# Patient Record
Sex: FEMALE | Race: BLACK OR AFRICAN AMERICAN | NOT HISPANIC OR LATINO | Employment: OTHER | URBAN - METROPOLITAN AREA
[De-identification: names, ages, dates, MRNs, and addresses within clinical notes are randomized per-mention and may not be internally consistent; named-entity substitution may affect disease eponyms.]

---

## 2017-01-03 RX ORDER — TIZANIDINE 2 MG/1
TABLET ORAL
Qty: 60 TABLET | Refills: 0 | Status: SHIPPED | OUTPATIENT
Start: 2017-01-03 | End: 2017-02-01 | Stop reason: SDUPTHER

## 2017-01-11 ENCOUNTER — TELEPHONE (OUTPATIENT)
Dept: SLEEP MEDICINE | Facility: CLINIC | Age: 52
End: 2017-01-11

## 2017-01-13 ENCOUNTER — HOSPITAL ENCOUNTER (EMERGENCY)
Facility: OTHER | Age: 52
Discharge: HOME | End: 2017-01-13
Attending: EMERGENCY MEDICINE
Payer: MEDICAID

## 2017-01-13 VITALS
BODY MASS INDEX: 38.99 KG/M2 | HEART RATE: 68 BPM | DIASTOLIC BLOOD PRESSURE: 79 MMHG | WEIGHT: 234 LBS | TEMPERATURE: 99 F | SYSTOLIC BLOOD PRESSURE: 146 MMHG | OXYGEN SATURATION: 97 % | HEIGHT: 65 IN | RESPIRATION RATE: 20 BRPM

## 2017-01-13 DIAGNOSIS — J02.9 VIRAL PHARYNGITIS: ICD-10-CM

## 2017-01-13 DIAGNOSIS — J06.9 VIRAL URI: Primary | ICD-10-CM

## 2017-01-13 LAB — DEPRECATED S PYO AG THROAT QL EIA: NEGATIVE

## 2017-01-13 PROCEDURE — 87081 CULTURE SCREEN ONLY: CPT

## 2017-01-13 PROCEDURE — 87880 STREP A ASSAY W/OPTIC: CPT

## 2017-01-13 PROCEDURE — 99283 EMERGENCY DEPT VISIT LOW MDM: CPT

## 2017-01-13 NOTE — ED AVS SNAPSHOT
[facility]  [address]  [facility] [address]               Batool Rivera   2017  8:55 AM   ED    Description:  Female : 1965   Department:  [facility]           Your Care was Coordinated By:     Provider Role From To    Danny Travis MD Attending Provider 17 0913 --    Lindsey Guadarrama PA-C Physician Assistant 17 0859 --      Reason for Visit     Sore Throat           Diagnoses this Visit        Comments    Viral URI    -  Primary     Viral pharyngitis           ED Disposition     None           To Do List           Follow-up Information     Follow up with Karan James MD In 2 days.    Specialty:  Internal Medicine    Contact information:    [address]  [facility] [address]  [phone]        [facility]     [facility] -  Assistance  Registered nurses in the [facility] provide clinical advisement, health education, appointment booking, and other advisory services.  Call for this free service at [phone].             Medications           Message regarding Medications     Verify the changes and/or additions to your medication regime listed below are the same as discussed with your clinician today.  If any of these changes or additions are incorrect, please notify your healthcare provider.        STOP taking these medications     fluticasone (FLONASE) 50 mcg/actuation nasal spray 1 spray by Each Nare route 2 (two) times daily as needed for Rhinitis.    ibuprofen (ADVIL,MOTRIN) 800 MG tablet Take 1 tablet (800 mg total) by mouth every 6 (six) hours as needed for Pain.    ciclopirox (PENLAC) 8 % Soln            Verify that the below list of medications is an accurate representation of the medications you are currently taking.  If none reported, the list may be blank. If incorrect, please contact your healthcare provider. Carry this list with you in  "case of emergency.           Current Medications     amlodipine (NORVASC) 5 MG tablet Take 1 tablet (5 mg total) by mouth once daily.    aspirin 81 MG Chew Take 81 mg by mouth once daily.     atorvastatin (LIPITOR) 80 MG tablet Take 1 tablet (80 mg total) by mouth once daily.    ergocalciferol (ERGOCALCIFEROL) 50,000 unit Cap TAKE ONE CAPSULE BY MOUTH EVERY 7 DAYS    escitalopram oxalate (LEXAPRO) 20 MG tablet Take 1 tablet (20 mg total) by mouth once daily.    ezetimibe (ZETIA) 10 mg tablet Take 1 tablet (10 mg total) by mouth once daily.    gabapentin (NEURONTIN) 300 MG capsule Take 300 mg by mouth 3 (three) times daily.     hydrocodone-acetaminophen 10-325mg (NORCO)  mg Tab Take 1 tablet by mouth 3 (three) times daily.      hydrOXYzine (ATARAX) 25 MG tablet Take 1 tablet (25 mg total) by mouth every 6 (six) hours as needed for Itching.    lisinopril (PRINIVIL,ZESTRIL) 40 MG tablet Take 1 tablet (40 mg total) by mouth once daily.    tizanidine (ZANAFLEX) 2 MG tablet TAKE 1 TO 2 TABLETS BY MOUTH EVERY NIGHT AT BEDTIME AS NEEDED FOR NECK PAIN    triamcinolone acetonide 0.1% (KENALOG) 0.1 % ointment            Clinical Reference Information           Your Vitals Were     BP Pulse Temp Resp Height Weight    142/85 (BP Location: Left arm, Patient Position: Sitting) 61 98.7 °F (37.1 °C) (Oral) 20 5' 5" (1.651 m) 106.1 kg (234 lb)    SpO2 BMI             96% 38.94 kg/m2         Allergies as of 1/13/2017     No Known Allergies      Immunizations Administered on Date of Encounter - 1/13/2017     None      ED Micro, Lab, POCT     Start Ordered       Status Ordering Provider    01/13/17 0929 01/13/17 0929  Rapid strep screen  STAT      Final result     01/13/17 0929 01/13/17 0929  Strep A culture, throat  Once      In process       ED Imaging Orders     None        Discharge Instructions         When You Have a Sore Throat  A sore throat can be painful. There are many reasons why you may have a sore throat. Your " healthcare provider will work with you to find the cause of your sore throat. He or she will also find the best treatment for you.      What Causes a Sore Throat?  Sore throats can be caused or worsened by:  · Cold or flu viruses  · Bacteria  · Irritants such as tobacco smoke  · Acid reflux  A Healthy Throat  The tonsils are on the sides of the throat near the base of the tongue. They collect viruses and bacteria and help fight infection. The throat (pharynx) is the passage for air. Mucus from the nasal cavity also moves down the passage.  An Inflamed Throat  The tonsils and pharynx can become inflamed due to a cold or flu virus. Postnasal drip (excess mucus draining from the nasal cavity) can irritate the throat. It can also make the throat or tonsils more likely to be infected by bacteria. Severe, untreated tonsillitis in children or adults can cause a pocket of pus (abscess) to form near the tonsil.  Your Evaluation  A medical evaluation can help find the cause of your sore throat. It can also help your healthcare provider choose the best treatment for you. The evaluation may include a health history, physical exam, and diagnostic tests.  Health History  Your healthcare provider may ask you the following:  · How long has the sore throat lasted and how have you been treating it?  · Do you have any other symptoms, such as body aches, fever, or cough?  · Does your sore throat recur? If so, how often? How many days of school or work have you missed because of a sore throat?  · Do you have trouble eating or swallowing?  · Have you been told that you snore or have other sleep problems?  · Do you have bad breath?  · Do you cough up bad-tasting mucus?  Physical Exam  During the exam, your healthcare provider checks your ears, nose, and throat for problems. He or she also checks for swelling in the neck, and may listen to your chest.  Possible Tests  Other tests your healthcare provider may perform include:  · A throat  swab to check for bacteria such as streptococcus (the bacteria that causes strep throat)  · A blood test to check for mononucleosis (a viral infection)  · A chest x-ray to rule out pneumonia, especially if you have a cough  Treating a Sore Throat  Treatment depends on many factors. What is the likely cause? Is the problem recent? Does it keep coming back? In many cases, the best thing to do is to treat the symptoms, rest, and let the problem heal itself. Antibiotics may help clear up some infections. For cases of severe or recurring tonsillitis, the tonsils may need to be removed.  Relieving Your Symptoms  · Dont smoke, and avoid secondhand smoke.  · For children, try throat sprays or Popsicles. Adults and older children may try lozenges.  · Drink warm liquids to soothe the throat and help thin mucus. Avoid alcohol, spicy foods, and acidic drinks such as orange juice. These can irritate the throat.  · Gargle with warm saltwater (1 teaspoon of salt to 8 ounces of warm water).  · Use a humidifier to keep air moist and relieve throat dryness.  · Try over-the-counter pain relievers such as acetaminophen or ibuprofen. Use as directed, and dont exceed the recommended dose. Dont give aspirin to children.   Are Antibiotics Needed?  If your sore throat is due to a bacterial infection, antibiotics may speed healing and prevent complications. But most sore throats are caused by cold or flu viruses. And antibiotics dont treat viral illness. In fact, using antibiotics when theyre not needed may produce bacteria that are harder to kill. Your healthcare provider will prescribe antibiotics only if he or she thinks they are likely to help.  If Antibiotics Are Prescribed  Take the medication exactly as directed. Be sure to finish your prescription even if youre feeling better.  And be sure to ask your healthcare provider or pharmacist what side effects are common and what to do about them.  Is Surgery Needed?  In some cases,  tonsils need to be removed. This is often done as outpatient (same-day) surgery. Your healthcare provider may advise removing the tonsils in cases of:  · Several severe bouts of tonsillitis in a year. Severe episodes include those that lead to missed days of school or work, or that need to be treated with antibiotics.  · Tonsillitis that causes breathing problems during sleep.  · Tonsillitis caused by food particles collecting in pouches in the tonsils (cryptic tonsillitis).  Call your healthcare provider if any of the following occur:  · Symptoms worsen, or new symptoms develop.  · Swollen tonsils make breathing difficult.  · The pain is severe enough to keep you from drinking liquids.  · A skin rash, hives, or wheezing develops. Any of these could signal an allergic reaction to antibiotics.  · Symptoms dont improve within a week.  · Symptoms dont improve within 2-3 days of starting antibiotics.   © 6323-8979 [employer]. [address] [place] [address]. All rights reserved. This information is not intended as a substitute for professional medical care. Always follow your healthcare professional's instructions.          Viral Upper Respiratory Illness (Adult)  You have a viral upper respiratory illness (URI), which is another term for the common cold. This illness is contagious during the first few days. It is spread through the air by coughing and sneezing. It may also be spread by direct contact (touching the sick person and then touching your own eyes, nose, or mouth). Frequent handwashing will decrease risk of spread. Most viral illnesses go away within 7 to 10 days with rest and simple home remedies. Sometimes the illness may last for several weeks. Antibiotics will not kill a virus, and they are generally not prescribed for this condition.    Home care  · If symptoms are severe, rest at home for the first 2 to 3 days. When you resume activity, don't let yourself get too  tired.  · Avoid being exposed to cigarette smoke (yours or others).  · You may use acetaminophen or ibuprofen to control pain and fever, unless another medicine was prescribed. (Note: If you have chronic liver or kidney disease, have ever had a stomach ulcer or gastrointestinal bleeding, or are taking blood-thinning medicines, talk with your healthcare provider before using these medicines.) Aspirin should never be given to anyone under 18 years of age who is ill with a viral infection or fever. It may cause severe liver or brain damage.  · Your appetite may be poor, so a light diet is fine. Avoid dehydration by drinking 6 to 8 glasses of fluids per day (water, soft drinks, juices, tea, or soup). Extra fluids will help loosen secretions in the nose and lungs.  · Over-the-counter cold medicines will not shorten the length of time youre sick, but they may be helpful for the following symptoms: cough, sore throat, and nasal and sinus congestion. (Note: Do not use decongestants if you have high blood pressure.)  Follow-up care  Follow up with your healthcare provider, or as advised.  When to seek medical advice  Call your healthcare provider right away if any of these occur:  · Cough with lots of colored sputum (mucus)  · Severe headache; face, neck, or ear pain  · Difficulty swallowing due to throat pain  · Fever of 100.4°F (38°C)  Call 911, or get immediate medical care  Call emergency services right away if any of these occur:  · Chest pain, shortness of breath, wheezing, or difficulty breathing  · Coughing up blood  · Inability to swallow due to throat pain  © 1024-3731 [employer]. [address] [place] [address], [place] [address]. All rights reserved. This information is not intended as a substitute for professional medical care. Always follow your healthcare professional's instructions.          Your Scheduled Appointments     Feb 16, 2017  9:40 AM CST   Established Patient Visit with Karan James  MD [facility] - Internal Medicine ([facility])    [address]  [facility] [id number] [phone]              [facility] Sign-Up     Activating your [facility] account is as easy as 1-2-3!     1) Visit [email or web address], select Sign Up Now, enter this activation code and your date of birth, then select Next.  [email or web address]  Expires: 2/27/2017 10:20 AM      2) Create a username and password to use when you visit [facility] in the future and select a security question in case you lose your password and select Next.    3) Enter your e-mail address and click Sign Up!    Additional Information  If you have questions, please e-mail [email or web address] or call [phone] to talk to our [facility] staff. Remember, [facility] is NOT to be used for urgent needs. For medical emergencies, dial 911.         Smoking Cessation     If you would like to quit smoking:   You may be eligible for free services if you are a [place] resident and started smoking cigarettes before September 1, 1988.  Call the Smoking Cessation Trust ([facility] [place]) toll free at ([phone] or (960) 643-8020.   Call 2-984-QUIT-NOW if you do not meet the above criteria.             [facility] complies with applicable Federal civil rights laws and does not discriminate on the basis of race, color, national origin, age, disability, or sex.        Language Assistance Services     ATTENTION: Language assistance services are available, free of charge. Please call [phone].      ATENCIÓN: Si habla español, tiene a tubbs disposición servicios gratuitos de asistencia lingüística. Llame al [phone].     CHÚ Ý: N?u b?n nói Ti?ng Vi?t, có các d?ch v? h? tr? ngôn ng? mi?n phí dành cho b?n. G?i s? [phone].

## 2017-01-13 NOTE — DISCHARGE INSTRUCTIONS
When You Have a Sore Throat  A sore throat can be painful. There are many reasons why you may have a sore throat. Your healthcare provider will work with you to find the cause of your sore throat. He or she will also find the best treatment for you.      What Causes a Sore Throat?  Sore throats can be caused or worsened by:  · Cold or flu viruses  · Bacteria  · Irritants such as tobacco smoke  · Acid reflux  A Healthy Throat  The tonsils are on the sides of the throat near the base of the tongue. They collect viruses and bacteria and help fight infection. The throat (pharynx) is the passage for air. Mucus from the nasal cavity also moves down the passage.  An Inflamed Throat  The tonsils and pharynx can become inflamed due to a cold or flu virus. Postnasal drip (excess mucus draining from the nasal cavity) can irritate the throat. It can also make the throat or tonsils more likely to be infected by bacteria. Severe, untreated tonsillitis in children or adults can cause a pocket of pus (abscess) to form near the tonsil.  Your Evaluation  A medical evaluation can help find the cause of your sore throat. It can also help your healthcare provider choose the best treatment for you. The evaluation may include a health history, physical exam, and diagnostic tests.  Health History  Your healthcare provider may ask you the following:  · How long has the sore throat lasted and how have you been treating it?  · Do you have any other symptoms, such as body aches, fever, or cough?  · Does your sore throat recur? If so, how often? How many days of school or work have you missed because of a sore throat?  · Do you have trouble eating or swallowing?  · Have you been told that you snore or have other sleep problems?  · Do you have bad breath?  · Do you cough up bad-tasting mucus?  Physical Exam  During the exam, your healthcare provider checks your ears, nose, and throat for problems. He or she also checks for swelling in the  neck, and may listen to your chest.  Possible Tests  Other tests your healthcare provider may perform include:  · A throat swab to check for bacteria such as streptococcus (the bacteria that causes strep throat)  · A blood test to check for mononucleosis (a viral infection)  · A chest x-ray to rule out pneumonia, especially if you have a cough  Treating a Sore Throat  Treatment depends on many factors. What is the likely cause? Is the problem recent? Does it keep coming back? In many cases, the best thing to do is to treat the symptoms, rest, and let the problem heal itself. Antibiotics may help clear up some infections. For cases of severe or recurring tonsillitis, the tonsils may need to be removed.  Relieving Your Symptoms  · Dont smoke, and avoid secondhand smoke.  · For children, try throat sprays or Popsicles. Adults and older children may try lozenges.  · Drink warm liquids to soothe the throat and help thin mucus. Avoid alcohol, spicy foods, and acidic drinks such as orange juice. These can irritate the throat.  · Gargle with warm saltwater (1 teaspoon of salt to 8 ounces of warm water).  · Use a humidifier to keep air moist and relieve throat dryness.  · Try over-the-counter pain relievers such as acetaminophen or ibuprofen. Use as directed, and dont exceed the recommended dose. Dont give aspirin to children.   Are Antibiotics Needed?  If your sore throat is due to a bacterial infection, antibiotics may speed healing and prevent complications. But most sore throats are caused by cold or flu viruses. And antibiotics dont treat viral illness. In fact, using antibiotics when theyre not needed may produce bacteria that are harder to kill. Your healthcare provider will prescribe antibiotics only if he or she thinks they are likely to help.  If Antibiotics Are Prescribed  Take the medication exactly as directed. Be sure to finish your prescription even if youre feeling better.  And be sure to ask your  healthcare provider or pharmacist what side effects are common and what to do about them.  Is Surgery Needed?  In some cases, tonsils need to be removed. This is often done as outpatient (same-day) surgery. Your healthcare provider may advise removing the tonsils in cases of:  · Several severe bouts of tonsillitis in a year. Severe episodes include those that lead to missed days of school or work, or that need to be treated with antibiotics.  · Tonsillitis that causes breathing problems during sleep.  · Tonsillitis caused by food particles collecting in pouches in the tonsils (cryptic tonsillitis).  Call your healthcare provider if any of the following occur:  · Symptoms worsen, or new symptoms develop.  · Swollen tonsils make breathing difficult.  · The pain is severe enough to keep you from drinking liquids.  · A skin rash, hives, or wheezing develops. Any of these could signal an allergic reaction to antibiotics.  · Symptoms dont improve within a week.  · Symptoms dont improve within 2-3 days of starting antibiotics.   © 5001-6346 [employer]. [address] [place] [address]. All rights reserved. This information is not intended as a substitute for professional medical care. Always follow your healthcare professional's instructions.          Viral Upper Respiratory Illness (Adult)  You have a viral upper respiratory illness (URI), which is another term for the common cold. This illness is contagious during the first few days. It is spread through the air by coughing and sneezing. It may also be spread by direct contact (touching the sick person and then touching your own eyes, nose, or mouth). Frequent handwashing will decrease risk of spread. Most viral illnesses go away within 7 to 10 days with rest and simple home remedies. Sometimes the illness may last for several weeks. Antibiotics will not kill a virus, and they are generally not prescribed for this condition.    Home care  · If  symptoms are severe, rest at home for the first 2 to 3 days. When you resume activity, don't let yourself get too tired.  · Avoid being exposed to cigarette smoke (yours or others).  · You may use acetaminophen or ibuprofen to control pain and fever, unless another medicine was prescribed. (Note: If you have chronic liver or kidney disease, have ever had a stomach ulcer or gastrointestinal bleeding, or are taking blood-thinning medicines, talk with your healthcare provider before using these medicines.) Aspirin should never be given to anyone under 18 years of age who is ill with a viral infection or fever. It may cause severe liver or brain damage.  · Your appetite may be poor, so a light diet is fine. Avoid dehydration by drinking 6 to 8 glasses of fluids per day (water, soft drinks, juices, tea, or soup). Extra fluids will help loosen secretions in the nose and lungs.  · Over-the-counter cold medicines will not shorten the length of time youre sick, but they may be helpful for the following symptoms: cough, sore throat, and nasal and sinus congestion. (Note: Do not use decongestants if you have high blood pressure.)  Follow-up care  Follow up with your healthcare provider, or as advised.  When to seek medical advice  Call your healthcare provider right away if any of these occur:  · Cough with lots of colored sputum (mucus)  · Severe headache; face, neck, or ear pain  · Difficulty swallowing due to throat pain  · Fever of 100.4°F (38°C)  Call 911, or get immediate medical care  Call emergency services right away if any of these occur:  · Chest pain, shortness of breath, wheezing, or difficulty breathing  · Coughing up blood  · Inability to swallow due to throat pain  © 8766-5494 [employer]. [address] [place] [address], [place] [address]. All rights reserved. This information is not intended as a substitute for professional medical care. Always follow your healthcare professional's  instructions.

## 2017-01-13 NOTE — ED PROVIDER NOTES
Encounter Date: 2017       History     Chief Complaint   Patient presents with    Sore Throat     Pt c/o sore throat, diffuculty swallowing, horseness & right ear ache. Pt c/o having cold symptoms since , reports symtpms worsening.     Review of patient's allergies indicates:  No Known Allergies  HPI Comments: Patient is a 51-year-old female with history of hypertension and hyperlipidemia who presents to the emergency department with sore throat.  Patient states over the last days she has had cold symptoms.  Patient reports congestion and rhinorrhea.  Patient reports postnasal drip.  Patient reports hoarseness and painful swallowing.  Patient states her right ear feels very full.  Patient reports a throbbing sensation.  Patient denies cough with sputum production.  Patient denies chest pain or shortness of breath.  Patient denies fevers or chills.  Patient denies headache or nuchal rigidity.    The history is provided by the patient.     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     Heart disease, unspecified     Hyperlipidemia     Hypertension      Past Medical History Pertinent Negatives   Diagnosis Date Noted    Abnormal Pap smear 2014     Past Surgical History   Procedure Laterality Date     section      Hysterectomy   or     Oophorectomy       one ovary was removed     Family History   Problem Relation Age of Onset    Hypertension Mother     Heart disease Mother     Asthma Mother     Cataracts Mother     Glaucoma Mother     Brain cancer Father     Stroke Brother     Kidney disease Brother     Glaucoma Sister     Diabetes Sister     Blindness Neg Hx     Macular degeneration Neg Hx     Retinal detachment Neg Hx      Social History   Substance Use Topics    Smoking status: Former Smoker     Packs/day: 2.00     Years: 28.00     Quit  date: 8/24/2011    Smokeless tobacco: Never Used    Alcohol use No     Review of Systems   Constitutional: Negative for activity change, appetite change, chills, fatigue and fever.   HENT: Positive for congestion, ear pain, postnasal drip, rhinorrhea, sore throat and trouble swallowing. Negative for ear discharge and mouth sores.    Eyes: Negative for photophobia and visual disturbance.   Respiratory: Negative for cough and shortness of breath.    Cardiovascular: Negative for chest pain.   Gastrointestinal: Negative for abdominal pain, blood in stool, constipation, diarrhea, nausea and vomiting.   Genitourinary: Negative for dysuria, flank pain and hematuria.   Musculoskeletal: Negative for back pain, neck pain and neck stiffness.   Skin: Negative for rash and wound.   Neurological: Negative for dizziness, syncope, speech difficulty, weakness, light-headedness and headaches.       Physical Exam   Initial Vitals   BP Pulse Resp Temp SpO2   01/13/17 0836 01/13/17 0836 01/13/17 0836 01/13/17 0836 01/13/17 0836   142/85 61 20 98.7 °F (37.1 °C) 96 %     Physical Exam    Nursing note and vitals reviewed.  Constitutional: She appears well-developed and well-nourished. She is not diaphoretic.  Non-toxic appearance. No distress.   HENT:   Head: Normocephalic.   Right Ear: Hearing and external ear normal. A middle ear effusion is present.   Left Ear: Hearing, tympanic membrane, external ear and ear canal normal.   Nose: Mucosal edema present.   Mouth/Throat: Uvula is midline and mucous membranes are normal. Mucous membranes are not pale. No trismus in the jaw. No uvula swelling. Oropharyngeal exudate and posterior oropharyngeal erythema present. No posterior oropharyngeal edema or tonsillar abscesses.   Eyes: Conjunctivae are normal. Pupils are equal, round, and reactive to light.   Neck: Normal range of motion and full passive range of motion without pain. Neck supple. Normal range of motion present. No rigidity.    Cardiovascular: Normal rate and regular rhythm.   Pulmonary/Chest: Breath sounds normal. No respiratory distress. She has no wheezes. She has no rhonchi. She has no rales. She exhibits no tenderness.   Abdominal: Soft. Bowel sounds are normal. She exhibits no distension and no mass. There is no tenderness. There is no rebound and no guarding.   Lymphadenopathy:     She has cervical adenopathy.   Neurological: She is alert and oriented to person, place, and time.   Skin: Skin is warm and dry.   Psychiatric: She has a normal mood and affect.         ED Course   Procedures  Labs Reviewed   THROAT SCREEN, RAPID             Medical Decision Making:   Initial Assessment:   Evaluation of a 51-year-old female who presents to the emergency department with sore throat.  Pt is afebrile, nontoxic appearing, and hemodynamically stable.  On exam, there is tonsillar exudate.  There is oropharyngeal erythema.  Uvula is midline.  No evidence of peritonsillar abscess.  There is nasal mucosal edema.  There is a right middle ear effusion with no evidence of infection.  Rapid strep will be obtained.  Patient be given Decadron.  ED Management:  10:16 AM  Negative strep test.  Patient has viral URI.  Patient is advised to follow-up with PCP or return to the emergency department with any worsening symptoms or concerns.  Other:   I have discussed this case with another health care provider.       <> Summary of the Discussion: Dr. Travis                   ED Course     Clinical Impression:   The primary encounter diagnosis was Viral URI. A diagnosis of Viral pharyngitis was also pertinent to this visit.          Lindsey Guadarrama PA-C  01/13/17 1021

## 2017-01-13 NOTE — ED NOTES
Patient Identifiers for Batool Rivera checked and correct  LOC: The patient is awake, alert and aware of environment with an appropriate affect, the patient is oriented x 3 and speaking appropriate.  APPEARANCE: Patient resting comfortably and in no acute distress. The patient is clean and well groomed. The patient's clothing is properly fastened.  SKIN: The skin is warm and dry. The patient has normal skin turgor and moist mucus membranes.   Musculoskeletal :  Normal range of motion noted. Moves all extremities well,  RESPIRATORY: Airway is open and patent, respirations are spontaneous, patient has a normal effort and rate. Breath sounds are clear & equal, bilaterally.  CARDIAC: Patient has a normal rate and rhythm, no peripheral edema noted, capillary refill < 3 seconds.   PULSES: 2+ radial & pedal pulses, symmetrical in all extremities.  ENT: No tonsillar exudate, mild erythema. Right ear canal redness noted. TM intact & white.    Will continue to monitor

## 2017-01-15 LAB — BACTERIA THROAT CULT: NORMAL

## 2017-01-20 DIAGNOSIS — E55.9 VITAMIN D DEFICIENCY: ICD-10-CM

## 2017-01-20 RX ORDER — ERGOCALCIFEROL 1.25 MG/1
CAPSULE ORAL
Qty: 12 CAPSULE | Refills: 0 | Status: SHIPPED | OUTPATIENT
Start: 2017-01-20 | End: 2017-04-13 | Stop reason: SDUPTHER

## 2017-01-20 RX ORDER — ERGOCALCIFEROL 1.25 MG/1
CAPSULE ORAL
Qty: 12 CAPSULE | Refills: 0 | OUTPATIENT
Start: 2017-01-20

## 2017-02-01 RX ORDER — TIZANIDINE 2 MG/1
TABLET ORAL
Qty: 60 TABLET | Refills: 0 | Status: SHIPPED | OUTPATIENT
Start: 2017-02-01 | End: 2017-02-28 | Stop reason: SDUPTHER

## 2017-02-13 ENCOUNTER — OFFICE VISIT (OUTPATIENT)
Dept: OBSTETRICS AND GYNECOLOGY | Facility: CLINIC | Age: 52
End: 2017-02-13
Payer: MEDICAID

## 2017-02-13 VITALS
WEIGHT: 235.88 LBS | DIASTOLIC BLOOD PRESSURE: 70 MMHG | BODY MASS INDEX: 39.3 KG/M2 | SYSTOLIC BLOOD PRESSURE: 130 MMHG | HEIGHT: 65 IN

## 2017-02-13 DIAGNOSIS — N39.41 URGE URINARY INCONTINENCE: ICD-10-CM

## 2017-02-13 DIAGNOSIS — Z01.419 WELL WOMAN EXAM WITH ROUTINE GYNECOLOGICAL EXAM: Primary | ICD-10-CM

## 2017-02-13 PROCEDURE — 87086 URINE CULTURE/COLONY COUNT: CPT

## 2017-02-13 PROCEDURE — 99214 OFFICE O/P EST MOD 30 MIN: CPT | Mod: PBBFAC | Performed by: OBSTETRICS & GYNECOLOGY

## 2017-02-13 PROCEDURE — 88175 CYTOPATH C/V AUTO FLUID REDO: CPT

## 2017-02-13 PROCEDURE — 99396 PREV VISIT EST AGE 40-64: CPT | Mod: S$PBB,,, | Performed by: OBSTETRICS & GYNECOLOGY

## 2017-02-13 PROCEDURE — 99999 PR PBB SHADOW E&M-EST. PATIENT-LVL IV: CPT | Mod: PBBFAC,,, | Performed by: OBSTETRICS & GYNECOLOGY

## 2017-02-13 NOTE — MR AVS SNAPSHOT
[facility], [place], operated by [facility] [address]  [address]  [address]  [place] [address]  Phone: [phone]  Fax: [phone]                  Batool Rivera   2017 11:15 AM   Office Visit    Description:  Female : 1965   Provider:  Clare Forrest MD   Department:  [facility], [place], operated [facility] - [facility] [address]           Reason for Visit     Well Woman           Diagnoses this Visit        Comments    Well woman exam with routine gynecological exam    -  Primary     Urge urinary incontinence                To Do List           Future Appointments        Provider Department Dept Phone    2017 9:40 AM Karan James MD [facility] - Internal Medicine [phone]    3/13/2017 1:00 PM [id number], [place] [phone]      Goals (5 Years of Data)              9/16/16    3/21/16    3/17/14    HDL > 40   40  36  35    LDL CALC < 130   157.6  245.6  111.4    Reduce fat intake to <75 grams per day             Follow-Up and Disposition     Return in about 1 year (around 2018) for annual or prn.    Follow-up and Disposition History      [facility]     [facility] -  Assistance  Registered nurses in the [facility] provide clinical advisement, health education, appointment booking, and other advisory services.  Call for this free service at [phone].             Medications           Message regarding Medications     Verify the changes and/or additions to your medication regime listed below are the same as discussed with your clinician today.  If any of these changes or additions are incorrect, please notify your healthcare provider.             Verify that the below list of medications is an accurate representation of the medications you are currently taking.  If none reported, the list may be blank. If incorrect, please contact your healthcare provider. Carry this list with you in case of emergency.           Current Medications     amlodipine  "(NORVASC) 5 MG tablet Take 1 tablet (5 mg total) by mouth once daily.    aspirin 81 MG Chew Take 81 mg by mouth once daily.     atorvastatin (LIPITOR) 80 MG tablet Take 1 tablet (80 mg total) by mouth once daily.    ergocalciferol (ERGOCALCIFEROL) 50,000 unit Cap TAKE ONE CAPSULE BY MOUTH EVERY 7 DAYS    escitalopram oxalate (LEXAPRO) 20 MG tablet Take 1 tablet (20 mg total) by mouth once daily.    ezetimibe (ZETIA) 10 mg tablet Take 1 tablet (10 mg total) by mouth once daily.    gabapentin (NEURONTIN) 300 MG capsule Take 300 mg by mouth 3 (three) times daily.     hydrocodone-acetaminophen 10-325mg (NORCO)  mg Tab Take 1 tablet by mouth 3 (three) times daily.      hydrOXYzine (ATARAX) 25 MG tablet Take 1 tablet (25 mg total) by mouth every 6 (six) hours as needed for Itching.    lisinopril (PRINIVIL,ZESTRIL) 40 MG tablet Take 1 tablet (40 mg total) by mouth once daily.    tizanidine (ZANAFLEX) 2 MG tablet TAKE 1 TO 2 TABLETS BY MOUTH EVERY NIGHT AT BEDTIME AS NEEDED FOR NECK PAIN    triamcinolone acetonide 0.1% (KENALOG) 0.1 % ointment            Clinical Reference Information           Your Vitals Were     BP Height Weight BMI       130/70 5' 5" (1.651 m) 107 kg (235 lb 14.3 oz) 39.25 kg/m2       Blood Pressure          Most Recent Value    BP  130/70      Allergies as of 2/13/2017     No Known Allergies      Immunizations Administered on Date of Encounter - 2/13/2017     None      Orders Placed During Today's Visit      Normal Orders This Visit    Liquid-based pap smear, screening     Urine culture     Future Labs/Procedures Expected by Expires    Mammo Digital Screening Bilat with CAD  2/13/2017 4/16/2018      Instructions      Kegel Exercises  Kegel exercises dont need special clothing or equipment. Theyre easy to learn and simple to do. And if you do them right, no one can tell youre doing them, so they can be done almost anywhere. Your healthcare provider, nurse, or physical therapist can answer any " questions you have and help you get started.    A weak pelvic floor   The pelvic floor muscles may weaken due to aging, pregnancy and vaginal childbirth, injury, surgery, chronic cough, or lack of exercise. If the pelvic floor is weak, your bladder and other pelvic organs may sag out of place. The urethra may also open too easily and allow urine to leak out. Kegel exercises can help you strengthen your pelvic floor muscles. Then they can better support the pelvic organs and control urine flow.  How Kegel exercises are done  Try each of the Kegel exercises described below. When youre doing them, try not to move your leg, buttock, or stomach muscles.  · Contract as if you were stopping your urine stream. But do it when youre not urinating.  · Tighten your rectum as if trying not to pass gas. Contract your anus, but dont move your buttocks.  · You may place a finger or 2 in the vagina and squeeze your finger with your vagina to learn which muscles to tighten.  Try to hold each Kegel for a slow count to 5. You probably wont be able to hold them for that long at first. But keep practicing. It will get easier as your pelvic floor gets stronger. Eventually, special weights that you place in your vagina may be recommended to help make your Kegels even more effective. Visit your healthcare provider if you have difficulties doing Kegel exercises.  Helpful hints  Here are some tips to follow:  · Do your Kegels as often as you can. The more you do them, the faster youll feel the results.  · Pick an activity you do often as a reminder. For instance, do your Kegels every time you sit down.  · Tighten your pelvic floor before you sneeze, get up from a chair, cough, laugh, or lift. This protects your pelvic floor from injury and can help prevent urine leakage.   Date Last Reviewed: 8/5/2015  © 0562-6166 [employer]. [address] [place] [address], [place] [address]. All rights reserved. This information is not intended  as a substitute for professional medical care. Always follow your healthcare professional's instructions.    Bladder Irritants  Certain foods and drinks have been associated with worsening symptoms of urinary frequency, urgency, urge incontinence, or bladder pain. If you suffer from any of these conditions, you may wish to try eliminating one or more of these foods from your diet and see if your symptoms improve. If bladder symptoms are related to dietary factors, strict adherence to a diet thateliminates the food should bring marked relief in 10 days. Once you are feeling better, you can begin to add foods back into your diet, one at a time. If symptoms return, you will be able to identify the irritant. As you add foods back to your diet it is very important that you drink significant amounts of water.    -----------------------------------------------------------------------------------------------  List of Common Bladder Irritants*  Alcoholic beverages  Apples and apple juice  Cantaloupe  Carbonated beverages  Chili and spicy foods  Chocolate  Citrus fruit  Coffee (including decaffeinated)  Cranberries and cranberry juice  Grapes  Guava  Milk Products: milk, cheese, cottage cheese, yogurt, ice cream  Peaches  Pineapple  Plums  Strawberries  Sugar especially artificial sweeteners, saccharin, aspartame, corn sweeteners, honey, fructose, sucrose, lactose  Tea  Tomatoes and tomato juice  Vitamin B complex  Vinegar  *Most people are not sensitive to ALL of these products; your goal is to find the foods that make YOUR symptoms worse.  ---------------------------------------------------------------------------------------------------    Low-acid fruit substitutions include apricots, papaya, pears and watermelon. Coffee drinkers can drink Kava or other lowacid instant drinks. Tea drinkers can substitute non-citrus herbal and sun brewed teas. Calcium carbonate co-buffered with calcium ascorbate can be substituted for  Vitamin C. Prelief is a dietary supplement that works as an acid blocker for the bladder.    Where to get more information:        Overcoming Bladder Disorders by Carmen Villalobos and Valerie Carlisle, 1990        You Dont Have to Live with Cystitis! By Mary Beth Cool, 1988  · http://[email or web address]             Language Assistance Services     ATTENTION: Language assistance services are available, free of charge. Please call [phone].      ATENCIÓN: Si habla español, tiene a tubbs disposición servicios gratuitos de asistencia lingüística. Llame al [phone].     CHÚ Ý: N?u b?n nói Ti?ng Vi?t, có các d?ch v? h? tr? ngôn ng? mi?n phí dành cho b?n. G?i s? [phone].         [facility] [address] complies with applicable Federal civil rights laws and does not discriminate on the basis of race, color, national origin, age, disability, or sex.

## 2017-02-13 NOTE — PATIENT INSTRUCTIONS
Kegel Exercises  Kegel exercises dont need special clothing or equipment. Theyre easy to learn and simple to do. And if you do them right, no one can tell youre doing them, so they can be done almost anywhere. Your healthcare provider, nurse, or physical therapist can answer any questions you have and help you get started.    A weak pelvic floor   The pelvic floor muscles may weaken due to aging, pregnancy and vaginal childbirth, injury, surgery, chronic cough, or lack of exercise. If the pelvic floor is weak, your bladder and other pelvic organs may sag out of place. The urethra may also open too easily and allow urine to leak out. Kegel exercises can help you strengthen your pelvic floor muscles. Then they can better support the pelvic organs and control urine flow.  How Kegel exercises are done  Try each of the Kegel exercises described below. When youre doing them, try not to move your leg, buttock, or stomach muscles.  · Contract as if you were stopping your urine stream. But do it when youre not urinating.  · Tighten your rectum as if trying not to pass gas. Contract your anus, but dont move your buttocks.  · You may place a finger or 2 in the vagina and squeeze your finger with your vagina to learn which muscles to tighten.  Try to hold each Kegel for a slow count to 5. You probably wont be able to hold them for that long at first. But keep practicing. It will get easier as your pelvic floor gets stronger. Eventually, special weights that you place in your vagina may be recommended to help make your Kegels even more effective. Visit your healthcare provider if you have difficulties doing Kegel exercises.  Helpful hints  Here are some tips to follow:  · Do your Kegels as often as you can. The more you do them, the faster youll feel the results.  · Pick an activity you do often as a reminder. For instance, do your Kegels every time you sit down.  · Tighten your pelvic floor before you sneeze, get up  from a chair, cough, laugh, or lift. This protects your pelvic floor from injury and can help prevent urine leakage.   Date Last Reviewed: 8/5/2015 © 2000-2016 [employer]. [address] [place] [address], [place] [address]. All rights reserved. This information is not intended as a substitute for professional medical care. Always follow your healthcare professional's instructions.    Bladder Irritants  Certain foods and drinks have been associated with worsening symptoms of urinary frequency, urgency, urge incontinence, or bladder pain. If you suffer from any of these conditions, you may wish to try eliminating one or more of these foods from your diet and see if your symptoms improve. If bladder symptoms are related to dietary factors, strict adherence to a diet thateliminates the food should bring marked relief in 10 days. Once you are feeling better, you can begin to add foods back into your diet, one at a time. If symptoms return, you will be able to identify the irritant. As you add foods back to your diet it is very important that you drink significant amounts of water.    -----------------------------------------------------------------------------------------------  List of Common Bladder Irritants*  Alcoholic beverages  Apples and apple juice  Cantaloupe  Carbonated beverages  Chili and spicy foods  Chocolate  Citrus fruit  Coffee (including decaffeinated)  Cranberries and cranberry juice  Grapes  Guava  Milk Products: milk, cheese, cottage cheese, yogurt, ice cream  Peaches  Pineapple  Plums  Strawberries  Sugar especially artificial sweeteners, saccharin, aspartame, corn sweeteners, honey, fructose, sucrose, lactose  Tea  Tomatoes and tomato juice  Vitamin B complex  Vinegar  *Most people are not sensitive to ALL of these products; your goal is to find the foods that make YOUR symptoms  worse.  ---------------------------------------------------------------------------------------------------    Low-acid fruit substitutions include apricots, papaya, pears and watermelon. Coffee drinkers can drink Kava or other lowacid instant drinks. Tea drinkers can substitute non-citrus herbal and sun brewed teas. Calcium carbonate co-buffered with calcium ascorbate can be substituted for Vitamin C. Prelief is a dietary supplement that works as an acid blocker for the bladder.    Where to get more information:        Overcoming Bladder Disorders by Carmen Villalobos and Valerie Carlisle, 1990        You Dont Have to Live with Cystitis! By Mary Beth Cool, 1988  · http://[email or web address].[email or web address]

## 2017-02-13 NOTE — PROGRESS NOTES
History & Physical  Gynecology      SUBJECTIVE:     Chief Complaint: Well Woman       History of Present Illness:  Annual Exam-Postmenopausal  Patient presents for annual exam. The patient has complaints today of leaking urine.  She has difficulty with leaking when she feels the urge to urinate.  No leaking with cough/sneeze.  This problem has been occurring for several.  Patient denies post-menopausal vaginal bleeding.   The patient is not sexually active. The patient is not taking hormone replacement therapy.    GYN screening history: unsure of last pap date.  Patient had a hysterectomy for fibroids, but does recall having abnormal pap smears in her life; probably >15 years ago.  No pap record in legacy documents or epic. Last colonoscopy was , recommended repeat 10 years.  Last mammogram was , normal.      The patient participates in regular exercise: no.  The patient is taking calcium and vitamin D supplementation.  She does not smoke.     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     Heart disease, unspecified     Hyperlipidemia     Hypertension      Past Surgical History   Procedure Laterality Date     section      Hysterectomy   or     Oophorectomy       one ovary was removed     OB History      Para Term  AB TAB SAB Ectopic Multiple Living    2         2        Family History   Problem Relation Age of Onset    Hypertension Mother     Heart disease Mother     Asthma Mother     Cataracts Mother     Glaucoma Mother     Brain cancer Father     Stroke Brother     Kidney disease Brother     Glaucoma Sister     Diabetes Sister     Blindness Neg Hx     Macular degeneration Neg Hx     Retinal  detachment Neg Hx      Social History   Substance Use Topics    Smoking status: Former Smoker     Packs/day: 2.00     Years: 28.00     Quit date: 8/24/2011    Smokeless tobacco: Never Used    Alcohol use No       Current Outpatient Prescriptions   Medication Sig    amlodipine (NORVASC) 5 MG tablet Take 1 tablet (5 mg total) by mouth once daily.    aspirin 81 MG Chew Take 81 mg by mouth once daily.     atorvastatin (LIPITOR) 80 MG tablet Take 1 tablet (80 mg total) by mouth once daily.    ergocalciferol (ERGOCALCIFEROL) 50,000 unit Cap TAKE ONE CAPSULE BY MOUTH EVERY 7 DAYS    escitalopram oxalate (LEXAPRO) 20 MG tablet Take 1 tablet (20 mg total) by mouth once daily.    ezetimibe (ZETIA) 10 mg tablet Take 1 tablet (10 mg total) by mouth once daily.    gabapentin (NEURONTIN) 300 MG capsule Take 300 mg by mouth 3 (three) times daily.     hydrocodone-acetaminophen 10-325mg (NORCO)  mg Tab Take 1 tablet by mouth 3 (three) times daily.      hydrOXYzine (ATARAX) 25 MG tablet Take 1 tablet (25 mg total) by mouth every 6 (six) hours as needed for Itching.    lisinopril (PRINIVIL,ZESTRIL) 40 MG tablet Take 1 tablet (40 mg total) by mouth once daily.    tizanidine (ZANAFLEX) 2 MG tablet TAKE 1 TO 2 TABLETS BY MOUTH EVERY NIGHT AT BEDTIME AS NEEDED FOR NECK PAIN    triamcinolone acetonide 0.1% (KENALOG) 0.1 % ointment      No current facility-administered medications for this visit.        Review of Systems:  Review of Systems   Constitutional: Negative for appetite change, fever and unexpected weight change.   Respiratory: Negative for shortness of breath.    Cardiovascular: Negative for chest pain.   Gastrointestinal: Negative for nausea and vomiting.   Genitourinary: Positive for urinary incontinence. Negative for dyspareunia, frequency, genital sores, pelvic pain, urgency, vaginal bleeding, vaginal discharge, vaginal pain, postcoital bleeding, postmenopausal bleeding and vaginal odor.        OBJECTIVE:      Physical Exam:  Physical Exam   Constitutional: She is oriented to person, place, and time. She appears well-developed and well-nourished.   Neck: Normal range of motion. Neck supple. No tracheal deviation present. No thyromegaly present.   Cardiovascular: Normal rate, regular rhythm and normal heart sounds.  Exam reveals no gallop and no friction rub.    No murmur heard.  Pulmonary/Chest: Effort normal and breath sounds normal. No respiratory distress. She has no wheezes. She has no rales. Right breast exhibits no inverted nipple, no mass, no nipple discharge, no skin change and no tenderness. Left breast exhibits no inverted nipple, no mass, no nipple discharge, no skin change and no tenderness. Breasts are symmetrical.   Abdominal: Soft. Bowel sounds are normal. She exhibits no distension. There is no tenderness. There is no rebound and no guarding.   Genitourinary: Vagina normal. No labial fusion. There is no rash, tenderness, lesion or injury on the right labia. There is no rash, tenderness, lesion or injury on the left labia. Right adnexum displays no mass, no tenderness and no fullness. Left adnexum displays no mass, no tenderness and no fullness. No erythema, tenderness or bleeding in the vagina. No foreign body in the vagina. No signs of injury around the vagina. No vaginal discharge found.   Neurological: She is alert and oriented to person, place, and time.   Psychiatric: She has a normal mood and affect. Her behavior is normal. Judgment and thought content normal.   Nursing note and vitals reviewed.        ASSESSMENT:       ICD-10-CM ICD-9-CM    1. Well woman exam with routine gynecological exam Z01.419 V72.31 Urine culture      Liquid-based pap smear, screening      Mammo Digital Screening Bilat with CAD   2. Urge urinary incontinence N39.41 788.31 Urine culture          Plan:      Batool was seen today for well woman.    Diagnoses and all orders for this visit:    Well woman exam with routine  gynecological exam  -     Urine culture  -     Liquid-based pap smear, screening  -     Mammo Digital Screening Bilat with CAD; Future    Urge urinary incontinence  -     Urine culture        Orders Placed This Encounter   Procedures    Urine culture    Mammo Digital Screening Bilat with CAD       Well Woman:   - Pap smear: of cuff done today as patient reports a history of an abnormal pap; will discontinue after this point  - Mammogram: ordered  - Colonoscopy: due 2023  - Dexa: due age 63  - Immunizations: none required  - Labs: primary care visit scheduled  - Calcium/Vitamin D counseling provided  - Exercise counseling provided    Urge urinary incontinence:   - counseled on behavior changes; instructions given; discussed options for medications as well; will try behavior first    Return in about 1 year (around 2/13/2018) for annual or prn.    Clare Forrest

## 2017-02-14 LAB — BACTERIA UR CULT: NORMAL

## 2017-02-16 ENCOUNTER — OFFICE VISIT (OUTPATIENT)
Dept: INTERNAL MEDICINE | Facility: CLINIC | Age: 52
End: 2017-02-16
Payer: MEDICAID

## 2017-02-16 VITALS
OXYGEN SATURATION: 99 % | HEIGHT: 65 IN | BODY MASS INDEX: 39.37 KG/M2 | DIASTOLIC BLOOD PRESSURE: 80 MMHG | WEIGHT: 236.31 LBS | HEART RATE: 53 BPM | TEMPERATURE: 98 F | SYSTOLIC BLOOD PRESSURE: 132 MMHG

## 2017-02-16 DIAGNOSIS — E78.5 HYPERLIPIDEMIA, UNSPECIFIED HYPERLIPIDEMIA TYPE: ICD-10-CM

## 2017-02-16 DIAGNOSIS — E78.5 DYSLIPIDEMIA: ICD-10-CM

## 2017-02-16 DIAGNOSIS — R71.8 RBC MICROCYTOSIS: ICD-10-CM

## 2017-02-16 DIAGNOSIS — E55.9 VITAMIN D DEFICIENCY: ICD-10-CM

## 2017-02-16 DIAGNOSIS — I10 ESSENTIAL HYPERTENSION: ICD-10-CM

## 2017-02-16 DIAGNOSIS — E66.01 SEVERE OBESITY (BMI 35.0-35.9 WITH COMORBIDITY): ICD-10-CM

## 2017-02-16 DIAGNOSIS — F39 MOOD DISORDER: ICD-10-CM

## 2017-02-16 DIAGNOSIS — F41.9 ANXIETY: ICD-10-CM

## 2017-02-16 DIAGNOSIS — G47.33 OSA (OBSTRUCTIVE SLEEP APNEA): Primary | ICD-10-CM

## 2017-02-16 PROCEDURE — 99213 OFFICE O/P EST LOW 20 MIN: CPT | Mod: PBBFAC | Performed by: INTERNAL MEDICINE

## 2017-02-16 PROCEDURE — 99214 OFFICE O/P EST MOD 30 MIN: CPT | Mod: S$PBB,,, | Performed by: INTERNAL MEDICINE

## 2017-02-16 PROCEDURE — 99999 PR PBB SHADOW E&M-EST. PATIENT-LVL III: CPT | Mod: PBBFAC,,, | Performed by: INTERNAL MEDICINE

## 2017-02-16 RX ORDER — EZETIMIBE 10 MG/1
10 TABLET ORAL DAILY
Qty: 90 TABLET | Refills: 3 | Status: SHIPPED | OUTPATIENT
Start: 2017-02-16 | End: 2018-02-04 | Stop reason: SDUPTHER

## 2017-02-16 RX ORDER — ESCITALOPRAM OXALATE 20 MG/1
20 TABLET ORAL DAILY
Qty: 90 TABLET | Refills: 3 | Status: SHIPPED | OUTPATIENT
Start: 2017-02-16 | End: 2018-01-23 | Stop reason: SDUPTHER

## 2017-02-16 RX ORDER — ATORVASTATIN CALCIUM 80 MG/1
80 TABLET, FILM COATED ORAL DAILY
Qty: 90 TABLET | Refills: 3 | Status: SHIPPED | OUTPATIENT
Start: 2017-02-16 | End: 2018-02-20 | Stop reason: SDUPTHER

## 2017-02-16 RX ORDER — LISINOPRIL 40 MG/1
40 TABLET ORAL DAILY
Qty: 90 TABLET | Refills: 3 | Status: SHIPPED | OUTPATIENT
Start: 2017-02-16 | End: 2018-02-20 | Stop reason: SDUPTHER

## 2017-02-16 RX ORDER — AMLODIPINE BESYLATE 5 MG/1
5 TABLET ORAL DAILY
Qty: 90 TABLET | Refills: 3 | Status: SHIPPED | OUTPATIENT
Start: 2017-02-16 | End: 2018-01-23 | Stop reason: SDUPTHER

## 2017-02-16 RX ORDER — HYDROXYZINE HYDROCHLORIDE 25 MG/1
25 TABLET, FILM COATED ORAL 3 TIMES DAILY PRN
Qty: 90 TABLET | Refills: 0 | Status: SHIPPED | OUTPATIENT
Start: 2017-02-16 | End: 2017-06-22 | Stop reason: SDUPTHER

## 2017-02-16 NOTE — PROGRESS NOTES
"Subjective:       Patient ID: Batool Rivera is a 51 y.o. female.    Chief Complaint: Follow-up and Medication Refill    HPI  50 y/o woman with h/o chronic back pain, obesity, HTN, HLD, GERD, multiple other issues here for follow up. Says she needs medication refills, but is not sure which medications she is taking or what she needs refills on.     R shoulder pain x 2 weeks - reports no particular strain or injury prior to having pain and stiffness in R shoulder. Does say she fell out of bed about a week ago, landed on R shoulder, but feels this didn't make it worse.   Pain/stiffness improves with taking a hot shower. She does not have a heating pad. Tried voltaren gel, but doesn't feel that this helped. Not exercising / doing ROM for her shoulder but knows she should do this.   Used to have R wrist pain after fracture in 2009, "maybe it traveled up to my shoulder", not having wrist or hand pain.  Also had L elbow pain in the past for which she had a steroid injection, says "it helped me for 3 years" but she doesn't want to have more injections done for anything.  She plans to call Dr Flor to make an appointment for this; does not want referral to Ortho here.     Chronic back and knee pain - follows with Dr Flor for this, he is prescribing norco, gabapentin, voltaren gel, another topical medication. Next visit currently scheduled for 4/2017.     HTN - says she is taking "my pressure pills" - thinks only taking one, but isn't sure. Hasn't taken any medications this morning.     HLD - she does not know if she is taking her medications for this. Last lipid panel improved, though LDL still in 150s.     H/o GERD - not currently taking any medication. No abdominal pain.     MDD, Anxiety - taking lexapro daily, not currently taking atarax but would like to get new rx for this to help with "nerves" and difficulty falling asleep. Feels that her mood is generally ok when taking the lexapro.     Headaches - " "following with Neurology / headache specialist; may be having rebound headaches related to pain med use for her chronic back/knee/joint pain. Was offered injections to help treat headaches, does not want to take this. Has flexeril to take prn.    BELLO - was given APAP machine, says she didn't use this "because I had the flu" and then had to return the machine. Needs new appt with [facility].     Obesity - Would like to see health  again. Weight stable.     Chronic microcytosis - has sickle cell trait (father has sickle cell anemia).     Vitamin D deficiency - taking weekly supplements.     [facility]  Scheduled for mammogram next month  Twin brother  in 2016.     Review of Systems   Constitutional: Negative for activity change and fatigue.   HENT: Negative for congestion and sore throat.    Eyes: Negative for visual disturbance.   Respiratory: Negative for cough and shortness of breath.    Cardiovascular: Negative for chest pain, palpitations and leg swelling.   Gastrointestinal: Negative.  Negative for abdominal pain, blood in stool, constipation and diarrhea.   Endocrine: Negative.    Genitourinary: Negative.  Negative for dysuria.   Musculoskeletal: Positive for arthralgias (knee pain - chronic, no recent change; new R shoulder pain.), back pain (chronic) and gait problem (sometimes with back spasm/sciatica, no recent flare). Negative for joint swelling and neck stiffness.   Skin: Negative for rash.   Allergic/Immunologic: Negative for environmental allergies.   Neurological: Positive for headaches (as noted in HPI). Negative for weakness and numbness.   Psychiatric/Behavioral: Positive for sleep disturbance (sleep apnea). Negative for dysphoric mood (stable on lexapro) and suicidal ideas. The patient is nervous/anxious (better when on lexapro).          Past medical history, surgical history, and family medical history reviewed and updated as appropriate.    Medications " "and allergies reviewed.     Objective:          Vitals:    02/16/17 0929   BP: 132/80   BP Location: Left arm   Patient Position: Sitting   Pulse: (!) 53   Temp: 97.9 °F (36.6 °C)   TempSrc: Oral   SpO2: 99%   Weight: 107.2 kg (236 lb 5.3 oz)   Height: 5' 5" (1.651 m)     Body mass index is 39.33 kg/(m^2).  Physical Exam   Constitutional: She is oriented to person, place, and time. She appears well-developed and well-nourished. No distress.   Pleasant, comfortable and conversant.    HENT:   Head: Normocephalic and atraumatic.   Nose: Mucosal edema present.   Mouth/Throat: Oropharynx is clear and moist.   Eyes: Conjunctivae and EOM are normal. Pupils are equal, round, and reactive to light.   Neck: Normal range of motion. Neck supple. No JVD present. No thyromegaly present.   Cardiovascular: Normal rate, regular rhythm, normal heart sounds and intact distal pulses.    No murmur heard.  Pulmonary/Chest: Effort normal and breath sounds normal. No respiratory distress. She has no wheezes. She has no rales.   Abdominal: Soft. Bowel sounds are normal. She exhibits no distension. There is no tenderness.   Musculoskeletal: Normal range of motion. She exhibits no edema or tenderness.   Exam limited due to pain - ROM of R shoulder very limited in all directions with AROM and PROM due to pain. Tenderness to even light palpation all around shoulder (though this is somewhat distractable; does have consistent tenderness especially in anterior and lateral shoulder with deeper palpation).    Lymphadenopathy:     She has no cervical adenopathy.   Neurological: She is alert and oriented to person, place, and time. She has normal strength. No cranial nerve deficit or sensory deficit. Gait normal.   Skin: Skin is warm and dry. No rash noted. She is not diaphoretic.   Psychiatric: She has a normal mood and affect.   Vitals reviewed.      Lab Results   Component Value Date    WBC 10.53 03/21/2016    HGB 13.6 03/21/2016    HCT 39.1 " 03/21/2016     03/21/2016    CHOL 221 (H) 09/16/2016    TRIG 117 09/16/2016    HDL 40 09/16/2016    ALT 18 09/16/2016    AST 17 09/16/2016     09/16/2016    K 4.3 09/16/2016     09/16/2016    CREATININE 0.8 09/16/2016    BUN 14 09/16/2016    CO2 27 09/16/2016    TSH 1.706 03/21/2016    INR 1.1 02/17/2012    HGBA1C 5.6 02/02/2015       Assessment:       1. BELLO (obstructive sleep apnea)    2. Mood disorder    3. Essential hypertension    4. Hyperlipidemia, unspecified hyperlipidemia type    5. Vitamin D deficiency    6. Anxiety    7. Severe obesity (BMI 35.0-35.9 with comorbidity)    8. RBC microcytosis    9. Dyslipidemia        Plan:   Batool was seen today for follow-up and medication refill.    Diagnoses and all orders for this visit:    BELLO (obstructive sleep apnea) - will assist with scheduling new appt with Sleep Med at checkout today. Not currently on CPAP or APAP but does need this.   -     CBC auto differential; Future  -     Comprehensive metabolic panel; Future    Mood disorder - continue lexapro. Giving refill on atarax for insomnia, prn anxiety especially at night  -     escitalopram oxalate (LEXAPRO) 20 MG tablet; Take 1 tablet (20 mg total) by mouth once daily.  -     hydrOXYzine HCl (ATARAX) 25 MG tablet; Take 1 tablet (25 mg total) by mouth 3 (three) times daily as needed for Anxiety (or difficulty sleeping).    Essential hypertension - unclear what medications she is taking; will send refills and will not attempt to change medication today, but will have her follow up with Adriana ALMEIDA here for full medication review.   -     lisinopril (PRINIVIL,ZESTRIL) 40 MG tablet; Take 1 tablet (40 mg total) by mouth once daily.  -     amlodipine (NORVASC) 5 MG tablet; Take 1 tablet (5 mg total) by mouth once daily.  -     Comprehensive metabolic panel; Future    Hyperlipidemia, unspecified hyperlipidemia type - continue current medications, return for med review as above.   -      atorvastatin (LIPITOR) 80 MG tablet; Take 1 tablet (80 mg total) by mouth once daily.  -     ezetimibe (ZETIA) 10 mg tablet; Take 1 tablet (10 mg total) by mouth once daily.  -     Lipid panel; Future    Vitamin D deficiency - continue supplement  -     Vitamin D; Future    Anxiety - as noted above  -     hydrOXYzine HCl (ATARAX) 25 MG tablet; Take 1 tablet (25 mg total) by mouth 3 (three) times daily as needed for Anxiety (or difficulty sleeping).    Severe obesity (BMI 35.0-35.9 with comorbidity) - obesity with HTN, HLD, BELLO. Encouraged her to re-contact health  to work on diet/exercise goals  -     Comprehensive metabolic panel; Future  -     Lipid panel; Future    RBC microcytosis  -     CBC auto differential; Future    R shoulder pain, chronic back & neck pain - discussed as noted above during visit, then patient states she will follow up with Dr Flor on this. Patient education given re: adhesive capsulitis, shoulder exercises.    Health maintenance reviewed with patient as noted above.  Recommended check with pharmacy re: cost of tdap.  Fasting labs before next visit  Return in about 4 months (around 6/16/2017) for follow up.    Karan James MD  Internal Medicine  [facility]  2/16/2017

## 2017-02-16 NOTE — PATIENT INSTRUCTIONS
Check with your pharmacy about getting a tetanus (TDaP) vaccine done -- you need to have this done once every 10 years.     Frozen Shoulder (Adhesive Capsulitis)    Frozen shoulder is when pain and stiffness make it difficult to move your shoulder normally. This condition is also called adhesive capsulitis.  The shoulder is a ball-and-socket joint. The ball on the upper arm bone fits into a socket on the shoulder blade. The joint is covered by strong connective tissue called the shoulder capsule.  If the shoulder capsule becomes inflamed and swollen, movement is painful. Bands of scar tissue form on the joints surface. This limits your shoulder's range of motion.  In most cases, only one shoulder at a time is affected. Some people may get frozen shoulder in the other shoulder, at a later time.  Frozen shoulder develops slowly in 3 stages. Each stage can last a few months or longer:  1. Painful stage. Pain occurs when raising your arm up or to the side, or when reaching behind your back. Your range of motion decreases. The pain may be worse at night and keep you from sleeping.  2. Frozen stage. Shoulder may be less painful, but it is stiffer. Range of motion is very limited.  3. Thawing stage. Range of motion starts to improve with treatment.  Experts dont know what causes frozen shoulder. It may occur after an injury such as a fracture. Or it may happen if the shoulder is immobile for a long time, such as after surgery. It may also be an autoimmune response. Risk factors include being over age 40, or having diabetes, heart disease, lung disease, or an overactive thyroid.  The diagnosis is made by physical exam and an X-ray of the shoulder joint. Sometimes an MRI is done to look for other causes.  Mild cases may be treated with a home exercise program and anti-inflammatory medicines. More severe cases require physical therapy. In some cases a steroid is shot, or injected, into the shoulder area. In hard to treat  cases, forced movement of the shoulder is done while you are asleep under general anesthesia. This will break up scar tissue and increase your range of motion. In rare cases, surgery may be needed to remove the scar tissue. Over time, most people with frozen shoulder get back nearly all range of motion without pain. Recovery may take a few months up to 1 year. You may still feel some stiffness.  Home care  · If physical therapy was prescribed, keep up with any home exercise program you were given.  · Keep using the affected shoulder and arm as much as possible.  · You may use over-the-counter pain medicine to control pain, unless another pain medicine was prescribed. Anti-inflammatory pain medicines may work better than acetaminophen. If you have chronic liver or kidney disease or ever had a stomach ulcer or GI bleeding, talk with your healthcare provider before using these medicines.  Follow-up care  Follow up with your healthcare provider, or as advised. Or follow up sooner if pain increases or your shoulder motion decreases.  If X-rays or an MRI were done, you will be told of any new findings that may affect your care.  When to seek medical advice  Call your healthcare provider right away if any of these occur:  · Your shoulder is red or swollen  · Your arm feels weak or numb  · Your shoulder movement decreases  · Your shoulder pain increases even after using pain medicines  Date Last Reviewed: 11/24/2015  © 6954-0875 [employer]. [address] [place] [address], [place] [address]. All rights reserved. This information is not intended as a substitute for professional medical care. Always follow your healthcare professional's instructions.

## 2017-02-16 NOTE — MR AVS SNAPSHOT
[facility]  [address]  [facility] [address]  Phone: [phone]  Fax: [phone]                  Batool Rivera   2017 9:40 AM   Office Visit    Description:  Female : 1965   Provider:  Karan James MD   Department:  [facility]           Reason for Visit     Follow-up     Medication Refill           Diagnoses this Visit        Comments    BELLO (obstructive sleep apnea)    -  Primary     Mood disorder         Essential hypertension         Hyperlipidemia, unspecified hyperlipidemia type         Vitamin D deficiency         Anxiety         Severe obesity (BMI 35.0-35.9 with comorbidity)         RBC microcytosis         Dyslipidemia                To Do List           Future Appointments        Provider Department Dept Phone    3/9/2017 9:00 AM Adriana Davis PA-C [facility] [phone]    3/13/2017 1:00 PM [facility] M[facility]MO1 [facility] [phone]    2017 8:30 AM LAB, APPOINTMENT [facility] IN[facility]ED [facility] [phone]    6/15/2017 9:00 AM Karan James MD [facility] [phone]      Goals (5 Years of Data)              9/16/16    3/21/16    3/17/14    HDL > 40   40  36  35    LDL CALC < 130   157.6  245.6  111.4    Reduce fat intake to <75 grams per day             Follow-Up and Disposition     Return in about 4 months (around 2017) for follow up.       These Medications        Disp Refills Start End    escitalopram oxalate (LEXAPRO) 20 MG tablet 90 tablet 3 2017     Take 1 tablet (20 mg total) by mouth once daily. - Oral    Pharmacy: [id number] [address] [facility] Ph #: [phone]       hydrOXYzine HCl (ATARAX) 25 MG tablet 90 tablet 0 2017     Take 1 tablet (25 mg total) by mouth 3 (three) times daily as needed for Anxiety (or difficulty sleeping). - Oral    Pharmacy:  [address] AT [facility] Ph #: [phone]       lisinopril (PRINIVIL,ZESTRIL) 40 MG tablet 90 tablet 3 2/16/2017     Take 1 tablet (40 mg total) by mouth once daily. - Oral    Pharmacy: [address] AT [facility] Ph #: [phone]       amlodipine (NORVASC) 5 MG tablet 90 tablet 3 2/16/2017     Take 1 tablet (5 mg total) by mouth once daily. - Oral    Pharmacy: [address] AT [facility] Ph #: [phone]       atorvastatin (LIPITOR) 80 MG tablet 90 tablet 3 2/16/2017     Take 1 tablet (80 mg total) by mouth once daily. - Oral    Pharmacy: [address] AT [facility] Ph #: [phone]       ezetimibe (ZETIA) 10 mg tablet 90 tablet 3 2/16/2017     Take 1 tablet (10 mg total) by mouth once daily. - Oral    Pharmacy: [address] AT [facility] Ph #: [phone]         [facility]     [facility] - 24/7 Assistance  Registered nurses in the [facility] provide clinical advisement, health education, appointment booking, and other advisory services.  Call for this free service at [phone].             Medications           Message regarding Medications     Verify the changes and/or additions to your medication regime listed below are the same as discussed with your clinician today.  If any of these changes or additions are incorrect, please notify your healthcare provider.        CHANGE how you are taking these medications     Start Taking Instead of    hydrOXYzine HCl (ATARAX) 25 MG tablet hydrOXYzine (ATARAX) 25 MG tablet    Dosage:  Take 1 tablet (25 mg total) by mouth 3 (three) times daily as needed for Anxiety (or difficulty sleeping). Dosage:  Take 1  "tablet (25 mg total) by mouth every 6 (six) hours as needed for Itching.    Reason for Change:  Reorder            Verify that the below list of medications is an accurate representation of the medications you are currently taking.  If none reported, the list may be blank. If incorrect, please contact your healthcare provider. Carry this list with you in case of emergency.           Current Medications     amlodipine (NORVASC) 5 MG tablet Take 1 tablet (5 mg total) by mouth once daily.    aspirin 81 MG Chew Take 81 mg by mouth once daily.     atorvastatin (LIPITOR) 80 MG tablet Take 1 tablet (80 mg total) by mouth once daily.    ergocalciferol (ERGOCALCIFEROL) 50,000 unit Cap TAKE ONE CAPSULE BY MOUTH EVERY 7 DAYS    escitalopram oxalate (LEXAPRO) 20 MG tablet Take 1 tablet (20 mg total) by mouth once daily.    ezetimibe (ZETIA) 10 mg tablet Take 1 tablet (10 mg total) by mouth once daily.    gabapentin (NEURONTIN) 300 MG capsule Take 300 mg by mouth 3 (three) times daily.     hydrocodone-acetaminophen 10-325mg (NORCO)  mg Tab Take 1 tablet by mouth 3 (three) times daily.      hydrOXYzine HCl (ATARAX) 25 MG tablet Take 1 tablet (25 mg total) by mouth 3 (three) times daily as needed for Anxiety (or difficulty sleeping).    lisinopril (PRINIVIL,ZESTRIL) 40 MG tablet Take 1 tablet (40 mg total) by mouth once daily.    tizanidine (ZANAFLEX) 2 MG tablet TAKE 1 TO 2 TABLETS BY MOUTH EVERY NIGHT AT BEDTIME AS NEEDED FOR NECK PAIN    triamcinolone acetonide 0.1% (KENALOG) 0.1 % ointment            Clinical Reference Information           Your Vitals Were     BP Pulse Temp Height Weight SpO2    132/80 (BP Location: Left arm, Patient Position: Sitting) 53 97.9 °F (36.6 °C) (Oral) 5' 5" (1.651 m) 107.2 kg (236 lb 5.3 oz) 99%    BMI                39.33 kg/m2          Blood Pressure          Most Recent Value    BP  132/80      Allergies as of 2/16/2017     No Known Allergies      Immunizations Administered on Date of " Encounter - 2/16/2017     None      Orders Placed During Today's Visit     Future Labs/Procedures Expected by Expires    Lipid panel  5/17/2017 (Approximate) 6/16/2017    CBC auto differential  5/19/2017 2/16/2018    Comprehensive metabolic panel  5/19/2017 2/16/2018    Vitamin D  5/19/2017 (Approximate) 8/16/2017      Instructions      Check with your pharmacy about getting a tetanus (TDaP) vaccine done -- you need to have this done once every 10 years.     Frozen Shoulder (Adhesive Capsulitis)    Frozen shoulder is when pain and stiffness make it difficult to move your shoulder normally. This condition is also called adhesive capsulitis.  The shoulder is a ball-and-socket joint. The ball on the upper arm bone fits into a socket on the shoulder blade. The joint is covered by strong connective tissue called the shoulder capsule.  If the shoulder capsule becomes inflamed and swollen, movement is painful. Bands of scar tissue form on the joints surface. This limits your shoulder's range of motion.  In most cases, only one shoulder at a time is affected. Some people may get frozen shoulder in the other shoulder, at a later time.  Frozen shoulder develops slowly in 3 stages. Each stage can last a few months or longer:  1. Painful stage. Pain occurs when raising your arm up or to the side, or when reaching behind your back. Your range of motion decreases. The pain may be worse at night and keep you from sleeping.  2. Frozen stage. Shoulder may be less painful, but it is stiffer. Range of motion is very limited.  3. Thawing stage. Range of motion starts to improve with treatment.  Experts dont know what causes frozen shoulder. It may occur after an injury such as a fracture. Or it may happen if the shoulder is immobile for a long time, such as after surgery. It may also be an autoimmune response. Risk factors include being over age 40, or having diabetes, heart disease, lung disease, or an overactive thyroid.  The  diagnosis is made by physical exam and an X-ray of the shoulder joint. Sometimes an MRI is done to look for other causes.  Mild cases may be treated with a home exercise program and anti-inflammatory medicines. More severe cases require physical therapy. In some cases a steroid is shot, or injected, into the shoulder area. In hard to treat cases, forced movement of the shoulder is done while you are asleep under general anesthesia. This will break up scar tissue and increase your range of motion. In rare cases, surgery may be needed to remove the scar tissue. Over time, most people with frozen shoulder get back nearly all range of motion without pain. Recovery may take a few months up to 1 year. You may still feel some stiffness.  Home care  · If physical therapy was prescribed, keep up with any home exercise program you were given.  · Keep using the affected shoulder and arm as much as possible.  · You may use over-the-counter pain medicine to control pain, unless another pain medicine was prescribed. Anti-inflammatory pain medicines may work better than acetaminophen. If you have chronic liver or kidney disease or ever had a stomach ulcer or GI bleeding, talk with your healthcare provider before using these medicines.  Follow-up care  Follow up with your healthcare provider, or as advised. Or follow up sooner if pain increases or your shoulder motion decreases.  If X-rays or an MRI were done, you will be told of any new findings that may affect your care.  When to seek medical advice  Call your healthcare provider right away if any of these occur:  · Your shoulder is red or swollen  · Your arm feels weak or numb  · Your shoulder movement decreases  · Your shoulder pain increases even after using pain medicines  Date Last Reviewed: 11/24/2015 © 2000-2016 [employer]. [address] [place] [address], [place] [address]. All rights reserved. This information is not intended as a substitute for professional  medical care. Always follow your healthcare professional's instructions.             Language Assistance Services     ATTENTION: Language assistance services are available, free of charge. Please call [phone].      ATENCIÓN: Si habkris mcguire, tiene a tubbs disposición servicios gratuitos de asistencia lingüística. Llame al [phone].     CHÚ Ý: N?u b?n nói Ti?ng Vi?t, có các d?ch v? h? tr? ngôn ng? mi?n phí dành cho b?n. G?i s? [phone].         [facility] complies with applicable Federal civil rights laws and does not discriminate on the basis of race, color, national origin, age, disability, or sex.

## 2017-02-20 ENCOUNTER — TELEPHONE (OUTPATIENT)
Dept: INTERNAL MEDICINE | Facility: CLINIC | Age: 52
End: 2017-02-20

## 2017-02-28 RX ORDER — TIZANIDINE 2 MG/1
TABLET ORAL
Qty: 60 TABLET | Refills: 0 | Status: SHIPPED | OUTPATIENT
Start: 2017-02-28 | End: 2017-03-28 | Stop reason: SDUPTHER

## 2017-03-06 ENCOUNTER — CLINICAL SUPPORT (OUTPATIENT)
Dept: INTERNAL MEDICINE | Facility: CLINIC | Age: 52
End: 2017-03-06
Payer: MEDICAID

## 2017-03-06 VITALS — BODY MASS INDEX: 39.4 KG/M2 | WEIGHT: 236.75 LBS

## 2017-03-06 NOTE — PROGRESS NOTES
Health  Follow-Up Note   Date: 3/6/17                 Time: 0900  [x] Office  [] Phone  Notes from previous session reviewed.   [x] Previous Session Goals unchanged.   [] Patient/Caregiver Change in Goals.  Goals added or changed by Patient/Caregiver since program participation:  1.     Eat more veggies  2. Stop creamer in coffee  3. Start dancing for exercise through [employer]     Additional/Changed support that patient/caregiver has experienced/sought?  (Indicate readiness, support from family, friends, others, community groups, etc)  1.  Dancing at [employer] group  2. [facility]    Additional/Changed obstacles that could prevent patient/caregiver from reaching their goals?  1.  Moving this month    Feedback provided:  1.  Praised for continued effort    Diagnostic values/Desriptors for follow-up as needed for chronic condition(s)   Weight: 107.4 kg 236 lb 12.4 oz    Interventions:   1. Health  listened reflectively, validated thoughts and feelings, offered support and encouragement.   2. Allowed patient to express themselves in a non-biased atmosphere.  3. Health  assisted pt to problem-solve obstacles such as being in a challenging environment and dealing with these challenges.   4. Motivational Interviewed interventions utilized (OARS).   5. Patient responded favorably to interventions and remained actively engaged in the session.   6. Health  will remain available and connected for patient by phone and/or office visits.   7. Positive reinforcement, emotional support and encouragement provided.   8. Focused Education: sugar, GL, exercise    Plan:  [x] Pt will work on goals as stated above.   [x] Pt will contact Health  for any questions, concerns or needs.  [x] Pt will follow up with Health  in office on 4-3-17 at 0900.      [] Pt will follow up with Health  on phone in:        [x] Health  will remain available.   [] Health  will contact patient by phone in:        []  Health  will consult:        [] Health  will inform Provider via EPIC messaging.     Impression:  1. Behavior is consistent with Preparation     Stage of Change.   2. Participation level:       [x] Receptive      [x] Interactive      [] Guarded and Resistant      [x] Self Motivated      [] Refused/Declined to participate   3. [x] Pt voiced understanding of all information presented.       [] Pt voiced needing further information/education. This will be arranged.       [x] Pt would benefit from further education/information as identified by this health . This will be arranged.     Kendal Gambino RN HC

## 2017-03-09 ENCOUNTER — OFFICE VISIT (OUTPATIENT)
Dept: INTERNAL MEDICINE | Facility: CLINIC | Age: 52
End: 2017-03-09
Payer: MEDICAID

## 2017-03-09 ENCOUNTER — HOSPITAL ENCOUNTER (OUTPATIENT)
Dept: RADIOLOGY | Facility: HOSPITAL | Age: 52
Discharge: HOME | End: 2017-03-09
Attending: INTERNAL MEDICINE
Payer: MEDICAID

## 2017-03-09 VITALS
DIASTOLIC BLOOD PRESSURE: 78 MMHG | BODY MASS INDEX: 39.63 KG/M2 | WEIGHT: 237.88 LBS | HEIGHT: 65 IN | SYSTOLIC BLOOD PRESSURE: 128 MMHG | HEART RATE: 64 BPM

## 2017-03-09 DIAGNOSIS — E78.5 HYPERLIPIDEMIA, UNSPECIFIED HYPERLIPIDEMIA TYPE: ICD-10-CM

## 2017-03-09 DIAGNOSIS — M25.511 ACUTE PAIN OF RIGHT SHOULDER: ICD-10-CM

## 2017-03-09 DIAGNOSIS — M25.511 ACUTE PAIN OF RIGHT SHOULDER: Primary | ICD-10-CM

## 2017-03-09 DIAGNOSIS — I10 HYPERTENSION, ESSENTIAL: ICD-10-CM

## 2017-03-09 PROCEDURE — 73030 X-RAY EXAM OF SHOULDER: CPT | Mod: 26,RT,, | Performed by: RADIOLOGY

## 2017-03-09 PROCEDURE — 73030 X-RAY EXAM OF SHOULDER: CPT | Mod: TC,RT

## 2017-03-09 PROCEDURE — 99999 PR PBB SHADOW E&M-EST. PATIENT-LVL V: CPT | Mod: PBBFAC,,, | Performed by: PHYSICIAN ASSISTANT

## 2017-03-09 PROCEDURE — 99213 OFFICE O/P EST LOW 20 MIN: CPT | Mod: S$PBB,,, | Performed by: PHYSICIAN ASSISTANT

## 2017-03-09 RX ORDER — DICLOFENAC SODIUM 10 MG/G
2 GEL TOPICAL 4 TIMES DAILY
COMMUNITY
End: 2018-10-31

## 2017-03-09 RX ORDER — ASPIRIN 325 MG
325 TABLET, DELAYED RELEASE (ENTERIC COATED) ORAL DAILY
COMMUNITY
End: 2018-10-31 | Stop reason: DRUGHIGH

## 2017-03-09 NOTE — Clinical Note
Reviewed all medication bottles.  She has not been taking Zetia but seems to be taking everything else.    Got her set up with ortho for the shoulder since Dr. Flor doesn't inject shoulders.    Let me know if there's anything else she needs.  Best, Adriana

## 2017-03-09 NOTE — PROGRESS NOTES
Subjective:       Patient ID: Batool Rivera is a 51 y.o. female.        Chief Complaint: Follow-up and Shoulder Pain (right, x 1-2 months)    HPI Comments: Batool Rivera is an established patient of Karan James MD here today for follow up visit.    She brings in her medication bottles for review.      Medication Bottles:  Amlodipine 5 mg qday  Lipitor 80 mg qHS  Lexapro 20 mg qday  Vitamin D 50,000 U q 7 days  Aspirin 325 mg EC  Lisinopril 40 mg qday  Gabapentin 300 mg TID  Voltaren gel 1% prn  Hydroxyzyine 25 mg TID prn (no refills on this)  Tizanidine 2 mg qHS prn (no refills on this)    Reports taking Hydrocodone 10/325 mg TID-does not bring in this bottle    Zesymone is on med card but she reports not taking the medication    She sees Dr. Flor at [facility] for pain management.  She asked him to do a steroid injection of the shoulder but he doesn't do shoulders.  Will get her set up with orthopedics.  Right shoulder pain x 1-2 months, 10/10, decreased range of motion.           Review of Systems   Constitutional: Negative for chills, diaphoresis, fatigue and fever.   HENT: Negative for congestion and sore throat.    Eyes: Negative for visual disturbance.   Respiratory: Negative for cough, chest tightness and shortness of breath.    Cardiovascular: Negative for chest pain, palpitations and leg swelling.   Gastrointestinal: Negative for abdominal pain, blood in stool, constipation, diarrhea, nausea and vomiting.   Genitourinary: Negative for dysuria, frequency, hematuria and urgency.   Musculoskeletal: Positive for arthralgias and back pain.   Skin: Negative for rash.   Neurological: Negative for dizziness, syncope, weakness and headaches.   Psychiatric/Behavioral: Negative for dysphoric mood and sleep disturbance. The patient is not nervous/anxious.        Objective:      Physical Exam   Constitutional: She appears well-developed and well-nourished.   HENT:   Head: Normocephalic.   Right Ear:  External ear normal.   Left Ear: External ear normal.   Mouth/Throat: Oropharynx is clear and moist.   Eyes: Pupils are equal, round, and reactive to light.   Cardiovascular: Normal rate, regular rhythm and normal heart sounds.  Exam reveals no gallop and no friction rub.    No murmur heard.  Pulmonary/Chest: Effort normal and breath sounds normal. No respiratory distress.   Abdominal: Soft. Normal appearance. There is no tenderness.   Musculoskeletal: She exhibits no edema.        Right shoulder: She exhibits decreased range of motion, tenderness (anterior and lateral shoulder) and pain. She exhibits no swelling, no spasm, normal pulse and normal strength.   Neurological: She is alert.   Skin: Skin is warm and dry.   Psychiatric: She has a normal mood and affect.   Nursing note and vitals reviewed.      Assessment:       1. Acute pain of right shoulder    2. Hypertension, essential    3. Hyperlipidemia, unspecified hyperlipidemia type        Plan:       Batool was seen today for follow-up and shoulder pain.    Diagnoses and all orders for this visit:    Acute pain of right shoulder  -     Ambulatory consult to Orthopedics  -     X-ray Shoulder 2 or More Views Right; Future    Hypertension, essential-well controlled, continue amlodipine and lisinopril    Hyperlipidemia, unspecified hyperlipidemia type-taking Lipitor but not Zetia    All medication bottles reviewed and reconciled through [employer] and also listed above.  All medication bottles had several refills except Hydroxyzine and Tizanidine.  She uses Tizanidine at night for headaches and plans to make f/u with neurology to further assess headache control.      Pt has been given instructions populated from [employer] database and has verbalized understanding of the after visit summary and information contained wherein.    Follow up with a primary care provider. May go to ER for acute shortness of breath, lightheadedness, fever, or any other emergent complaints or changes  "in condition.    "This note will be shared with the patient"    Future Appointments  Date Time Provider Department Center   3/9/2017 9:30 AM [facility] [id number] 485 LB LIMIT [facility] XRAY IM [facility]   3/13/2017 8:30 AM Kelsea Oliver PA-C [facility] ORTHO [facility]   3/13/2017 1:00 PM [facility] MAMMO1 [facility] M[facility]   4/3/2017 9:00 AM [facility] IM [facility]   6/8/2017 8:30 AM LAB, APPOINTMENT [facility] LAB IM [facility]   6/22/2017 9:00 AM Karan James MD [facility] IM [facility]               "

## 2017-03-09 NOTE — MR AVS SNAPSHOT
[facility]  [address]  [facility] [address]  Phone: [phone]  Fax: [phone]                  Batool Rivera   3/9/2017 9:00 AM   Office Visit    Description:  Female : 1965   Provider:  Adriana Davis PA-C   Department:  [facility] - Internal Medicine           Reason for Visit     Follow-up     Shoulder Pain           Diagnoses this Visit        Comments    Acute pain of right shoulder    -  Primary     Hypertension, essential         Hyperlipidemia, unspecified hyperlipidemia type                To Do List           Future Appointments        Provider Department Dept Phone    3/13/2017 1:00 PM [id number] [phone]    4/3/2017 9:00 AM [facility] [phone]    2017 8:30 AM LAB, APPOINTMENT [facility] [phone]    2017 9:00 AM Karan James MD [facility] [phone]      Goals (5 Years of Data)              9/16/16    3/21/16    3/17/14    HDL > 40   40  36  35    LDL CALC < 130   157.6  245.6  111.4    Reduce fat intake to <75 grams per day             [facility]     [facility] Care [facility] -  Assistance  Registered nurses in the [facility] provide clinical advisement, health education, appointment booking, and other advisory services.  Call for this free service at [phone].             Medications           Message regarding Medications     Verify the changes and/or additions to your medication regime listed below are the same as discussed with your clinician today.  If any of these changes or additions are incorrect, please notify your healthcare provider.        STOP taking these medications     aspirin 81 MG Chew Take 81 mg by mouth once daily.            Verify that the below list of medications is an accurate representation of the medications you are currently taking.   "If none reported, the list may be blank. If incorrect, please contact your healthcare provider. Carry this list with you in case of emergency.           Current Medications     amlodipine (NORVASC) 5 MG tablet Take 1 tablet (5 mg total) by mouth once daily.    aspirin (ECOTRIN) 325 MG EC tablet Take 325 mg by mouth once daily.    atorvastatin (LIPITOR) 80 MG tablet Take 1 tablet (80 mg total) by mouth once daily.    diclofenac sodium (VOLTAREN) 1 % Gel Apply 2 g topically 4 (four) times daily.    ergocalciferol (ERGOCALCIFEROL) 50,000 unit Cap TAKE ONE CAPSULE BY MOUTH EVERY 7 DAYS    escitalopram oxalate (LEXAPRO) 20 MG tablet Take 1 tablet (20 mg total) by mouth once daily.    gabapentin (NEURONTIN) 300 MG capsule Take 300 mg by mouth 3 (three) times daily.     hydrocodone-acetaminophen 10-325mg (NORCO)  mg Tab Take 1 tablet by mouth 3 (three) times daily.      hydrOXYzine HCl (ATARAX) 25 MG tablet Take 1 tablet (25 mg total) by mouth 3 (three) times daily as needed for Anxiety (or difficulty sleeping).    lisinopril (PRINIVIL,ZESTRIL) 40 MG tablet Take 1 tablet (40 mg total) by mouth once daily.    tizanidine (ZANAFLEX) 2 MG tablet TAKE 1 TO 2 TABLETS BY MOUTH EVERY NIGHT AT BEDTIME AS NEEDED FOR NECK PAIN    ezetimibe (ZETIA) 10 mg tablet Take 1 tablet (10 mg total) by mouth once daily.    triamcinolone acetonide 0.1% (KENALOG) 0.1 % ointment            Clinical Reference Information           Your Vitals Were     BP Pulse Height Weight BMI    128/78 (BP Location: Left arm, Patient Position: Sitting, BP Method: Manual) 64 5' 5" (1.651 m) 107.9 kg (237 lb 14 oz) 39.58 kg/m2      Blood Pressure          Most Recent Value    BP  128/78      Allergies as of 3/9/2017     No Known Allergies      Immunizations Administered on Date of Encounter - 3/9/2017     None      Orders Placed During Today's Visit      Normal Orders This Visit    Ambulatory consult to Orthopedics     Future Labs/Procedures Expected by " Expires    X-ray Shoulder 2 or More Views Right  3/9/2017 3/9/2018      Instructions      Shoulder Pain with Uncertain Cause  Shoulder pain can have many causes. Pain often comes from the structures that surround the shoulder joint. These are the joint capsule, ligaments, tendons, muscles, and bursa. Pain can also come from cartilage in the joint. Cartilage can become worn out or injured. Its important to know whats causing your pain so the healthcare provider can use the correct treatment. But sometimes its difficult to find the exact cause of shoulder pain. You may need to see a specialist (orthopedist). You may also need special tests such as a CT scan or MRI. The provider may need to use special tools to look inside the joint (arthroscopy).  Shoulder pain can be treated with a sling or a device that keeps your shoulder from moving. You can take an anti-inflammatory medicine such as ibuprofen to ease pain. You may need to do special shoulder exercises. Follow up with a specialist if the pain is severe or doesnt go away after a few weeks.  Home care  Follow these tips when caring for yourself at home:  · If a sling was given to you, leave it in place for the time advised by your healthcare provider. If you arent sure how long to wear it, ask for advice. If the sling becomes loose, adjust it so that your forearm is level with the ground. Your shoulder should feel well supported.  · Put an ice pack on the injured area for 20 minutes every 1 to 2 hours the first day. You can make your own ice pack by putting ice cubes in a plastic bag. Wrap the bag in a thin towel. Continue with ice packs 3 to 4 times a day for the next 2 days. Then use the pack as needed to ease pain and swelling.  · You may use acetaminophen or ibuprofen to control pain, unless another pain medicine was prescribed. If you have chronic liver or kidney disease, talk with your healthcare provider before using these medicines. Also talk with your  provider if youve ever had a stomach ulcer or GI bleeding.  · Shoulder pain may seem worse at night, when there is less to distract you from the pain. If you sleep on your side, try to keep weight off your painful shoulder. Propping pillows behind you may stop you from rolling over onto that shoulder during sleep.   · Shoulder and elbow joints can become stiff if left in a sling for too long. You should start range of motion exercises about 7 to 10 days after the injury. Talk with your provider to find out what type of exercises to do and how soon to start.  · You can take the sling off to shower or bathe.  Follow-up care  Follow up with your healthcare provider if you dont start to get better in the next 5 days.  When to seek medical advice  Call your healthcare provider right away if any of these occur:  · Pain or swelling gets worse or continues for more than a few days  · Your hand or fingers become cold, blue, numb, or tingly  · Large amount of bruising on your shoulder or upper arm  · Difficulty moving your hand or fingers  · Weakness in your hand or fingers  · Your shoulder becomes stiff  · It feels like your shoulder is popping out  · You are less able to do your daily activities  Date Last Reviewed: 10/1/2016  © 4395-8217 [employer]. [address] [place] [address], [place] [address]. All rights reserved. This information is not intended as a substitute for professional medical care. Always follow your healthcare professional's instructions.             Language Assistance Services     ATTENTION: Language assistance services are available, free of charge. Please call [phone].      ATENCIÓN: Si habla español, tiene a tubbs disposición servicios gratuitos de asistencia lingüística. Llame al [phone].     CHÚ Ý: N?u b?n nói Ti?ng Vi?t, có các d?ch v? h? tr? ngôn ng? mi?n phí dành cho b?n. G?i s? [phone].         [facility] complies with applicable Federal civil rights  laws and does not discriminate on the basis of race, color, national origin, age, disability, or sex.

## 2017-03-09 NOTE — PATIENT INSTRUCTIONS
Shoulder Pain with Uncertain Cause  Shoulder pain can have many causes. Pain often comes from the structures that surround the shoulder joint. These are the joint capsule, ligaments, tendons, muscles, and bursa. Pain can also come from cartilage in the joint. Cartilage can become worn out or injured. Its important to know whats causing your pain so the healthcare provider can use the correct treatment. But sometimes its difficult to find the exact cause of shoulder pain. You may need to see a specialist (orthopedist). You may also need special tests such as a CT scan or MRI. The provider may need to use special tools to look inside the joint (arthroscopy).  Shoulder pain can be treated with a sling or a device that keeps your shoulder from moving. You can take an anti-inflammatory medicine such as ibuprofen to ease pain. You may need to do special shoulder exercises. Follow up with a specialist if the pain is severe or doesnt go away after a few weeks.  Home care  Follow these tips when caring for yourself at home:  · If a sling was given to you, leave it in place for the time advised by your healthcare provider. If you arent sure how long to wear it, ask for advice. If the sling becomes loose, adjust it so that your forearm is level with the ground. Your shoulder should feel well supported.  · Put an ice pack on the injured area for 20 minutes every 1 to 2 hours the first day. You can make your own ice pack by putting ice cubes in a plastic bag. Wrap the bag in a thin towel. Continue with ice packs 3 to 4 times a day for the next 2 days. Then use the pack as needed to ease pain and swelling.  · You may use acetaminophen or ibuprofen to control pain, unless another pain medicine was prescribed. If you have chronic liver or kidney disease, talk with your healthcare provider before using these medicines. Also talk with your provider if youve ever had a stomach ulcer or GI bleeding.  · Shoulder pain may seem  worse at night, when there is less to distract you from the pain. If you sleep on your side, try to keep weight off your painful shoulder. Propping pillows behind you may stop you from rolling over onto that shoulder during sleep.   · Shoulder and elbow joints can become stiff if left in a sling for too long. You should start range of motion exercises about 7 to 10 days after the injury. Talk with your provider to find out what type of exercises to do and how soon to start.  · You can take the sling off to shower or bathe.  Follow-up care  Follow up with your healthcare provider if you dont start to get better in the next 5 days.  When to seek medical advice  Call your healthcare provider right away if any of these occur:  · Pain or swelling gets worse or continues for more than a few days  · Your hand or fingers become cold, blue, numb, or tingly  · Large amount of bruising on your shoulder or upper arm  · Difficulty moving your hand or fingers  · Weakness in your hand or fingers  · Your shoulder becomes stiff  · It feels like your shoulder is popping out  · You are less able to do your daily activities  Date Last Reviewed: 10/1/2016  © 4516-6330 [employer]. [address] [place] [address], [place] [address]. All rights reserved. This information is not intended as a substitute for professional medical care. Always follow your healthcare professional's instructions.

## 2017-03-13 ENCOUNTER — HOSPITAL ENCOUNTER (OUTPATIENT)
Dept: RADIOLOGY | Facility: OTHER | Age: 52
Discharge: HOME | End: 2017-03-13
Attending: OBSTETRICS & GYNECOLOGY
Payer: MEDICAID

## 2017-03-13 DIAGNOSIS — Z01.419 WELL WOMAN EXAM WITH ROUTINE GYNECOLOGICAL EXAM: ICD-10-CM

## 2017-03-13 DIAGNOSIS — Z12.31 VISIT FOR SCREENING MAMMOGRAM: ICD-10-CM

## 2017-03-13 PROCEDURE — 77067 SCR MAMMO BI INCL CAD: CPT | Mod: 26,,, | Performed by: RADIOLOGY

## 2017-03-13 PROCEDURE — 77063 BREAST TOMOSYNTHESIS BI: CPT | Mod: 26,,, | Performed by: RADIOLOGY

## 2017-03-13 PROCEDURE — 77067 SCR MAMMO BI INCL CAD: CPT | Mod: TC

## 2017-03-20 ENCOUNTER — TELEPHONE (OUTPATIENT)
Dept: INTERNAL MEDICINE | Facility: CLINIC | Age: 52
End: 2017-03-20

## 2017-03-20 ENCOUNTER — HOSPITAL ENCOUNTER (EMERGENCY)
Facility: OTHER | Age: 52
Discharge: HOME | End: 2017-03-20
Attending: EMERGENCY MEDICINE
Payer: MEDICAID

## 2017-03-20 VITALS
HEIGHT: 65 IN | HEART RATE: 54 BPM | OXYGEN SATURATION: 100 % | DIASTOLIC BLOOD PRESSURE: 78 MMHG | SYSTOLIC BLOOD PRESSURE: 158 MMHG | BODY MASS INDEX: 38.99 KG/M2 | TEMPERATURE: 98 F | RESPIRATION RATE: 16 BRPM | WEIGHT: 234 LBS

## 2017-03-20 DIAGNOSIS — M75.31 CALCIFIC TENDONITIS OF RIGHT SHOULDER: ICD-10-CM

## 2017-03-20 DIAGNOSIS — M75.51 BURSITIS, SHOULDER, RIGHT: Primary | ICD-10-CM

## 2017-03-20 DIAGNOSIS — E66.01 SEVERE OBESITY (BMI 35.0-35.9 WITH COMORBIDITY): Primary | ICD-10-CM

## 2017-03-20 PROCEDURE — 96372 THER/PROPH/DIAG INJ SC/IM: CPT

## 2017-03-20 PROCEDURE — 99283 EMERGENCY DEPT VISIT LOW MDM: CPT | Mod: 25

## 2017-03-20 PROCEDURE — 63600175 PHARM REV CODE 636 W HCPCS: Performed by: EMERGENCY MEDICINE

## 2017-03-20 RX ORDER — DEXAMETHASONE SODIUM PHOSPHATE 4 MG/ML
8 INJECTION, SOLUTION INTRA-ARTICULAR; INTRALESIONAL; INTRAMUSCULAR; INTRAVENOUS; SOFT TISSUE
Status: COMPLETED | OUTPATIENT
Start: 2017-03-20 | End: 2017-03-20

## 2017-03-20 RX ORDER — OXYCODONE AND ACETAMINOPHEN 5; 325 MG/1; MG/1
1 TABLET ORAL EVERY 6 HOURS PRN
Qty: 12 TABLET | Refills: 0 | Status: SHIPPED | OUTPATIENT
Start: 2017-03-20 | End: 2017-06-22

## 2017-03-20 RX ORDER — IBUPROFEN 800 MG/1
800 TABLET ORAL EVERY 6 HOURS PRN
Qty: 12 TABLET | Refills: 0 | Status: SHIPPED | OUTPATIENT
Start: 2017-03-20 | End: 2018-05-28

## 2017-03-20 RX ORDER — KETOROLAC TROMETHAMINE 30 MG/ML
30 INJECTION, SOLUTION INTRAMUSCULAR; INTRAVENOUS
Status: COMPLETED | OUTPATIENT
Start: 2017-03-20 | End: 2017-03-20

## 2017-03-20 RX ADMIN — KETOROLAC TROMETHAMINE 30 MG: 30 INJECTION, SOLUTION INTRAMUSCULAR at 09:03

## 2017-03-20 RX ADMIN — DEXAMETHASONE SODIUM PHOSPHATE 8 MG: 4 INJECTION, SOLUTION INTRAMUSCULAR; INTRAVENOUS at 09:03

## 2017-03-20 NOTE — TELEPHONE ENCOUNTER
----- Message from Kendal Gambino RN sent at 3/6/2017 10:25 AM CST -----  Regarding: A1c  Do you think we can check an A1C on her last one 2015?  Her twin brother  heart attack and he also had an amputation of lower extremity, along with her risk factors....??   She was 5.6- 2 yrs ago.   Thanks Kendal

## 2017-03-20 NOTE — ED TRIAGE NOTES
Patient states her right shoulder has been hurting for over a month.  Denies any trauma.  States her mom and sister have the same problems.  States she had xray's March 9th but cannot get in with the ortho doctor and has not been back to her PCP.  Patient states it hurts to sleep on it and hurts to raise it and has some swelling to the shoulder states the pain is like a toothache just throbbing that is constant and cant sleep at night..  No obvious swelling noted.  Patient is neurologically intact. Pt is AAOX 3 RR easy non labored.  NAD  No other complaints

## 2017-03-20 NOTE — ED AVS SNAPSHOT
[facility]  [address]  [facility] [address]               Batool Rivera   3/20/2017  7:55 AM   ED    Description:  Female : 1965   Department:  [facility]           Your Care was Coordinated By:     Provider Role From To    Danny Travis MD Attending Provider 17 0759 --      Reason for Visit     Arm Pain           Diagnoses this Visit        Comments    Bursitis, shoulder, right    -  Primary     Calcific tendonitis of right shoulder           ED Disposition     None           To Do List           Follow-up Information     Follow up with Karan James MD In 2 days.    Specialty:  Internal Medicine    Contact information:    [address]  [facility] [address]  [phone]         These Medications        Disp Refills Start End    ibuprofen (ADVIL,MOTRIN) 800 MG tablet 12 tablet 0 3/20/2017     Take 1 tablet (800 mg total) by mouth every 6 (six) hours as needed for Pain. - Oral    Pharmacy: [facility] [address] [place] [address] [address] AT [facility] Ph #: [phone]       oxycodone-acetaminophen (PERCOCET) 5-325 mg per tablet 12 tablet 0 3/20/2017     Take 1 tablet by mouth every 6 (six) hours as needed for Pain. - Oral    Pharmacy: [facility] [email or web address] AT [facility] Ph #: [phone]         [facility]     [facility] -  Assistance  Registered nurses in the [facility] provide clinical advisement, health education, appointment booking, and other advisory services.  Call for this free service at [phone].             Medications           Message regarding Medications     Verify the changes and/or additions to your medication regime listed below are the same as discussed with your clinician today.  If any of these changes or additions are incorrect, please notify your healthcare  provider.        START taking these NEW medications        Refills    ibuprofen (ADVIL,MOTRIN) 800 MG tablet 0    Sig: Take 1 tablet (800 mg total) by mouth every 6 (six) hours as needed for Pain.    Class: Print    Route: Oral    oxycodone-acetaminophen (PERCOCET) 5-325 mg per tablet 0    Sig: Take 1 tablet by mouth every 6 (six) hours as needed for Pain.    Class: Print    Route: Oral      These medications were administered today        Dose Freq    dexamethasone injection 8 mg 8 mg ED 1 Time    Sig: Inject 2 mLs (8 mg total) into the muscle ED 1 Time.    Class: Normal    Route: Intramuscular    ketorolac injection 30 mg 30 mg ED 1 Time    Sig: Inject 30 mg into the muscle ED 1 Time.    Class: Normal    Route: Intramuscular           Verify that the below list of medications is an accurate representation of the medications you are currently taking.  If none reported, the list may be blank. If incorrect, please contact your healthcare provider. Carry this list with you in case of emergency.           Current Medications     amlodipine (NORVASC) 5 MG tablet Take 1 tablet (5 mg total) by mouth once daily.    aspirin (ECOTRIN) 325 MG EC tablet Take 325 mg by mouth once daily.    atorvastatin (LIPITOR) 80 MG tablet Take 1 tablet (80 mg total) by mouth once daily.    diclofenac sodium (VOLTAREN) 1 % Gel Apply 2 g topically 4 (four) times daily.    ergocalciferol (ERGOCALCIFEROL) 50,000 unit Cap TAKE ONE CAPSULE BY MOUTH EVERY 7 DAYS    escitalopram oxalate (LEXAPRO) 20 MG tablet Take 1 tablet (20 mg total) by mouth once daily.    ezetimibe (ZETIA) 10 mg tablet Take 1 tablet (10 mg total) by mouth once daily.    gabapentin (NEURONTIN) 300 MG capsule Take 300 mg by mouth 3 (three) times daily.     hydrocodone-acetaminophen 10-325mg (NORCO)  mg Tab Take 1 tablet by mouth 3 (three) times daily.      hydrOXYzine HCl (ATARAX) 25 MG tablet Take 1 tablet (25 mg total) by mouth 3 (three) times daily as needed for Anxiety  "(or difficulty sleeping).    lisinopril (PRINIVIL,ZESTRIL) 40 MG tablet Take 1 tablet (40 mg total) by mouth once daily.    tizanidine (ZANAFLEX) 2 MG tablet TAKE 1 TO 2 TABLETS BY MOUTH EVERY NIGHT AT BEDTIME AS NEEDED FOR NECK PAIN    triamcinolone acetonide 0.1% (KENALOG) 0.1 % ointment     ibuprofen (ADVIL,MOTRIN) 800 MG tablet Take 1 tablet (800 mg total) by mouth every 6 (six) hours as needed for Pain.    oxycodone-acetaminophen (PERCOCET) 5-325 mg per tablet Take 1 tablet by mouth every 6 (six) hours as needed for Pain.           Clinical Reference Information           Your Vitals Were     BP Pulse Temp Resp Height Weight    148/81 (BP Location: Left arm, Patient Position: Sitting) 62 98.4 °F (36.9 °C) (Oral) 17 5' 5" (1.651 m) 106.1 kg (234 lb)    SpO2 BMI             100% 38.94 kg/m2         Allergies as of 3/20/2017     No Known Allergies      Immunizations Administered on Date of Encounter - 3/20/2017     None      ED Micro, Lab, POCT     None      ED Imaging Orders     None      Discharge References/Attachments     BURSITIS (ENGLISH)    TENDONITIS (ENGLISH)      Your Scheduled Appointments     Apr 03, 2017  9:00 AM CDT   Nurse Visit Short with [facility] - Internal Medicine ([facility])    [address]  [place] [address]   [phone]            Jun 08, 2017  8:30 AM CDT   Fasting Lab with LAB, APPOINTMENT [facility])    [address]  [place] [address]   [phone]            Jun 22, 2017  9:00 AM CDT   Established Patient Visit with Karan James MD   [facility] - Internal Medicine ([facility])    [address]  [place] [address]   [phone]              Smoking Cessation     If you would like to quit smoking:   You may be eligible for free services if you are a [place] resident and started " smoking cigarettes before September 1, 1988.  Call the Smoking Cessation Trust (SCT) toll free at (172) 319-1442 or (679) 862-6144.   Call 1-800-QUIT-NOW if you do not meet the above criteria.             [facility] complies with applicable Federal civil rights laws and does not discriminate on the basis of race, color, national origin, age, disability, or sex.        Language Assistance Services     ATTENTION: Language assistance services are available, free of charge. Please call [phone].      ATENCIÓN: Si habla español, tiene a tubbs disposición servicios gratuitos de asistencia lingüística. Llame al [phone].     CHÚ Ý: N?u b?n nói Ti?ng Vi?t, có các d?ch v? h? tr? ngôn ng? mi?n phí dành cho b?n. G?i s? [phone].

## 2017-03-28 RX ORDER — TIZANIDINE 2 MG/1
TABLET ORAL
Qty: 60 TABLET | Refills: 0 | Status: SHIPPED | OUTPATIENT
Start: 2017-03-28 | End: 2017-09-14 | Stop reason: SDUPTHER

## 2017-04-06 ENCOUNTER — TELEPHONE (OUTPATIENT)
Dept: INTERNAL MEDICINE | Facility: CLINIC | Age: 52
End: 2017-04-06

## 2017-04-13 DIAGNOSIS — E55.9 VITAMIN D DEFICIENCY: ICD-10-CM

## 2017-04-13 RX ORDER — ERGOCALCIFEROL 1.25 MG/1
CAPSULE ORAL
Qty: 12 CAPSULE | Refills: 0 | Status: SHIPPED | OUTPATIENT
Start: 2017-04-13 | End: 2017-06-22 | Stop reason: SDUPTHER

## 2017-04-17 DIAGNOSIS — E78.5 HYPERLIPEMIA: ICD-10-CM

## 2017-04-17 NOTE — TELEPHONE ENCOUNTER
Patient should have sufficient refills to last until 2/2018 from last rx sent in for this med. Please contact patient/pharmacy to clarify.

## 2017-04-18 RX ORDER — ATORVASTATIN CALCIUM 80 MG/1
TABLET, FILM COATED ORAL
Qty: 90 TABLET | Refills: 0 | OUTPATIENT
Start: 2017-04-18

## 2017-04-18 NOTE — TELEPHONE ENCOUNTER
Spoke with pharmacy pt has sufficent refills available at the pharmacy. Pharmacy will reach out to pt.

## 2017-04-20 ENCOUNTER — CLINICAL SUPPORT (OUTPATIENT)
Dept: INTERNAL MEDICINE | Facility: CLINIC | Age: 52
End: 2017-04-20
Payer: MEDICAID

## 2017-04-20 VITALS — WEIGHT: 235.88 LBS | BODY MASS INDEX: 39.25 KG/M2

## 2017-04-20 NOTE — PROGRESS NOTES
Health  Follow-Up Note   [x] Office  [] Phone  Notes from previous session reviewed.   [x] Previous Session Goals unchanged.   [] Patient/Caregiver Change in Goals.  Goals added or changed by Patient/Caregiver since program participation:  1.     Exercise 3 x week  2. Start going to gym on [employer]     Additional/Changed support that patient/caregiver has experienced/sought?  (Indicate readiness, support from family, friends, others, community groups, etc)  1.  daughter and granddaughter    Additional/Changed obstacles that could prevent patient/caregiver from reaching their goals?  1.  pain right shoulder may need surgery finds out next month    Feedback provided:  1.  Praised for continued effort and determination    Diagnostic values/Desriptors for follow-up as needed for chronic condition(s)   Weight: 107 kg 235 lb 14.3 oz    Interventions:   1. Health  listened reflectively, validated thoughts and feelings, offered support and encouragement.   2. Allowed patient to express themselves in a non-biased atmosphere.  3. Health  assisted pt to problem-solve obstacles such as being in a challenging environment and dealing with these challenges.   4. Motivational Interviewed interventions utilized (OARS).   5. Patient responded favorably to interventions and remained actively engaged in the session.   6. Health  will remain available and connected for patient by phone and/or office visits.   7. Positive reinforcement, emotional support and encouragement provided.   8. Focused Education: MI, Exercise    Plan:  [x] Pt will work on goals as stated above.   [x] Pt will contact Health  for any questions, concerns or needs.  [x] Pt will follow up with Health  in office on 6/12/17 at 0900:        [] Pt will follow up with Health  on phone in:        [x] Health  will remain available.   [] Health  will contact patient by phone in:        [] Health  will  consult:        [] Health  will inform Provider via EPIC messaging.     Impression:  1. Behavior is consistent with   Action    Stage of Change.   2. Participation level:       [x] Receptive      [x] Interactive      [] Guarded and Resistant      [x] Self Motivated      [] Refused/Declined to participate   3. [x] Pt voiced understanding of all information presented.       [] Pt voiced needing further information/education. This will be arranged.       [x] Pt would benefit from further education/information as identified by this health . This will be arranged.     Kendal Gambino RN HC

## 2017-04-27 ENCOUNTER — LAB VISIT (OUTPATIENT)
Dept: LAB | Facility: OTHER | Age: 52
End: 2017-04-27
Attending: OBSTETRICS & GYNECOLOGY
Payer: MEDICAID

## 2017-04-27 ENCOUNTER — OFFICE VISIT (OUTPATIENT)
Dept: OBSTETRICS AND GYNECOLOGY | Facility: CLINIC | Age: 52
End: 2017-04-27
Payer: MEDICAID

## 2017-04-27 VITALS
WEIGHT: 235 LBS | HEIGHT: 65 IN | SYSTOLIC BLOOD PRESSURE: 124 MMHG | BODY MASS INDEX: 39.15 KG/M2 | DIASTOLIC BLOOD PRESSURE: 80 MMHG

## 2017-04-27 DIAGNOSIS — Z11.3 SCREEN FOR STD (SEXUALLY TRANSMITTED DISEASE): ICD-10-CM

## 2017-04-27 DIAGNOSIS — Z11.3 SCREEN FOR STD (SEXUALLY TRANSMITTED DISEASE): Primary | ICD-10-CM

## 2017-04-27 DIAGNOSIS — N95.0 POST-MENOPAUSAL BLEEDING: ICD-10-CM

## 2017-04-27 LAB
CANDIDA RRNA VAG QL PROBE: NEGATIVE
G VAGINALIS RRNA GENITAL QL PROBE: POSITIVE
T VAGINALIS RRNA GENITAL QL PROBE: NEGATIVE

## 2017-04-27 PROCEDURE — 86592 SYPHILIS TEST NON-TREP QUAL: CPT

## 2017-04-27 PROCEDURE — 86703 HIV-1/HIV-2 1 RESULT ANTBDY: CPT

## 2017-04-27 PROCEDURE — 80074 ACUTE HEPATITIS PANEL: CPT

## 2017-04-27 PROCEDURE — 36415 COLL VENOUS BLD VENIPUNCTURE: CPT

## 2017-04-27 PROCEDURE — 99214 OFFICE O/P EST MOD 30 MIN: CPT | Mod: S$PBB,,, | Performed by: OBSTETRICS & GYNECOLOGY

## 2017-04-27 PROCEDURE — 99999 PR PBB SHADOW E&M-EST. PATIENT-LVL III: CPT | Mod: PBBFAC,,, | Performed by: OBSTETRICS & GYNECOLOGY

## 2017-04-27 RX ORDER — TIZANIDINE 2 MG/1
TABLET ORAL
Qty: 60 TABLET | Refills: 0 | OUTPATIENT
Start: 2017-04-27

## 2017-04-27 NOTE — PROGRESS NOTES
Gynecology    SUBJECTIVE:     Chief Complaint: Vaginal Bleeding (Patient reports that she noticed spotting on yesterday after using the restroom.)       History of Present Illness:  51 year old who presents with vaginal bleeding.  Patient first noticed blood about one month ago.  She reports washing herself one month ago and bleeding afterwards.  Patient reports spotting three times during this month.  Does not need a panti liner.  Patient is sexually active as of two months ago.  No pain with intercourse.  No increase in bleeding after intercourse.  She is using protection- condoms.  Patient also has noticed a discharge that is white with a funny odor.  No itching or burning.  Has been present for two weeks.      S/p hysterectomy.      Review of Systems:  Review of Systems   Constitutional: Negative for appetite change, fever and unexpected weight change.   Respiratory: Negative for shortness of breath.    Cardiovascular: Negative for chest pain.   Gastrointestinal: Negative for nausea and vomiting.   Genitourinary: Positive for vaginal discharge and vaginal odor. Negative for menorrhagia, menstrual problem, pelvic pain, vaginal bleeding and vaginal pain.        OBJECTIVE:     Physical Exam:  Physical Exam   Constitutional: She is oriented to person, place, and time. She appears well-developed and well-nourished.   Pulmonary/Chest: Effort normal.   Abdominal: Soft.   Genitourinary: No labial fusion. There is no rash, tenderness, lesion or injury on the right labia. There is no rash, tenderness, lesion or injury on the left labia. Right adnexum displays no mass, no tenderness and no fullness. Left adnexum displays no mass, no tenderness and no fullness. No erythema, tenderness or bleeding in the vagina. No foreign body in the vagina. No signs of injury around the vagina. Vaginal discharge found.   Genitourinary Comments: Frothy, white vaginal discharge. No vaginal lesions or tears.  No vaginal atrophy.     Neurological: She is alert and oriented to person, place, and time.   Psychiatric: She has a normal mood and affect. Her behavior is normal. Judgment and thought content normal.   Nursing note and vitals reviewed.        ASSESSMENT:       ICD-10-CM ICD-9-CM    1. Screen for STD (sexually transmitted disease) Z11.3 V74.5 HIV-1 and HIV-2 antibodies      RPR      Hepatitis panel, acute      C. trachomatis/N. gonorrhoeae by AMP DNA Urine      Vaginosis Screen by DNA Probe   2. Post-menopausal bleeding N95.0 627.1           Plan:      Batool was seen today for vaginal bleeding.    Diagnoses and all orders for this visit:    Screen for STD (sexually transmitted disease)  -     HIV-1 and HIV-2 antibodies; Future  -     RPR; Future  -     Hepatitis panel, acute; Future  -     C. trachomatis/N. gonorrhoeae by AMP DNA Urine  -     Vaginosis Screen by DNA Probe    Post-menopausal bleeding      STD testing. Suspect bleeding as a result of STD.   Safe sex practices.     Orders Placed This Encounter   Procedures    C. trachomatis/N. gonorrhoeae by AMP DNA Urine    Vaginosis Screen by DNA Probe    HIV-1 and HIV-2 antibodies    RPR    Hepatitis panel, acute       Return for annual or prn.    Clare Forrest

## 2017-04-27 NOTE — MR AVS SNAPSHOT
[facility] [address]  [address]  [address]  [place] [address]  Phone: [phone]  Fax: [phone]                  Batool Rivera   2017 1:30 PM   Office Visit    Description:  Female : 1965   Provider:  Clare Forrest MD   Department:  [facility] [address]           Reason for Visit     Vaginal Bleeding                To Do List           Future Appointments        Provider Department Dept Phone    2017 1:30 PM Clare Forrest MD [facility] [address] [phone]    2017 8:30 AM LAB, APPOINTMENT [facility] [phone]    2017 9:00 AM [facility] [phone]    2017 9:00 AM Karan James MD [facility] Internal [facility] [phone]      Goals (5 Years of Data)              9/16/16    3/21/16    3/17/14    HDL > 40   40  36  35    LDL CALC < 130   157.6  245.6  111.4    Reduce fat intake to <75 grams per day             [facility]     [facility] Nurse Care Line -  Assistance  Unless otherwise directed by your provider, please contact [facility], our nurse care line that is available for  assistance.     Registered nurses in the [facility] provide: appointment scheduling, clinical advisement, health education, and other advisory services.  Call: [phone] (toll free)               Medications           Message regarding Medications     Verify the changes and/or additions to your medication regime listed below are the same as discussed with your clinician today.  If any of these changes or additions are incorrect, please notify your healthcare provider.             Verify that the below list of medications is an accurate representation of the medications you are currently taking.  If none reported, the list may be blank. If incorrect, please contact your healthcare provider. Carry this list with you in case of  "emergency.           Current Medications     amlodipine (NORVASC) 5 MG tablet Take 1 tablet (5 mg total) by mouth once daily.    aspirin (ECOTRIN) 325 MG EC tablet Take 325 mg by mouth once daily.    atorvastatin (LIPITOR) 80 MG tablet Take 1 tablet (80 mg total) by mouth once daily.    diclofenac sodium (VOLTAREN) 1 % Gel Apply 2 g topically 4 (four) times daily.    ergocalciferol (ERGOCALCIFEROL) 50,000 unit Cap TAKE ONE CAPSULE BY MOUTH EVERY 7 DAYS    escitalopram oxalate (LEXAPRO) 20 MG tablet Take 1 tablet (20 mg total) by mouth once daily.    ezetimibe (ZETIA) 10 mg tablet Take 1 tablet (10 mg total) by mouth once daily.    gabapentin (NEURONTIN) 300 MG capsule Take 300 mg by mouth 3 (three) times daily.     hydrocodone-acetaminophen 10-325mg (NORCO)  mg Tab Take 1 tablet by mouth 3 (three) times daily.      hydrOXYzine HCl (ATARAX) 25 MG tablet Take 1 tablet (25 mg total) by mouth 3 (three) times daily as needed for Anxiety (or difficulty sleeping).    ibuprofen (ADVIL,MOTRIN) 800 MG tablet Take 1 tablet (800 mg total) by mouth every 6 (six) hours as needed for Pain.    lisinopril (PRINIVIL,ZESTRIL) 40 MG tablet Take 1 tablet (40 mg total) by mouth once daily.    oxycodone-acetaminophen (PERCOCET) 5-325 mg per tablet Take 1 tablet by mouth every 6 (six) hours as needed for Pain.    tizanidine (ZANAFLEX) 2 MG tablet TAKE 1 TO 2 TABLETS BY MOUTH EVERY NIGHT AT BEDTIME AS NEEDED FOR NECK PAIN    triamcinolone acetonide 0.1% (KENALOG) 0.1 % ointment            Clinical Reference Information           Your Vitals Were     BP Height Weight BMI       124/80 (BP Method: Manual) 5' 5" (1.651 m) 106.6 kg (235 lb) 39.11 kg/m2       Blood Pressure          Most Recent Value    BP  124/80      Allergies as of 4/27/2017     No Known Allergies      Immunizations Administered on Date of Encounter - 4/27/2017     None      Language Assistance Services     ATTENTION: Language assistance services are available, free of " charge. Please call [phone].      ATENCIÓN: Si habla español, tiene a tubbs disposición servicios gratuitos de asistencia lingüística. Llame al [phone].     CHÚ Ý: N?u b?n nói Ti?ng Vi?t, có các d?ch v? h? tr? ngôn ng? mi?n phí dành cho b?n. G?i s? [phone].         [facility] [address] complies with applicable Federal civil rights laws and does not discriminate on the basis of race, color, national origin, age, disability, or sex.

## 2017-04-28 ENCOUNTER — PATIENT MESSAGE (OUTPATIENT)
Dept: OBSTETRICS AND GYNECOLOGY | Facility: CLINIC | Age: 52
End: 2017-04-28

## 2017-04-28 DIAGNOSIS — B96.89 BACTERIAL VAGINOSIS: Primary | ICD-10-CM

## 2017-04-28 DIAGNOSIS — N76.0 BACTERIAL VAGINOSIS: Primary | ICD-10-CM

## 2017-04-28 LAB
C TRACH DNA SPEC QL NAA+PROBE: NOT DETECTED
HAV IGM SERPL QL IA: NEGATIVE
HBV CORE IGM SERPL QL IA: NEGATIVE
HBV SURFACE AG SERPL QL IA: NEGATIVE
HCV AB SERPL QL IA: NEGATIVE
HIV 1+2 AB+HIV1 P24 AG SERPL QL IA: NEGATIVE
N GONORRHOEA DNA SPEC QL NAA+PROBE: NOT DETECTED
RPR SER QL: NORMAL

## 2017-04-28 RX ORDER — METRONIDAZOLE 500 MG/1
500 TABLET ORAL EVERY 12 HOURS
Qty: 14 TABLET | Refills: 0 | Status: SHIPPED | OUTPATIENT
Start: 2017-04-28 | End: 2017-05-05

## 2017-05-12 ENCOUNTER — TELEPHONE (OUTPATIENT)
Dept: INTERNAL MEDICINE | Facility: CLINIC | Age: 52
End: 2017-05-12

## 2017-05-12 NOTE — TELEPHONE ENCOUNTER
----- Message from Kitty Miranda sent at 5/12/2017  3:02 PM CDT -----  Contact: Self/ [phone]   Type: Referral to a Specialist    Where would you like a referral to?    Have you previously requested?    Is the physician within the [facility]? No     Name and phone number of specialist:Orthopedics     Reason for appointment: shoulder     Is an appointment scheduled with specialist? When? No     Comments: pt is calling to have a referral sent [facility]. Please call and advise     Thank you

## 2017-05-17 ENCOUNTER — TELEPHONE (OUTPATIENT)
Dept: INTERNAL MEDICINE | Facility: CLINIC | Age: 52
End: 2017-05-17

## 2017-05-17 DIAGNOSIS — M75.51 BURSITIS OF RIGHT SHOULDER: ICD-10-CM

## 2017-05-17 DIAGNOSIS — M25.511 RIGHT SHOULDER PAIN, UNSPECIFIED CHRONICITY: Primary | ICD-10-CM

## 2017-05-17 NOTE — TELEPHONE ENCOUNTER
----- Message from DanialKush Tang sent at 5/17/2017  9:27 AM CDT -----  Contact: Pt at [phone]  Patient would like to get a referral.  Does the patient already have the specialty clinic appointment scheduled:  no  If yes, what date is the appointment scheduled:     Referral to what specialty:  Orthopedics  Reason (be specific):  Right arm/shoulder pain  Does the patient want the referral with a specific physician: no   [facility] an [facility]:  [facility]  Comments:

## 2017-05-29 ENCOUNTER — TELEPHONE (OUTPATIENT)
Dept: INTERNAL MEDICINE | Facility: CLINIC | Age: 52
End: 2017-05-29

## 2017-05-29 NOTE — TELEPHONE ENCOUNTER
Called patient left message request call back to reschedule appt she has on 6/12/17 with the Health .  Kendal Gambino RN

## 2017-05-31 ENCOUNTER — TELEPHONE (OUTPATIENT)
Dept: INTERNAL MEDICINE | Facility: CLINIC | Age: 52
End: 2017-05-31

## 2017-05-31 NOTE — TELEPHONE ENCOUNTER
----- Message from Elizabeth Shields sent at 5/31/2017  1:42 PM CDT -----  Contact: patient [phone]  Pt called to ask if you sent his paperwork to [facility] about her shoulder. Please call pt back today.

## 2017-05-31 NOTE — TELEPHONE ENCOUNTER
Spoke with patient refaxed referral to [facility] as requested.

## 2017-06-05 ENCOUNTER — CLINICAL SUPPORT (OUTPATIENT)
Dept: INTERNAL MEDICINE | Facility: CLINIC | Age: 52
End: 2017-06-05
Payer: MEDICAID

## 2017-06-05 VITALS — WEIGHT: 240.31 LBS | BODY MASS INDEX: 39.99 KG/M2

## 2017-06-05 NOTE — PROGRESS NOTES
Health  Follow-Up Note   [x] Office  [] Phone  Notes from previous session reviewed.   [x] Previous Session Goals unchanged.   [] Patient/Caregiver Change in Goals.  Goals added or changed by Patient/Caregiver since program participation:  1.     Exercise (try to purchase trampoline mini)    Additional/Changed obstacles that could prevent patient/caregiver from reaching their goals?  1.  Children and granddaughter cooking foods in house    Feedback provided:  1.  Praised for continued effort    Diagnostic values/Desriptors for follow-up as needed for chronic condition(s)   Weight: 109 kg 240 lb 4.8 oz gain 4 lbs    Interventions:   1. Health  listened reflectively, validated thoughts and feelings, offered support and encouragement.   2. Allowed patient to express themselves in a non-biased atmosphere.  3. Health  assisted pt to problem-solve obstacles such as being in a challenging environment and dealing with these challenges.   4. Motivational Interviewed interventions utilized (OARS).   5. Patient responded favorably to interventions and remained actively engaged in the session.   6. Health  will remain available and connected for patient by phone and/or office visits.   7. Positive reinforcement, emotional support and encouragement provided.   8. Focused Education: MI    Plan:  [x] Pt will work on goals as stated above.   [x] Pt will contact Health  for any questions, concerns or needs.  [x] Pt will follow up with Health  in office on 7/10/17 at 1030        [] Pt will follow up with Health  on phone in:        [x] Health  will remain available.   [] Health  will contact patient by phone in:        [] Health  will consult:        [] Health  will inform Provider via EPIC messaging.     Impression:  1. Behavior is consistent with   Preparation    Stage of Change.   2. Participation level:       [x] Receptive      [x] Interactive      [] Guarded and Resistant       [x] Self Motivated      [] Refused/Declined to participate   3. [x] Pt voiced understanding of all information presented.       [] Pt voiced needing further information/education. This will be arranged.       [x] Pt would benefit from further education/information as identified by this health . This will be arranged.     Kendal Gambino RN HC

## 2017-06-12 ENCOUNTER — LAB VISIT (OUTPATIENT)
Dept: LAB | Facility: HOSPITAL | Age: 52
End: 2017-06-12
Attending: INTERNAL MEDICINE
Payer: MEDICAID

## 2017-06-12 DIAGNOSIS — E66.01 SEVERE OBESITY (BMI 35.0-35.9 WITH COMORBIDITY): ICD-10-CM

## 2017-06-12 DIAGNOSIS — R71.8 RBC MICROCYTOSIS: ICD-10-CM

## 2017-06-12 DIAGNOSIS — I10 ESSENTIAL HYPERTENSION: ICD-10-CM

## 2017-06-12 DIAGNOSIS — G47.33 OSA (OBSTRUCTIVE SLEEP APNEA): ICD-10-CM

## 2017-06-12 DIAGNOSIS — E55.9 VITAMIN D DEFICIENCY: ICD-10-CM

## 2017-06-12 LAB
25(OH)D3+25(OH)D2 SERPL-MCNC: 28 NG/ML
ALBUMIN SERPL BCP-MCNC: 3.6 G/DL
ALP SERPL-CCNC: 114 U/L
ALT SERPL W/O P-5'-P-CCNC: 16 U/L
ANION GAP SERPL CALC-SCNC: 7 MMOL/L
AST SERPL-CCNC: 16 U/L
BASOPHILS # BLD AUTO: 0.03 K/UL
BASOPHILS NFR BLD: 0.3 %
BILIRUB SERPL-MCNC: 0.5 MG/DL
BUN SERPL-MCNC: 14 MG/DL
CALCIUM SERPL-MCNC: 9.5 MG/DL
CHLORIDE SERPL-SCNC: 107 MMOL/L
CHOLEST/HDLC SERPL: 6.5 {RATIO}
CO2 SERPL-SCNC: 27 MMOL/L
CREAT SERPL-MCNC: 0.8 MG/DL
DIFFERENTIAL METHOD: ABNORMAL
EOSINOPHIL # BLD AUTO: 0.2 K/UL
EOSINOPHIL NFR BLD: 1.7 %
ERYTHROCYTE [DISTWIDTH] IN BLOOD BY AUTOMATED COUNT: 15.6 %
EST. GFR  (AFRICAN AMERICAN): >60 ML/MIN/1.73 M^2
EST. GFR  (NON AFRICAN AMERICAN): >60 ML/MIN/1.73 M^2
ESTIMATED AVG GLUCOSE: 126 MG/DL
GLUCOSE SERPL-MCNC: 103 MG/DL
HBA1C MFR BLD HPLC: 6 %
HCT VFR BLD AUTO: 37.8 %
HDL/CHOLESTEROL RATIO: 15.3 %
HDLC SERPL-MCNC: 222 MG/DL
HDLC SERPL-MCNC: 34 MG/DL
HGB BLD-MCNC: 13.1 G/DL
LDLC SERPL CALC-MCNC: 159.8 MG/DL
LYMPHOCYTES # BLD AUTO: 4 K/UL
LYMPHOCYTES NFR BLD: 36.2 %
MCH RBC QN AUTO: 24.8 PG
MCHC RBC AUTO-ENTMCNC: 34.7 %
MCV RBC AUTO: 72 FL
MONOCYTES # BLD AUTO: 0.6 K/UL
MONOCYTES NFR BLD: 5.5 %
NEUTROPHILS # BLD AUTO: 6.2 K/UL
NEUTROPHILS NFR BLD: 55.9 %
NONHDLC SERPL-MCNC: 188 MG/DL
PLATELET # BLD AUTO: 229 K/UL
PMV BLD AUTO: 10 FL
POTASSIUM SERPL-SCNC: 4.3 MMOL/L
PROT SERPL-MCNC: 7.2 G/DL
RBC # BLD AUTO: 5.28 M/UL
SODIUM SERPL-SCNC: 141 MMOL/L
TRIGL SERPL-MCNC: 141 MG/DL
WBC # BLD AUTO: 11.1 K/UL

## 2017-06-12 PROCEDURE — 80061 LIPID PANEL: CPT

## 2017-06-12 PROCEDURE — 36415 COLL VENOUS BLD VENIPUNCTURE: CPT

## 2017-06-12 PROCEDURE — 82306 VITAMIN D 25 HYDROXY: CPT

## 2017-06-12 PROCEDURE — 83036 HEMOGLOBIN GLYCOSYLATED A1C: CPT

## 2017-06-12 PROCEDURE — 80053 COMPREHEN METABOLIC PANEL: CPT

## 2017-06-12 PROCEDURE — 85025 COMPLETE CBC W/AUTO DIFF WBC: CPT

## 2017-06-14 ENCOUNTER — TELEPHONE (OUTPATIENT)
Dept: INTERNAL MEDICINE | Facility: CLINIC | Age: 52
End: 2017-06-14

## 2017-06-14 NOTE — TELEPHONE ENCOUNTER
Called pt left  requesting return call in regards to results:      Labs reviewed.   A1c (which gives a sense of average blood sugars over the past 3 months) is a little high, in the borderline range, but not in the diabetes range.   Vitamin D level continues to improve, but is still a little low -- continue supplement, and we will review at her next visit.   Metabolic panel (electrolytes, glucose, kidney function, liver function) is normal.   Cholesterol panel shows that the LDL is about the same as prior, and her HDL (good cholesterol) is a little lower - we will also review this at her upcoming visit.   Complete blood count is overall stable, no anemia.   Please call patient to inform & remind of her upcoming appt.   Karan James MD

## 2017-06-14 NOTE — PROGRESS NOTES
Labs reviewed.   A1c (which gives a sense of average blood sugars over the past 3 months) is a little high, in the borderline range, but not in the diabetes range.  Vitamin D level continues to improve, but is still a little low -- continue supplement, and we will review at her next visit.  Metabolic panel (electrolytes, glucose, kidney function, liver function) is normal.  Cholesterol panel shows that the LDL is about the same as prior, and her HDL (good cholesterol) is a little lower - we will also review this at her upcoming visit.  Complete blood count is overall stable, no anemia.  Please call patient to inform & remind of her upcoming appt.  aKran James MD

## 2017-06-15 NOTE — TELEPHONE ENCOUNTER
Pt informed of lab results and verbalized understanding. Pt also reminded of upcoming appointment.

## 2017-06-22 ENCOUNTER — OFFICE VISIT (OUTPATIENT)
Dept: INTERNAL MEDICINE | Facility: CLINIC | Age: 52
End: 2017-06-22
Payer: MEDICAID

## 2017-06-22 VITALS
SYSTOLIC BLOOD PRESSURE: 119 MMHG | HEART RATE: 60 BPM | DIASTOLIC BLOOD PRESSURE: 86 MMHG | HEIGHT: 65 IN | WEIGHT: 237.19 LBS | TEMPERATURE: 98 F | OXYGEN SATURATION: 99 % | BODY MASS INDEX: 39.52 KG/M2

## 2017-06-22 DIAGNOSIS — I10 ESSENTIAL HYPERTENSION: ICD-10-CM

## 2017-06-22 DIAGNOSIS — J06.9 URI, ACUTE: Primary | ICD-10-CM

## 2017-06-22 DIAGNOSIS — E78.5 HYPERLIPIDEMIA, UNSPECIFIED HYPERLIPIDEMIA TYPE: ICD-10-CM

## 2017-06-22 DIAGNOSIS — E66.01 SEVERE OBESITY (BMI 35.0-35.9 WITH COMORBIDITY): ICD-10-CM

## 2017-06-22 DIAGNOSIS — F39 MOOD DISORDER: ICD-10-CM

## 2017-06-22 DIAGNOSIS — F41.9 ANXIETY: ICD-10-CM

## 2017-06-22 DIAGNOSIS — E55.9 VITAMIN D DEFICIENCY: ICD-10-CM

## 2017-06-22 DIAGNOSIS — F32.A DEPRESSION, UNSPECIFIED DEPRESSION TYPE: Chronic | ICD-10-CM

## 2017-06-22 DIAGNOSIS — K21.9 GASTROESOPHAGEAL REFLUX DISEASE, ESOPHAGITIS PRESENCE NOT SPECIFIED: ICD-10-CM

## 2017-06-22 PROCEDURE — 99214 OFFICE O/P EST MOD 30 MIN: CPT | Mod: PBBFAC | Performed by: INTERNAL MEDICINE

## 2017-06-22 PROCEDURE — 99999 PR PBB SHADOW E&M-EST. PATIENT-LVL IV: CPT | Mod: PBBFAC,,, | Performed by: INTERNAL MEDICINE

## 2017-06-22 PROCEDURE — 99214 OFFICE O/P EST MOD 30 MIN: CPT | Mod: S$PBB,,, | Performed by: INTERNAL MEDICINE

## 2017-06-22 RX ORDER — ERGOCALCIFEROL 1.25 MG/1
50000 CAPSULE ORAL
Qty: 12 CAPSULE | Refills: 0 | Status: SHIPPED | OUTPATIENT
Start: 2017-06-22 | End: 2017-11-11 | Stop reason: SDUPTHER

## 2017-06-22 RX ORDER — HYDROXYZINE HYDROCHLORIDE 25 MG/1
25 TABLET, FILM COATED ORAL 3 TIMES DAILY PRN
Qty: 90 TABLET | Refills: 0 | Status: SHIPPED | OUTPATIENT
Start: 2017-06-22 | End: 2018-10-31

## 2017-06-22 NOTE — PROGRESS NOTES
Subjective:       Patient ID: Batool Rivera is a 52 y.o. female.    Chief Complaint: Sinus Problem    HPI  51 y/o woman with h/o chronic back pain, obesity, HTN, HLD, GERD, multiple other issues here for follow up; primary concern is sinus problem.    URI / sinus problem - sinus congestion and pressure, +postnasal drip, +cough, no fever or ear pain, +ear itching x 4-5 days.   Took tylenol sinus yesterday, doesn't feel that this helped. Hasn't taken any other medications.     Shoulder pain (right) - was referred to [facility], has appt with Ortho there next month. Still having pain, sometimes swelling. Went to ER in 3/2017 for this, was given steroid IM and toradol IM which did give some relief.    Elevated A1c, normal fasting glucose on last labs. Working on reducing carbs in diet.    Vitamin D deficiency - taking weekly supplement    Dyslipidemia - taking atorvastatin 80mg, +zetia 10mg. Taking these every day.    (down from 245 last year), HDL 34.    Chronic back and knee pain - follows with Dr Flor for this, he is prescribing norco, gabapentin, voltaren gel, another topical medication. Saw him earlier this month, but may be switching providers - says Dr Flor is switching to only doing procedures.  Reports her norco dose was decreased recently, but doesn't recall what dose she is taking. Is given #30 tablets/month, so takes these once/day.  Had flare last month of back pain, couldn't walk for a few days.      HTN - on amlodipine 5mg, lisinopril 40mg. No headache, vision changes, chest pain, or dyspnea.  Working on reducing salt in diet - switched from canned vegetables to bagged    H/o GERD - not currently taking any medication. No abdominal pain.      MDD, Anxiety - taking lexapro daily, feels this continues to help her.     Headaches - has seen Neurology / headache specialist; may be having rebound headaches related to pain med use for her chronic back/knee/joint pain. Was offered injections to  "help treat headaches, does not want to take this. Has zanaflex to take prn.  Today says she does have headaches every day, but not severe. Doesn't take zanaflex daily.     BELLO - was given APAP machine, says she didn't use this "because I had the flu" and then had to return the machine. Needs new appt with Sleep Med.      Obesity - Saw health  in March and June. Weight stable. Working on making weight changes, cutting out rice, cutting down on carbs. Stopped buying chips, avoiding having junk foods at home.     Chronic microcytosis - has sickle cell trait (father has sickle cell anemia).     Review of Systems   Constitutional: Negative for activity change and fatigue.   HENT: Positive for congestion, postnasal drip, rhinorrhea, sinus pressure and sore throat.    Eyes: Negative for visual disturbance.   Respiratory: Positive for cough. Negative for shortness of breath.    Cardiovascular: Negative for chest pain, palpitations and leg swelling.   Gastrointestinal: Negative.  Negative for abdominal pain, blood in stool, constipation and diarrhea.   Endocrine: Negative.    Genitourinary: Negative.  Negative for dysuria.   Musculoskeletal: Positive for arthralgias (knee pain - chronic, no recent change; new R shoulder pain.), back pain (chronic) and gait problem (sometimes with back spasm/sciatica, no recent flare). Negative for joint swelling and neck stiffness.   Skin: Negative for rash.   Allergic/Immunologic: Negative for environmental allergies.   Neurological: Positive for headaches. Negative for weakness and numbness.   Psychiatric/Behavioral: Positive for sleep disturbance (sleep apnea). Negative for dysphoric mood (stable on lexapro) and suicidal ideas. The patient is nervous/anxious (better when on lexapro).          Past medical history, surgical history, and family medical history reviewed and updated as appropriate.    Medications and allergies reviewed.     Objective:          Vitals:    06/22/17 0927 " "  BP: 119/86   BP Location: Right arm   Patient Position: Sitting   Pulse: 60   Temp: 98.4 °F (36.9 °C)   TempSrc: Oral   SpO2: 99%   Weight: 107.6 kg (237 lb 3.4 oz)   Height: 5' 5" (1.651 m)     Body mass index is 39.47 kg/m².  Physical Exam   HENT:   Nose: Mucosal edema (and erythema of nasal turbinates) present.   Mouth/Throat: Posterior oropharyngeal erythema (mild erythema) present. No oropharyngeal exudate.       Lab Results   Component Value Date    WBC 11.10 06/12/2017    HGB 13.1 06/12/2017    HCT 37.8 06/12/2017     06/12/2017    CHOL 222 (H) 06/12/2017    TRIG 141 06/12/2017    HDL 34 (L) 06/12/2017    ALT 16 06/12/2017    AST 16 06/12/2017     06/12/2017    K 4.3 06/12/2017     06/12/2017    CREATININE 0.8 06/12/2017    BUN 14 06/12/2017    CO2 27 06/12/2017    TSH 1.706 03/21/2016    INR 1.1 02/17/2012    HGBA1C 6.0 (H) 06/12/2017       Assessment:       1. URI, acute    2. Vitamin D deficiency    3. Mood disorder    4. Anxiety    5. Essential hypertension    6. Severe obesity (BMI 35.0-35.9 with comorbidity)    7. Gastroesophageal reflux disease, esophagitis presence not specified    8. Hyperlipidemia, unspecified hyperlipidemia type    9. Depression, unspecified depression type        Plan:   Batool was seen today for sinus problem.    Diagnoses and all orders for this visit:    URI, acute - Recommended: saline nasal spray or rinse BID-TID, steroid nasal spray BID x 1 week then daily, antihistamine daily, guaifenesin (+dextromethorphan for cough) with plenty of fluids, lots of hot tea / soup, lots of sleep. Contact clinic or RTC for fever, focal facial or ear pain, worsening symptoms, or symptoms that have not improved significantly after 10-14 days.     Vitamin D deficiency  -     ergocalciferol (ERGOCALCIFEROL) 50,000 unit Cap; Take 1 capsule (50,000 Units total) by mouth every 7 days.    Mood disorder  -     hydrOXYzine HCl (ATARAX) 25 MG tablet; Take 1 tablet (25 mg total) by " mouth 3 (three) times daily as needed for Anxiety (or difficulty sleeping).      Anxiety  -     hydrOXYzine HCl (ATARAX) 25 MG tablet; Take 1 tablet (25 mg total) by mouth 3 (three) times daily as needed for Anxiety (or difficulty sleeping).    Essential hypertension -  At goal, no med changes    Severe obesity (BMI 35.0-35.9 with comorbidity) - encouraged continued work with health , doing well so far    BELLO - follow up with Sleep Med clinic    Gastroesophageal reflux disease, esophagitis presence not specified - asymptomatic with only diet control, continue watching diet for trigger foods    Hyperlipidemia, unspecified hyperlipidemia type - continue statin, zetia    Depression, unspecified depression type - MDD - improved, continue lexapro    Health maintenance reviewed with patient.     Return in about 4 months (around 10/22/2017) for follow up, work on weight loss.    Karan James MD  Internal Medicine  [facility]  6/22/2017

## 2017-06-22 NOTE — PATIENT INSTRUCTIONS
"For your symptoms (sinusitis, congestion, cough):  1. Saline nasal spray ([place]) at least 2-3 times/day  2. Flonase (fluticasone) nasal spray - twice a day for 1 week, then once a day thereafter  3. Claritin (loratadine) or Zyrtec (cetirizine) once a day  4. Mucinex (guaifenesin) every 6-8 hours with plenty of water. Ok to use Mucinex-DM to help with cough  5. Hot soup, hot tea (try "throat coat" tea) with honey and lemon.  6. Plenty of sleep!      Sinusitis (No Antibiotics)    The sinuses are air-filled spaces within the bones of the face. They connect to the inside of the nose. Sinusitis is an inflammation of the tissue lining the sinus cavity. Sinus inflammation can occur during a cold. It can also be due to allergies to pollens and other particles in the air. It can cause symptoms such as sinus congestion, headache, sore throat, facial swelling and fullness. It may also cause a low-grade fever. No infection is present, and no antibiotic treatment is needed.  Home care  · Drink plenty of water, hot tea, and other liquids. This may help thin mucus. It also may promote sinus drainage.  · Heat may help soothe painful areas of the face. Use a towel soaked in hot water. Or,  the shower and direct the hot spray onto your face. Using a vaporizer along with a menthol rub at night may also help.   · An expectorant containing guaifenesin may help thin the mucus and promote drainage from the sinuses.  · Over-the-counter decongestants may be used unless a similar medicine was prescribed. Nasal sprays work the fastest. Use one that contains phenylephrine or oxymetazoline. First blow the nose gently. Then use the spray. Do not use these medicines more often than directed on the label or symptoms may get worse. You may also use tablets containing pseudoephedrine. Avoid products that combine ingredients, because side effects may be increased. Read labels. You can also ask the pharmacist for help. (NOTE: Persons with " high blood pressure should not use decongestants. They can raise blood pressure.)  · Over-the-counter antihistamines may help if allergies contributed to your sinusitis.    · Use acetaminophen or ibuprofen to control pain, unless another pain medicine was prescribed. (If you have chronic liver or kidney disease or ever had a stomach ulcer, talk with your doctor before using these medicines. Aspirin should never be used in anyone under 18 years of age who is ill with a fever. It may cause severe liver damage.)  · Use nasal rinses or irrigation as instructed by your health care provider.  · Don't smoke. This can worsen symptoms.  Follow-up care  Follow up with your healthcare provider or our staff if you are not improving within the next week.  When to seek medical advice  Call your healthcare provider if any of these occur:  · Green or yellow discharge from the nose or into the throat  · Facial pain or headache becoming more severe  · Stiff neck  · Unusual drowsiness or confusion  · Swelling of the forehead or eyelids  · Vision problems, including blurred or double vision  · Fever of 100.4ºF (38ºC) or higher, or as directed by your healthcare provider  · Seizure  · Breathing problems  · Symptoms not resolving within 10 days  Date Last Reviewed: 4/13/2015  © 7675-0139 [employer]. [address] [place] [address], [place] [address]. All rights reserved. This information is not intended as a substitute for professional medical care. Always follow your healthcare professional's instructions.

## 2017-07-02 DIAGNOSIS — E55.9 VITAMIN D DEFICIENCY: ICD-10-CM

## 2017-07-03 RX ORDER — ERGOCALCIFEROL 1.25 MG/1
CAPSULE ORAL
Qty: 12 CAPSULE | Refills: 0 | Status: SHIPPED | OUTPATIENT
Start: 2017-07-03 | End: 2018-02-02 | Stop reason: SDUPTHER

## 2017-07-10 ENCOUNTER — DOCUMENTATION ONLY (OUTPATIENT)
Dept: RESEARCH | Facility: HOSPITAL | Age: 52
End: 2017-07-10

## 2017-07-10 ENCOUNTER — CLINICAL SUPPORT (OUTPATIENT)
Dept: INTERNAL MEDICINE | Facility: CLINIC | Age: 52
End: 2017-07-10
Payer: MEDICAID

## 2017-07-10 VITALS — WEIGHT: 236.75 LBS | BODY MASS INDEX: 39.4 KG/M2

## 2017-07-10 NOTE — PROGRESS NOTES
Health  Follow-Up Note   [x] Office  [] Phone  Notes from previous session reviewed.   [x] Previous Session Goals unchanged.   [] Patient/Caregiver Change in Goals.  Goals added or changed by Patient/Caregiver since program participation:  1.    Try Probiotics  2. Get trampoline     Additional/Changed obstacles that could prevent patient/caregiver from reaching their goals?  1.  skipping breakfast, eating cheerio's for breakfast  2. Not eating enough    Feedback provided:  1.  Praised for continued effort and loss of 3.6 lb      Diagnostic values/Desriptors for follow-up as needed for chronic condition(s)   Weight: 107.4 kg 236.77 lb    Interventions:   1. Health  listened reflectively, validated thoughts and feelings, offered support and encouragement.   2. Allowed patient to express themselves in a non-biased atmosphere.  3. Health  assisted pt to problem-solve obstacles such as being in a challenging environment and dealing with these challenges.   4. Motivational Interviewed interventions utilized (OARS).   5. Patient responded favorably to interventions and remained actively engaged in the session.   6. Health  will remain available and connected for patient by phone and/or office visits.   7. Positive reinforcement, emotional support and encouragement provided.   8. Focused Education: [place]    Plan:  [x] Pt will work on goals as stated above.   [x] Pt will contact Health  for any questions, concerns or needs.  [x] Pt will follow up with Health  in office on  8/14/17 at 1000.      [] Pt will follow up with Health  on phone in:        [x] Health  will remain available.   [] Health  will contact patient by phone in:        [] Health  will consult:        [] Health  will inform Provider via EPIC messaging.     Impression:  1. Behavior is consistent with    Action   Stage of Change.   2. Participation level:       [x] Receptive      [x] Interactive      [] Guarded  and Resistant      [x] Self Motivated      [] Refused/Declined to participate   3. [x] Pt voiced understanding of all information presented.       [] Pt voiced needing further information/education. This will be arranged.       [x] Pt would benefit from further education/information as identified by this health . This will be arranged.     Kendal Gambino RN HC

## 2017-07-10 NOTE — RESEARCH
Documentation of Informed Consent Obtained for Research by Non-[facility] Personnel    Study: PROmoting Successful Weight Loss in Primary CarE in [place] (PROP)  IRB Number: #[id number]; [facility] IRB ID: [id number]  : Antonio Lal, PhD.  Coordinating Site: [facility] Co-Investigators: Lesley Fierro MD and Earle Barba MD  [facility] Approval Date: 11/6/15  This study is reviewed and acknowledged by the [facility] IRB. The IRB of Record is the [facility] ([facility]) IRB.    Is the volunteer currently enrolled in any other research trials (per Epic)? [ ] Yes  [X] No  [facility] staff administering informed consent: Shannon Abarca  Date:   6/5/2017  Does the volunteer agree to participate in the trial? [X] Yes  [ ] No  If no, document the reason here:       [X] I acknowledge that I have reviewed the informed consent form and the volunteer signed and dated the signature section of the consent forms.    Name: ([facility] personnel reviewing consent form):           Alex Coleman  Review and Scan Date (if different than date of note): 7/6/2017  Comments: See  for Consent Forms    [facility]-trained recruiters will obtain informed consent form all participants as well as HIPAA authorization.  Study protocol states all questions and concerns are clarified before any forms are signed. The following elements of the informed consent document were to be discussed:  · What you should know about a research study (e.g. purpose of the consent form; right to refuse or agree and change your mind later; participation is voluntary)?  · Who is doing the study?    · Where is the study being conducted?    · What is the purpose of this study?  · Who is eligible to participate in the study?     · What will happen to you if you take part in the study (e.g. Screening, Measurement visits, Lifestyle change meetings activities)?  · What are the  possible risks and discomforts? Benefits?  · If you do not want to take part in the study, are there other choices?  · If you have any questions or problems, whom can you call?  · What information will be kept private?  · Can your taking part in the study end early?  · What if information becomes available that might affect your decision to stay in the study?  · What charges will you have to pay? What payment will you receive?  · Will you be compensated for a study-related injury or medical illness?

## 2017-07-19 ENCOUNTER — OFFICE VISIT (OUTPATIENT)
Dept: SLEEP MEDICINE | Facility: CLINIC | Age: 52
End: 2017-07-19
Payer: MEDICAID

## 2017-07-19 VITALS
DIASTOLIC BLOOD PRESSURE: 74 MMHG | HEIGHT: 65 IN | WEIGHT: 238.13 LBS | BODY MASS INDEX: 39.67 KG/M2 | HEART RATE: 68 BPM | SYSTOLIC BLOOD PRESSURE: 134 MMHG

## 2017-07-19 DIAGNOSIS — G47.33 OBSTRUCTIVE SLEEP APNEA: Primary | ICD-10-CM

## 2017-07-19 PROCEDURE — 99213 OFFICE O/P EST LOW 20 MIN: CPT | Mod: S$PBB,,, | Performed by: NURSE PRACTITIONER

## 2017-07-19 PROCEDURE — 99999 PR PBB SHADOW E&M-EST. PATIENT-LVL III: CPT | Mod: PBBFAC,,, | Performed by: NURSE PRACTITIONER

## 2017-07-19 PROCEDURE — 99213 OFFICE O/P EST LOW 20 MIN: CPT | Mod: PBBFAC | Performed by: NURSE PRACTITIONER

## 2017-07-19 NOTE — PROGRESS NOTES
"Batool Rivera  was seen as a f/u for the management of obstructive sleep apnea. Last seen 8/29/16    Since last seen, she was setup with apap 5-16cm Sept'16. She used it almost nightly but would often remove mask and not reapply. Sometimes had better longer consolidated sleep. Felt less sleepy during daytime when using and had less leg pain. +less nocturia also. Was using chin strap with dreamwear mask. +sinus congestion "worse this year". Ready to reuse cpap. She returned equipment 1/11/17 ~. Having flu few weeks also affected her adherence.     8/11/16:  CHIEF COMPLAINT: Snoring, frequent nocturia    HISTORY OF PRESENT ILLNESS:Batool Rivera a 52 y.o. female presents for the evaluation of possible obstructive sleep apnea. She has never had a sleep study. She reports symptoms of disruptive snoring. Her ex- told her she stopped breathing during sleep. Nocturia 5x. Denies dyspnea. Denies am headaches. Denies dry mouth in am. Sleep is not refreshing. + excessive daytime sleepiness with occasional naps. She finds 2mg tizanidine helps her fall asleep quicker than typical (2hr). She has hard time losing weight. + witnessed apneic pauses. Denies air gasping, nocturnal choking or coughing.     Denies symptoms of restless legs or kicking during sleep.     On todays [place] Sleepiness Scale the patient scores a 13/24.     Norco prn for back pain/knee pain    BT 11p  WT 0530    8/29/16: She has since undergone a sleep study which was reviewed with her in detail today. She reports right leg pinched nerve (takes gabapentin tid/norco prn). She continues to report excessive daytime sleepiness with occasional naps, snoring, frequent nocturia. +apneic pauses. '    PSG: (241#) 8/18/16: AHI was 9.2 with an oxygen grant of 83.0%. The supine AHI was 11.9 and the REM AHI was 33.0. The patient did not qualify for a split night study due to an insufficient number of events in the first half of the study.     FAMILY " "HISTORY: No known sleep disorders.   SOCIAL HISTORY: . No tobacco or ETOH    REVIEW OF SYSTEMS:  Sleep related symptoms as per HPI; 4# gain; Sinus congestion; + headaches. Otherwise, a balance of 10 systems reviewed is negative        PHYSICAL EXAM:   /74   Pulse 68   Ht 5' 5" (1.651 m)   Wt 108 kg (238 lb 1.6 oz)   BMI 39.62 kg/m²   GENERAL: Obese body habitus, well groomed       ASSESSMENT:     BELLO, mild REM predominant. Was adherent with autopap but had no adherence monitroing and had mask removal. Returned equipment due to not meeting ins guidelines. Ready to resume.   She has medical comorbidities of obesity, hypertension, hyperlipidemia. Warrants definitive treatment for sleep apnea.     PLAN:   1. Given reasons documented for failure to meet ins guidelines, will obtain CPAP titration study (type I study) to determine effective pressure and also assess mouth leak with nasal mask. Call results/plan setup afterward with adherence monitoring. Discussed etiology of BELLO and potential ramifications of untreated BELLO, including heart disease, hypertension, cognitive difficulties, stroke, and diabetes. APAP setup recommended vs cpap titration study.   2 She does not drive.  Muscle relaxants and opiods can potentially worsen untreated sleep apnea.     "

## 2017-07-25 ENCOUNTER — TELEPHONE (OUTPATIENT)
Dept: SLEEP MEDICINE | Facility: OTHER | Age: 52
End: 2017-07-25

## 2017-08-03 ENCOUNTER — HOSPITAL ENCOUNTER (OUTPATIENT)
Dept: SLEEP MEDICINE | Facility: OTHER | Age: 52
Discharge: HOME | End: 2017-08-03
Attending: NURSE PRACTITIONER
Payer: MEDICAID

## 2017-08-03 DIAGNOSIS — G47.33 OBSTRUCTIVE SLEEP APNEA: ICD-10-CM

## 2017-08-03 PROCEDURE — 95811 POLYSOM 6/>YRS CPAP 4/> PARM: CPT | Mod: 26,,, | Performed by: PSYCHIATRY & NEUROLOGY

## 2017-08-03 PROCEDURE — 95811 POLYSOM 6/>YRS CPAP 4/> PARM: CPT

## 2017-08-04 NOTE — PROGRESS NOTES
This is a Titrtaion study for 52 year old Batool Rivera.  EKG appeared to be NSR.  Started CPAP with a Med. Eson nasal mask and chinstrap. Cflex 3 humidity 3.  Explored pressure 4 - 9. Supine REM at a pressure of 8  Significant limb movements through out the night.

## 2017-08-17 ENCOUNTER — TELEPHONE (OUTPATIENT)
Dept: SLEEP MEDICINE | Facility: CLINIC | Age: 52
End: 2017-08-17

## 2017-08-17 DIAGNOSIS — G47.33 OBSTRUCTIVE SLEEP APNEA: Primary | ICD-10-CM

## 2017-09-06 ENCOUNTER — TELEPHONE (OUTPATIENT)
Dept: INTERNAL MEDICINE | Facility: CLINIC | Age: 52
End: 2017-09-06

## 2017-09-06 NOTE — TELEPHONE ENCOUNTER
----- Message from Honey Correa sent at 9/5/2017  4:21 PM CDT -----  Contact: pt [phone]  Pt would like a call from the nurse in regards to getting an appointment with the Dr nothing come up through out January,please advise

## 2017-09-14 RX ORDER — TIZANIDINE 2 MG/1
TABLET ORAL
Qty: 20 TABLET | Refills: 0 | Status: SHIPPED | OUTPATIENT
Start: 2017-09-14 | End: 2018-05-28

## 2017-09-22 RX ORDER — TIZANIDINE 2 MG/1
TABLET ORAL
Qty: 20 TABLET | Refills: 0 | OUTPATIENT
Start: 2017-09-22

## 2017-10-19 ENCOUNTER — IMMUNIZATION (OUTPATIENT)
Dept: INTERNAL MEDICINE | Facility: CLINIC | Age: 52
End: 2017-10-19
Payer: MEDICAID

## 2017-10-19 ENCOUNTER — TELEPHONE (OUTPATIENT)
Dept: SLEEP MEDICINE | Facility: CLINIC | Age: 52
End: 2017-10-19

## 2017-10-19 ENCOUNTER — TELEPHONE (OUTPATIENT)
Dept: INTERNAL MEDICINE | Facility: CLINIC | Age: 52
End: 2017-10-19

## 2017-10-19 ENCOUNTER — OFFICE VISIT (OUTPATIENT)
Dept: INTERNAL MEDICINE | Facility: CLINIC | Age: 52
End: 2017-10-19
Payer: MEDICAID

## 2017-10-19 VITALS
WEIGHT: 239.63 LBS | SYSTOLIC BLOOD PRESSURE: 133 MMHG | OXYGEN SATURATION: 97 % | HEART RATE: 56 BPM | DIASTOLIC BLOOD PRESSURE: 78 MMHG | BODY MASS INDEX: 39.92 KG/M2 | HEIGHT: 65 IN

## 2017-10-19 DIAGNOSIS — F33.2 SEVERE EPISODE OF RECURRENT MAJOR DEPRESSIVE DISORDER, WITHOUT PSYCHOTIC FEATURES: Primary | Chronic | ICD-10-CM

## 2017-10-19 DIAGNOSIS — G47.33 OBSTRUCTIVE SLEEP APNEA: ICD-10-CM

## 2017-10-19 DIAGNOSIS — R73.09 ELEVATED HEMOGLOBIN A1C: ICD-10-CM

## 2017-10-19 DIAGNOSIS — E55.9 VITAMIN D DEFICIENCY: ICD-10-CM

## 2017-10-19 DIAGNOSIS — H53.9 VISION CHANGES: ICD-10-CM

## 2017-10-19 PROCEDURE — 90471 IMMUNIZATION ADMIN: CPT | Mod: PBBFAC

## 2017-10-19 PROCEDURE — 99213 OFFICE O/P EST LOW 20 MIN: CPT | Mod: PBBFAC,25 | Performed by: INTERNAL MEDICINE

## 2017-10-19 PROCEDURE — 99999 PR PBB SHADOW E&M-EST. PATIENT-LVL III: CPT | Mod: PBBFAC,,, | Performed by: INTERNAL MEDICINE

## 2017-10-19 PROCEDURE — 99214 OFFICE O/P EST MOD 30 MIN: CPT | Mod: S$PBB,,, | Performed by: INTERNAL MEDICINE

## 2017-10-19 RX ORDER — TRAZODONE HYDROCHLORIDE 50 MG/1
50 TABLET ORAL NIGHTLY
Qty: 30 TABLET | Refills: 1 | Status: SHIPPED | OUTPATIENT
Start: 2017-10-19 | End: 2017-12-16 | Stop reason: SDUPTHER

## 2017-10-19 NOTE — Clinical Note
Stefanie - I'm seeing Ms Rivera today for follow up, and she says she hasn't heard anything about getting her CPAP machine since it was ordered in August. Could you check into this? Thanks! Karan James MD

## 2017-10-19 NOTE — PROGRESS NOTES
Subjective:       Patient ID: Batool Rivera is a 52 y.o. female.    Chief Complaint: Follow-up (4mo.)    HPI  53 y/o woman with h/o chronic back pain, obesity, HTN, HLD, GERD, BELLO, multiple other issues here for follow up. Primary concern is mood.  Outside records reviewed through [facility].     MDD, Anxiety - much worse than prior; signficant stressors with death of two brothers including her twin.   Having difficulty sleeping, overeating, dysphoric mood, anhedonia, easily agitated or irritable. Feeling tired. +negative self-talk. +difficulty with memory / concentration. Feeling socially isolated.  Sometimes having ?visual hallucination or vision abnormality - sees shadow or object in peripheral vision sometimes.  No SI but having thoughts of harming others; doesn't feel that she is going to act on these thoughts.  Not currently working with a counselor. Goes to [employer], but doesn't feel that she could talk with her .   Meds: lexapro daily; she doesn't think she is taking hydroxyzine    BELLO - following with Sleep Medicine clinic here, had titration study done 8/3/17 - 8cmH2O recommended. CPAP ordered in August, but says she doesn't have the CPAP at home.    Chronic sinus congestion - not able to tolerate sinus rinse. Not currently using any nasal spray or taking any antihistamines.    Other chronic health issues; reviewed but not discussed in detail today:  Elevated A1c at 6.0, normal fasting glucose on last labs in June.  HTN - on amlodipine 5mg, lisinopril 40mg.  Dyslipidemia - taking atorvastatin 80mg, +zetia 10mg.  LDL improved from 245 to 150s on last labs with these medications.  Vitamin D deficiency - taking weekly supplement; last level 28 in 6/2017. Missed about a month of these - lost pills.  Obesity - Weight stable since last visit. Saw health  earlier this year.  Chronic back and knee pain - had followed with Dr Flor and Dr Dolan at [facility] in the past, was on norco, gabapentin,  "voltaren.  Last norco prescription filled 6/16/17, no other controlled-substance medications in [facility] since then.  Says she has been working on seeing another provider on Prytania, doesn't recall name - was given an order to have an MRI done. Hasn't had this done or contacted them for follow up.  R shoulder pain - had appt with Ortho at [facility] 7/31/17. Arthritis noted on xray there. Was offered steroid injection but declined - was referred to PT, went to 2 sessions.    Review of Systems   Constitutional: Positive for activity change. Negative for unexpected weight change.   HENT: Positive for congestion.    Eyes: Positive for visual disturbance.   Respiratory: Negative for cough and shortness of breath.    Cardiovascular: Negative for chest pain, palpitations and leg swelling.   Gastrointestinal: Negative for abdominal pain.   Musculoskeletal: Positive for arthralgias and back pain.   Skin: Negative for rash.   Allergic/Immunologic: Positive for environmental allergies.   Neurological: Negative for syncope and weakness.   Psychiatric/Behavioral: Positive for decreased concentration, dysphoric mood and sleep disturbance. Negative for suicidal ideas. The patient is not hyperactive.          Past medical history, surgical history, and family medical history reviewed and updated as appropriate.    Medications and allergies reviewed.     Objective:          Vitals:    10/19/17 1019   BP: 133/78   Pulse: (!) 56   SpO2: 97%   Weight: 108.7 kg (239 lb 10.2 oz)   Height: 5' 5" (1.651 m)     Body mass index is 39.88 kg/m².  Physical Exam   Constitutional: She is oriented to person, place, and time. She appears well-developed and well-nourished. No distress.   HENT:   Head: Normocephalic and atraumatic.   Nose: Mucosal edema (and erythema of nasal turbinates) present.   Mouth/Throat: Posterior oropharyngeal erythema (mild erythema) present. No oropharyngeal exudate.   Eyes: Conjunctivae and EOM are normal.   Neck: Neck supple. "   Cardiovascular: Normal rate, regular rhythm and normal heart sounds.    Pulmonary/Chest: Effort normal and breath sounds normal. No respiratory distress.   Abdominal: Soft. There is no tenderness.   Musculoskeletal: She exhibits no edema or tenderness.   Neurological: She is alert and oriented to person, place, and time. No cranial nerve deficit. Gait normal.   Skin: Skin is warm and dry.   Psychiatric:   Dysphoric. Affect somewhat blunted; avoids eye contact much of the time. Speech normal.    Vitals reviewed.      Lab Results   Component Value Date    WBC 11.10 06/12/2017    HGB 13.1 06/12/2017    HCT 37.8 06/12/2017     06/12/2017    CHOL 222 (H) 06/12/2017    TRIG 141 06/12/2017    HDL 34 (L) 06/12/2017    ALT 16 06/12/2017    AST 16 06/12/2017     06/12/2017    K 4.3 06/12/2017     06/12/2017    CREATININE 0.8 06/12/2017    BUN 14 06/12/2017    CO2 27 06/12/2017    TSH 1.706 03/21/2016    INR 1.1 02/17/2012    HGBA1C 6.0 (H) 06/12/2017       Assessment:       1. Severe episode of recurrent major depressive disorder, without psychotic features    2. Vision changes    3. Obstructive sleep apnea    4. Elevated hemoglobin A1c    5. Vitamin D deficiency        Plan:   Batool was seen today for follow-up.    Diagnoses and all orders for this visit:    Severe episode of recurrent major depressive disorder, without psychotic features  Comments:  concerning given recent stressors and isolation from family / friends; encouraged her to continue to establish care with a counselor. She denies SI, but does have significant irritability, anger, negative self-talk. Crisis line info as well as info for [facility] resident clinic given; reviewed other options for mental health care.   Continue lexapro. Will add trazodone to help with sleep; may also be adjunctive for depression.  Orders:  -     trazodone (DESYREL) 50 MG tablet; Take 1 tablet (50 mg total) by mouth every evening.    Vision  changes  Comments:  will see about expediting her optometry visit for full vision evaluation    Obstructive sleep apnea  Comments:  message sent to Sleep Med clinic to help coordinate care as she hasn't gotten CPAP    Elevated hemoglobin A1c  Comments:  encouraged her to work on doing some form of exercise every day - walking around the block or in park    Vitamin D deficiency  Comments:  continue supplement    Health maintenance reviewed with patient.   Flu shot today.    Return in about 2 months (around 12/19/2017) for depression, hypertension.    Karan James MD  Internal Medicine  [facility]  10/19/2017

## 2017-10-19 NOTE — TELEPHONE ENCOUNTER
----- Message from Tata Jimenez sent at 10/19/2017 11:51 AM CDT -----  Pt need a 2 mth f/u with Dr James

## 2017-10-19 NOTE — Clinical Note
Dr Robert Medel I saw Ms Rivera for follow up today; somewhat limited history due to mood issues, but she seems to be having more vision problems. Would it be possible for her to get an earlier optometry visit? Thanks! Karan James MD

## 2017-10-19 NOTE — PATIENT INSTRUCTIONS
Call the [facility] to make an appointment for counseling:  [facility]  [address] [place] [address]  Phone: [phone]        Behavioral Health Crisis Resources  If you or someone you know is in a behavioral health crisis, call the [facility] Crisis Response Team at [phone].  This team is available 24 hours a day, seven days a week in [facility].      Depression: Tips to Help Yourself  As your healthcare providers help treat your depression, you can also help yourself. Keep in mind that your illness affects you emotionally, physically, mentally, and socially. So full recovery will take time. Take care of your body and your soul, and be patient with yourself as you get better.    Self-care  · Educate yourself. Read about treatment and medicine options. If you have the energy, attend local conferences or support groups. Keep a list of useful websites and helpful books and use them as needed. This illness is not your fault. Dont blame yourself for your depression.  · Manage early symptoms. If you notice symptoms returning, experience triggers, or identify other factors that may lead to a depressive episode, get help as soon as possible. Ask trusted friends and family to monitor your behavior and let you know if they see anything of concern.  · Work with your provider. Find a provider you can trust. Communicate honestly with that person and share information on your treatment for depression and your reaction to medicines.  · Be prepared for a crisis. Know what to do if you experience a crisis. Keep the phone number of a crisis hotline and know the location of your community's urgent care centers and the closest emergency department.  · Hold off on big decisions. Depression can cloud your judgment. So wait until you feel better before making major life decisions, such as changing jobs, moving, or getting  or  ".  · Be patient. Recovering from depression is a process. Dont be discouraged if it takes some time to feel better.  · Keep it simple. Depression saps your energy and concentration. So you wont be able to do all the things you used to do. Set small goals and do what you can.  · Be with others. Dont isolate yourself--youll only feel worse. Try to be with other people. And take part in fun activities when you can. Go to a movie, Fludgame, Voodoo service, or social event. Talk openly with people you can trust. And accept help when its offered.  Take care of your body  People with depression often lose the desire to take care of themselves. That only makes their problems worse. During treatment and afterward, make a point to:  · Exercise. Its a great way to take care of your body. And studies have shown that exercise helps fight depression.  · Avoid drugs and alcohol. These may ease the pain in the short term. But theyll only make your problems worse in the long run.  · Get relief from stress. Ask your healthcare provider for relaxation exercises and techniques to help relieve stress.  · Eat right. A balanced and healthy diet helps keep your body healthy.  Date Last Reviewed: 1/1/2017  © 6727-1713 [employer]. [address] [place] [address], [place] [address]. All rights reserved. This information is not intended as a substitute for professional medical care. Always follow your healthcare professional's instructions.      Know the Signs and Symptoms of Depression  Everyone feels down at times. The blues are a natural part of life. But an unhappy period thats intense or lasts for more than a couple of weeks can be a sign of depression.  Depression is a serious illness. It is not a sign of weakness or a "character flaw," and it is not something you can "snap out of." In fact, most people with depression need treatment to get better. Depression can disrupt the lives of family and friends. If you " know someone you think may be depressed, find out what you can do to help.    Recognizing signs of depression  People who are depressed may:  · Feel unhappy, sad, blue, down, or miserable nearly every day  · Feel helpless, hopeless, or worthless  · Lose interest in hobbies, friends, and activities that used to give pleasure  · Not sleep well or sleep too much  · Gain or lose weight  · Feel low on energy or constantly tired  · Have a hard time concentrating or making decisions  · Lose interest in sex  · Have physical symptoms, such as stomachaches, headaches, or backaches  Know the serious signals  Never ignore a person's comments about suicide or behaviors that can lead to self-harm. Warning signals for suicide include:  · Threats or talk of suicide  · Statements such as I wont be a problem much longer or Nothing matters  · Giving away possessions or making a will or  arrangements  · Buying a gun or other weapon  · Sudden, unexplained cheerfulness or calm after a period of depression  If you notice any of these signs, get help right away. Call a healthcare professional, mental health clinic, or suicide hotline and ask what action to take. In an emergency, dont hesitate to call the police.  Resources:  · National Institutes of Mental Eueyyb050-717-5087nip.nimh.nih.gov  · National [place] on Mental Wjlhxhg642-299-8974qor.susan.org   · Mental Health Zzavjza932-151-6131lae.RUST.org  · National Suicide Wzycfxf310-019-3162 (800-SUICIDE)  · National Suicide Prevention Kttarhvf360-352-5320 (848-136-YRXD)www.suicidepreventionlifeline.org   Date Last Reviewed: 2017  © 0964-4963 [employer]. [address] [place] [address], [place] [address]. All rights reserved. This information is not intended as a substitute for professional medical care. Always follow your healthcare professional's instructions.

## 2017-10-20 ENCOUNTER — TELEPHONE (OUTPATIENT)
Dept: OPTOMETRY | Facility: CLINIC | Age: 52
End: 2017-10-20

## 2017-10-20 NOTE — TELEPHONE ENCOUNTER
Called patient per Dr. James. Left message for patient to call back to schedule appointment. Has appointment scheduled 1/4/18 but per Dr. James patient is having vision problems.

## 2017-10-20 NOTE — TELEPHONE ENCOUNTER
----- Message from Karan James MD sent at 10/19/2017 11:11 AM CDT -----  Stefanie - I'm seeing Ms Rivera today for follow up, and she says she hasn't heard anything about getting her CPAP machine since it was ordered in August. Could you check into this?  Thanks!  Karan James MD

## 2017-10-20 NOTE — TELEPHONE ENCOUNTER
----- Message from Nancie Jones OD sent at 10/20/2017  8:42 AM CDT -----  Please call this patient and schedule her appt to an earlier date.  She has Medicaid and her current appt is scheduled in January.  Thanks.      ----- Message -----  From: Karan James MD  Sent: 10/19/2017   6:03 PM  To: SARAY Kay Dr saw Ms Rivera for follow up today; somewhat limited history due to mood issues, but she seems to be having more vision problems. Would it be possible for her to get an earlier optometry visit?  Thanks!  Karan James MD

## 2017-11-11 DIAGNOSIS — E55.9 VITAMIN D DEFICIENCY: ICD-10-CM

## 2017-11-13 RX ORDER — ERGOCALCIFEROL 1.25 MG/1
CAPSULE ORAL
Qty: 12 CAPSULE | Refills: 0 | Status: SHIPPED | OUTPATIENT
Start: 2017-11-13 | End: 2018-02-02 | Stop reason: SDUPTHER

## 2017-12-16 DIAGNOSIS — F33.2 SEVERE EPISODE OF RECURRENT MAJOR DEPRESSIVE DISORDER, WITHOUT PSYCHOTIC FEATURES: Chronic | ICD-10-CM

## 2017-12-18 RX ORDER — TRAZODONE HYDROCHLORIDE 50 MG/1
TABLET ORAL
Qty: 30 TABLET | Refills: 1 | Status: SHIPPED | OUTPATIENT
Start: 2017-12-18 | End: 2018-01-03 | Stop reason: SDUPTHER

## 2018-01-03 ENCOUNTER — OFFICE VISIT (OUTPATIENT)
Dept: INTERNAL MEDICINE | Facility: CLINIC | Age: 53
End: 2018-01-03
Payer: MEDICAID

## 2018-01-03 ENCOUNTER — TELEPHONE (OUTPATIENT)
Dept: INTERNAL MEDICINE | Facility: CLINIC | Age: 53
End: 2018-01-03

## 2018-01-03 VITALS
OXYGEN SATURATION: 98 % | TEMPERATURE: 99 F | WEIGHT: 238.13 LBS | SYSTOLIC BLOOD PRESSURE: 125 MMHG | HEART RATE: 54 BPM | DIASTOLIC BLOOD PRESSURE: 80 MMHG | BODY MASS INDEX: 39.67 KG/M2 | HEIGHT: 65 IN

## 2018-01-03 DIAGNOSIS — M54.40 CHRONIC BILATERAL LOW BACK PAIN WITH SCIATICA, SCIATICA LATERALITY UNSPECIFIED: ICD-10-CM

## 2018-01-03 DIAGNOSIS — E78.5 HYPERLIPIDEMIA, UNSPECIFIED HYPERLIPIDEMIA TYPE: ICD-10-CM

## 2018-01-03 DIAGNOSIS — G89.29 CHRONIC BILATERAL LOW BACK PAIN WITH SCIATICA, SCIATICA LATERALITY UNSPECIFIED: ICD-10-CM

## 2018-01-03 DIAGNOSIS — R73.09 ELEVATED HEMOGLOBIN A1C: ICD-10-CM

## 2018-01-03 DIAGNOSIS — I10 ESSENTIAL HYPERTENSION: Primary | ICD-10-CM

## 2018-01-03 DIAGNOSIS — G47.33 OBSTRUCTIVE SLEEP APNEA: ICD-10-CM

## 2018-01-03 DIAGNOSIS — F33.2 SEVERE EPISODE OF RECURRENT MAJOR DEPRESSIVE DISORDER, WITHOUT PSYCHOTIC FEATURES: Chronic | ICD-10-CM

## 2018-01-03 DIAGNOSIS — R20.2 PARESTHESIA: ICD-10-CM

## 2018-01-03 DIAGNOSIS — E55.9 VITAMIN D DEFICIENCY: ICD-10-CM

## 2018-01-03 PROCEDURE — 99214 OFFICE O/P EST MOD 30 MIN: CPT | Mod: S$PBB,,, | Performed by: INTERNAL MEDICINE

## 2018-01-03 PROCEDURE — 99214 OFFICE O/P EST MOD 30 MIN: CPT | Mod: PBBFAC | Performed by: INTERNAL MEDICINE

## 2018-01-03 PROCEDURE — 99999 PR PBB SHADOW E&M-EST. PATIENT-LVL IV: CPT | Mod: PBBFAC,,, | Performed by: INTERNAL MEDICINE

## 2018-01-03 RX ORDER — TRAZODONE HYDROCHLORIDE 50 MG/1
100 TABLET ORAL NIGHTLY
Qty: 60 TABLET | Refills: 2 | Status: SHIPPED | OUTPATIENT
Start: 2018-01-03 | End: 2018-03-28 | Stop reason: SDUPTHER

## 2018-01-03 NOTE — PATIENT INSTRUCTIONS
Blood Pressure Measurement:    -- Please record your blood pressure 3-5 times per week. When checking, make sure you have been sitting for about 5 minutes, your legs are uncrossed, and the blood pressure cuff at the level of your heart. Record your blood pressure with the date and time in a log and bring it with you to every doctor's visit. Please bring your home BP cuff in to your next visit as well.    -- Goal blood pressure is top number less than 140 and bottom number less than 90.  -- If your blood pressure is >160/>100 on two consecutive occasions, contact the office. If it is >180/>120 and you are having confusion, chest pain or back pain, shortness of breath, or blood in the urine, go to the emergency room immediately.

## 2018-01-03 NOTE — TELEPHONE ENCOUNTER
----- Message from Sarahi Seo sent at 1/3/2018 11:42 AM CST -----  Pt needs to be scheduled for a 3 month f/u. I was unable to do so. Pts labs have been schedule for April 3, 2018.    Thank you

## 2018-01-03 NOTE — PROGRESS NOTES
"Subjective:       Patient ID: Batool Rivera is a 52 y.o. female.    Chief Complaint: Hypertension and Depression    HPI  51 y/o woman with h/o chronic back pain, obesity, HTN, HLD, GERD, BELLO, multiple other issues here for follow up focused on depression and HTN.    MDD, Anxiety - at last visit, depression much worse than prior; signficant stressors with death of two brothers including her twin.   On lexapro 20mg; trazodone to help with sleep added at last visit.   She has been taking this but says she is still having trouble sleeping.   Watches TV before bed. Sometimes doesn't go to sleep until 11-12pm, wakes up for 5:30pm.   More family stressors, argument with mother & sister.   Tried hydroxyzine in the past but didn't feel it helped much.  Recommended she establish with a counselor - "I haven't called nobody. I've been trying to be in my house by myself."     Feels she has been losing weight. Weight stable on scale here, says "I've got clothes on." Reports her pants are fitting more loosely, now able to wear clothes that were too small before.    HTN - on amlodipine 5mg, lisinopril 40mg. Hasn't taken her medications yet today. Checks BP at home, reports highest around     Dyslipidemia - taking atorvastatin 80mg, +zetia 10mg.    BELLO on CPAP - following with Sleep Medicine clinic here, had titration study done 8/3/17 - 8cmH2O recommended. CPAP ordered in August; she checked in with Sleep Med clinic after last visit and now has her CPAP, using every night. Says that using this helps her have less pain while she is lying down. Not feeling as tired during the day. Isn't sure when she should follow up.    Vision problems - has appt scheduled tomorrow. No longer seeing "shadows" mentioned at last visit.    Again doesn't recall which provider on [address] she sees for her back. Says that she was recommended to get an MRI, but that there has been some problem with this.   Takes gabapentin 300mg TID. Not " "currently taking norco; not being prescribed this by her pain management provider.  Reports chronic L foot paresthesias / numbness, feels that this has been getting worse over the past few months.     Did see Ortho at [facility] for her shoulder, feels this is doing better with HEP.    Review of Systems   Constitutional: Positive for unexpected weight change (reported but stable on scale here). Negative for activity change and fever.   HENT: Negative for congestion and sore throat.    Eyes: Positive for visual disturbance (improved).   Respiratory: Negative for cough and shortness of breath.    Cardiovascular: Negative for chest pain, palpitations and leg swelling.   Gastrointestinal: Negative.  Negative for abdominal pain.   Genitourinary: Negative.    Musculoskeletal: Positive for arthralgias (chronic) and back pain (chronic).   Skin: Negative for rash.   Allergic/Immunologic: Positive for environmental allergies.   Neurological: Negative.  Negative for weakness.   Psychiatric/Behavioral: Positive for decreased concentration and dysphoric mood. Negative for suicidal ideas. Sleep disturbance: improved. The patient is not hyperactive.          Past medical history, surgical history, and family medical history reviewed and updated as appropriate.    Medications and allergies reviewed.     Objective:          Vitals:    01/03/18 1016   BP: 125/80   BP Location: Left arm   Patient Position: Sitting   Pulse: (!) 54   Temp: 98.6 °F (37 °C)   TempSrc: Oral   SpO2: 98%   Weight: 108 kg (238 lb 1.6 oz)   Height: 5' 5" (1.651 m)     Body mass index is 39.62 kg/m².  Physical Exam   Constitutional: She is oriented to person, place, and time. She appears well-developed and well-nourished. No distress.   HENT:   Head: Normocephalic and atraumatic.   Nose: Mucosal edema (and erythema of nasal turbinates) present.   Mouth/Throat: Posterior oropharyngeal erythema (mild erythema) present. No oropharyngeal exudate.   Eyes: Conjunctivae and " EOM are normal. Pupils are equal, round, and reactive to light.   Neck: Neck supple.   Cardiovascular: Normal rate, regular rhythm and normal heart sounds.    Pulmonary/Chest: Effort normal and breath sounds normal. No respiratory distress.   Abdominal: Soft. There is no tenderness.   Musculoskeletal: She exhibits no edema or tenderness.   Neurological: She is alert and oriented to person, place, and time. No cranial nerve deficit. Gait normal.   Skin: Skin is warm and dry.   Psychiatric:   Dysphoric. Affect somewhat blunted. Speech normal.    Vitals reviewed.      Lab Results   Component Value Date    WBC 11.10 06/12/2017    HGB 13.1 06/12/2017    HCT 37.8 06/12/2017     06/12/2017    CHOL 222 (H) 06/12/2017    TRIG 141 06/12/2017    HDL 34 (L) 06/12/2017    ALT 16 06/12/2017    AST 16 06/12/2017     06/12/2017    K 4.3 06/12/2017     06/12/2017    CREATININE 0.8 06/12/2017    BUN 14 06/12/2017    CO2 27 06/12/2017    TSH 1.706 03/21/2016    INR 1.1 02/17/2012    HGBA1C 6.0 (H) 06/12/2017       Assessment:       1. Essential hypertension    2. Severe episode of recurrent major depressive disorder, without psychotic features    3. Hyperlipidemia, unspecified hyperlipidemia type    4. Elevated hemoglobin A1c    5. Vitamin D deficiency    6. Obstructive sleep apnea    7. Chronic bilateral low back pain with sciatica, sciatica laterality unspecified    8. Paresthesia        Plan:   Batool was seen today for hypertension and depression.    Diagnoses and all orders for this visit:    Essential hypertension - bring in BP log to next visit. Continue current medications for now.   -     Comprehensive metabolic panel; Future    Severe episode of recurrent major depressive disorder, without psychotic features - discussed medication options. Continue lexapro; will titrate up trazodone. Close follow-up. Strongly recommended she see a mental health provider - mental health resource handout  given  Comments:  concerning given recent stressors and isolation from family / friends; encouraged her to continue to establish care with a counselor. crisis line info given.  Orders:  -     traZODone (DESYREL) 50 MG tablet; Take 2 tablets (100 mg total) by mouth every evening.  -     TSH; Future    Hyperlipidemia, unspecified hyperlipidemia type  -     Comprehensive metabolic panel; Future  -     Lipid panel; Future    Elevated hemoglobin A1c  -     CBC auto differential; Future  -     Hemoglobin A1c; Future    Vitamin D deficiency  -     Vitamin D; Future    Obstructive sleep apnea  -     CBC auto differential; Future    Chronic bilateral low back pain with sciatica, sciatica laterality unspecified  -     Ambulatory Referral to Physical Medicine Rehab    Paresthesia  -     Vitamin B12; Future  -     Ambulatory Referral to Physical Medicine Rehab    Health maintenance reviewed with patient.     Return in about 3 months (around 4/3/2018) for annual physical.    Karan James MD  Internal Medicine  [facility]  1/3/2018

## 2018-01-03 NOTE — Clinical Note
Stefanie - I'm seeing Ms Rivera today for follow up. She says she was able to get her CPAP and is using every night, but isn't sure when she needs to follow up. When should she see you again? Best, Karan James MD

## 2018-01-04 ENCOUNTER — TELEPHONE (OUTPATIENT)
Dept: SLEEP MEDICINE | Facility: CLINIC | Age: 53
End: 2018-01-04

## 2018-01-04 ENCOUNTER — CLINICAL SUPPORT (OUTPATIENT)
Dept: OPHTHALMOLOGY | Facility: CLINIC | Age: 53
End: 2018-01-04
Payer: MEDICAID

## 2018-01-04 ENCOUNTER — OFFICE VISIT (OUTPATIENT)
Dept: OPTOMETRY | Facility: CLINIC | Age: 53
End: 2018-01-04
Payer: MEDICAID

## 2018-01-04 DIAGNOSIS — H52.4 PRESBYOPIA: ICD-10-CM

## 2018-01-04 DIAGNOSIS — H40.013 OPEN ANGLE WITH BORDERLINE FINDINGS AND LOW GLAUCOMA RISK IN BOTH EYES: Primary | ICD-10-CM

## 2018-01-04 DIAGNOSIS — Q12.0 CONGENITAL CATARACT OF RIGHT EYE: ICD-10-CM

## 2018-01-04 DIAGNOSIS — I10 ESSENTIAL HYPERTENSION: ICD-10-CM

## 2018-01-04 PROCEDURE — 92133 CPTRZD OPH DX IMG PST SGM ON: CPT | Mod: PBBFAC | Performed by: OPTOMETRIST

## 2018-01-04 PROCEDURE — 92014 COMPRE OPH EXAM EST PT 1/>: CPT | Mod: S$PBB,,, | Performed by: OPTOMETRIST

## 2018-01-04 PROCEDURE — 99999 PR PBB SHADOW E&M-EST. PATIENT-LVL II: CPT | Mod: PBBFAC,,, | Performed by: OPTOMETRIST

## 2018-01-04 PROCEDURE — 99212 OFFICE O/P EST SF 10 MIN: CPT | Mod: PBBFAC,25 | Performed by: OPTOMETRIST

## 2018-01-04 PROCEDURE — 92083 EXTENDED VISUAL FIELD XM: CPT | Mod: PBBFAC | Performed by: OPTOMETRIST

## 2018-01-04 NOTE — TELEPHONE ENCOUNTER
----- Message from Karan James MD sent at 1/3/2018 11:21 AM CST -----  Stefanie - I'm seeing Ms Rivera today for follow up. She says she was able to get her CPAP and is using every night, but isn't sure when she needs to follow up. When should she see you again?  Best,  Karan James MD

## 2018-01-04 NOTE — PROGRESS NOTES
HPI     Glaucoma    Additional comments: hvf and oct           Comments   Last eye exam was 12/20/16 with Dr. Jones.  Pt here for annual eye exam w/ HVF and OCT. No changes in changes. Patient   denies flashes, floaters, pain and double vision. Feels her eyes are   tearing less than they used to, not using any gtts.  Also c/o black area   in her peripheral vision as if someone is standing next to her. Only sees   it when she is moving around quickly. Wearing reading glasses +1.75          Last edited by Peace Pinedo, PCT on 1/4/2018  9:42 AM.   (History)            Assessment /Plan     For exam results, see Encounter Report.    Open angle with borderline findings and low glaucoma risk in both eyes  -     [place]manza Visual Field - OU - Extended - Both Eyes  -     OCT - Optic Nerve    Congenital cataract of right eye    Essential hypertension    Presbyopia            1.  Due to increased c/d ratio L>R.  All testing normal OU.  Family history of glaucoma-mother.  Continue to monitor yearly with hvf and oct.  2.  Currently not affecting vision.  Monitor.  3.  No retinopathy--monitor yearly.  BP control.    4.  Bifocal rx given          RTC 1 year for routine exam.

## 2018-01-04 NOTE — TELEPHONE ENCOUNTER
Please assist pt with scheduling follow-up with [facility] NP or first available provider per pt, for CPAP f/u as soon as possible.

## 2018-01-23 DIAGNOSIS — F39 MOOD DISORDER: ICD-10-CM

## 2018-01-23 DIAGNOSIS — I10 ESSENTIAL HYPERTENSION: ICD-10-CM

## 2018-01-27 RX ORDER — ESCITALOPRAM OXALATE 20 MG/1
TABLET ORAL
Qty: 90 TABLET | Refills: 0 | Status: SHIPPED | OUTPATIENT
Start: 2018-01-27 | End: 2018-04-23 | Stop reason: SDUPTHER

## 2018-01-27 RX ORDER — AMLODIPINE BESYLATE 5 MG/1
TABLET ORAL
Qty: 90 TABLET | Refills: 0 | Status: SHIPPED | OUTPATIENT
Start: 2018-01-27 | End: 2018-04-23 | Stop reason: SDUPTHER

## 2018-02-02 DIAGNOSIS — E55.9 VITAMIN D DEFICIENCY: ICD-10-CM

## 2018-02-02 RX ORDER — ERGOCALCIFEROL 1.25 MG/1
CAPSULE ORAL
Qty: 12 CAPSULE | Refills: 0 | Status: SHIPPED | OUTPATIENT
Start: 2018-02-02 | End: 2018-06-12 | Stop reason: SDUPTHER

## 2018-02-04 DIAGNOSIS — E78.5 HYPERLIPIDEMIA, UNSPECIFIED HYPERLIPIDEMIA TYPE: ICD-10-CM

## 2018-02-05 RX ORDER — EZETIMIBE 10 MG/1
TABLET ORAL
Qty: 90 TABLET | Refills: 3 | Status: SHIPPED | OUTPATIENT
Start: 2018-02-05 | End: 2019-03-28 | Stop reason: SDUPTHER

## 2018-02-20 ENCOUNTER — OFFICE VISIT (OUTPATIENT)
Dept: SLEEP MEDICINE | Facility: CLINIC | Age: 53
End: 2018-02-20
Payer: MEDICAID

## 2018-02-20 VITALS
BODY MASS INDEX: 38.53 KG/M2 | DIASTOLIC BLOOD PRESSURE: 74 MMHG | HEART RATE: 74 BPM | HEIGHT: 65 IN | SYSTOLIC BLOOD PRESSURE: 110 MMHG | WEIGHT: 231.25 LBS

## 2018-02-20 DIAGNOSIS — I10 ESSENTIAL HYPERTENSION: ICD-10-CM

## 2018-02-20 DIAGNOSIS — G47.33 OBSTRUCTIVE SLEEP APNEA: Primary | ICD-10-CM

## 2018-02-20 DIAGNOSIS — E78.5 HYPERLIPIDEMIA, UNSPECIFIED HYPERLIPIDEMIA TYPE: ICD-10-CM

## 2018-02-20 PROCEDURE — 99213 OFFICE O/P EST LOW 20 MIN: CPT | Mod: S$PBB,,, | Performed by: NURSE PRACTITIONER

## 2018-02-20 PROCEDURE — 99214 OFFICE O/P EST MOD 30 MIN: CPT | Mod: PBBFAC | Performed by: NURSE PRACTITIONER

## 2018-02-20 PROCEDURE — 3008F BODY MASS INDEX DOCD: CPT | Mod: ,,, | Performed by: NURSE PRACTITIONER

## 2018-02-20 PROCEDURE — 99999 PR PBB SHADOW E&M-EST. PATIENT-LVL IV: CPT | Mod: PBBFAC,,, | Performed by: NURSE PRACTITIONER

## 2018-02-20 RX ORDER — LISINOPRIL 40 MG/1
TABLET ORAL
Qty: 90 TABLET | Refills: 1 | Status: SHIPPED | OUTPATIENT
Start: 2018-02-20 | End: 2018-08-09 | Stop reason: SDUPTHER

## 2018-02-20 RX ORDER — ATORVASTATIN CALCIUM 80 MG/1
TABLET, FILM COATED ORAL
Qty: 90 TABLET | Refills: 1 | Status: SHIPPED | OUTPATIENT
Start: 2018-02-20 | End: 2018-08-09 | Stop reason: SDUPTHER

## 2018-02-20 NOTE — PROGRESS NOTES
Batool Rivera  was seen as a f/u for the management of obstructive sleep apnea. Last seen 7/19/17    She underwent titration study and got cpap 9cm setup 10/3/17. Doing well.using it nightly. Sleeping better. Still taking bp med qhs (causes n octuria). Mask leaks with side sleep. Wants to switch to FFM. No more snoring or apneic pauses. Denies oral drying w/o use of chin strap.   Date Range: 11/1/2017 - 12/2/2017     Hide      Compliance Summary  Apnea Indices    Days with Device Usage:  32 days  Average AHI:  2.5    Percentage of Days >=4 Hours:  87.5%  Average OA Index:  1.2    Average Usage (Days Used):  6 hrs. 9 mins. 53 secs.  Average CA Index:  0.4    Average Usage (All Days):  6 hrs. 9 mins. 53 secs.                 HISTORY:  8/11/16:  CHIEF COMPLAINT: Snoring, frequent nocturia    HISTORY OF PRESENT ILLNESS:Batool Rivera a 52 y.o. female presents for the evaluation of possible obstructive sleep apnea. She has never had a sleep study. She reports symptoms of disruptive snoring. Her ex- told her she stopped breathing during sleep. Nocturia 5x. Denies dyspnea. Denies am headaches. Denies dry mouth in am. Sleep is not refreshing. + excessive daytime sleepiness with occasional naps. She finds 2mg tizanidine helps her fall asleep quicker than typical (2hr). She has hard time losing weight. + witnessed apneic pauses. Denies air gasping, nocturnal choking or coughing.     Denies symptoms of restless legs or kicking during sleep.     On todays [place] Sleepiness Scale the patient scores a 13/24.     Norco prn for back pain/knee pain    BT 11p  WT 0530    8/29/16: She has since undergone a sleep study which was reviewed with her in detail today. She reports right leg pinched nerve (takes gabapentin tid/norco prn). She continues to report excessive daytime sleepiness with occasional naps, snoring, frequent nocturia. +apneic pauses. '    7/19/17:   Since last seen, she was setup with apap 5-16cm  "Sept'16. She used it almost nightly but would often remove mask and not reapply. Sometimes had better longer consolidated sleep. Felt less sleepy during daytime when using and had less leg pain. +less nocturia also. Was using chin strap with dreamwear mask. +sinus congestion "worse this year". Ready to reuse cpap. She returned equipment 1/11/17 ~. Having flu few weeks also affected her adherence.       PSG: (241#) 8/18/16: AHI was 9.2 with an oxygen grant of 83.0%. The supine AHI was 11.9 and the REM AHI was 33.0. The patient did not qualify for a split night study due to an insufficient number of events in the first half of the study.   8/3/17 Titration study, effective cpap 9cm /eson mask      FAMILY HISTORY: No known sleep disorders.   SOCIAL HISTORY: . No tobacco or ETOH    REVIEW OF SYSTEMS:  Sleep related symptoms as per HPI; 7#loss; Sinus congestion. Otherwise, a balance of 10 systems reviewed is negative        PHYSICAL EXAM:   /74   Pulse 74   Ht 5' 5" (1.651 m)   Wt 104.9 kg (231 lb 4.2 oz)   BMI 38.48 kg/m²   GENERAL: Obese body habitus, well groomed       ASSESSMENT:     BELLO, mild REM predominant. Was adherent with autopap but had no adherence monitroing and had mask removal. Returned equipment due to not meeting ins guidelines. Ready to resume. 2/20/18:adherent with cpap 9cm, AHI<5. Symptoms are improved  She has medical comorbidities of obesity, hypertension, hyperlipidemia. Warrants definitive treatment for sleep apnea.      PLAN:   1. Contineu cpap 9cm. [facility] D[facility] new mask today/supplies. Discussed effectiveness of thearpy and potential ramifications of untreated BELLO, including heart disease, hypertension, cognitive difficulties, stroke, and diabetes.   2 She does not drive.  RTC annually  "

## 2018-03-27 ENCOUNTER — HOSPITAL ENCOUNTER (OUTPATIENT)
Dept: RADIOLOGY | Facility: HOSPITAL | Age: 53
Discharge: HOME | End: 2018-03-27
Attending: PHYSICAL MEDICINE & REHABILITATION
Payer: MEDICAID

## 2018-03-27 ENCOUNTER — INITIAL CONSULT (OUTPATIENT)
Dept: PHYSICAL MEDICINE AND REHAB | Facility: CLINIC | Age: 53
End: 2018-03-27
Payer: MEDICAID

## 2018-03-27 ENCOUNTER — OFFICE VISIT (OUTPATIENT)
Dept: INTERNAL MEDICINE | Facility: CLINIC | Age: 53
End: 2018-03-27
Payer: MEDICAID

## 2018-03-27 VITALS
DIASTOLIC BLOOD PRESSURE: 76 MMHG | HEIGHT: 65 IN | HEART RATE: 54 BPM | SYSTOLIC BLOOD PRESSURE: 125 MMHG | TEMPERATURE: 98 F | BODY MASS INDEX: 38.42 KG/M2 | OXYGEN SATURATION: 99 % | WEIGHT: 230.63 LBS

## 2018-03-27 VITALS
DIASTOLIC BLOOD PRESSURE: 80 MMHG | HEART RATE: 47 BPM | WEIGHT: 230.81 LBS | BODY MASS INDEX: 38.45 KG/M2 | HEIGHT: 65 IN | SYSTOLIC BLOOD PRESSURE: 141 MMHG

## 2018-03-27 DIAGNOSIS — E66.01 SEVERE OBESITY (BMI 35.0-35.9 WITH COMORBIDITY): ICD-10-CM

## 2018-03-27 DIAGNOSIS — M17.11 PRIMARY OSTEOARTHRITIS OF RIGHT KNEE: ICD-10-CM

## 2018-03-27 DIAGNOSIS — N39.46 MIXED STRESS AND URGE URINARY INCONTINENCE: ICD-10-CM

## 2018-03-27 DIAGNOSIS — M54.5 CHRONIC BILATERAL LOW BACK PAIN, WITH SCIATICA PRESENCE UNSPECIFIED: ICD-10-CM

## 2018-03-27 DIAGNOSIS — E78.5 HYPERLIPIDEMIA, UNSPECIFIED HYPERLIPIDEMIA TYPE: ICD-10-CM

## 2018-03-27 DIAGNOSIS — G47.33 OSA ON CPAP: ICD-10-CM

## 2018-03-27 DIAGNOSIS — Z12.39 SCREENING FOR MALIGNANT NEOPLASM OF BREAST: ICD-10-CM

## 2018-03-27 DIAGNOSIS — M25.551 RIGHT HIP PAIN: ICD-10-CM

## 2018-03-27 DIAGNOSIS — Z00.00 ANNUAL PHYSICAL EXAM: Primary | ICD-10-CM

## 2018-03-27 DIAGNOSIS — I10 ESSENTIAL HYPERTENSION: ICD-10-CM

## 2018-03-27 DIAGNOSIS — E55.9 VITAMIN D DEFICIENCY: ICD-10-CM

## 2018-03-27 DIAGNOSIS — M54.50 CHRONIC MIDLINE LOW BACK PAIN WITHOUT SCIATICA: ICD-10-CM

## 2018-03-27 DIAGNOSIS — G89.29 CHRONIC MIDLINE LOW BACK PAIN WITHOUT SCIATICA: ICD-10-CM

## 2018-03-27 DIAGNOSIS — M25.511 CHRONIC RIGHT SHOULDER PAIN: ICD-10-CM

## 2018-03-27 DIAGNOSIS — G89.29 CHRONIC RIGHT SHOULDER PAIN: ICD-10-CM

## 2018-03-27 DIAGNOSIS — R73.09 ELEVATED HEMOGLOBIN A1C: ICD-10-CM

## 2018-03-27 DIAGNOSIS — G89.29 CHRONIC BILATERAL LOW BACK PAIN, WITH SCIATICA PRESENCE UNSPECIFIED: ICD-10-CM

## 2018-03-27 DIAGNOSIS — F33.1 MODERATE EPISODE OF RECURRENT MAJOR DEPRESSIVE DISORDER: ICD-10-CM

## 2018-03-27 DIAGNOSIS — G89.29 CHRONIC MIDLINE LOW BACK PAIN WITHOUT SCIATICA: Primary | ICD-10-CM

## 2018-03-27 DIAGNOSIS — M54.50 CHRONIC MIDLINE LOW BACK PAIN WITHOUT SCIATICA: Primary | ICD-10-CM

## 2018-03-27 PROCEDURE — 99999 PR PBB SHADOW E&M-EST. PATIENT-LVL III: CPT | Mod: PBBFAC,,, | Performed by: PHYSICAL MEDICINE & REHABILITATION

## 2018-03-27 PROCEDURE — 73502 X-RAY EXAM HIP UNI 2-3 VIEWS: CPT | Mod: TC,RT

## 2018-03-27 PROCEDURE — 99999 PR PBB SHADOW E&M-EST. PATIENT-LVL IV: CPT | Mod: PBBFAC,,, | Performed by: INTERNAL MEDICINE

## 2018-03-27 PROCEDURE — 72100 X-RAY EXAM L-S SPINE 2/3 VWS: CPT | Mod: TC

## 2018-03-27 PROCEDURE — 73560 X-RAY EXAM OF KNEE 1 OR 2: CPT | Mod: TC,50

## 2018-03-27 PROCEDURE — 72100 X-RAY EXAM L-S SPINE 2/3 VWS: CPT | Mod: 26,,, | Performed by: RADIOLOGY

## 2018-03-27 PROCEDURE — 73560 X-RAY EXAM OF KNEE 1 OR 2: CPT | Mod: 26,50,, | Performed by: RADIOLOGY

## 2018-03-27 PROCEDURE — 99204 OFFICE O/P NEW MOD 45 MIN: CPT | Mod: S$PBB,,, | Performed by: PHYSICAL MEDICINE & REHABILITATION

## 2018-03-27 PROCEDURE — 72120 X-RAY BEND ONLY L-S SPINE: CPT | Mod: 26,,, | Performed by: RADIOLOGY

## 2018-03-27 PROCEDURE — 99396 PREV VISIT EST AGE 40-64: CPT | Mod: S$PBB,,, | Performed by: INTERNAL MEDICINE

## 2018-03-27 PROCEDURE — 99214 OFFICE O/P EST MOD 30 MIN: CPT | Mod: PBBFAC,25,27 | Performed by: INTERNAL MEDICINE

## 2018-03-27 PROCEDURE — 73502 X-RAY EXAM HIP UNI 2-3 VIEWS: CPT | Mod: 26,RT,, | Performed by: RADIOLOGY

## 2018-03-27 PROCEDURE — 99213 OFFICE O/P EST LOW 20 MIN: CPT | Mod: PBBFAC,25 | Performed by: PHYSICAL MEDICINE & REHABILITATION

## 2018-03-27 RX ORDER — ACETAMINOPHEN 500 MG
500-1000 TABLET ORAL 3 TIMES DAILY PRN
Refills: 0 | COMMUNITY
Start: 2018-03-27 | End: 2019-01-24

## 2018-03-27 RX ORDER — OXYBUTYNIN CHLORIDE 5 MG/1
5 TABLET ORAL 2 TIMES DAILY
Qty: 60 TABLET | Refills: 2 | Status: SHIPPED | OUTPATIENT
Start: 2018-03-27 | End: 2018-06-17 | Stop reason: SDUPTHER

## 2018-03-27 RX ORDER — MELOXICAM 15 MG/1
15 TABLET ORAL DAILY
Qty: 30 TABLET | Refills: 2 | Status: SHIPPED | OUTPATIENT
Start: 2018-03-27 | End: 2018-06-17 | Stop reason: SDUPTHER

## 2018-03-27 NOTE — PROGRESS NOTES
Subjective:       Patient ID: Batool Rivera is a 52 y.o. female.    Chief Complaint: No chief complaint on file.    HPI     HISTORY OF PRESENT ILLNESS:  Ms. Rivera is a 52-year-old black female with past   medical history of hypertension, depression/anxiety, obesity (BMI of 38 today),   and obstructive sleep apnea on CPAP.  She was referred to Physical Medicine   Clinic for help with back pain, knee pain, hip pain and shoulder pain.    The patient started complaining of back pain about 10 years ago.  She denies any   preceding injury.  She was followed up outside [facility].  She reports being   diagnosed with slipped disc.  She received reportedly an epidural steroid   injection with some help.  Her pain has been under fair control for the last   couple of years.  Her back pain is an intermittent aching sensation in the lower   lumbar spine and across her back.  It is aggravated by bending, lifting and   prolonged standing or walking.  It is better with lying down.  Her max pain is   10/10 and minimum 1-2/10.  Today, it is 1-2/10.  The patient denies any lower   extremity weakness.  She has chronic urinary urgency.  She denies any bowel   incontinence.  She complains of chronic left foot numbness and floating   sensations in her legs, but without any clear association to her back pain.    She has been complaining of right knee pain for few years.  She reports being   diagnosed with OA.  Her pain has been slowly worse.  It is an intermittent   aching pain aggravated by bending her knee and getting up after sitting for too   long.  It is better with rest.  Her maximum pain is 10/10 and minimum 0/10.    Today, it is 0/10.  The patient complains of occasional mild swelling.  She has   also been complaining of intermittent right hip pain, mostly when she sleeps on   her right side.  She also complains of right shoulder pain related to movement.    She is currently taking gabapentin 300 mg p.o. three times per day.  She  reports   it helps a lot.  She was previously on ibuprofen, but has not taken any for a   long time.  She also was on diclofenac XL, but she has not been taken any.  She   also reports that it does not help.  She was previously on hydrocodone/APAP   7.5/325 p.r.n.  Her [place]  was checked and last prescription was on   06/16/2017 for 60 tablets.      MS/HN  dd: 03/27/2018 11:27:51 (CDT)  td: 03/28/2018 08:26:39 (CDT)  Doc ID   #[id number]  Job ID #[id number]    CC:       Review of Systems   Constitutional: Negative for fatigue.   Eyes: Negative for visual disturbance.   Respiratory: Negative for shortness of breath.    Cardiovascular: Negative for chest pain.   Gastrointestinal: Positive for blood in stool and constipation. Negative for nausea and vomiting.   Genitourinary: Positive for difficulty urinating.   Musculoskeletal: Positive for arthralgias and back pain. Negative for gait problem, joint swelling and neck pain.   Neurological: Positive for headaches. Negative for dizziness.   Psychiatric/Behavioral: Positive for sleep disturbance. Negative for behavioral problems.       Objective:      Physical Exam   Constitutional: She is oriented to person, place, and time. She appears well-developed and well-nourished.   HENT:   Head: Normocephalic and atraumatic.   Neck: Normal range of motion.   Mild tenderness low C-spine.   Cardiovascular: Normal rate, regular rhythm and normal heart sounds.    Pulmonary/Chest: Effort normal and breath sounds normal.   Abdominal: Soft.   Musculoskeletal:   BUE:  ROM:   RUE: full.   LUE: full.  Strength:    RUE: 4/5 at shoulder abduction, 5 elbow flexion, 5 elbow extension, 5 hand .   LUE: 5-/5 at shoulder abduction, 5 elbow flexion, 5 elbow extension, 5 hand .  Sensation to pinprick:   RUE: intact.   LUE: intact.  DTR:    RUE: +1 biceps, +1 triceps.   LUE:  +1 biceps, +1 triceps.  Miranda:   RUE: -ve.   LUE: -ve.    Impingement Signs:  Neer:  RUE: +ve    LUE:  -ve  Terry: RUE: +ve    LUE: -ve   +ve Rt deltoid tenderness.    BLE:  ROM:   RLE: full.   LLE: full.  Knee crepitus:   RLE: +ve.   LLE: +ve.   Strength:    RLE: 5/5 at hip flexion, 5 knee extension, 5 ankle DF, 5 PF, 5 EHL.   LLE: 5/5 at hip flexion, 5 knee extension, 5 ankle DF, 5 PF, 5 EHL.  Sensation to pinprick:     RLE: intact.      LLE: intact.   DTR:     RLE: +1 knee, +1 ankle.    LLE: +1 knee, +1 ankle.  Clonus:    Rt ankle: -ve.    Lt ankle: -ve.  SLR:      RLE: -ve at 60 degrees.      LLE: -ve at 60 degrees.   BETHANY:     RLE: -ve.      LLE: -ve.  -ve tenderness over lumbar spine.  +ve mild tenderness Rt hip.  No leg length discrepancy.    Directional Preference:  Spine flexion: 70 degrees , mild pain in back.  Spine extension: 20 degrees, mild pain in back.  Lateral bending: mod pain to Right, no pain to Left.      Heel walking: WNL.  Toe walking: WNL.  Gait: WNL     Neurological: She is alert and oriented to person, place, and time.   Skin: Skin is warm.   Psychiatric: She has a normal mood and affect. Her behavior is normal.   Vitals reviewed.      Assessment:       1. Chronic midline low back pain without sciatica    2. Primary osteoarthritis of right knee    3. Right hip pain    4. Chronic right shoulder pain    5. Severe obesity (BMI 35.0-35.9 with comorbidity)        Plan:         Diagnoses and all orders for this visit:    Chronic midline low back pain without sciatica  -     X-Ray Lumbar Spine Ap Lateral w/Flex Ext; Future  -     meloxicam (MOBIC) 15 MG tablet; Take 1 tablet (15 mg total) by mouth once daily.  -     acetaminophen (TYLENOL) 500 MG tablet; Take 1-2 tablets (500-1,000 mg total) by mouth 3 (three) times daily as needed for Pain.    Primary osteoarthritis of right knee  -     X-ray AP Standing Knees with Right Lateral; Future  -     meloxicam (MOBIC) 15 MG tablet; Take 1 tablet (15 mg total) by mouth once daily.  -     acetaminophen (TYLENOL) 500 MG tablet; Take 1-2 tablets  (500-1,000 mg total) by mouth 3 (three) times daily as needed for Pain.    Right hip pain  Comments:  likely due to trochanteric bursitis  Orders:  -     meloxicam (MOBIC) 15 MG tablet; Take 1 tablet (15 mg total) by mouth once daily.  -     acetaminophen (TYLENOL) 500 MG tablet; Take 1-2 tablets (500-1,000 mg total) by mouth 3 (three) times daily as needed for Pain.  -     X-Ray Hip 2 or 3 views Right; Future    Chronic right shoulder pain  Comments:  likely due to subdeltoid bursitis  Orders:  -     meloxicam (MOBIC) 15 MG tablet; Take 1 tablet (15 mg total) by mouth once daily.  -     acetaminophen (TYLENOL) 500 MG tablet; Take 1-2 tablets (500-1,000 mg total) by mouth 3 (three) times daily as needed for Pain.    Severe obesity (BMI 35.0-35.9 with comorbidity)        -    Weight loss was encouraged.    - Continue gabapentin 300 mg po tid.  - The patient was encouraged to adopt a regular Home Exercise Program for back and shoulder, and provided with printouts.  - Quadriceps strengthening through sitting straight leg raising exercises were demonstrated.  - Follow-up in about 2 months (around 5/27/2018).      This was a 45 minute visit, 50% of which was spent educating the patient about the diagnosis and the treatment plan.

## 2018-03-27 NOTE — PATIENT INSTRUCTIONS
Treating Incontinence in Women with Medicine    Urinary incontinence is the leaking of urine from the bladder. In some cases, medicine can reduce or stop the leaking. It is mainly given for urge incontinence. Your healthcare provider will talk with you about your options. Make sure to ask what side effects to expect.  Below are some types of medicines that may help with urge incontinence.  Types of medicine  · Anticholinergics. These may increase how much urine the bladder can hold. They may also help relax bladder muscles.  · Estrogen. This may help improve muscle tone in the urethra and bladder.  · Antibiotics. These are used to treat urinary tract infections.  · Botulinum toxin. Injection of botulinum toxin into the bladder muscle is an option when other medicines are not effective.  Tips for taking medicine  · Take your medicine on time and as your healthcare provider tell you to.  · Tell your healthcare provider if you have any side effects, your dosage may be adjusted if necessary.  · Be patient. It may take time to find the right dose for you.  · Keep a list of the medicines you take. Show it to your healthcare provider and pharmacist before you buy over-the-counter medicines.   Date Last Reviewed: 1/1/2017  © 0071-7855 [employer]. [address] [place] [address]. All rights reserved. This information is not intended as a substitute for professional medical care. Always follow your healthcare professional's instructions.      Kegel Exercises  Kegel exercises dont need special clothing or equipment. Theyre easy to learn and simple to do. And if you do them right, no one can tell youre doing them, so they can be done almost anywhere. Your healthcare provider, nurse, or physical therapist can answer any questions you have and help you get started.    A weak pelvic floor   The pelvic floor muscles may weaken due to aging, pregnancy and vaginal childbirth, injury, surgery, chronic  cough, or lack of exercise. If the pelvic floor is weak, your bladder and other pelvic organs may sag out of place. The urethra may also open too easily and allow urine to leak out. Kegel exercises can help you strengthen your pelvic floor muscles. Then they can better support the pelvic organs and control urine flow.  How Kegel exercises are done  Try each of the Kegel exercises described below. When youre doing them, try not to move your leg, buttock, or stomach muscles.  · Contract as if you were stopping your urine stream. But do it when youre not urinating.  · Tighten your rectum as if trying not to pass gas. Contract your anus, but dont move your buttocks.  · You may place a finger or 2 in the vagina and squeeze your finger with your vagina to learn which muscles to tighten.  Try to hold each Kegel for a slow count to 5. You probably wont be able to hold them for that long at first. But keep practicing. It will get easier as your pelvic floor gets stronger. Eventually, special weights that you place in your vagina may be recommended to help make your Kegels even more effective. Visit your healthcare provider if you have difficulties doing Kegel exercises.  Helpful hints  Here are some tips to follow:  · Do your Kegels as often as you can. The more you do them, the faster youll feel the results.  · Pick an activity you do often as a reminder. For instance, do your Kegels every time you sit down.  · Tighten your pelvic floor before you sneeze, get up from a chair, cough, laugh, or lift. This protects your pelvic floor from injury and can help prevent urine leakage.   Date Last Reviewed: 8/5/2015  © 6725-3104 [employer]. [address] [place] [address], [place] [address]. All rights reserved. This information is not intended as a substitute for professional medical care. Always follow your healthcare professional's instructions.

## 2018-03-27 NOTE — PATIENT INSTRUCTIONS
Neck/Back Pain [General]    Both neck and back pain are usually caused by injury to the muscles or ligaments of the spine. Sometimes the disks that separate each bone of the spine may cause pain by putting pressure on a nearby nerve. Back and neck pain may appear after a sudden twisting/bending force (such as in a car accident), or sometimes after a simple awkward movement. In either case, muscle spasm is often present and adds to the pain.  Acute neck and back pain usually gets better in one to two weeks. Pain related to disk disease, arthritis in the spinal joints or spinal stenosis (narrowing of the spinal canal) can become chronic and last for months or years.  Home Care:  · FOR NECK PAIN: Use a comfortable pillow that supports the head and keeps the spine in a neutral position. The position of the head should not be tilted forward or backward.  · FOR BACK PAIN: You may need to stay in bed the first few days. But, as soon as possible, begin sitting or walking to avoid problems with prolonged bed rest (muscle weakness, worsening back stiffness and pain, blood clots in the legs).  · When in bed, try to find a position of comfort. A firm mattress is best. Try lying flat on your back with pillows under your knees. You can also try lying on your side with your knees bent up towards your chest and a pillow between your knees.  · Avoid prolonged sitting. This puts more stress on the lower back than standing or walking.  · During the first two days after injury, apply an ICE PACK to the painful area for 20 minutes every 2-4 hours. This will reduce swelling and pain. HEAT (hot shower, hot bath or heating pad) works well for muscle spasm. You can start with ice, then switch to heat after two days. Some patients feel best alternating ice and heat treatments. Use the one method that feels the best to you.  · You may use acetaminophen (Tylenol) or ibuprofen (Motrin, Advil) to control pain, unless another pain medicine was  prescribed. [NOTE: If you have chronic liver or kidney disease or ever had a stomach ulcer or GI bleeding, talk with your doctor before using these medicines.]  · Be aware of safe lifting methods and do not lift anything over 15 pounds until all the pain is gone.  Follow Up  with your physician or this facility if your symptoms do not start to improve after one week. Physical therapy or further tests may be needed.  [NOTE: If X-rays were taken, they will be reviewed by a radiologist. You will be notified of any new findings that may affect your care.]  Get Prompt Medical Attention  if any of the following occur:  · Pain becomes worse or spreads into your arms or legs  · Weakness, numbness or pain in one or both arms or legs  · Loss of bowel or bladder control  · Numbness in the groin area  · Difficulty walking  · Fever of 100.4ºF (38ºC) or higher, or as directed by your healthcare provider  © 5732-5622 [facility], [address] [place] [address]. All rights reserved. This information is not intended as a substitute for professional medical care. Always follow your healthcare professional's instructions.    Back Exercises: Back Press  Do this exercise on your hands and knees. Keep your knees under your hips and your hands under your shoulders. Keep your spine in a neutral position (not arched or sagging). Be sure to maintain your necks natural curve.  · Tighten your abdominal and buttocks muscles to press your back upward. Let your head drop slightly.  · Hold for 5 seconds. Return to starting position.  · Repeat 5 times.     © 4981-6664 [facility], [address] [place] [address]. All rights reserved. This information is not intended as a substitute for professional medical care. Always follow your healthcare professional's instructions.    Back Exercises: Back Release  Do this exercise on your hands and knees. Keep your knees under your hips and your hands under your shoulders. Keep  your spine in a neutral position (not arched or sagging). Be sure to maintain your necks natural curve.  · Relax your abdominal and buttocks muscles, lift your head, and let your back sag. Be sure to keep your weight evenly distributed. Dont sit back on your hips.   · Hold for 5 seconds.  · Return to starting position.  · Repeat 5 times.  © 3211-4926 [facility] [place] [address]. All rights reserved. This information is not intended as a substitute for professional medical care. Always follow your healthcare professional's instructions.    Back Exercises: Bridge  The Bridge exercise strengthens your abdominal, buttocks, and hamstring muscles. This helps keep your back stable and aligned when you walk.  · Lie on the floor with your back and palms flat. Bend your knees. Keep your feet flat on the floor.  · Contract your abdominal and buttocks muscles. Slowly lift your buttocks off the floor until there is a straight line from your knees to your shoulders.  · Hold for 5  seconds. Repeat 10 times.    © 6775-9793 [place] [address]. All rights reserved. This information is not intended as a substitute for professional medical care. Always follow your healthcare professional's instructions.    Back Exercises: Elbow Press    To start, lie face down on your stomach, feet slightly apart, forehead on the floor. Breathe deeply. You should feel comfortable and relaxed in this position.  · Press up on your forearms. Keep your abdomen and hips on the floor.  · Hold for 20 seconds. Lower slowly.  · Repeat 2 times.  · Return to starting position.  © 0118-5286 [facility], [address] [place] [address]. All rights reserved. This information is not intended as a substitute for professional medical care. Always follow your healthcare professional's instructions.    Back Exercises: Pelvic Tilt  To start, lie on your back with your knees bent and  feet flat on the floor. Dont press your neck or lower back to the floor. Breathe deeply. You should feel comfortable and relaxed in this position.  · Tighten your abdomen and buttocks, and press your lower back toward the floor. This should be a small, subtle movement.  · Hold for 5 seconds. Release.  · Repeat 5 times.         © 8839-6137 [facility], [address] [place] [address]. All rights reserved. This information is not intended as a substitute for professional medical care. Always follow your healthcare professional's instructions.    Back Exercises: Partial Curl-Ups          To start, lie on your back with your knees bent and feet flat on the floor. Dont press your neck or lower back to the floor. Breathe deeply. You should feel comfortable and relaxed in this position.  · Cross your arms loosely.  · Tighten your abdomen and curl skilled nursing up, keeping your head in line with your shoulders.  · Hold for 5 seconds. Uncurl to lie down.  · Repeat 5 times.   © 7413-1859 [facility], [address] [place] [address]. All rights reserved. This information is not intended as a substitute for professional medical care. Always follow your healthcare professional's instructions.    Back Exercises: Lower Back Stretch                            To start, sit in a chair with your feet flat on the floor. Shift your weight slightly forward to avoid rounding your back. Relax, and keep your ears, shoulders, and hips aligned.  · Sit with your feet well apart.  · Bend forward and touch the floor with the backs of your hands. Relax and let your body drop.  · Hold for 20 seconds. Return to starting position.  · Repeat 2 times.   © 5411-5521 [facility], [address] [place] [address]. All rights reserved. This information is not intended as a substitute for professional medical care. Always follow your healthcare professional's instructions.    Back Exercises: Seated Rotation      To  start, sit in a chair with your feet flat on the floor. Shift your weight slightly forward to avoid rounding your back. Relax, and keep your ears, shoulders, and hips aligned.  · Fold your arms, elbows just below shoulder height.  · Turn from the waist with hips forward. Turn your head last.  · Hold for a count of 5. Return to starting position.  · Repeat 5 times on one side. Then switch sides.  © 2872-2666 [facility] [place] [address]. All rights reserved. This information is not intended as a substitute for professional medical care. Always follow your healthcare professional's instructions.    Back Exercises: Side Stretch  To start, sit in a chair with your feet flat on the floor. Shift your weight slightly forward to avoid rounding your back. Relax. Keep your ears, shoulders, and hips aligned.  · Stretch your right arm overhead.  · Slowly bend to the left. Dont twist your torso.  · Hold for 20 seconds. Return to starting position.  · Repeat 2 times. Then, switch to the other side.  © 8334-6902 [place] [address]. All rights reserved. This information is not intended as a substitute for professional medical care. Always follow your healthcare professional's instructions        Shoulder Clock Exercise         To start, stand tall with your ears, shoulders, and hips in line. Your feet should be slightly apart, positioned just under your hips. Focus your eyes directly in front of you.  this position for a few seconds before starting your exercise. This helps increase your awareness of proper posture.  · Imagine that your right shoulder is the center of a clock. With your shoulder, slowly trace the outer edge of the clock.  · Move clockwise first, then counterclockwise.  · Repeat 3 times. Switch shoulders.     © 2661-7242 [facility], [address] [place] [address]. All rights reserved. This information is not intended as a  substitute for professional medical care. Always follow your healthcare professional's instructions.          Shoulder and Upper Back Stretch  To start, stand tall with your ears, shoulders, and hips in line. Your feet should be slightly apart, positioned just under your hips. Focus your eyes directly in front of you.  this position for a few seconds before starting your exercise. This helps increase your awareness of proper posture.  · Reach overhead and slightly back with both arms. Keep your shoulders and neck aligned and your elbows behind your shoulders.  · With your palms facing the ceiling, turn your fingers inward.  · Take a deep breath. Breathe out and lower your elbows toward your buttocks. Hold for 5 seconds, then return to starting position.  · Repeat 3 times.          © 8017-5198 [facility], [address] [place] [address]. All rights reserved. This information is not intended as a substitute for professional medical care. Always follow your healthcare professional's instructions.          Shoulder Girdle Stretch        To start, sit in a chair with your feet flat on the floor. Your weight should be slightly forward so that youre balanced evenly on your buttocks. Relax your shoulders and keep your head level. Using a chair with arms may help you keep your balance.  · Place one hand on the outside elbow of the other arm.  · Pull the arm across your body. Hold for 30-60 seconds. Repeat once.  · Switch sides.  For your safety, check with your healthcare provider before starting an exercise program.    © 0117-7287 [facility], [address] [place] [address], [place] [address]. All rights reserved. This information is not intended as a substitute for professional medical care. Always follow your healthcare professional's instructions.          Shoulder Isometric Exercises      To start, sit in a chair with your feet flat on the floor. Your weight should be slightly forward so that youre  balanced evenly on your buttocks. Relax your shoulders and keep your head level. Avoid arching your back or rounding your shoulders. Using a chair with arms may help you keep your balance.  · Raise your arms, elbows bent, to shoulder height.  · Slowly move your forearms together. Hold for 5 seconds.  · Return to starting position. Repeat 5 times.  © 0460-2473 [facility], [address] [place] [address], [place] [address]. All rights reserved. This information is not intended as a substitute for professional medical care. Always follow your healthcare professional's instructions.

## 2018-03-27 NOTE — LETTER
March 27, 2018      Karan James MD  [address]  [facility] [address]           [facility]  [address]  [facility] [address]  Phone: [phone]          Patient: Batool Rivera   MR Number: [id number]   YOB: 1965   Date of Visit: 3/27/2018       Dear Dr. Karan James:    Thank you for referring Batool Rivera to me for evaluation. Attached you will find relevant portions of my assessment and plan of care.    If you have questions, please do not hesitate to call me. I look forward to following Batool Rivera along with you.    Sincerely,    Diamond Moraes MD    Enclosure  CC:  No Recipients    If you would like to receive this communication electronically, please contact [email or web address] or [phone] to request more information on [employer] access.    For providers and/or their staff who would like to refer a patient to [facility], please contact us through our one-stop-shop provider referral line, [facility], at [phone].    If you feel you have received this communication in error or would no longer like to receive these types of communications, please e-mail [email or web address]

## 2018-03-27 NOTE — PROGRESS NOTES
"Subjective:       Patient ID: Batool Rivera is a 52 y.o. female.    Chief Complaint: Annual Exam    HPI  51 y/o woman with h/o chronic back pain, obesity, HTN, HLD, GERD, BELLO, multiple other issues here for annual exam.  Has not yet had labs done that were ordered at last visit - this is scheduled for 4/3/18.    MDD, Anxiety - On lexapro 20mg; trazodone to help with sleep added, then increased to 100mg nightly at last visit. Feels that this is about the same as prior, though she is having an easier time sleeping. Sometimes feels "like I'm about to go crazy" - when she feels this way she finds a quiet place to sit by herself, which helps. Especially bothered if other people around her are agitated. No SI.   Has not called to make appt with mental health provider.    HTN - on amlodipine 5mg, lisinopril 40mg. Took these last night. No headache, vision changes, chest pain, or dyspnea.    Dyslipidemia - taking atorvastatin 80mg, +zetia 10mg. Tolerating these without problems.     Elevated A1c - A1c 6.0 on last labs, watching carbs in diet, has been gradually losing weight.     Vitamin D deficiency - taking supplement weekly    BELLO on CPAP - following with Sleep Medicine clinic here, had titration study done 8/3/17 - 8cmH2O recommended. Using CPAP nightly but getting up to urinate 3-4 times/night. Drinks lots of water, especially at night.     Chronic back pain - follows with a specialist on [facility] for this, but "I haven't seen them since August." Referred to PMR for this, has appt today.    Urinary incontinence - has to urinate quickly after drinking water, feels this is worse when it's cold.   +urge incontinence, also when laughing or coughing.     Eye exam done after last visit.    Review of Systems   Constitutional: Negative for activity change, fever and unexpected weight change.   HENT: Negative for congestion and sore throat.    Eyes: Negative for visual disturbance (improved).   Respiratory: Negative for " cough and shortness of breath.    Cardiovascular: Negative for chest pain, palpitations and leg swelling.   Gastrointestinal: Negative.  Negative for abdominal pain and blood in stool.   Endocrine: Negative for cold intolerance, heat intolerance and polyuria.   Genitourinary: Positive for frequency and urgency. Negative for difficulty urinating, dysuria and hematuria.        As noted   Musculoskeletal: Positive for arthralgias (chronic) and back pain (chronic).   Skin: Negative for rash.   Allergic/Immunologic: Positive for environmental allergies.   Neurological: Negative.  Negative for weakness.   Psychiatric/Behavioral: Positive for dysphoric mood and sleep disturbance (improved). Negative for suicidal ideas. The patient is nervous/anxious. The patient is not hyperactive.          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     Heart disease, unspecified     Hyperlipidemia     Hypertension      Past Surgical History:   Procedure Laterality Date     SECTION      HYSTERECTOMY   or     OOPHORECTOMY      one ovary was removed    TUBAL LIGATION       Family History   Problem Relation Age of Onset    Hypertension Mother     Heart disease Mother     Asthma Mother     Cataracts Mother     Glaucoma Mother     Brain cancer Father     Stroke Brother     Kidney disease Brother     Glaucoma Sister     Diabetes Sister     Heart disease Brother      MI x 5    Depression Brother     Blindness Neg Hx     Macular degeneration Neg Hx     Retinal detachment Neg Hx     Breast cancer Neg Hx     Colon cancer Neg Hx     Ovarian cancer Neg Hx        Social History   Substance Use Topics    Smoking status: Former Smoker     Packs/day: 2.00     Years: 28.00     Quit date: 2011    Smokeless tobacco: Never Used    Alcohol use No       Medications and allergies  "reviewed.     Objective:          Vitals:    03/27/18 0936   BP: 125/76   BP Location: Right arm   Patient Position: Sitting   Pulse: (!) 54   Temp: 97.6 °F (36.4 °C)   TempSrc: Oral   SpO2: 99%   Weight: 104.6 kg (230 lb 9.6 oz)   Height: 5' 5" (1.651 m)     Body mass index is 38.37 kg/m².  Physical Exam   Constitutional: She is oriented to person, place, and time. She appears well-developed and well-nourished. No distress.   HENT:   Head: Normocephalic and atraumatic.   Nose: Mucosal edema present.   Mouth/Throat: Oropharynx is clear and moist. No oropharyngeal exudate.   Eyes: Conjunctivae and EOM are normal. Pupils are equal, round, and reactive to light. No scleral icterus.   Neck: Normal range of motion. Neck supple. No thyromegaly present.   Cardiovascular: Normal rate, regular rhythm and normal heart sounds.    No murmur heard.  Pulmonary/Chest: Effort normal and breath sounds normal. No respiratory distress.   Abdominal: Soft. There is no tenderness.   Musculoskeletal: She exhibits no edema or tenderness.   Lymphadenopathy:     She has no cervical adenopathy.   Neurological: She is alert and oriented to person, place, and time. She has normal strength. No cranial nerve deficit. Gait normal.   Skin: Skin is warm and dry.   Psychiatric: Her behavior is normal. Thought content normal.   Dysphoric, though less than prior. Speech normal.    Vitals reviewed.      Lab Results   Component Value Date    WBC 11.10 06/12/2017    HGB 13.1 06/12/2017    HCT 37.8 06/12/2017     06/12/2017    CHOL 222 (H) 06/12/2017    TRIG 141 06/12/2017    HDL 34 (L) 06/12/2017    ALT 16 06/12/2017    AST 16 06/12/2017     06/12/2017    K 4.3 06/12/2017     06/12/2017    CREATININE 0.8 06/12/2017    BUN 14 06/12/2017    CO2 27 06/12/2017    TSH 1.706 03/21/2016    INR 1.1 02/17/2012    HGBA1C 6.0 (H) 06/12/2017       Assessment:       1. Annual physical exam    2. Screening for malignant neoplasm of breast    3. Mixed " stress and urge urinary incontinence    4. Essential hypertension    5. Hyperlipidemia, unspecified hyperlipidemia type    6. Vitamin D deficiency    7. Severe obesity (BMI 35.0-35.9 with comorbidity)    8. Elevated hemoglobin A1c    9. BELLO on CPAP    10. Chronic bilateral low back pain, with sciatica presence unspecified        Plan:   Batool was seen today for annual exam.    Diagnoses and all orders for this visit:    Annual physical exam - Overall stable, doing well. Reviewed chronic and preventive health concerns.  Continue trazodone for sleep.  Strongly encouraged her to make appt with mental health provider - mental health resource handout given again.   Labs to be done 4/3/18    Screening for malignant neoplasm of breast  -     Mammo Digital Screening Bilat with CAD; Future    Mixed stress and urge urinary incontinence  Comments:  discussed options; recommended drinking less water at night. will trial oxybutynin; if still having problems will refer to Urology  Orders:  -     oxybutynin (DITROPAN) 5 MG Tab; Take 1 tablet (5 mg total) by mouth 2 (two) times daily. As needed for urinary incontinence    Essential hypertension  Comments:  at goal, continue current meds    Hyperlipidemia, unspecified hyperlipidemia type  Comments:  stable, continue current meds    Vitamin D deficiency  Comments:  continue weekly supplement    Severe obesity (BMI 35.0-35.9 with comorbidity)  Comments:  continue working on healthy changes    Elevated hemoglobin A1c  Comments:  stable on last labs, encouraged her to continue diet changes, working on weight loss. Continue BELLO treatment. Encouraged exercise; limited by her back pain.     BELLO on CPAP  Comments:  continue using CPAP, follow with Sleep Med; working to address frequent nighttime urination    Chronic bilateral low back pain, with sciatica presence unspecified  Comments:  encouraged her to go to PMR visit today    Health maintenance reviewed with patient as  noted.    Follow-up in about 6 months (around 9/27/2018) for follow up.    Karan James MD  Internal Medicine  [facility]  3/27/2018

## 2018-03-28 DIAGNOSIS — F33.2 SEVERE EPISODE OF RECURRENT MAJOR DEPRESSIVE DISORDER, WITHOUT PSYCHOTIC FEATURES: Chronic | ICD-10-CM

## 2018-03-28 PROBLEM — N39.46 MIXED STRESS AND URGE URINARY INCONTINENCE: Status: ACTIVE | Noted: 2018-03-28

## 2018-03-28 RX ORDER — TRAZODONE HYDROCHLORIDE 50 MG/1
TABLET ORAL
Qty: 60 TABLET | Refills: 3 | Status: SHIPPED | OUTPATIENT
Start: 2018-03-28 | End: 2018-08-08 | Stop reason: SDUPTHER

## 2018-04-04 ENCOUNTER — LAB VISIT (OUTPATIENT)
Dept: LAB | Facility: HOSPITAL | Age: 53
End: 2018-04-04
Attending: INTERNAL MEDICINE
Payer: MEDICAID

## 2018-04-04 DIAGNOSIS — G47.33 OBSTRUCTIVE SLEEP APNEA: ICD-10-CM

## 2018-04-04 DIAGNOSIS — R73.09 ELEVATED HEMOGLOBIN A1C: ICD-10-CM

## 2018-04-04 DIAGNOSIS — E78.5 HYPERLIPIDEMIA, UNSPECIFIED HYPERLIPIDEMIA TYPE: ICD-10-CM

## 2018-04-04 DIAGNOSIS — I10 ESSENTIAL HYPERTENSION: ICD-10-CM

## 2018-04-04 DIAGNOSIS — E55.9 VITAMIN D DEFICIENCY: ICD-10-CM

## 2018-04-04 DIAGNOSIS — F33.2 SEVERE EPISODE OF RECURRENT MAJOR DEPRESSIVE DISORDER, WITHOUT PSYCHOTIC FEATURES: Chronic | ICD-10-CM

## 2018-04-04 DIAGNOSIS — R20.2 PARESTHESIA: ICD-10-CM

## 2018-04-04 LAB
25(OH)D3+25(OH)D2 SERPL-MCNC: 25 NG/ML
ALBUMIN SERPL BCP-MCNC: 3.9 G/DL
ALP SERPL-CCNC: 95 U/L
ALT SERPL W/O P-5'-P-CCNC: 16 U/L
ANION GAP SERPL CALC-SCNC: 9 MMOL/L
AST SERPL-CCNC: 15 U/L
BASOPHILS # BLD AUTO: 0.05 K/UL
BASOPHILS NFR BLD: 0.5 %
BILIRUB SERPL-MCNC: 0.3 MG/DL
BUN SERPL-MCNC: 14 MG/DL
CALCIUM SERPL-MCNC: 9.4 MG/DL
CHLORIDE SERPL-SCNC: 104 MMOL/L
CHOLEST SERPL-MCNC: 312 MG/DL
CHOLEST/HDLC SERPL: 7.4 {RATIO}
CO2 SERPL-SCNC: 27 MMOL/L
CREAT SERPL-MCNC: 0.8 MG/DL
DIFFERENTIAL METHOD: ABNORMAL
EOSINOPHIL # BLD AUTO: 0.2 K/UL
EOSINOPHIL NFR BLD: 2.2 %
ERYTHROCYTE [DISTWIDTH] IN BLOOD BY AUTOMATED COUNT: 16.9 %
EST. GFR  (AFRICAN AMERICAN): >60 ML/MIN/1.73 M^2
EST. GFR  (NON AFRICAN AMERICAN): >60 ML/MIN/1.73 M^2
ESTIMATED AVG GLUCOSE: 117 MG/DL
GLUCOSE SERPL-MCNC: 109 MG/DL
HBA1C MFR BLD HPLC: 5.7 %
HCT VFR BLD AUTO: 40.4 %
HDLC SERPL-MCNC: 42 MG/DL
HDLC SERPL: 13.5 %
HGB BLD-MCNC: 13.6 G/DL
LDLC SERPL CALC-MCNC: 241 MG/DL
LYMPHOCYTES # BLD AUTO: 2.9 K/UL
LYMPHOCYTES NFR BLD: 31.3 %
MCH RBC QN AUTO: 24.9 PG
MCHC RBC AUTO-ENTMCNC: 33.7 G/DL
MCV RBC AUTO: 74 FL
MONOCYTES # BLD AUTO: 0.6 K/UL
MONOCYTES NFR BLD: 6.5 %
NEUTROPHILS # BLD AUTO: 5.5 K/UL
NEUTROPHILS NFR BLD: 59.2 %
NONHDLC SERPL-MCNC: 270 MG/DL
NRBC BLD-RTO: 0 /100 WBC
PLATELET # BLD AUTO: 234 K/UL
PMV BLD AUTO: 10.4 FL
POTASSIUM SERPL-SCNC: 4 MMOL/L
PROT SERPL-MCNC: 7.4 G/DL
RBC # BLD AUTO: 5.47 M/UL
SODIUM SERPL-SCNC: 140 MMOL/L
TRIGL SERPL-MCNC: 145 MG/DL
TSH SERPL DL<=0.005 MIU/L-ACNC: 2.64 UIU/ML
VIT B12 SERPL-MCNC: 451 PG/ML
WBC # BLD AUTO: 9.35 K/UL

## 2018-04-04 PROCEDURE — 80061 LIPID PANEL: CPT

## 2018-04-04 PROCEDURE — 85025 COMPLETE CBC W/AUTO DIFF WBC: CPT

## 2018-04-04 PROCEDURE — 82607 VITAMIN B-12: CPT

## 2018-04-04 PROCEDURE — 82306 VITAMIN D 25 HYDROXY: CPT

## 2018-04-04 PROCEDURE — 80053 COMPREHEN METABOLIC PANEL: CPT

## 2018-04-04 PROCEDURE — 36415 COLL VENOUS BLD VENIPUNCTURE: CPT

## 2018-04-04 PROCEDURE — 83036 HEMOGLOBIN GLYCOSYLATED A1C: CPT

## 2018-04-04 PROCEDURE — 84443 ASSAY THYROID STIM HORMONE: CPT

## 2018-04-10 ENCOUNTER — TELEPHONE (OUTPATIENT)
Dept: INTERNAL MEDICINE | Facility: CLINIC | Age: 53
End: 2018-04-10

## 2018-04-10 NOTE — TELEPHONE ENCOUNTER
----- Message from Vikram Martinez sent at 4/9/2018 12:23 PM CDT -----  Contact: [facility] [phone]  Pharmacy calling in regards to needs a prior authorization for Rx ezetimibe (ZETIA) 10 mg tablet, Stating faxed several times for request.    Please call and advise  Thank you

## 2018-04-10 NOTE — TELEPHONE ENCOUNTER
Patient on zetia since at least 2012, has seen cardiology in 2013 who recommended continue atorvastatin + zetia for goal LDL <100. She does need to be on both medications due to her dyslipidemia.    At her last visit she said she has been taking these, but her LDL is quite high (>200). Please clarify if she has indeed been taking both of these medications every day recently.

## 2018-04-11 ENCOUNTER — TELEPHONE (OUTPATIENT)
Dept: INTERNAL MEDICINE | Facility: CLINIC | Age: 53
End: 2018-04-11

## 2018-04-11 NOTE — TELEPHONE ENCOUNTER
----- Message from Nuha Delgado sent at 4/11/2018  8:52 AM CDT -----  Contact: Patient [phone]  Patient is returning a missed call.     Who left the message and when: Maria Antonia 04/11/2018    Please call back and advise.    Thank you

## 2018-04-23 DIAGNOSIS — I10 ESSENTIAL HYPERTENSION: ICD-10-CM

## 2018-04-23 DIAGNOSIS — F39 MOOD DISORDER: ICD-10-CM

## 2018-04-23 RX ORDER — AMLODIPINE BESYLATE 5 MG/1
TABLET ORAL
Qty: 90 TABLET | Refills: 1 | Status: SHIPPED | OUTPATIENT
Start: 2018-04-23 | End: 2018-10-10 | Stop reason: SDUPTHER

## 2018-04-23 RX ORDER — ESCITALOPRAM OXALATE 20 MG/1
TABLET ORAL
Qty: 90 TABLET | Refills: 1 | Status: SHIPPED | OUTPATIENT
Start: 2018-04-23 | End: 2018-10-10 | Stop reason: SDUPTHER

## 2018-04-27 DIAGNOSIS — E55.9 VITAMIN D DEFICIENCY: ICD-10-CM

## 2018-04-27 RX ORDER — ERGOCALCIFEROL 1.25 MG/1
CAPSULE ORAL
Qty: 12 CAPSULE | Refills: 0 | OUTPATIENT
Start: 2018-04-27

## 2018-05-09 ENCOUNTER — HOSPITAL ENCOUNTER (OUTPATIENT)
Dept: RADIOLOGY | Facility: HOSPITAL | Age: 53
Discharge: HOME | End: 2018-05-09
Attending: INTERNAL MEDICINE
Payer: MEDICAID

## 2018-05-09 DIAGNOSIS — Z12.39 SCREENING FOR MALIGNANT NEOPLASM OF BREAST: ICD-10-CM

## 2018-05-09 PROCEDURE — 77067 SCR MAMMO BI INCL CAD: CPT | Mod: 26,,, | Performed by: RADIOLOGY

## 2018-05-09 PROCEDURE — 77063 BREAST TOMOSYNTHESIS BI: CPT | Mod: 26,,, | Performed by: RADIOLOGY

## 2018-05-09 PROCEDURE — 77067 SCR MAMMO BI INCL CAD: CPT | Mod: TC

## 2018-05-28 ENCOUNTER — OFFICE VISIT (OUTPATIENT)
Dept: PHYSICAL MEDICINE AND REHAB | Facility: CLINIC | Age: 53
End: 2018-05-28
Payer: MEDICAID

## 2018-05-28 VITALS
BODY MASS INDEX: 38.89 KG/M2 | WEIGHT: 233.44 LBS | HEART RATE: 64 BPM | SYSTOLIC BLOOD PRESSURE: 131 MMHG | HEIGHT: 65 IN | DIASTOLIC BLOOD PRESSURE: 81 MMHG

## 2018-05-28 DIAGNOSIS — E66.01 SEVERE OBESITY (BMI 35.0-35.9 WITH COMORBIDITY): ICD-10-CM

## 2018-05-28 DIAGNOSIS — M47.816 SPONDYLOSIS OF LUMBAR REGION WITHOUT MYELOPATHY OR RADICULOPATHY: ICD-10-CM

## 2018-05-28 DIAGNOSIS — M54.50 CHRONIC MIDLINE LOW BACK PAIN WITHOUT SCIATICA: Primary | ICD-10-CM

## 2018-05-28 DIAGNOSIS — M25.551 RIGHT HIP PAIN: ICD-10-CM

## 2018-05-28 DIAGNOSIS — G89.29 CHRONIC RIGHT SHOULDER PAIN: ICD-10-CM

## 2018-05-28 DIAGNOSIS — G89.29 CHRONIC MIDLINE LOW BACK PAIN WITHOUT SCIATICA: Primary | ICD-10-CM

## 2018-05-28 DIAGNOSIS — M25.511 CHRONIC RIGHT SHOULDER PAIN: ICD-10-CM

## 2018-05-28 DIAGNOSIS — M17.11 PRIMARY OSTEOARTHRITIS OF RIGHT KNEE: ICD-10-CM

## 2018-05-28 DIAGNOSIS — M16.11 PRIMARY OSTEOARTHRITIS OF RIGHT HIP: ICD-10-CM

## 2018-05-28 PROCEDURE — 99999 PR PBB SHADOW E&M-EST. PATIENT-LVL III: CPT | Mod: PBBFAC,,, | Performed by: PHYSICAL MEDICINE & REHABILITATION

## 2018-05-28 PROCEDURE — 99213 OFFICE O/P EST LOW 20 MIN: CPT | Mod: PBBFAC | Performed by: PHYSICAL MEDICINE & REHABILITATION

## 2018-05-28 PROCEDURE — 99214 OFFICE O/P EST MOD 30 MIN: CPT | Mod: S$PBB,,, | Performed by: PHYSICAL MEDICINE & REHABILITATION

## 2018-05-28 NOTE — PROGRESS NOTES
Subjective:       Patient ID: Batool Rivera is a 52 y.o. female.    Chief Complaint: No chief complaint on file.    HPI     HISTORY OF PRESENT ILLNESS:  Ms. Rivera is a 52-year-old black female with past   medical history of HTN, depression/anxiety, obesity and obstructive sleep apnea.    She presented to the [facility] Medicine Clinic on 03/27/2018 for chronic low   back pain, chronic right knee pain, right hip pain and right shoulder pain.    X-rays of the lumbar spine, right knee and right hip were ordered.  They were   positive for mild DJD.  She was started on meloxicam and p.r.n. Tylenol.  She   was maintained on gabapentin.    The patient is coming today to the clinic for followup.  Her back pain was worse   over the past two weeks due to taking care of her grandchildren during school.    It has gotten better over the past few days.  Her pain is an intermittent   aching sensation in the lumbar spine and across her back.  She denies any   radiation to her legs.  She continues, however, to complain of persistent left   foot/toe numbness, not clearly related to her back pain.  Her pain is worse with   activity and better with rest.  Her maximum pain is 9/10 and minimum 7/10.    Today, it is 7/10.  The patient denies any lower extremity weakness.  She denies   any bowel or bladder incontinence.    She continues to complain of right knee pain.  It is a constant aching pain.  It   is worse with standing or walking.  Her maximum pain is 10/10 and minimum 6/10.    Today, it is 6/10.    She has been complaining of bilateral hip pain, worse on the right.  It is a   constant aching pain.  It is aggravated by walking slow and by sleeping on her   right side.  It is better with walking fast.  Her maximum pain is 10/10 and   minimum 5-6/10.  Today, it is 8/10.    She is currently taking Tylenol, but only one or two tablets a couple times per   week.  She takes gabapentin 300 mg p.o. three times per day.  She was on    meloxicam, but she stopped it secondary to chest pain.  She takes trazodone 50   mg p.r.n. for sleep.      MS/HN  dd: 05/28/2018 10:57:37 (CDT)  td: 05/29/2018 04:17:45 (CDT)  Doc ID   #[id number]  Job ID #[id number]    CC:         Review of Systems   Constitutional: Negative for fatigue.   Eyes: Negative for visual disturbance.   Respiratory: Negative for shortness of breath.    Cardiovascular: Negative for chest pain.   Gastrointestinal: Negative for blood in stool, constipation, nausea and vomiting.   Genitourinary: Negative for difficulty urinating.   Musculoskeletal: Positive for arthralgias and back pain. Negative for gait problem, joint swelling and neck pain.   Neurological: Positive for headaches. Negative for dizziness.   Psychiatric/Behavioral: Positive for sleep disturbance. Negative for behavioral problems.       Objective:      Physical Exam   Constitutional: She is oriented to person, place, and time. She appears well-developed and well-nourished.   HENT:   Head: Normocephalic and atraumatic.   Neck: Normal range of motion.   Mild tenderness low C-spine.   Musculoskeletal:   BUE:  ROM:   RUE: full.   LUE: full.  Strength:    RUE: 4/5 at shoulder abduction, 5 elbow flexion, 5 elbow extension, 5 hand .   LUE: 5-/5 at shoulder abduction, 5 elbow flexion, 5 elbow extension, 5 hand .  Sensation to pinprick:   RUE: intact.   LUE: intact.    Impingement Signs:  Neer:  RUE: +ve    LUE: -ve  Terry: RUE: +ve    LUE: -ve       BLE:  ROM:   RLE: full.   LLE: full.  Knee crepitus:   RLE: +ve.   LLE: +ve.   Strength:    RLE: 5/5 at hip flexion, 5 knee extension, 5 ankle DF, 5 PF, 5 EHL.   LLE: 5/5 at hip flexion, 5 knee extension, 5 ankle DF, 5 PF, 5 EHL.  Sensation to pinprick:     RLE: intact.      LLE: intact.   SLR (sitting):      RLE: -ve.      LLE: -ve.    -ve tenderness over lumbar spine.    Gait: WNL     Neurological: She is alert and oriented to person, place, and time.   Skin: Skin is warm.    Psychiatric: She has a normal mood and affect. Her behavior is normal.   Vitals reviewed.      Assessment:       1. Chronic midline low back pain without sciatica    2. Spondylosis of lumbar region without myelopathy or radiculopathy    3. Primary osteoarthritis of right knee    4. Right hip pain    5. Primary osteoarthritis of right hip    6. Chronic right shoulder pain    7. Severe obesity (BMI 35.0-35.9 with comorbidity)        Plan:         - X-Ray findings were discussed with the patient.  - Meloxicam was discontinued b/o chest pain (unknown cardiac or GI). She was asked to avoid NSAIDs..  - Increase acetaminophen (TYLENOL) 500 MG tablet; Take 1-2 tablets (500-1,000 mg total) by mouth 3 (three) times daily as needed for Pain.  - Continue gabapentin 300 mg po tid.  - Regular home exercise program was encouraged.  - Weight loss was encouraged. She gained ~2.5 lbs since last visit.  - Follow-up in about 6 months (around 11/28/2018).     This was a 25 minute visit, more than 50% of which was spent counseling the patient about the diagnosis and the treatment plan.

## 2018-06-11 ENCOUNTER — TELEPHONE (OUTPATIENT)
Dept: INTERNAL MEDICINE | Facility: CLINIC | Age: 53
End: 2018-06-11

## 2018-06-11 NOTE — TELEPHONE ENCOUNTER
----- Message from Darcy Mary sent at 6/11/2018 12:46 PM CDT -----  Contact: self/[phone]  Patient called in regards needing a check up appointment (unable to find an open/available appointment). Please call and advise. Thank you!!!

## 2018-06-12 DIAGNOSIS — E55.9 VITAMIN D DEFICIENCY: ICD-10-CM

## 2018-06-12 RX ORDER — ERGOCALCIFEROL 1.25 MG/1
50000 CAPSULE ORAL
Qty: 12 CAPSULE | Refills: 1 | Status: SHIPPED | OUTPATIENT
Start: 2018-06-12 | End: 2018-11-24 | Stop reason: SDUPTHER

## 2018-06-12 NOTE — TELEPHONE ENCOUNTER
----- Message from Elizabeth Khoury sent at 6/12/2018  9:15 AM CDT -----  Contact: self/[phone]  Pt called in regards to a missed lynda from the MA about an appointment.      Please advise

## 2018-06-17 DIAGNOSIS — M25.511 CHRONIC RIGHT SHOULDER PAIN: ICD-10-CM

## 2018-06-17 DIAGNOSIS — G89.29 CHRONIC RIGHT SHOULDER PAIN: ICD-10-CM

## 2018-06-17 DIAGNOSIS — M17.11 PRIMARY OSTEOARTHRITIS OF RIGHT KNEE: ICD-10-CM

## 2018-06-17 DIAGNOSIS — N39.46 MIXED STRESS AND URGE URINARY INCONTINENCE: ICD-10-CM

## 2018-06-17 DIAGNOSIS — M54.50 CHRONIC MIDLINE LOW BACK PAIN WITHOUT SCIATICA: ICD-10-CM

## 2018-06-17 DIAGNOSIS — M25.551 RIGHT HIP PAIN: ICD-10-CM

## 2018-06-17 DIAGNOSIS — G89.29 CHRONIC MIDLINE LOW BACK PAIN WITHOUT SCIATICA: ICD-10-CM

## 2018-06-17 RX ORDER — MELOXICAM 15 MG/1
TABLET ORAL
Qty: 30 TABLET | Refills: 0 | Status: SHIPPED | OUTPATIENT
Start: 2018-06-17 | End: 2018-07-16 | Stop reason: SDUPTHER

## 2018-06-18 RX ORDER — OXYBUTYNIN CHLORIDE 5 MG/1
TABLET ORAL
Qty: 60 TABLET | Refills: 0 | Status: SHIPPED | OUTPATIENT
Start: 2018-06-18 | End: 2018-07-16 | Stop reason: SDUPTHER

## 2018-07-16 DIAGNOSIS — M25.511 CHRONIC RIGHT SHOULDER PAIN: ICD-10-CM

## 2018-07-16 DIAGNOSIS — G89.29 CHRONIC MIDLINE LOW BACK PAIN WITHOUT SCIATICA: ICD-10-CM

## 2018-07-16 DIAGNOSIS — N39.46 MIXED STRESS AND URGE URINARY INCONTINENCE: ICD-10-CM

## 2018-07-16 DIAGNOSIS — G89.29 CHRONIC RIGHT SHOULDER PAIN: ICD-10-CM

## 2018-07-16 DIAGNOSIS — M25.551 RIGHT HIP PAIN: ICD-10-CM

## 2018-07-16 DIAGNOSIS — M54.50 CHRONIC MIDLINE LOW BACK PAIN WITHOUT SCIATICA: ICD-10-CM

## 2018-07-16 DIAGNOSIS — M17.11 PRIMARY OSTEOARTHRITIS OF RIGHT KNEE: ICD-10-CM

## 2018-07-16 RX ORDER — OXYBUTYNIN CHLORIDE 5 MG/1
TABLET ORAL
Qty: 60 TABLET | Refills: 1 | Status: SHIPPED | OUTPATIENT
Start: 2018-07-16 | End: 2018-09-13 | Stop reason: SDUPTHER

## 2018-07-16 RX ORDER — MELOXICAM 15 MG/1
TABLET ORAL
Qty: 30 TABLET | Refills: 1 | Status: SHIPPED | OUTPATIENT
Start: 2018-07-16 | End: 2018-09-13 | Stop reason: SDUPTHER

## 2018-08-08 DIAGNOSIS — F33.2 SEVERE EPISODE OF RECURRENT MAJOR DEPRESSIVE DISORDER, WITHOUT PSYCHOTIC FEATURES: Chronic | ICD-10-CM

## 2018-08-08 RX ORDER — TRAZODONE HYDROCHLORIDE 50 MG/1
TABLET ORAL
Qty: 60 TABLET | Refills: 3 | Status: SHIPPED | OUTPATIENT
Start: 2018-08-08 | End: 2018-12-02 | Stop reason: SDUPTHER

## 2018-08-09 DIAGNOSIS — E78.5 HYPERLIPIDEMIA, UNSPECIFIED HYPERLIPIDEMIA TYPE: ICD-10-CM

## 2018-08-09 DIAGNOSIS — I10 ESSENTIAL HYPERTENSION: ICD-10-CM

## 2018-08-09 RX ORDER — ATORVASTATIN CALCIUM 80 MG/1
TABLET, FILM COATED ORAL
Qty: 90 TABLET | Refills: 0 | Status: SHIPPED | OUTPATIENT
Start: 2018-08-09 | End: 2018-11-11 | Stop reason: SDUPTHER

## 2018-08-09 RX ORDER — LISINOPRIL 40 MG/1
TABLET ORAL
Qty: 90 TABLET | Refills: 0 | Status: SHIPPED | OUTPATIENT
Start: 2018-08-09 | End: 2018-11-11 | Stop reason: SDUPTHER

## 2018-08-16 ENCOUNTER — TELEPHONE (OUTPATIENT)
Dept: INTERNAL MEDICINE | Facility: CLINIC | Age: 53
End: 2018-08-16

## 2018-08-16 NOTE — TELEPHONE ENCOUNTER
----- Message from Ambreen Pan sent at 8/15/2018  2:37 PM CDT -----  Contact: self/[phone]  Pt would like to speak with the doctor in regards to a class action litigation for Pts that completed MRI's with contrast. Please Advise

## 2018-08-17 ENCOUNTER — TELEPHONE (OUTPATIENT)
Dept: INTERNAL MEDICINE | Facility: CLINIC | Age: 53
End: 2018-08-17

## 2018-08-17 NOTE — TELEPHONE ENCOUNTER
----- Message from Zeinab Zimmerman sent at 8/17/2018 12:37 PM CDT -----  Contact: [phone]  Type: Returning a call    Who left a message?  Shilpi    When did the practice call? yesterday    Comments:  Please advise, thank  you

## 2018-08-28 ENCOUNTER — TELEPHONE (OUTPATIENT)
Dept: INTERNAL MEDICINE | Facility: CLINIC | Age: 53
End: 2018-08-28

## 2018-08-28 NOTE — TELEPHONE ENCOUNTER
----- Message from Vikram Martinez sent at 8/28/2018  9:10 AM CDT -----  Contact: Patient [phone]  Type: Referral to a Specialist    Where would you like a referral to? Head Specialist/Neurologist    Have you previously requested? no    Is the physician within the [facility]? yes    Name and phone number of specialist: [facility]    Reason for appointment: Stating has funny feeling in head close to a dizzy feeling, would like to have checked out by a Head Specialist.    Is an appointment scheduled with specialist? When? 11/15/18    Please call an advise  Thank you

## 2018-08-28 NOTE — TELEPHONE ENCOUNTER
----- Message from Azra Giordano sent at 8/28/2018 10:29 AM CDT -----  Contact: pt [phone]  Patient is returning a phone call.  Who left a message for the patient: Fabi  Does patient know what this is regarding:    Comments:

## 2018-09-05 ENCOUNTER — TELEPHONE (OUTPATIENT)
Dept: INTERNAL MEDICINE | Facility: CLINIC | Age: 53
End: 2018-09-05

## 2018-09-05 NOTE — TELEPHONE ENCOUNTER
----- Message from Darcy Mary sent at 9/5/2018  1:30 PM CDT -----  Contact: self/[phone]  Patient called in regards needing to talk with Dr James medical assistant about if is possible for her to be seen tomorrow. Patient stated that she still having the same issue funny feeling in head close to a dizzy feeling. Patient has been scheduled for the next available appointment 11/15/18, but she is asking for something earlier than that. Please call and advise. Thank you

## 2018-09-13 DIAGNOSIS — G89.29 CHRONIC MIDLINE LOW BACK PAIN WITHOUT SCIATICA: ICD-10-CM

## 2018-09-13 DIAGNOSIS — M54.50 CHRONIC MIDLINE LOW BACK PAIN WITHOUT SCIATICA: ICD-10-CM

## 2018-09-13 DIAGNOSIS — M25.511 CHRONIC RIGHT SHOULDER PAIN: ICD-10-CM

## 2018-09-13 DIAGNOSIS — G89.29 CHRONIC RIGHT SHOULDER PAIN: ICD-10-CM

## 2018-09-13 DIAGNOSIS — M17.11 PRIMARY OSTEOARTHRITIS OF RIGHT KNEE: ICD-10-CM

## 2018-09-13 DIAGNOSIS — M25.551 RIGHT HIP PAIN: ICD-10-CM

## 2018-09-13 DIAGNOSIS — N39.46 MIXED STRESS AND URGE URINARY INCONTINENCE: ICD-10-CM

## 2018-09-13 RX ORDER — MELOXICAM 15 MG/1
TABLET ORAL
Qty: 30 TABLET | Refills: 1 | Status: SHIPPED | OUTPATIENT
Start: 2018-09-13 | End: 2018-10-31

## 2018-09-13 RX ORDER — OXYBUTYNIN CHLORIDE 5 MG/1
TABLET ORAL
Qty: 60 TABLET | Refills: 0 | Status: SHIPPED | OUTPATIENT
Start: 2018-09-13 | End: 2018-10-10 | Stop reason: SDUPTHER

## 2018-09-17 ENCOUNTER — LAB VISIT (OUTPATIENT)
Dept: LAB | Facility: HOSPITAL | Age: 53
End: 2018-09-17
Payer: MEDICAID

## 2018-09-17 ENCOUNTER — OFFICE VISIT (OUTPATIENT)
Dept: INTERNAL MEDICINE | Facility: CLINIC | Age: 53
End: 2018-09-17
Payer: MEDICAID

## 2018-09-17 VITALS
OXYGEN SATURATION: 97 % | WEIGHT: 239.88 LBS | DIASTOLIC BLOOD PRESSURE: 70 MMHG | SYSTOLIC BLOOD PRESSURE: 110 MMHG | BODY MASS INDEX: 39.97 KG/M2 | HEART RATE: 54 BPM | HEIGHT: 65 IN

## 2018-09-17 DIAGNOSIS — F41.9 ANXIETY: ICD-10-CM

## 2018-09-17 DIAGNOSIS — E66.01 SEVERE OBESITY (BMI 35.0-35.9 WITH COMORBIDITY): ICD-10-CM

## 2018-09-17 DIAGNOSIS — E78.5 HYPERLIPIDEMIA, UNSPECIFIED HYPERLIPIDEMIA TYPE: ICD-10-CM

## 2018-09-17 DIAGNOSIS — F33.1 MODERATE EPISODE OF RECURRENT MAJOR DEPRESSIVE DISORDER: Chronic | ICD-10-CM

## 2018-09-17 DIAGNOSIS — G44.89 CHRONIC MIXED HEADACHE SYNDROME: Primary | ICD-10-CM

## 2018-09-17 DIAGNOSIS — H53.2 DIPLOPIA: ICD-10-CM

## 2018-09-17 DIAGNOSIS — G44.89 CHRONIC MIXED HEADACHE SYNDROME: ICD-10-CM

## 2018-09-17 LAB
CRP SERPL-MCNC: 6 MG/L
ERYTHROCYTE [SEDIMENTATION RATE] IN BLOOD BY WESTERGREN METHOD: 17 MM/HR

## 2018-09-17 PROCEDURE — 85652 RBC SED RATE AUTOMATED: CPT

## 2018-09-17 PROCEDURE — 99214 OFFICE O/P EST MOD 30 MIN: CPT | Mod: S$PBB,,, | Performed by: STUDENT IN AN ORGANIZED HEALTH CARE EDUCATION/TRAINING PROGRAM

## 2018-09-17 PROCEDURE — 99214 OFFICE O/P EST MOD 30 MIN: CPT | Mod: PBBFAC | Performed by: STUDENT IN AN ORGANIZED HEALTH CARE EDUCATION/TRAINING PROGRAM

## 2018-09-17 PROCEDURE — 83835 ASSAY OF METANEPHRINES: CPT

## 2018-09-17 PROCEDURE — 86140 C-REACTIVE PROTEIN: CPT

## 2018-09-17 PROCEDURE — 99999 PR PBB SHADOW E&M-EST. PATIENT-LVL IV: CPT | Mod: PBBFAC,,, | Performed by: STUDENT IN AN ORGANIZED HEALTH CARE EDUCATION/TRAINING PROGRAM

## 2018-09-17 PROCEDURE — 36415 COLL VENOUS BLD VENIPUNCTURE: CPT

## 2018-09-17 RX ORDER — GABAPENTIN 400 MG/1
400 CAPSULE ORAL 3 TIMES DAILY
Qty: 90 CAPSULE | Refills: 11 | Status: SHIPPED | OUTPATIENT
Start: 2018-09-17 | End: 2018-10-31

## 2018-09-17 NOTE — PROGRESS NOTES
"Subjective:       Patient ID: Batool Rivera is a 53 y.o. female.    Chief Complaint: Headache; Dizziness; and Head Fog      Ms. Batool Rivera is a 52 y/o woman with h/o chronic back pain, obesity, HTN, HLD, GERD, BELLO, multiple other issues here for headache, dizziness and head fog. She feels that she is having headaches and double vision with associated neck pain and neck tenderness. These symptoms started two weeks ago. The headaches started first, describes as pin sticking in the top of her head, constant pain, 10/10, with radiation to her neck and upper back. She has taken tylenol (1g BID) for this without benefit. She feels that her headache resolves with rest in a dark room and is made worse by noise, light and when first awakening in the morning. Has pain in many muscles and joints, denies nausea/vomiting. These are different from her prior headaches - states these are more severe and different. She has also had more severe blood pressure readings at home of late, with her wrist BP cuff SBP ranging 160's-220's. She states compliance with her norvasc and lisinopril. She had a CPAP machine and uses it 90% of the time. She finds her CPAP to be uncomfortable.     She takes gabapentin, ASA,, lisinopril and norvasc in the morning. She takes trazadone and gabapentin at night. She does not take Zetia or Lipitor and is unsure if she takes Lexapro.     Last labs remarkable for , Vit D 25, A1c 5.7.      MDD, Anxiety - On lexapro 20mg; trazodone to help with sleep added, then increased to 100mg nightly at last visit. Feels that this is about the same as prior, though she is having an easier time sleeping. Sometimes feels "like I'm about to go crazy" - when she feels this way she finds a quiet place to sit by herself, which helps. Especially bothered if other people around her are agitated. No SI. Never called mental health for appointment.       HTN - on amlodipine 5mg, lisinopril 40mg. States that she takes " these in AM. No vision changes or dyspnea. She has had two episodes of stabbing chest pain in the last month, episodes occur at rest, resolve without intervention in 15 minutes. + HA as above.      Dyslipidemia - prescribed atorvastatin 80mg, +zetia 10mg. Not taking these.      Elevated A1c - A1c 5.7 on last labs, struggling with weight loss. Does not watch diet or exercise.     Vitamin D deficiency - taking supplement weekly     BELLO on CPAP - following with Sleep Medicine clinic here, had titration study done 8/3/17 - 8cmH2O recommended. Using CPAP nightly but getting up to urinate 3-4 times/night. Drinks lots of water, especially at night. Uses it 90% of the time.      Chronic back pain - saw [facility] for this. Continuing to follow with them. Continues to gain weight.              Review of Systems   Constitutional: Negative for chills and fever.   HENT: Negative for ear discharge and ear pain.    Eyes: Negative for visual disturbance.   Respiratory: Negative for cough, chest tightness and shortness of breath.    Cardiovascular: Negative for chest pain, palpitations and leg swelling.   Gastrointestinal: Negative for abdominal pain, blood in stool, constipation, diarrhea, nausea and vomiting.   Genitourinary: Negative for difficulty urinating, dysuria and hematuria.   Musculoskeletal: Positive for arthralgias, back pain, myalgias and neck pain.   Skin: Negative for color change and rash.   Neurological: Positive for dizziness, light-headedness, numbness (L foot ) and headaches. Negative for tremors, seizures, syncope, speech difficulty and weakness.   Psychiatric/Behavioral: Negative for sleep disturbance and suicidal ideas. The patient is not nervous/anxious.        Objective:      Physical Exam   Constitutional: She is oriented to person, place, and time. She appears well-developed and well-nourished. No distress.   HENT:   Head: Normocephalic and atraumatic.   Right Ear: External ear normal.   Left Ear:  External ear normal.   Eyes: Conjunctivae and EOM are normal. Pupils are equal, round, and reactive to light.   Arcus senilis bilaterally   Neck: No JVD present. Muscular tenderness present.   Acanthosis nigricans  TTP right submandibular / tonsillar   Cardiovascular: Normal rate, regular rhythm, S1 normal, S2 normal and intact distal pulses.   Murmur heard.   Systolic murmur is present with a grade of 2/6.  Pulses:       Radial pulses are 2+ on the right side, and 2+ on the left side.   2/6 loudest at SICS RSB   Pulmonary/Chest: Effort normal and breath sounds normal. She has no wheezes. She has no rales.   Abdominal: Soft. Bowel sounds are normal. She exhibits no distension and no mass. There is no tenderness.   Musculoskeletal: Normal range of motion. She exhibits no edema.   Feet:   Right Foot:   Protective Sensation: 5 sites tested. 5 sites sensed.   Left Foot:   Protective Sensation: 5 sites tested. 5 sites sensed.   Lymphadenopathy:        Head (right side): No submental, no submandibular, no tonsillar, no preauricular, no posterior auricular and no occipital adenopathy present.        Head (left side): No submental, no submandibular, no tonsillar, no preauricular, no posterior auricular and no occipital adenopathy present.     She has no cervical adenopathy.   Neurological: She is alert and oriented to person, place, and time. She has normal strength. No cranial nerve deficit.   Skin: Skin is warm and dry.   Psychiatric: She has a normal mood and affect. Her behavior is normal.   Nursing note and vitals reviewed.      Assessment:       1. Hyperlipidemia, unspecified hyperlipidemia type    2. Anxiety    3. Moderate episode of recurrent major depressive disorder    4. Severe obesity (BMI 35.0-35.9 with comorbidity)    5. Diplopia    6. Chronic mixed headache syndrome        Plan:       Batool was seen today for headache, dizziness and head fog.    Diagnoses and all orders for this visit:    Hyperlipidemia,  unspecified hyperlipidemia type  -    Patient is prescribed Zetia and Lipitor and is not adherent to these medications  -    Educated patient on need for compliance, patient states that she will start taking them  -    ASCVD 5.7%  -    Educated and reinforced dietary changes and exercise need    Chronic mixed headache syndrome  -     Ambulatory Referral to Neurology  -     Ambulatory consult to [facility]  -     CT Head Without Contrast; Future  -     Sedimentation rate; Future  -     C-reactive protein; Future  -     METANEPHRINES, PLASMA FREE; Future  -     Patient previously seen by neurology and planning injection (nerve block vs botox?), referral to neurology to re-evaluate  -     Due to worsening severity, elevated blood pressures and no evidence of prior scan will check CT Head  -     On my examination there was SBP differential between L and R arm of 20 mmHg, patient also states BP difference between arms at home, recheck x3 in clinic unrevealing for discrepancy and no difference in pulses or in motor between arms   -     Out of abundance of caution due to higher BP and headaches will check plasma metanephrines, ESR and CRP - feel that vasculitis such as Takayasu's Arteritis is low on differential but with myalgias, headache and blood pressure changes it remains on differential, also considering pheo and intracerebral pathology    Anxiety  - patient states that she is taking lexapro and trazadone QHS  - she may benefit from cross titration and switch to Duloxetine due to significant myalgias, depression and anxiety      Moderate episode of recurrent major depressive disorder    Severe obesity (BMI 35.0-35.9 with comorbidity)  -     Ambulatory consult to [facility]  -     Sedimentation rate; Future    Diplopia  -     CT Head Without Contrast; Future  -     Please see headache syndromes - no current diplopia or other CN changes on examination     Other orders  -     gabapentin (NEURONTIN)  400 MG capsule; Take 1 capsule (400 mg total) by mouth 3 (three) times daily.    RTC in 3 months or PRN    Case discussed and patient seen with Dr. Enrique Carrion.     Heri Montiel M.D. PGY-2  [facility]  11:19 AM  9/17/2018  Pager [phone]

## 2018-09-17 NOTE — PROGRESS NOTES
Ms. Batool Rivera is a 54 y/o woman with h/o chronic back pain, obesity, HTN, HLD, GERD, BELLO, multiple other issues here for headache, dizziness and head fog. Presents with frontal and occipital headache with radiation to neck SHEEBA which has been present near constantly for last 2 weeks. Associated intermittent blurry vision but no weakness or paresthesias. Per chart review appears to have had similar presentations in past and evaluated by Neurology who was planning block. No imaging noted. She reports these current headaches are different than the typical ones, more severe as well. Takes tylenol 1g BID and gabapentin and avoids any other NSAIDs per report.     Of note has reported episodes of SBP in 200s at home and different BP on each arm by about 20mmHg, multiple checks in clinic showed equal BPs. Takes medications on schedule per patient. Will screen for pheochromocytoma as well as get CT head non contrast given reported different type and worse headache comapred to previous ones. Discussed if abruptly worsens headache or develops new neurologic signs to go to ED for emergent eval. No focal neuro signs on exam now    If this workup is negative, I agree with Dr. Montiel's plan that considering duloxetine or another SNRI change may be worth considering as this could have some chronic pain syndrome/fibromyalgia component should inflammatory workup and imaging be negative    Rest of plan per PGY3 note

## 2018-09-20 ENCOUNTER — HOSPITAL ENCOUNTER (OUTPATIENT)
Dept: RADIOLOGY | Facility: HOSPITAL | Age: 53
Discharge: HOME | End: 2018-09-20
Attending: STUDENT IN AN ORGANIZED HEALTH CARE EDUCATION/TRAINING PROGRAM
Payer: MEDICAID

## 2018-09-20 DIAGNOSIS — H53.2 DIPLOPIA: ICD-10-CM

## 2018-09-20 DIAGNOSIS — G44.89 CHRONIC MIXED HEADACHE SYNDROME: ICD-10-CM

## 2018-09-20 PROCEDURE — 70450 CT HEAD/BRAIN W/O DYE: CPT | Mod: 26,,, | Performed by: RADIOLOGY

## 2018-09-20 PROCEDURE — 70450 CT HEAD/BRAIN W/O DYE: CPT | Mod: TC

## 2018-09-23 LAB
METANEPH FREE SERPL-MCNC: <25 PG/ML
METANEPHS SERPL-MCNC: 49 PG/ML
NORMETANEPH FREE SERPL-MCNC: 49 PG/ML

## 2018-10-02 ENCOUNTER — CLINICAL SUPPORT (OUTPATIENT)
Dept: REHABILITATION | Facility: OTHER | Age: 53
End: 2018-10-02
Attending: STUDENT IN AN ORGANIZED HEALTH CARE EDUCATION/TRAINING PROGRAM
Payer: MEDICAID

## 2018-10-02 DIAGNOSIS — M54.50 CHRONIC MIDLINE LOW BACK PAIN WITHOUT SCIATICA: ICD-10-CM

## 2018-10-02 DIAGNOSIS — G89.29 CHRONIC MIDLINE LOW BACK PAIN WITHOUT SCIATICA: ICD-10-CM

## 2018-10-02 PROCEDURE — G8979 MOBILITY GOAL STATUS: HCPCS | Mod: CJ | Performed by: PHYSICAL THERAPIST

## 2018-10-02 PROCEDURE — 97162 PT EVAL MOD COMPLEX 30 MIN: CPT | Performed by: PHYSICAL THERAPIST

## 2018-10-02 PROCEDURE — G8978 MOBILITY CURRENT STATUS: HCPCS | Mod: CK | Performed by: PHYSICAL THERAPIST

## 2018-10-02 PROCEDURE — 97110 THERAPEUTIC EXERCISES: CPT | Performed by: PHYSICAL THERAPIST

## 2018-10-02 NOTE — PLAN OF CARE
[facility] - PHYSICAL THERAPY EVALUATION     Name: Batool Rivera  Clinic Number: [id number]    Diagnosis:   Encounter Diagnosis   Name Primary?   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    amLODIPine (NORVASC) 5 MG tablet TAKE 1 TABLET(5 MG) BY MOUTH EVERY DAY    aspirin (ECOTRIN) 325 MG EC tablet Take 325 mg by mouth once daily.    atorvastatin (LIPITOR) 80 MG tablet TAKE 1 TABLET(80 MG) BY MOUTH EVERY DAY    diclofenac sodium (VOLTAREN) 1 % Gel Apply 2 g topically 4 (four) times daily.    ergocalciferol (ERGOCALCIFEROL) 50,000 unit Cap Take 1 capsule (50,000 Units total) by mouth every 7 days.    escitalopram oxalate (LEXAPRO) 20 MG tablet TAKE 1 TABLET(20 MG) BY MOUTH EVERY DAY    ezetimibe (ZETIA) 10 mg tablet TAKE 1 TABLET(10 MG) BY MOUTH EVERY DAY    gabapentin (NEURONTIN) 400 MG capsule Take 1 capsule (400 mg total) by mouth 3 (three) times daily.    hydrOXYzine HCl (ATARAX) 25 MG tablet Take 1 tablet (25 mg total) by mouth 3 (three) times daily as needed for Anxiety (or difficulty sleeping).    lisinopril (PRINIVIL,ZESTRIL) 40 MG tablet TAKE 1 TABLET(40 MG) BY MOUTH EVERY DAY    meloxicam (MOBIC) 15 MG tablet TAKE 1 TABLET(15 MG) BY MOUTH EVERY DAY    oxybutynin (DITROPAN) 5 MG Tab TAKE 1 TABLET(5 MG) BY MOUTH TWICE DAILY AS NEEDED FOR URINARY INCONTINENCE    traZODone (DESYREL) 50 MG tablet TAKE 2 TABLETS(100 MG) BY MOUTH EVERY EVENING    triamcinolone  acetonide 0.1% (KENALOG) 0.1 % ointment      No current facility-administered medications for this visit.      Review of patient's allergies indicates:  No Known Allergies    Precautions: none     Pattern of pain determined:Pattern 1 PEN    Evaluation Date: 10/2/2018  Authorization Period Expiration: 12/31/18  Plan of Care SIgned:   Plan of Care Expiration: 1/2/19  Reassessment Due: 11/2/18  Visit # / Visits authorized: 1/ 20    Time In: 9  Time Out: 1030  Total Billable Time: 90 minutes     HISTORY   History of Present Illness:     Ms. Batolo Rivera is a 54 y/o woman with h/o chronic back pain, obesity, HTN, HLD, GERD, BELLO, multiple other issues here for headache, dizziness and head fog. She feels that she is having headaches and double vision with associated neck pain and neck tenderness. These symptoms started two weeks ago. The headaches started first, describes as pin sticking in the top of her head, constant pain, 10/10, with radiation to her neck and upper back. She has taken tylenol (1g BID) for this without benefit. She feels that her headache resolves with rest in a dark room and is made worse by noise, light and when first awakening in the morning. Has pain in many muscles and joints, denies nausea/vomiting. These are different from her prior headaches - states these are more severe and different. She has also had more severe blood pressure readings at home of late, with her wrist BP cuff SBP ranging 160's-220's. She states compliance with her norvasc and lisinopril. She had a CPAP machine and uses it 90% of the time. She finds her CPAP to be uncomfortable     (B) lower back pain, unable to move most of the week, tightness of muscles, difficulty bending forward, difficulty getting in and out of bed and car,     Pt was seeing Dr Flor at [facility], MRI 2 bulging discs, [facility] helped for 5 years    Diagnostic Tests: From EPIC Radiographs  FINDINGS:  Mild DJD.  The lumbosacral disc space is narrowed.  The other  disc spaces are well maintained.  No fracture, spondylolisthesis or bone destruction identified    Pain Scale: Batool rates pain on a scale of 0-10 to be 10 at worst; 7 currently; 7 at best using VAS.   Pain location: lower back (B)    Aggravating factors: can't sit too long, difficulty with position changes, walking slowly,   Easing Factors: ice,   Disturbed Sleep: none    Pattern of pain questions:  1.  Where is your pain the worst? back  2.  Is your pain constant or intermittent? constant  3.  Does bending forward make your typical pain worse? yes  4.  Since the start of your back pain, has there been a change in your bowel or bladder? none  5.  What can't you do now that you use to be able to do? Pushes self to do everything, moving furniture, takes time to do it    Prior Treatment: none, [facility] by Dr Flor,   Prior functional status: none  DME owned/used: treadmill, not using,   Occupation: none  Leisure: trey, drawing               Pts goals: get rid of pain    Red Flag Screening:   Cough  Sneeze  Strain: (--)  Bladder/ bowel: (--)  Falls: (--)  Night pain: (--)  Unexplained weight loss: (--)  General health: none    OBJECTIVE     Postural examination/scapula alignment: Rounded shoulder and Head forward  Sitting: super roll  Standing: slightly increased lumbar lordosis, anterior pelvic tilt  Correction of posture: better with lumbar roll    MOVEMENT LOSS    ROM Loss   Flexion major loss   Extension moderate loss   Side bending Right moderate loss   Side bending Left moderate loss   Rotation Right moderate loss   Rotation Left moderate loss     Lower Extremity Strength  Right LE  Left LE    Hip flexion: 5/5 Hip flexion: 4/5   Hip extension:  4/5 Hip extension: 4/5   Hip abduction: 4+/5 Hip abduction: 4-/5   Hip adduction:  5/5 Hip adduction:  4/5   Hip Internal rotation   4+/5 Hip Internal rotation 4/5   Knee Flexion 5/5 Knee Flexion 4/5   Knee Extension 5/5 Knee Extension 4/5   Ankle dorsiflexion: 5/5  Ankle dorsiflexion: 4/5   Ankle plantarflexion: 5/5 Ankle plantarflexion: 4/5       GAIT:  Assistive Device used: none  Level of Assistance: independent  Patient displays the following gait deviations:  no gait deviations observed.     Special Tests:   Test Name  Test Result   Prone Instability Test Unable to lie prone   SI Joint Provocation Test (--)   Straight Leg Raise -   Neural Tension Test (+)   Crossed Straight Leg Raise (+)   Walking on toes (+)balance    Walking on heels  (+)balance       NEUROLOGICAL SCREENING     Sensory deficit: intact to light touch (B)    Reflexes:    Left Right   Patella Tendon 1+ 1+   Achilles Tendon 2+ 2+   Babinski  (--) (--)   Clonus (--) (--)     REPEATED TEST MOVEMENTS:  Repeated Flexion in Standing worse   Repeated Extension in Standing worse   Repeated Flexion in lying no better   Repeated Extension in lying  no better           Baseline Isometric Testing on Med X equipment: Testing administered by PT  Date of testing:10/2/18  ROM  0-36 deg   Max Peak Torque 91   Min Peak Torque 31   Flex/Ext Ratio 3;1   % below normative data 49   Counter weight 285   femur 5   Seat pad 0     Visit 2: start at 45 ftlbs    CMS Impairment/Limitation/Restriction for FOTO Lumbar Survey    Therapist reviewed FOTO scores for Batool Rivera on 10/2/2018.   FOTO documents entered into [employer] - see Media section.    Limitation Score: 55%  Category: Mobility    Current : CK = at least 40% but < 60% impaired, limited or restricted  Goal: CJ = at least 20% but < 40% impaired, limited or restricted  Discharge: CJ = at least 20% but < 40% impaired, limited or restricted           Treatment   Time In: 900  Time Out: 930    PT Evaluation Completed? Yes  Discussed Plan of Care with patient: Yes      Home Exercise Program as follows:   Handouts were given to the patient. Pt demo good understanding of the education provided. Batool demonstrated good return demonstration of activities.     - Patient received  education regarding proper posture and body mechanics.  Patient was given top 10 tips handout which discusses posture seated, standing, lifting correctly, components of exercise, importance of nutrition and hydration, and importance of sleep.  - Julius rosas tried, recommended, and purchase information was provided.    - Patient received a handout regarding anticipated muscular soreness following the isometric test and strategies for management were reviewed with patient including stretching, using ice and scheduled rest.       Pt was instructed in and performed the following:   Cardiovascular exercise and therapeutic exercise to improve posture, lumbar ROM, strength, and muscular endurance as follows:     RADU with ball 10 reps  EIS 10 reps  Ice and z lie 10 min    Batool received therapeutic exercises to develop/improve posture, lumbar ROM, strength and muscular endurance for 30 minutes including the following exercises:     [facility] 10/2/2018   Visit Number 1   VAS Pain Rating 7   Extension in Standing 10   Flexion in Lying 10   Lumbar Extension Seat Pad 0   Femur Restraint 5   Top Dead Center 24   Counterweight 285   Lumbar Flexion 36   Lumbar Extension 0   Lumbar Peak Torque 91   Min Torque 31   Test Percent Below Normative Data 49   Lumbar Weight 35   Repetitions 5   Ice - Z Lie (in min.) 10         Batool received the following manual therapy techniques: no  Assessment   This is a 53 y.o. female referred to [facility] and presents with a medical diagnosis of   Encounter Diagnosis   Name Primary?    Chronic midline low back pain without sciatica     and demonstrates limitations as described below in the problem list. Pt rehab potential is Good. Pt presents with decreased ROM lumbar spine and (B) hips, weakness at hips and lower back lumbar extensors. Pt notes numbness (B) feet and weakness at the left LE.  Slightly decreased reflexes at patellar tendon, may need to retest.      Pain  Pattern: Pattern 1 PEN    Patient received education on the Healthy [facility] program, purpose of the isometric test, progression of back strengthening as well as wellness approach and systemic strengthening.  Details of the program were discussed.  Reviewed that patient should feel support/pressure from med ex restraints but no pain or discomfort and patient expressed understanding.    Based on the above history and physical examination an active physical therapy program is recommended.  Pt will continue to benefit from skilled outpatient physical therapy to address the deficits listed below in the chart, provide pt/family education and to maximize pt's level of independence in the home and community environment. .     No environmental, cultural, spiritual, developmental or education needs expressed or noted    Medical necessity is demonstrated by the following problem list.    Pt presents with the following impairments:     History  Co-morbidities and personal factors that may impact the plan of care Co-morbidities:   BMI, age, chronicity    Personal Factors:   no deficits     moderate   Examination  Body Structures and Functions, activity limitations and participation restrictions that may impact the plan of care Body Regions:   back  lower extremities    Body Systems:    ROM  strength  motor control  motor learning    Participation Restrictions:   none    Activity limitations:   Learning and applying knowledge  no deficits    General Tasks and Commands  no deficits    Communication  no deficits    Mobility  lifting and carrying objects  walking    Self care  washing oneself (bathing, drying, washing hands)  caring for body parts (brushing teeth, shaving, grooming)  dressing    Domestic Life  shopping  cooking  doing house work (cleaning house, washing dishes, laundry)    Interactions/Relationships  no deficits    Life Areas  no deficits    Community and Social Life  no deficits         moderate   Clinical  Presentation evolving clinical presentation with changing clinical characteristics moderate   Decision Making/ Complexity Score: moderate       GOALS: Pt is in agreement with the following goals.    Short term goals:  6 weeks or 10 visits   1.  Pt will demonstrate increased lumbar ROM by at least 3 degrees from the initial ROM value with improvements noted in functional ROM and ability to perform ADLs  2.  Pt will demonstrate increased maximum isometric torque value by 10% when compared to the initial value resulting in improved ability to perform bending, lifting, and carrying activities safely, confidently.    3.  Patient report a reduction in worst pain score by 1-2 points for improved tolerance during work and recreational activities  4.  Pt able to perform HEP correctly with minimal cueing or supervision for therapist      Long term goals: 13 weeks or 20 visits   1. Pt will demonstrate increased lumbar ROM by at least 6 degrees from initial ROM value, resulting in improved ability to perform functional fwd bending while standing and sitting.   2. Pt will demonstrate increased maximum isometric torque value by 20% when compared to the initial value resulting in improved ability to perform bending, lifting, and carrying activities safely, confidently.  3. Pt to demonstrate ability to independently control and reduce their pain through posture positioning and mechanical movements throughout a typical day.  4.  Patient will demonstrate improved overall function per FOTO Survey to CJ = at least 20% but < 40% impaired, limited or restricted score or less.      Plan   Outpatient physical therapy 2x week for 13 weeks or 20 visits to include the following:   - Patient education  - Therapeutic exercise  - Manual therapy  - Performance testing   - Neuromuscular Re-education  - Therapeutic activity   - Modalities    Pt may be seen by PTA as part of the rehabilitation team.     Therapist: Lindsay Cosme,  "PT  10/2/2018    "I certify the need for these services furnished under this plan of treatment and while under my care."    ____________________________________  Physician/Referring Practitioner    _______________  Date of Signature          "

## 2018-10-02 NOTE — PROGRESS NOTES
[facility] - PHYSICAL THERAPY EVALUATION     Name: Batool Rivera  Clinic Number: [id number]    Diagnosis:   Encounter Diagnosis   Name Primary?   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    amLODIPine (NORVASC) 5 MG tablet TAKE 1 TABLET(5 MG) BY MOUTH EVERY DAY    aspirin (ECOTRIN) 325 MG EC tablet Take 325 mg by mouth once daily.    atorvastatin (LIPITOR) 80 MG tablet TAKE 1 TABLET(80 MG) BY MOUTH EVERY DAY    diclofenac sodium (VOLTAREN) 1 % Gel Apply 2 g topically 4 (four) times daily.    ergocalciferol (ERGOCALCIFEROL) 50,000 unit Cap Take 1 capsule (50,000 Units total) by mouth every 7 days.    escitalopram oxalate (LEXAPRO) 20 MG tablet TAKE 1 TABLET(20 MG) BY MOUTH EVERY DAY    ezetimibe (ZETIA) 10 mg tablet TAKE 1 TABLET(10 MG) BY MOUTH EVERY DAY    gabapentin (NEURONTIN) 400 MG capsule Take 1 capsule (400 mg total) by mouth 3 (three) times daily.    hydrOXYzine HCl (ATARAX) 25 MG tablet Take 1 tablet (25 mg total) by mouth 3 (three) times daily as needed for Anxiety (or difficulty sleeping).    lisinopril (PRINIVIL,ZESTRIL) 40 MG tablet TAKE 1 TABLET(40 MG) BY MOUTH EVERY DAY    meloxicam (MOBIC) 15 MG tablet TAKE 1 TABLET(15 MG) BY MOUTH EVERY DAY    oxybutynin (DITROPAN) 5 MG Tab TAKE 1 TABLET(5 MG) BY MOUTH TWICE DAILY AS NEEDED FOR URINARY INCONTINENCE    traZODone (DESYREL) 50 MG tablet TAKE 2 TABLETS(100 MG) BY MOUTH EVERY EVENING    triamcinolone  acetonide 0.1% (KENALOG) 0.1 % ointment      No current facility-administered medications for this visit.      Review of patient's allergies indicates:  No Known Allergies    Precautions: none     Pattern of pain determined:Pattern 1 PEN    Evaluation Date: 10/2/2018  Authorization Period Expiration: 12/31/18  Plan of Care SIgned:   Plan of Care Expiration: 1/2/19  Reassessment Due: 11/2/18  Visit # / Visits authorized: 1/ 20    Time In: 9  Time Out: 1030  Total Billable Time: 90 minutes     HISTORY   History of Present Illness:     Ms. Batool Rivera is a 52 y/o woman with h/o chronic back pain, obesity, HTN, HLD, GERD, BELLO, multiple other issues here for headache, dizziness and head fog. She feels that she is having headaches and double vision with associated neck pain and neck tenderness. These symptoms started two weeks ago. The headaches started first, describes as pin sticking in the top of her head, constant pain, 10/10, with radiation to her neck and upper back. She has taken tylenol (1g BID) for this without benefit. She feels that her headache resolves with rest in a dark room and is made worse by noise, light and when first awakening in the morning. Has pain in many muscles and joints, denies nausea/vomiting. These are different from her prior headaches - states these are more severe and different. She has also had more severe blood pressure readings at home of late, with her wrist BP cuff SBP ranging 160's-220's. She states compliance with her norvasc and lisinopril. She had a CPAP machine and uses it 90% of the time. She finds her CPAP to be uncomfortable     (B) lower back pain, unable to move most of the week, tightness of muscles, difficulty bending forward, difficulty getting in and out of bed and car,     Pt was seeing Dr Flor at [facility], MRI 2 bulging discs, [facility] helped for 5 years    Diagnostic Tests: From EPIC Radiographs  FINDINGS:  Mild DJD.  The lumbosacral disc space is narrowed.  The other  disc spaces are well maintained.  No fracture, spondylolisthesis or bone destruction identified    Pain Scale: Batool rates pain on a scale of 0-10 to be 10 at worst; 7 currently; 7 at best using VAS.   Pain location: lower back (B)    Aggravating factors: can't sit too long, difficulty with position changes, walking slowly,   Easing Factors: ice,   Disturbed Sleep: none    Pattern of pain questions:  1.  Where is your pain the worst? back  2.  Is your pain constant or intermittent? constant  3.  Does bending forward make your typical pain worse? yes  4.  Since the start of your back pain, has there been a change in your bowel or bladder? none  5.  What can't you do now that you use to be able to do? Pushes self to do everything, moving furniture, takes time to do it    Prior Treatment: none, [facility] by Dr Flor,   Prior functional status: none  DME owned/used: treadmill, not using,   Occupation: none  Leisure: trey, drawing               Pts goals: get rid of pain    Red Flag Screening:   Cough  Sneeze  Strain: (--)  Bladder/ bowel: (--)  Falls: (--)  Night pain: (--)  Unexplained weight loss: (--)  General health: none    OBJECTIVE     Postural examination/scapula alignment: Rounded shoulder and Head forward  Sitting: super roll  Standing: slightly increased lumbar lordosis, anterior pelvic tilt  Correction of posture: better with lumbar roll    MOVEMENT LOSS    ROM Loss   Flexion major loss   Extension moderate loss   Side bending Right moderate loss   Side bending Left moderate loss   Rotation Right moderate loss   Rotation Left moderate loss     Lower Extremity Strength  Right LE  Left LE    Hip flexion: 5/5 Hip flexion: 4/5   Hip extension:  4/5 Hip extension: 4/5   Hip abduction: 4+/5 Hip abduction: 4-/5   Hip adduction:  5/5 Hip adduction:  4/5   Hip Internal rotation   4+/5 Hip Internal rotation 4/5   Knee Flexion 5/5 Knee Flexion 4/5   Knee Extension 5/5 Knee Extension 4/5   Ankle dorsiflexion: 5/5  Ankle dorsiflexion: 4/5   Ankle plantarflexion: 5/5 Ankle plantarflexion: 4/5       GAIT:  Assistive Device used: none  Level of Assistance: independent  Patient displays the following gait deviations:  no gait deviations observed.     Special Tests:   Test Name  Test Result   Prone Instability Test Unable to lie prone   SI Joint Provocation Test (--)   Straight Leg Raise -   Neural Tension Test (+)   Crossed Straight Leg Raise (+)   Walking on toes (+)balance    Walking on heels  (+)balance       NEUROLOGICAL SCREENING     Sensory deficit: intact to light touch (B)    Reflexes:    Left Right   Patella Tendon 1+ 1+   Achilles Tendon 2+ 2+   Babinski  (--) (--)   Clonus (--) (--)     REPEATED TEST MOVEMENTS:  Repeated Flexion in Standing worse   Repeated Extension in Standing worse   Repeated Flexion in lying no better   Repeated Extension in lying  no better           Baseline Isometric Testing on Med X equipment: Testing administered by PT  Date of testing:10/2/18  ROM  0-36 deg   Max Peak Torque 91   Min Peak Torque 31   Flex/Ext Ratio 3;1   % below normative data 49   Counter weight 285   femur 5   Seat pad 0     Visit 2: start at 45 ftlbs    CMS Impairment/Limitation/Restriction for FOTO Lumbar Survey    Therapist reviewed FOTO scores for Batool Rivera on 10/2/2018.   FOTO documents entered into [employer] - see Media section.    Limitation Score: 55%  Category: Mobility    Current : CK = at least 40% but < 60% impaired, limited or restricted  Goal: CJ = at least 20% but < 40% impaired, limited or restricted  Discharge: CJ = at least 20% but < 40% impaired, limited or restricted           Treatment   Time In: 900  Time Out: 930    PT Evaluation Completed? Yes  Discussed Plan of Care with patient: Yes      Home Exercise Program as follows:   Handouts were given to the patient. Pt demo good understanding of the education provided. Batool demonstrated good return demonstration of activities.     - Patient received  education regarding proper posture and body mechanics.  Patient was given top 10 tips handout which discusses posture seated, standing, lifting correctly, components of exercise, importance of nutrition and hydration, and importance of sleep.  - Julius rosas tried, recommended, and purchase information was provided.    - Patient received a handout regarding anticipated muscular soreness following the isometric test and strategies for management were reviewed with patient including stretching, using ice and scheduled rest.       Pt was instructed in and performed the following:   Cardiovascular exercise and therapeutic exercise to improve posture, lumbar ROM, strength, and muscular endurance as follows:     RADU with ball 10 reps  EIS 10 reps  Ice and z lie 10 min    Batool received therapeutic exercises to develop/improve posture, lumbar ROM, strength and muscular endurance for 30 minutes including the following exercises:     [facility] 10/2/2018   Visit Number 1   VAS Pain Rating 7   Extension in Standing 10   Flexion in Lying 10   Lumbar Extension Seat Pad 0   Femur Restraint 5   Top Dead Center 24   Counterweight 285   Lumbar Flexion 36   Lumbar Extension 0   Lumbar Peak Torque 91   Min Torque 31   Test Percent Below Normative Data 49   Lumbar Weight 35   Repetitions 5   Ice - Z Lie (in min.) 10         Batool received the following manual therapy techniques: no  Assessment   This is a 53 y.o. female referred to [facility] and presents with a medical diagnosis of   Encounter Diagnosis   Name Primary?    Chronic midline low back pain without sciatica     and demonstrates limitations as described below in the problem list. Pt rehab potential is Good. Pt presents with decreased ROM lumbar spine and (B) hips, weakness at hips and lower back lumbar extensors. Pt notes numbness (B) feet and weakness at the left LE.  Slightly decreased reflexes at patellar tendon, may need to retest.      Pain  Pattern: Pattern 1 PEN    Patient received education on the [facility] program, purpose of the isometric test, progression of back strengthening as well as wellness approach and systemic strengthening.  Details of the program were discussed.  Reviewed that patient should feel support/pressure from med ex restraints but no pain or discomfort and patient expressed understanding.    Based on the above history and physical examination an active physical therapy program is recommended.  Pt will continue to benefit from skilled outpatient physical therapy to address the deficits listed below in the chart, provide pt/family education and to maximize pt's level of independence in the home and community environment. .     No environmental, cultural, spiritual, developmental or education needs expressed or noted    Medical necessity is demonstrated by the following problem list.    Pt presents with the following impairments:     History  Co-morbidities and personal factors that may impact the plan of care Co-morbidities:   BMI, age, chronicity    Personal Factors:   no deficits     moderate   Examination  Body Structures and Functions, activity limitations and participation restrictions that may impact the plan of care Body Regions:   back  lower extremities    Body Systems:    ROM  strength  motor control  motor learning    Participation Restrictions:   none    Activity limitations:   Learning and applying knowledge  no deficits    General Tasks and Commands  no deficits    Communication  no deficits    Mobility  lifting and carrying objects  walking    Self care  washing oneself (bathing, drying, washing hands)  caring for body parts (brushing teeth, shaving, grooming)  dressing    Domestic Life  shopping  cooking  doing house work (cleaning house, washing dishes, laundry)    Interactions/Relationships  no deficits    Life Areas  no deficits    Community and Social Life  no deficits         moderate   Clinical  Presentation evolving clinical presentation with changing clinical characteristics moderate   Decision Making/ Complexity Score: moderate       GOALS: Pt is in agreement with the following goals.    Short term goals:  6 weeks or 10 visits   1.  Pt will demonstrate increased lumbar ROM by at least 3 degrees from the initial ROM value with improvements noted in functional ROM and ability to perform ADLs  2.  Pt will demonstrate increased maximum isometric torque value by 10% when compared to the initial value resulting in improved ability to perform bending, lifting, and carrying activities safely, confidently.    3.  Patient report a reduction in worst pain score by 1-2 points for improved tolerance during work and recreational activities  4.  Pt able to perform HEP correctly with minimal cueing or supervision for therapist      Long term goals: 13 weeks or 20 visits   1. Pt will demonstrate increased lumbar ROM by at least 6 degrees from initial ROM value, resulting in improved ability to perform functional fwd bending while standing and sitting.   2. Pt will demonstrate increased maximum isometric torque value by 20% when compared to the initial value resulting in improved ability to perform bending, lifting, and carrying activities safely, confidently.  3. Pt to demonstrate ability to independently control and reduce their pain through posture positioning and mechanical movements throughout a typical day.  4.  Patient will demonstrate improved overall function per FOTO Survey to CJ = at least 20% but < 40% impaired, limited or restricted score or less.      Plan   Outpatient physical therapy 2x week for 13 weeks or 20 visits to include the following:   - Patient education  - Therapeutic exercise  - Manual therapy  - Performance testing   - Neuromuscular Re-education  - Therapeutic activity   - Modalities    Pt may be seen by PTA as part of the rehabilitation team.     Therapist: Lindsay Cosme,  "PT  10/2/2018    "I certify the need for these services furnished under this plan of treatment and while under my care."    ____________________________________  Physician/Referring Practitioner    _______________  Date of Signature        "

## 2018-10-10 DIAGNOSIS — N39.46 MIXED STRESS AND URGE URINARY INCONTINENCE: ICD-10-CM

## 2018-10-10 DIAGNOSIS — F39 MOOD DISORDER: ICD-10-CM

## 2018-10-10 DIAGNOSIS — I10 ESSENTIAL HYPERTENSION: ICD-10-CM

## 2018-10-10 RX ORDER — OXYBUTYNIN CHLORIDE 5 MG/1
TABLET ORAL
Qty: 60 TABLET | Refills: 0 | Status: SHIPPED | OUTPATIENT
Start: 2018-10-10 | End: 2018-11-11 | Stop reason: SDUPTHER

## 2018-10-10 RX ORDER — ESCITALOPRAM OXALATE 20 MG/1
TABLET ORAL
Qty: 90 TABLET | Refills: 0 | Status: SHIPPED | OUTPATIENT
Start: 2018-10-10 | End: 2019-01-10 | Stop reason: SDUPTHER

## 2018-10-10 RX ORDER — AMLODIPINE BESYLATE 5 MG/1
TABLET ORAL
Qty: 90 TABLET | Refills: 0 | Status: SHIPPED | OUTPATIENT
Start: 2018-10-10 | End: 2019-01-10 | Stop reason: SDUPTHER

## 2018-10-30 ENCOUNTER — CLINICAL SUPPORT (OUTPATIENT)
Dept: REHABILITATION | Facility: OTHER | Age: 53
End: 2018-10-30
Attending: STUDENT IN AN ORGANIZED HEALTH CARE EDUCATION/TRAINING PROGRAM
Payer: MEDICAID

## 2018-10-30 DIAGNOSIS — G89.29 CHRONIC MIDLINE LOW BACK PAIN WITHOUT SCIATICA: Primary | ICD-10-CM

## 2018-10-30 DIAGNOSIS — M54.50 CHRONIC MIDLINE LOW BACK PAIN WITHOUT SCIATICA: Primary | ICD-10-CM

## 2018-10-30 PROCEDURE — 97110 THERAPEUTIC EXERCISES: CPT | Performed by: PHYSICAL THERAPIST

## 2018-10-30 NOTE — PROGRESS NOTES
[facility] Physical Therapy Treatment      Name: Batool Rivera  Clinic Number: [id number]  Date of Treatment: 10/30/2018   Diagnosis:   Encounter Diagnosis   Name Primary?    Chronic midline low back pain without sciatica Yes     Physician: Heri Montiel MD    Pain pattern determined: Pattern 1 PEN  Plan of care signed: 10/3/18   Time in: 1045  Time Out: 1145  Total Treatment time: 60  Precautions: standard  Visit #: 2/20  (190)    POC due: 1/3/19  Reassessment due: 11/3/18    Subjective   Batool reports she is feeling about the same.  She was not sore after the initial evaluation.  She is here one hour early for appt today    Patient reports their pain to be 8/10 on a 0-10 scale with 0 being no pain and 10 being the worst pain imaginable.    Pain Location:lower back pain     Occupation: none  Leisure: trey, drawing               Pts goals: get rid of pain    Objective          Baseline Isometric Testing on Med X equipment: Testing administered by PT  Date of testing:10/2/18  ROM  0-36 deg   Max Peak Torque 91   Min Peak Torque 31   Flex/Ext Ratio 3;1   % below normative data 49   Counter weight 285   femur 5   Seat pad 0      Visit 2: start at 45 ftlbs     CMS Impairment/Limitation/Restriction for FOTO Lumbar Survey     Therapist reviewed FOTO scores for Batool Rivera on 10/2/2018.   FOTO documents entered into [employer] - see Media section.     Limitation Score: 55%  Category: Mobility     Current : CK = at least 40% but < 60% impaired, limited or restricted  Goal: CJ = at least 20% but < 40% impaired, limited or restricted  Discharge: CJ = at least 20% but < 40% impaired, limited or restricted              Treatment    Pt was instructed in and performed the following:     Batool received therapeutic exercises to develop/improved posture, cardiovascular endurance, muscular endurance, lumbar  ROM, strength and muscular endurance for 30 minutes including the following exercises:     [facility]  Therapy 10/30/2018   Visit Number 2   VAS Pain Rating 8   Treadmill Time (in min.) 10   Speed 1.5   Extension in Standing 10   Flexion in Lying 10   Lumbar Weight 45   Repetitions 15   Rating of Perceived Exertion 5   Ice - Z Lie (in min.) 10         Peripheral muscle strengthening which included 1 set of 15-20 repetitions at a slow, controlled 7 second per rep pace focused on strengthening supporting musculature for improved body mechanics and functional mobility.  Pt and therapist focused on proper form during treatment to ensure optimal strengthening of each targeted muscle group.  Machines were utilized including torso rotation, leg extension, leg curl, chest press, upright row. Tricep extension, bicep curl, leg press, and hip abduction added on third visit.       Batool received the following manual therapy techniques:none      Home Exercise Program as follows:     RADU 10 reps with ball  EIS 10 reps  Ice and z lie    Handouts were given to the patient. Pt demo good understanding of the education provided. Batool demonstrated good return demonstration of activities.     Lumbar roll use compliance: not yet  Additional exercises taught this treatment session: none    Assessment     Tolerated treatment well with good tolerance to Med X strengthening at 50% peak torque, 45 ftlbs, 15 reps and RPE 2 today.  Pt notes minimal soreness on Med X.  She completed  the peripheral machines without difficulty.  Needs to review HEP for RADU and EIS.   Patient is making good progress towards established goals.  Pt will continue to benefit from skilled outpatient physical therapy to address the deficits stated in the impairment chart, provide pt/family education and to maximize pt's level of independence in the home and community environment.       Pt's spiritual, cultural and educational needs considered and pt agreeable to plan of care and goals as stated below:       Medical necessity is demonstrated by the following problem  list.    Pt presents with the following impairments:      History  Co-morbidities and personal factors that may impact the plan of care Co-morbidities:   BMI, age, chronicity     Personal Factors:   no deficits       moderate   Examination  Body Structures and Functions, activity limitations and participation restrictions that may impact the plan of care Body Regions:   back  lower extremities     Body Systems:    ROM  strength  motor control  motor learning     Participation Restrictions:   none     Activity limitations:   Learning and applying knowledge  no deficits     General Tasks and Commands  no deficits     Communication  no deficits     Mobility  lifting and carrying objects  walking     Self care  washing oneself (bathing, drying, washing hands)  caring for body parts (brushing teeth, shaving, grooming)  dressing     Domestic Life  shopping  cooking  doing house work (cleaning house, washing dishes, laundry)     Interactions/Relationships  no deficits     Life Areas  no deficits     Community and Social Life  no deficits             moderate   Clinical Presentation evolving clinical presentation with changing clinical characteristics moderate   Decision Making/ Complexity Score: moderate         GOALS: Pt is in agreement with the following goals.     Short term goals:  6 weeks or 10 visits   1.  Pt will demonstrate increased lumbar ROM by at least 3 degrees from the initial ROM value with improvements noted in functional ROM and ability to perform ADLs  2.  Pt will demonstrate increased maximum isometric torque value by 10% when compared to the initial value resulting in improved ability to perform bending, lifting, and carrying activities safely, confidently.     3.  Patient report a reduction in worst pain score by 1-2 points for improved tolerance during work and recreational activities  4.  Pt able to perform HEP correctly with minimal cueing or supervision for therapist        Long term goals: 13  weeks or 20 visits   1. Pt will demonstrate increased lumbar ROM by at least 6 degrees from initial ROM value, resulting in improved ability to perform functional fwd bending while standing and sitting.   2. Pt will demonstrate increased maximum isometric torque value by 20% when compared to the initial value resulting in improved ability to perform bending, lifting, and carrying activities safely, confidently.  3. Pt to demonstrate ability to independently control and reduce their pain through posture positioning and mechanical movements throughout a typical day.  4.  Patient will demonstrate improved overall function per FOTO Survey to CJ = at least 20% but < 40% impaired, limited or restricted score or less.        Plan   Outpatient physical therapy 2x week for 13 weeks or 20 visits

## 2018-10-31 ENCOUNTER — IMMUNIZATION (OUTPATIENT)
Dept: INTERNAL MEDICINE | Facility: CLINIC | Age: 53
End: 2018-10-31
Payer: MEDICAID

## 2018-10-31 ENCOUNTER — OFFICE VISIT (OUTPATIENT)
Dept: INTERNAL MEDICINE | Facility: CLINIC | Age: 53
End: 2018-10-31
Payer: MEDICAID

## 2018-10-31 VITALS
OXYGEN SATURATION: 97 % | HEART RATE: 53 BPM | HEIGHT: 65 IN | WEIGHT: 237.44 LBS | SYSTOLIC BLOOD PRESSURE: 130 MMHG | DIASTOLIC BLOOD PRESSURE: 80 MMHG | BODY MASS INDEX: 39.56 KG/M2

## 2018-10-31 DIAGNOSIS — R73.09 ELEVATED HEMOGLOBIN A1C: Primary | ICD-10-CM

## 2018-10-31 DIAGNOSIS — F33.1 MODERATE EPISODE OF RECURRENT MAJOR DEPRESSIVE DISORDER: Chronic | ICD-10-CM

## 2018-10-31 DIAGNOSIS — G57.92 NEUROPATHY OF LEFT FOOT: ICD-10-CM

## 2018-10-31 DIAGNOSIS — G44.039 EPISODIC PAROXYSMAL HEMICRANIA, NOT INTRACTABLE: ICD-10-CM

## 2018-10-31 DIAGNOSIS — M54.50 CHRONIC MIDLINE LOW BACK PAIN WITHOUT SCIATICA: ICD-10-CM

## 2018-10-31 DIAGNOSIS — F41.9 ANXIETY: ICD-10-CM

## 2018-10-31 DIAGNOSIS — E78.5 HYPERLIPIDEMIA, UNSPECIFIED HYPERLIPIDEMIA TYPE: ICD-10-CM

## 2018-10-31 DIAGNOSIS — E66.01 CLASS 2 SEVERE OBESITY DUE TO EXCESS CALORIES WITH SERIOUS COMORBIDITY AND BODY MASS INDEX (BMI) OF 39.0 TO 39.9 IN ADULT: ICD-10-CM

## 2018-10-31 DIAGNOSIS — G47.33 OSA ON CPAP: ICD-10-CM

## 2018-10-31 DIAGNOSIS — G89.29 CHRONIC MIDLINE LOW BACK PAIN WITHOUT SCIATICA: ICD-10-CM

## 2018-10-31 DIAGNOSIS — E66.01 SEVERE OBESITY (BMI 35.0-35.9 WITH COMORBIDITY): ICD-10-CM

## 2018-10-31 DIAGNOSIS — N39.46 MIXED STRESS AND URGE URINARY INCONTINENCE: ICD-10-CM

## 2018-10-31 PROCEDURE — 90471 IMMUNIZATION ADMIN: CPT | Mod: PBBFAC

## 2018-10-31 PROCEDURE — 99214 OFFICE O/P EST MOD 30 MIN: CPT | Mod: PBBFAC | Performed by: STUDENT IN AN ORGANIZED HEALTH CARE EDUCATION/TRAINING PROGRAM

## 2018-10-31 PROCEDURE — 99999 PR PBB SHADOW E&M-EST. PATIENT-LVL IV: CPT | Mod: PBBFAC,,, | Performed by: STUDENT IN AN ORGANIZED HEALTH CARE EDUCATION/TRAINING PROGRAM

## 2018-10-31 PROCEDURE — 99213 OFFICE O/P EST LOW 20 MIN: CPT | Mod: S$PBB,,, | Performed by: STUDENT IN AN ORGANIZED HEALTH CARE EDUCATION/TRAINING PROGRAM

## 2018-10-31 RX ORDER — GABAPENTIN 400 MG/1
400 CAPSULE ORAL 3 TIMES DAILY
Qty: 90 CAPSULE | Refills: 11
Start: 2018-10-31 | End: 2019-08-08

## 2018-10-31 NOTE — PROGRESS NOTES
Subjective:       Patient ID: Batool Rivera is a 53 y.o. female.    Chief Complaint: Follow-up      Ms. Batool Rivera is a 54 y/o woman with h/o chronic back pain, obesity, HTN, HLD, GERD, BELLO, multiple other issues here for follow up.     At our last appointment her chief complaint was headache, dizziness and head fog. She felt that she is having headaches and double vision with associated neck pain and neck tenderness. These symptoms started two weeks ago which was 9/13. The headaches started first, describes as pin sticking in the top of her head, constant pain, 10/10, with radiation to her neck and upper back. She has taken tylenol (1g BID) for this without benefit. She feels that her headache resolves with rest in a dark room and is made worse by noise, light and when first awakening in the morning. Has pain in many muscles and joints, denies nausea/vomiting. These are different from her prior headaches - states these are more severe and different. Following this we performed an ESR/CRP normal, plasma metanephrines which were WNL. She received a CT head which displayed minimal chronic microvascular ischemic change.     Today she has a stinging sharp pain in the back of her head. It lasts 30-40 minutes. It is a shooting pain. It resolves with time. She doesn't take anything for it. These episodes are happening around once a month. It is not related to any activity or time of time.     She has also had more severe blood pressure readings at home of late, with her wrist BP cuff SBP ranging 160's-220's. She states compliance with her norvasc and lisinopril. She had a CPAP machine and uses it 90% of the time. She finds her CPAP to be uncomfortable.     She takes gabapentin, ASA 81mg, lisinopril and norvasc in the morning. She takes trazadone and gabapentin at night. She does Zetia or Lipitor and states she is taking Lexapro.     Last labs remarkable for , Vit D 25, A1c 5.7.       MDD, Anxiety - On lexapro  "20mg; trazodone to help with sleep added, then increased to 100mg nightly at last visit. Feels that this is about the same as prior, though she is having an easier time sleeping. Is only sleep 4-5 hours nightly. Sometimes feels "like I'm about to go crazy" - when she feels this way she finds a quiet place to sit by herself, which helps. Especially bothered if other people around her are agitated. No SI. Has some thoughts of harming others - "anyone who makes me angry". Never called mental health for appointment.       HTN - on amlodipine 5mg, lisinopril 40mg. States that she takes these in AM. No vision changes or dyspnea.      Dyslipidemia - prescribed atorvastatin 80mg, +zetia 10mg. States she is taking these.      Elevated A1c - A1c 5.7 on last labs, struggling with weight loss. Does not watch diet or exercise.     Vitamin D deficiency - taking supplement weekly     BELLO on CPAP - following with [facility] Medicine clinic here, had titration study done 8/3/17 - 8cmH2O recommended. Using CPAP nightly but getting up to urinate 3-4 times/night. Drinks lots of water, especially at night. Uses it 80-90% of the time.      Chronic back pain - saw [facility] for this. Continuing to follow with them and [facility]. Continues to gain weight.              Review of Systems   Constitutional: Negative for chills and fever.   HENT: Negative for ear discharge and ear pain.    Eyes: Negative for visual disturbance.   Respiratory: Negative for cough, chest tightness and shortness of breath.    Cardiovascular: Negative for chest pain, palpitations and leg swelling.   Gastrointestinal: Negative for abdominal pain, blood in stool, constipation, diarrhea, nausea and vomiting.   Genitourinary: Negative for difficulty urinating, dysuria and hematuria.   Musculoskeletal: Positive for arthralgias, back pain, myalgias and neck pain.   Skin: Negative for color change and rash.   Neurological: Positive for dizziness, light-headedness, " numbness (L foot ) and headaches. Negative for tremors, seizures, syncope, speech difficulty and weakness.   Psychiatric/Behavioral: Negative for sleep disturbance and suicidal ideas. The patient is not nervous/anxious.        Objective:      Physical Exam   Constitutional: She is oriented to person, place, and time. She appears well-developed and well-nourished. No distress.   HENT:   Head: Normocephalic and atraumatic.   Right Ear: External ear normal.   Left Ear: External ear normal.   Eyes: Conjunctivae and EOM are normal. Pupils are equal, round, and reactive to light.   Arcus senilis bilaterally   Neck: No JVD present. Muscular tenderness present.   Acanthosis nigricans  TTP right submandibular / tonsillar   Cardiovascular: Normal rate, regular rhythm, S1 normal, S2 normal and intact distal pulses.   Murmur heard.   Systolic murmur is present with a grade of 2/6.  Pulses:       Radial pulses are 2+ on the right side, and 2+ on the left side.   2/6 loudest at SICS RSB   Pulmonary/Chest: Effort normal and breath sounds normal. She has no wheezes. She has no rales.   Abdominal: Soft. Bowel sounds are normal. She exhibits no distension and no mass. There is no tenderness.   Musculoskeletal: Normal range of motion. She exhibits no edema.   Feet:   Right Foot:   Protective Sensation: 5 sites tested. 5 sites sensed.   Left Foot:   Protective Sensation: 5 sites tested. 0 sites sensed.   Lymphadenopathy:        Head (right side): No submental, no submandibular, no tonsillar, no preauricular, no posterior auricular and no occipital adenopathy present.        Head (left side): No submental, no submandibular, no tonsillar, no preauricular, no posterior auricular and no occipital adenopathy present.     She has no cervical adenopathy.   Neurological: She is alert and oriented to person, place, and time. She has normal strength. No cranial nerve deficit.   Skin: Skin is warm and dry.   Psychiatric: She has a normal mood and  affect. Her behavior is normal.   Nursing note and vitals reviewed.      Assessment:       1. Elevated hemoglobin A1c    2. Severe obesity (BMI 35.0-35.9 with comorbidity)    3. Mixed stress and urge urinary incontinence    4. Chronic midline low back pain without sciatica    5. BELLO on CPAP    6. Neuropathy of left foot    7. Hyperlipidemia, unspecified hyperlipidemia type        Plan:   Batool was seen today for continued headache and medication review.     Diagnoses and all orders for this visit:    Hyperlipidemia, unspecified hyperlipidemia type  -    Patient is prescribed Zetia and Lipitor and per epic has not refilled Zetia, is taking Lipitor  -    Educated patient on need for compliance  -    ASCVD 5.7%  -    Educated and reinforced dietary changes and exercise need    Chronic mixed headache syndrome  -     Ambulatory Referral to Neurology - did not get to see, will place another referral  -     Ambulatory consult to [facility] - attending, seems to be helping  -     CT Head Without Contrast - WNL  -     Sedimentation rate WNL  -     C-reactive protein WNL  -     METANEPHRINES, PLASMA FREE WNL  -     Patient previously seen by neurology and planning injection (nerve block vs botox?), referral to neurology to re-evaluate    Anxiety  - patient states that she is taking lexapro and trazadone QHS  - she may benefit from cross titration and switch to Duloxetine due to significant myalgias, depression and anxiety  - patient desires to speak with someone - will provide pamphlet of resources    Moderate episode of recurrent major depressive disorder        - as in anxiety     Severe obesity (BMI 35.0-35.9 with comorbidity)  -     Continues to struggle with weight   -     referal to nutrion    Elevated hemoglobin A1c        - last A1c displayed pre-DM range        - provided dietary education, referral to nutrition        - will recheck A1c at 1 year interval     Class 2 severe obesity with serious comorbidity  and body mass index (BMI) of 39.0 to 39.9 in adult        - education provided regarding dietary choices        - referral to nutrition services        - would benefit from bariatric surgery    Mixed stress and urge urinary incontinence        - remains on ditropan - has significant frequency with nocturia x 5    Chronic midline low back pain without sciatica       -  Continues to follow with [facility] and is achieving some benefit from this       -  Continue gabapentin and tylenol        BELLO on CPAP      - adherent 80-90 percent of the time      - sleep remains poor despite use     Neuropathy of left foot      - continue Gabapentin and referral placed to neurology for further assessment      - denies nerve pain related to back, unsure of etiology of mononeuropathy     RTC in 6 months or PRN    Case discussed and patient seen with Dr. Houston.     Heri Montiel M.D. PGY-2  [facility]  10:26 AM  10/31/2018  Pager [phone]

## 2018-11-05 ENCOUNTER — CLINICAL SUPPORT (OUTPATIENT)
Dept: REHABILITATION | Facility: OTHER | Age: 53
End: 2018-11-05
Attending: STUDENT IN AN ORGANIZED HEALTH CARE EDUCATION/TRAINING PROGRAM
Payer: MEDICAID

## 2018-11-05 DIAGNOSIS — M54.50 CHRONIC MIDLINE LOW BACK PAIN WITHOUT SCIATICA: Primary | ICD-10-CM

## 2018-11-05 DIAGNOSIS — G89.29 CHRONIC MIDLINE LOW BACK PAIN WITHOUT SCIATICA: Primary | ICD-10-CM

## 2018-11-05 PROCEDURE — 97110 THERAPEUTIC EXERCISES: CPT

## 2018-11-11 ENCOUNTER — TELEPHONE (OUTPATIENT)
Dept: INTERNAL MEDICINE | Facility: CLINIC | Age: 53
End: 2018-11-11

## 2018-11-11 DIAGNOSIS — G89.29 CHRONIC RIGHT SHOULDER PAIN: ICD-10-CM

## 2018-11-11 DIAGNOSIS — E78.5 HYPERLIPIDEMIA, UNSPECIFIED HYPERLIPIDEMIA TYPE: ICD-10-CM

## 2018-11-11 DIAGNOSIS — M25.551 RIGHT HIP PAIN: ICD-10-CM

## 2018-11-11 DIAGNOSIS — G89.29 CHRONIC MIDLINE LOW BACK PAIN WITHOUT SCIATICA: ICD-10-CM

## 2018-11-11 DIAGNOSIS — N39.46 MIXED STRESS AND URGE URINARY INCONTINENCE: ICD-10-CM

## 2018-11-11 DIAGNOSIS — I10 ESSENTIAL HYPERTENSION: ICD-10-CM

## 2018-11-11 DIAGNOSIS — M54.50 CHRONIC MIDLINE LOW BACK PAIN WITHOUT SCIATICA: ICD-10-CM

## 2018-11-11 DIAGNOSIS — M25.511 CHRONIC RIGHT SHOULDER PAIN: ICD-10-CM

## 2018-11-11 DIAGNOSIS — M17.11 PRIMARY OSTEOARTHRITIS OF RIGHT KNEE: ICD-10-CM

## 2018-11-11 RX ORDER — MELOXICAM 15 MG/1
TABLET ORAL
Qty: 30 TABLET | Refills: 0 | Status: SHIPPED | OUTPATIENT
Start: 2018-11-11 | End: 2018-11-15

## 2018-11-12 RX ORDER — LISINOPRIL 40 MG/1
TABLET ORAL
Qty: 90 TABLET | Refills: 0 | Status: SHIPPED | OUTPATIENT
Start: 2018-11-12 | End: 2019-02-07 | Stop reason: SDUPTHER

## 2018-11-12 RX ORDER — ATORVASTATIN CALCIUM 80 MG/1
TABLET, FILM COATED ORAL
Qty: 90 TABLET | Refills: 0 | Status: SHIPPED | OUTPATIENT
Start: 2018-11-12 | End: 2019-02-07 | Stop reason: SDUPTHER

## 2018-11-12 RX ORDER — OXYBUTYNIN CHLORIDE 5 MG/1
TABLET ORAL
Qty: 60 TABLET | Refills: 0 | Status: SHIPPED | OUTPATIENT
Start: 2018-11-12 | End: 2018-11-15

## 2018-11-12 NOTE — TELEPHONE ENCOUNTER
----- Message from Elizabeth Shields sent at 11/12/2018  9:11 AM CST -----  Contact: patient [phone]  Pt said that she was advised to let you know that someone would be calling to request a rx for a back and knee brace.

## 2018-11-15 ENCOUNTER — TELEPHONE (OUTPATIENT)
Dept: PHYSICAL MEDICINE AND REHAB | Facility: CLINIC | Age: 53
End: 2018-11-15

## 2018-11-15 ENCOUNTER — OFFICE VISIT (OUTPATIENT)
Dept: INTERNAL MEDICINE | Facility: CLINIC | Age: 53
End: 2018-11-15
Payer: MEDICAID

## 2018-11-15 VITALS
WEIGHT: 236.13 LBS | HEIGHT: 65 IN | OXYGEN SATURATION: 97 % | HEART RATE: 62 BPM | BODY MASS INDEX: 39.34 KG/M2 | DIASTOLIC BLOOD PRESSURE: 88 MMHG | SYSTOLIC BLOOD PRESSURE: 122 MMHG

## 2018-11-15 DIAGNOSIS — F33.1 MODERATE EPISODE OF RECURRENT MAJOR DEPRESSIVE DISORDER: Chronic | ICD-10-CM

## 2018-11-15 DIAGNOSIS — E78.5 HYPERLIPIDEMIA, UNSPECIFIED HYPERLIPIDEMIA TYPE: ICD-10-CM

## 2018-11-15 DIAGNOSIS — I10 ESSENTIAL HYPERTENSION: ICD-10-CM

## 2018-11-15 DIAGNOSIS — M54.42 CHRONIC BILATERAL LOW BACK PAIN WITH BILATERAL SCIATICA: Primary | ICD-10-CM

## 2018-11-15 DIAGNOSIS — M54.41 CHRONIC BILATERAL LOW BACK PAIN WITH BILATERAL SCIATICA: Primary | ICD-10-CM

## 2018-11-15 DIAGNOSIS — G47.33 OSA ON CPAP: ICD-10-CM

## 2018-11-15 DIAGNOSIS — G89.29 CHRONIC BILATERAL LOW BACK PAIN WITH BILATERAL SCIATICA: Primary | ICD-10-CM

## 2018-11-15 DIAGNOSIS — E55.9 VITAMIN D DEFICIENCY: ICD-10-CM

## 2018-11-15 PROCEDURE — 99214 OFFICE O/P EST MOD 30 MIN: CPT | Mod: S$PBB,,, | Performed by: INTERNAL MEDICINE

## 2018-11-15 PROCEDURE — 99999 PR PBB SHADOW E&M-EST. PATIENT-LVL III: CPT | Mod: PBBFAC,,, | Performed by: INTERNAL MEDICINE

## 2018-11-15 PROCEDURE — 99213 OFFICE O/P EST LOW 20 MIN: CPT | Mod: PBBFAC | Performed by: INTERNAL MEDICINE

## 2018-11-15 NOTE — PROGRESS NOTES
"Subjective:       Patient ID: Batool Rivera is a 53 y.o. female.    Chief Complaint: Back Pain and Follow-up    HPI  52 y/o woman with h/o chronic back pain, obesity, HTN, HLD, GERD, BELLO, multiple other issues here for follow up. Primary concern is severe back pain.    Chronic back pain with recent flare - has seen PMR for this, has been going to PT recently.  Last saw Dr Moraes for this 5/2018, due for follow up this month but doesn't have that appt scheduled.  Previously took mobic, stopped this due to having chest pain symptoms - instr  At last PMR visit was recommended to take tylenol 500-1000mg TID, gabapentin 300mg TID. Gabapentin increased to 400mg TID since then, but she has not been taking this for some time. Taking tylenol 500mg 1-2 times/day.  Went to PT 11/5 and was noted to have no back pain at that visit (patient confirms), she reports that her back pain flared the next day and she has been having more pain since then. Has trouble rolling over to get out of bed, has to have some help putting on clothes in the morning.   Reports "burning pain" at back of legs, buttocks, and thighs bilaterally  Able to walk, no weakness. No bowel/bladder incontinence or saddle anesthesia.   Trying to put her legs up / do z-lie position hurts, so she hasn't been doing this  Used ice once but doesn't feel this helps much.     HTN - on amlodipine 5mg, lisinopril 40mg.    Dyslipidemia - taking atorvastatin 80mg, +zetia 10mg, reports she has restarted these in the past month. Tolerating these without problems.     Elevated A1c - 5.7 on last check 4/2018    MDD, Anxiety - On lexapro 20mg; trazodone to help with sleep added, then increased to 100mg nightly     BELLO on CPAP - following with Sleep Medicine clinic here, had titration study done 8/3/17 - 8cmH2O recommended. Sleeps on her side.  Hasn't been using CPAP for past week due to URI, but this has improved.     Vitamin D deficiency - Taking vitamin D supplement " "weekly.    Review of Systems   Constitutional: Negative for fatigue and fever.   HENT: Positive for congestion and rhinorrhea. Negative for sore throat.         Improving URI symptoms   Eyes: Negative for visual disturbance.   Respiratory: Negative for cough and shortness of breath.    Cardiovascular: Negative for chest pain and leg swelling.   Gastrointestinal: Negative for abdominal pain, blood in stool and constipation.   Endocrine: Negative.    Genitourinary: Negative.  Negative for difficulty urinating and dysuria.   Musculoskeletal: Positive for back pain. Negative for joint swelling.   Skin: Negative for rash.   Neurological: Negative for syncope, weakness and numbness.   Psychiatric/Behavioral: Positive for dysphoric mood. Negative for suicidal ideas.         Past medical history, surgical history, and family medical history reviewed and updated as appropriate.    Medications and allergies reviewed.     Objective:          Vitals:    11/15/18 1103   BP: 122/88   BP Location: Right arm   Patient Position: Sitting   BP Method: Large (Manual)   Pulse: 62   SpO2: 97%   Weight: 107.1 kg (236 lb 1.8 oz)   Height: 5' 5" (1.651 m)     Body mass index is 39.29 kg/m².  Physical Exam   Constitutional: She is oriented to person, place, and time. She appears well-developed and well-nourished. No distress.   HENT:   Head: Normocephalic and atraumatic.   Nose: Mucosal edema present.   Mouth/Throat: Oropharynx is clear and moist. No oropharyngeal exudate.   Eyes: Conjunctivae and EOM are normal. No scleral icterus.   Neck: Normal range of motion. Neck supple.   Cardiovascular: Normal rate, regular rhythm and normal heart sounds.   No murmur heard.  Pulmonary/Chest: Effort normal and breath sounds normal. No respiratory distress.   Abdominal: Soft. There is no tenderness.   Musculoskeletal: She exhibits no edema or tenderness.   Limited ROM of low back, tender over paraspinal muscles of low back and over SI joint " bilaterally. Pressure over SI joint and upper quadrant of gluteus worsens radiating/burning pain in legs  Not able to do SLR test   Lymphadenopathy:     She has no cervical adenopathy.   Neurological: She is alert and oriented to person, place, and time. She has normal strength. No cranial nerve deficit or sensory deficit. Gait normal.   Skin: Skin is warm and dry.   Psychiatric: Her behavior is normal.   Dysphoric mood she relates primarily to pain today   Vitals reviewed.      Lab Results   Component Value Date    WBC 9.35 04/04/2018    HGB 13.6 04/04/2018    HCT 40.4 04/04/2018     04/04/2018    CHOL 312 (H) 04/04/2018    TRIG 145 04/04/2018    HDL 42 04/04/2018    ALT 16 04/04/2018    AST 15 04/04/2018     04/04/2018    K 4.0 04/04/2018     04/04/2018    CREATININE 0.8 04/04/2018    BUN 14 04/04/2018    CO2 27 04/04/2018    TSH 2.641 04/04/2018    INR 1.1 02/17/2012    HGBA1C 5.7 (H) 04/04/2018       Assessment:       1. Chronic bilateral low back pain with bilateral sciatica    2. Essential hypertension    3. Hyperlipidemia, unspecified hyperlipidemia type    4. Moderate episode of recurrent major depressive disorder    5. Vitamin D deficiency    6. BELLO on CPAP        Plan:   Batool was seen today for back pain and follow-up.    Diagnoses and all orders for this visit:    Chronic bilateral low back pain with bilateral sciatica - chronic, recent flare after last PT session. Recommending restart gabapentin - taper up starting with 400mg qHS, can increase to BID and then TID over 2-3 weeks if tolerated. Reviewed side effects to watch for.  Recommended increase APAP to 1000mg BID for 5 days, then prn  Recommended schedule appt with PMR at checkout today    Essential hypertension - DBP a little high but having significant pain. Continue current meds    Hyperlipidemia, unspecified hyperlipidemia type - continue statin, zetia    Moderate episode of recurrent major depressive disorder - continue  lexapro + trazodone    Vitamin D deficiency - continue high-dose supplement    BELLO on CPAP - strongly recommended restarting CPAP; continue to follow with [facility] clinic    Reminded to keep neurology appt on 11/20 for headaches and nerve pain.    Health maintenance reviewed with patient. Updated.    Follow-up in about 3 months (around 2/15/2019) for follow up, coordination of care.    Karan James MD  Internal Medicine  [facility]  11/15/2018    Day after visit rec'd faxed form from [employer] with request for back brace and bilateral soft knee braces. Patient did not mention wanting these, and I do not recommend them. If she feels she needs knee or back braces, would recommend discussing with Dr Moraes or her physical therapist.

## 2018-11-15 NOTE — PATIENT INSTRUCTIONS
Restart taking gabapentin - start with 1 pill (400mg) tonight and tomorrow. If needed, you can gradually increase to twice a day, and then to three times a day if needed. Watch out for sleepiness and constipation with this medication; take a stool softener from the pharmacy if you need it.    Take tylenol 1000mg (2 x 500mg tablets) twice a day every day for the next 5 days at least, then as needed.     Remember to follow up with the neurology appointment on 11/20 for your headaches and nerve pain

## 2018-11-15 NOTE — TELEPHONE ENCOUNTER
RTC 6 mo 11/28/18  Patient called today for Nov appointment.  Patient placed on wait list for RTC.  Appointment scheduled 01/04/19      ----- Message from Tata Jimenez sent at 11/15/2018 12:07 PM CST -----  Pt is due to have a follow up with Dr Moraes please call with and apt

## 2018-11-20 ENCOUNTER — TELEPHONE (OUTPATIENT)
Dept: INTERNAL MEDICINE | Facility: CLINIC | Age: 53
End: 2018-11-20

## 2018-11-20 NOTE — TELEPHONE ENCOUNTER
----- Message from Jenny Moseley sent at 11/20/2018  3:13 PM CST -----  Contact: Srinivas @ [employer]# [phone],Fax# [phone]  Srinivas is calling in regards to a fax that he sent over on this morning for an order for a knee brace for the patient. He would like to know if you have received it or not and what is the status on it?

## 2018-11-20 NOTE — TELEPHONE ENCOUNTER
Message left for [employer]/ [employer] that office does not order Knee braces, pt or company will have to contact pt's orthopedist.

## 2018-11-24 DIAGNOSIS — E55.9 VITAMIN D DEFICIENCY: ICD-10-CM

## 2018-11-26 RX ORDER — ERGOCALCIFEROL 1.25 MG/1
CAPSULE ORAL
Qty: 12 CAPSULE | Refills: 0 | Status: SHIPPED | OUTPATIENT
Start: 2018-11-26 | End: 2019-01-24

## 2018-11-27 ENCOUNTER — TELEPHONE (OUTPATIENT)
Dept: INTERNAL MEDICINE | Facility: CLINIC | Age: 53
End: 2018-11-27

## 2018-11-27 NOTE — TELEPHONE ENCOUNTER
----- Message from Elizabeth Shields sent at 11/27/2018  9:30 AM CST -----  Contact: [employer]/ [phone]  Company called to ask if you received the order they faxed for a back and knee brace for this pt. It was last faxed to you on 11/23/18.

## 2018-12-02 DIAGNOSIS — F33.2 SEVERE EPISODE OF RECURRENT MAJOR DEPRESSIVE DISORDER, WITHOUT PSYCHOTIC FEATURES: Chronic | ICD-10-CM

## 2018-12-03 RX ORDER — TRAZODONE HYDROCHLORIDE 50 MG/1
TABLET ORAL
Qty: 60 TABLET | Refills: 3 | Status: SHIPPED | OUTPATIENT
Start: 2018-12-03 | End: 2019-04-02 | Stop reason: SDUPTHER

## 2018-12-12 DIAGNOSIS — G89.29 CHRONIC MIDLINE LOW BACK PAIN WITHOUT SCIATICA: ICD-10-CM

## 2018-12-12 DIAGNOSIS — N39.46 MIXED STRESS AND URGE URINARY INCONTINENCE: ICD-10-CM

## 2018-12-12 DIAGNOSIS — M17.11 PRIMARY OSTEOARTHRITIS OF RIGHT KNEE: ICD-10-CM

## 2018-12-12 DIAGNOSIS — G89.29 CHRONIC RIGHT SHOULDER PAIN: ICD-10-CM

## 2018-12-12 DIAGNOSIS — M54.50 CHRONIC MIDLINE LOW BACK PAIN WITHOUT SCIATICA: ICD-10-CM

## 2018-12-12 DIAGNOSIS — M25.511 CHRONIC RIGHT SHOULDER PAIN: ICD-10-CM

## 2018-12-12 DIAGNOSIS — M25.551 RIGHT HIP PAIN: ICD-10-CM

## 2018-12-12 RX ORDER — MELOXICAM 15 MG/1
TABLET ORAL
Qty: 30 TABLET | Refills: 2 | Status: SHIPPED | OUTPATIENT
Start: 2018-12-12 | End: 2019-01-24

## 2018-12-12 NOTE — TELEPHONE ENCOUNTER
Automated refill request.  Med was taken off pt's list recently, per note said that she was no longer taking.  Please call patient to clarify if she is using this medication and if she intended to request a refill.

## 2018-12-13 RX ORDER — OXYBUTYNIN CHLORIDE 5 MG/1
TABLET ORAL
Qty: 60 TABLET | Refills: 0 | OUTPATIENT
Start: 2018-12-13

## 2018-12-18 DIAGNOSIS — N39.46 MIXED STRESS AND URGE URINARY INCONTINENCE: Primary | ICD-10-CM

## 2018-12-18 NOTE — TELEPHONE ENCOUNTER
----- Message from Darcy Mary sent at 12/18/2018 11:27 AM CST -----  Contact: Ignacio/[phone]  Prescription Request:     Name of medication: oxybutynin (DITROPAN) 5 MG Tab     Reason for request: Refill    Pharmacy: [facility] [address] [place] [address] [phone] (Phone)  [phone] (Fax)        Please advise.    Thank You

## 2018-12-19 RX ORDER — OXYBUTYNIN CHLORIDE 5 MG/1
5 TABLET ORAL 3 TIMES DAILY
Qty: 90 TABLET | Refills: 3 | Status: SHIPPED | OUTPATIENT
Start: 2018-12-19 | End: 2019-01-24

## 2019-01-04 ENCOUNTER — TELEPHONE (OUTPATIENT)
Dept: PHYSICAL MEDICINE AND REHAB | Facility: CLINIC | Age: 54
End: 2019-01-04

## 2019-01-04 NOTE — TELEPHONE ENCOUNTER
----- Message from Tierra Godfrey sent at 1/4/2019  8:07 AM CST -----  Contact: self  Patient states that her back when out unable to make appointment scheduled for 9:40am this morning.    Patient will call to reschedule.

## 2019-01-09 ENCOUNTER — TELEPHONE (OUTPATIENT)
Dept: PHYSICAL MEDICINE AND REHAB | Facility: CLINIC | Age: 54
End: 2019-01-09

## 2019-01-09 NOTE — TELEPHONE ENCOUNTER
----- Message from Kendal Frederick sent at 1/9/2019  8:20 AM CST -----  Contact: self @ [phone]  Pt is calling to reschedule her appt from 1-4-19 but there is nothing available.  Pls call pt with an appt.

## 2019-01-09 NOTE — TELEPHONE ENCOUNTER
Patient to RTC 6 mo from 05/28/18 patient   Patient cancel 01/04/18.  Patient scheduled for first available 04/02/19  Patient placed on wait list also.      Kendal OCONNELL Staff   Caller: self @ [phone] (Today,  8:20 AM)             Pt is calling to reschedule her appt from 1-4-19 but there is nothing available.  Pls call pt with an appt.

## 2019-01-10 DIAGNOSIS — F39 MOOD DISORDER: ICD-10-CM

## 2019-01-10 DIAGNOSIS — I10 ESSENTIAL HYPERTENSION: ICD-10-CM

## 2019-01-10 RX ORDER — AMLODIPINE BESYLATE 5 MG/1
TABLET ORAL
Qty: 90 TABLET | Refills: 0 | Status: SHIPPED | OUTPATIENT
Start: 2019-01-10 | End: 2019-04-09 | Stop reason: SDUPTHER

## 2019-01-10 RX ORDER — ESCITALOPRAM OXALATE 20 MG/1
TABLET ORAL
Qty: 90 TABLET | Refills: 0 | Status: SHIPPED | OUTPATIENT
Start: 2019-01-10 | End: 2019-03-28 | Stop reason: SDUPTHER

## 2019-01-24 ENCOUNTER — OFFICE VISIT (OUTPATIENT)
Dept: OPTOMETRY | Facility: CLINIC | Age: 54
End: 2019-01-24
Payer: COMMERCIAL

## 2019-01-24 ENCOUNTER — CLINICAL SUPPORT (OUTPATIENT)
Dept: OPHTHALMOLOGY | Facility: CLINIC | Age: 54
End: 2019-01-24
Payer: COMMERCIAL

## 2019-01-24 DIAGNOSIS — Q12.0 CONGENITAL CATARACT OF RIGHT EYE: ICD-10-CM

## 2019-01-24 DIAGNOSIS — H40.013 OPEN ANGLE WITH BORDERLINE FINDINGS AND LOW GLAUCOMA RISK IN BOTH EYES: Primary | ICD-10-CM

## 2019-01-24 DIAGNOSIS — I10 ESSENTIAL HYPERTENSION: ICD-10-CM

## 2019-01-24 DIAGNOSIS — H52.4 PRESBYOPIA: ICD-10-CM

## 2019-01-24 PROCEDURE — 92083 HUMPHREY VISUAL FIELD - OU - BOTH EYES: ICD-10-PCS | Mod: S$GLB,,, | Performed by: OPTOMETRIST

## 2019-01-24 PROCEDURE — 99999 PR PBB SHADOW E&M-EST. PATIENT-LVL II: ICD-10-PCS | Mod: PBBFAC,,, | Performed by: OPTOMETRIST

## 2019-01-24 PROCEDURE — 92015 DETERMINE REFRACTIVE STATE: CPT | Mod: S$GLB,,, | Performed by: OPTOMETRIST

## 2019-01-24 PROCEDURE — 92014 COMPRE OPH EXAM EST PT 1/>: CPT | Mod: S$GLB,,, | Performed by: OPTOMETRIST

## 2019-01-24 PROCEDURE — 92083 EXTENDED VISUAL FIELD XM: CPT | Mod: PBBFAC | Performed by: OPTOMETRIST

## 2019-01-24 PROCEDURE — 99212 OFFICE O/P EST SF 10 MIN: CPT | Mod: PBBFAC,25 | Performed by: OPTOMETRIST

## 2019-01-24 PROCEDURE — 92015 PR REFRACTION: ICD-10-PCS | Mod: S$GLB,,, | Performed by: OPTOMETRIST

## 2019-01-24 PROCEDURE — 92133 POSTERIOR SEGMENT OCT OPTIC NERVE(OCULAR COHERENCE TOMOGRAPHY) - OU - BOTH EYES: ICD-10-PCS | Mod: S$GLB,,, | Performed by: OPTOMETRIST

## 2019-01-24 PROCEDURE — 99999 PR PBB SHADOW E&M-EST. PATIENT-LVL II: CPT | Mod: PBBFAC,,, | Performed by: OPTOMETRIST

## 2019-01-24 PROCEDURE — 92133 CPTRZD OPH DX IMG PST SGM ON: CPT | Mod: PBBFAC | Performed by: OPTOMETRIST

## 2019-01-24 PROCEDURE — 92014 PR EYE EXAM, EST PATIENT,COMPREHESV: ICD-10-PCS | Mod: S$GLB,,, | Performed by: OPTOMETRIST

## 2019-01-24 RX ORDER — ASPIRIN 81 MG/1
81 TABLET ORAL DAILY
COMMUNITY
End: 2021-10-18 | Stop reason: SDUPTHER

## 2019-02-01 DIAGNOSIS — E55.9 VITAMIN D DEFICIENCY: ICD-10-CM

## 2019-02-01 RX ORDER — ERGOCALCIFEROL 1.25 MG/1
CAPSULE ORAL
Qty: 12 CAPSULE | Refills: 0 | Status: SHIPPED | OUTPATIENT
Start: 2019-02-01 | End: 2019-03-28 | Stop reason: SDUPTHER

## 2019-02-07 DIAGNOSIS — I10 ESSENTIAL HYPERTENSION: ICD-10-CM

## 2019-02-07 DIAGNOSIS — E78.5 HYPERLIPIDEMIA, UNSPECIFIED HYPERLIPIDEMIA TYPE: ICD-10-CM

## 2019-02-07 RX ORDER — ATORVASTATIN CALCIUM 80 MG/1
TABLET, FILM COATED ORAL
Qty: 90 TABLET | Refills: 3 | Status: SHIPPED | OUTPATIENT
Start: 2019-02-07 | End: 2019-11-19 | Stop reason: SDUPTHER

## 2019-02-07 RX ORDER — LISINOPRIL 40 MG/1
TABLET ORAL
Qty: 90 TABLET | Refills: 0 | Status: SHIPPED | OUTPATIENT
Start: 2019-02-07 | End: 2019-03-28 | Stop reason: ALTCHOICE

## 2019-03-10 DIAGNOSIS — M25.551 RIGHT HIP PAIN: ICD-10-CM

## 2019-03-10 DIAGNOSIS — M54.50 CHRONIC MIDLINE LOW BACK PAIN WITHOUT SCIATICA: ICD-10-CM

## 2019-03-10 DIAGNOSIS — G89.29 CHRONIC MIDLINE LOW BACK PAIN WITHOUT SCIATICA: ICD-10-CM

## 2019-03-10 DIAGNOSIS — M17.11 PRIMARY OSTEOARTHRITIS OF RIGHT KNEE: ICD-10-CM

## 2019-03-10 DIAGNOSIS — M25.511 CHRONIC RIGHT SHOULDER PAIN: ICD-10-CM

## 2019-03-10 DIAGNOSIS — G89.29 CHRONIC RIGHT SHOULDER PAIN: ICD-10-CM

## 2019-03-11 RX ORDER — MELOXICAM 15 MG/1
TABLET ORAL
Qty: 30 TABLET | Refills: 2 | Status: SHIPPED | OUTPATIENT
Start: 2019-03-11 | End: 2019-04-02

## 2019-03-19 ENCOUNTER — TELEPHONE (OUTPATIENT)
Dept: SLEEP MEDICINE | Facility: CLINIC | Age: 54
End: 2019-03-19

## 2019-03-19 NOTE — TELEPHONE ENCOUNTER
----- Message from Sharda Deluca sent at 3/19/2019  9:46 AM CDT -----  Name of Who is Calling:MINE FERRARA [[id number]]    What is the request in detail: Pt would like to make a follow up visit. Pt is an existing medicaid pt.       Can the clinic reply by [facility]:   No       What Number to [facility] Back [facility]: [phone]

## 2019-03-28 ENCOUNTER — OFFICE VISIT (OUTPATIENT)
Dept: INTERNAL MEDICINE | Facility: CLINIC | Age: 54
End: 2019-03-28
Payer: COMMERCIAL

## 2019-03-28 VITALS
BODY MASS INDEX: 38.5 KG/M2 | HEART RATE: 52 BPM | WEIGHT: 231.06 LBS | HEIGHT: 65 IN | OXYGEN SATURATION: 97 % | DIASTOLIC BLOOD PRESSURE: 72 MMHG | SYSTOLIC BLOOD PRESSURE: 130 MMHG

## 2019-03-28 DIAGNOSIS — Z12.9 SCREENING FOR CANCER: ICD-10-CM

## 2019-03-28 DIAGNOSIS — Z00.00 ENCOUNTER FOR HEALTH MAINTENANCE EXAMINATION: ICD-10-CM

## 2019-03-28 DIAGNOSIS — Z12.31 ENCOUNTER FOR SCREENING MAMMOGRAM FOR BREAST CANCER: ICD-10-CM

## 2019-03-28 DIAGNOSIS — F39 MOOD DISORDER: ICD-10-CM

## 2019-03-28 DIAGNOSIS — I10 ESSENTIAL HYPERTENSION: ICD-10-CM

## 2019-03-28 DIAGNOSIS — E55.9 VITAMIN D DEFICIENCY: ICD-10-CM

## 2019-03-28 DIAGNOSIS — Z00.00 ANNUAL PHYSICAL EXAM: Primary | ICD-10-CM

## 2019-03-28 DIAGNOSIS — E78.5 HYPERLIPIDEMIA, UNSPECIFIED HYPERLIPIDEMIA TYPE: ICD-10-CM

## 2019-03-28 PROCEDURE — 3075F SYST BP GE 130 - 139MM HG: CPT | Mod: CPTII,S$GLB,, | Performed by: INTERNAL MEDICINE

## 2019-03-28 PROCEDURE — 3078F PR MOST RECENT DIASTOLIC BLOOD PRESSURE < 80 MM HG: ICD-10-PCS | Mod: CPTII,S$GLB,, | Performed by: INTERNAL MEDICINE

## 2019-03-28 PROCEDURE — 99999 PR PBB SHADOW E&M-EST. PATIENT-LVL IV: CPT | Mod: PBBFAC,,, | Performed by: INTERNAL MEDICINE

## 2019-03-28 PROCEDURE — 3075F PR MOST RECENT SYSTOLIC BLOOD PRESS GE 130-139MM HG: ICD-10-PCS | Mod: CPTII,S$GLB,, | Performed by: INTERNAL MEDICINE

## 2019-03-28 PROCEDURE — 99396 PREV VISIT EST AGE 40-64: CPT | Mod: S$GLB,,, | Performed by: INTERNAL MEDICINE

## 2019-03-28 PROCEDURE — 3078F DIAST BP <80 MM HG: CPT | Mod: CPTII,S$GLB,, | Performed by: INTERNAL MEDICINE

## 2019-03-28 PROCEDURE — 99999 PR PBB SHADOW E&M-EST. PATIENT-LVL IV: ICD-10-PCS | Mod: PBBFAC,,, | Performed by: INTERNAL MEDICINE

## 2019-03-28 PROCEDURE — 99396 PR PREVENTIVE VISIT,EST,40-64: ICD-10-PCS | Mod: S$GLB,,, | Performed by: INTERNAL MEDICINE

## 2019-03-28 RX ORDER — EZETIMIBE 10 MG/1
TABLET ORAL
Qty: 90 TABLET | Refills: 3 | Status: SHIPPED | OUTPATIENT
Start: 2019-03-28 | End: 2020-03-30

## 2019-03-28 RX ORDER — ERGOCALCIFEROL 1.25 MG/1
50000 CAPSULE ORAL
Qty: 12 CAPSULE | Refills: 0 | Status: SHIPPED | OUTPATIENT
Start: 2019-03-28 | End: 2019-06-20 | Stop reason: SDUPTHER

## 2019-03-28 RX ORDER — LOSARTAN POTASSIUM 50 MG/1
100 TABLET ORAL DAILY
Qty: 180 TABLET | Refills: 3 | Status: SHIPPED | OUTPATIENT
Start: 2019-03-28 | End: 2019-07-17 | Stop reason: SINTOL

## 2019-03-28 RX ORDER — ESCITALOPRAM OXALATE 20 MG/1
TABLET ORAL
Qty: 90 TABLET | Refills: 1 | Status: SHIPPED | OUTPATIENT
Start: 2019-03-28 | End: 2019-10-03 | Stop reason: SDUPTHER

## 2019-03-28 NOTE — PROGRESS NOTES
"Subjective:       Patient ID: Batool Rivera is a 53 y.o. female.    Chief Complaint: Follow-up    HPI  54 y/o woman with h/o chronic back pain, obesity, HTN, HLD, GERD, BELLO, multiple other issues here for follow up.    Chronic back pain - has seen PMR for this. Taking gabapentin again which helps - taking this only when she has back pain, not all the time.    HTN - on amlodipine 5mg, lisinopril 40mg.     Dyslipidemia - taking atorvastatin 80mg, +zetia 10mg but hasn't been able to get the zetia recently.  Last .    Elevated A1c - 5.7 on last check 4/2018     MDD, Anxiety, difficulty sleeping - On lexapro 20mg; trazodone to help with sleep added, then increased to 100mg nightly, and since last visit has increased to 150mg nightly.   Not sleeping well recently - "up worrying". Also wakes up 3-4 times/night to urinate  Tries to go to bed early - takes trazodone around 5pm  Not taking trazodone every night.    BELLO on CPAP - following with Sleep Medicine clinic here, had titration study done 8/3/17 - 8cmH2O recommended. Sleeps on her side.  Used about 3 nights/week    Vitamin D deficiency - Taking vitamin D supplement weekly.    Smoking history 2 packs/day x >25 years, quit >5 years ago. Meets criteria for low-dose screening lung CT    Review of Systems   Constitutional: Negative for activity change and unexpected weight change.   HENT: Negative.    Eyes: Negative for visual disturbance.   Respiratory: Negative for cough and shortness of breath.    Cardiovascular: Negative.  Negative for chest pain and leg swelling.   Gastrointestinal: Negative.  Negative for abdominal pain.   Endocrine: Negative for polyuria.   Genitourinary: Negative for difficulty urinating and dysuria.   Musculoskeletal: Positive for back pain.   Skin: Negative.    Neurological: Negative for weakness and numbness.   Psychiatric/Behavioral: Positive for sleep disturbance. Negative for dysphoric mood. The patient is nervous/anxious.      " "    Past medical history, surgical history, and family medical history reviewed and updated as appropriate.    Medications and allergies reviewed.     Objective:          Vitals:    03/28/19 1121   BP: 130/72   BP Location: Left arm   Patient Position: Sitting   Pulse: (!) 52   SpO2: 97%   Weight: 104.8 kg (231 lb 0.7 oz)   Height: 5' 5" (1.651 m)     Body mass index is 38.45 kg/m².  Physical Exam   Constitutional: She is oriented to person, place, and time. She appears well-developed and well-nourished. No distress.   HENT:   Head: Normocephalic and atraumatic.   Mouth/Throat: Oropharynx is clear and moist. No oropharyngeal exudate.   Eyes: Conjunctivae and EOM are normal. No scleral icterus.   Neck: Normal range of motion. Neck supple.   Cardiovascular: Normal rate, regular rhythm and normal heart sounds.   No murmur heard.  Pulmonary/Chest: Effort normal and breath sounds normal. No respiratory distress.   Abdominal: Soft. There is no tenderness.   Musculoskeletal: She exhibits no edema or tenderness.   Limited ROM of low back   Lymphadenopathy:     She has no cervical adenopathy.   Neurological: She is alert and oriented to person, place, and time. She has normal strength. No cranial nerve deficit or sensory deficit. Gait normal.   Skin: Skin is warm and dry.   Psychiatric: Her behavior is normal.   Anxiety as noted   Vitals reviewed.      Lab Results   Component Value Date    WBC 9.35 04/04/2018    HGB 13.6 04/04/2018    HCT 40.4 04/04/2018     04/04/2018    CHOL 312 (H) 04/04/2018    TRIG 145 04/04/2018    HDL 42 04/04/2018    ALT 16 04/04/2018    AST 15 04/04/2018     04/04/2018    K 4.0 04/04/2018     04/04/2018    CREATININE 0.8 04/04/2018    BUN 14 04/04/2018    CO2 27 04/04/2018    TSH 2.641 04/04/2018    INR 1.1 02/17/2012    HGBA1C 5.7 (H) 04/04/2018       Assessment:       1. Essential hypertension    2. Hyperlipidemia, unspecified hyperlipidemia type    3. Vitamin D deficiency    4. " Mood disorder    5. Encounter for health maintenance examination    6. Encounter for screening mammogram for breast cancer    7. Screening for cancer        Plan:   Batool was seen today for follow-up.    Diagnoses and all orders for this visit:    Annual exam - Stable. Reviewed chronic and preventive health concerns as noted.    Essential hypertension - switch ACEi to ARB due to recent study  -     losartan (COZAAR) 50 MG tablet; Take 2 tablets (100 mg total) by mouth once daily. For blood pressure, switch from lisinopril  -     Ambulatory Referral to Cardiology    Hyperlipidemia, unspecified hyperlipidemia type  -     ezetimibe (ZETIA) 10 mg tablet; TAKE 1 TABLET(10 MG) BY MOUTH EVERY DAY for cholesterol  -     Ambulatory Referral to Cardiology    Vitamin D deficiency  -     ergocalciferol (ERGOCALCIFEROL) 50,000 unit Cap; Take 1 capsule (50,000 Units total) by mouth every 7 days.    Mood disorder  -     escitalopram oxalate (LEXAPRO) 20 MG tablet; TAKE 1 TABLET(20 MG) BY MOUTH EVERY DAY    Encounter for health maintenance examination  -     CBC Without Differential; Future  -     Comprehensive metabolic panel; Future  -     Lipid panel; Future  -     Vitamin D; Future  -     Hemoglobin A1c; Future    Encounter for screening mammogram for breast cancer  -     Mammo Digital Screening Bilat; Future    Screening for cancer  -     CT Chest Lung Screening Low Dose; Future - if covered by insurance    Follow up with Sleep Med clinic  Follow up with Dr Yo with cardiology  Health maintenance reviewed with patient.     Follow up in about 6 months (around 9/28/2019) for hypertension, dyslipidemia, coordination of care.    Karan James MD  Internal Medicine  [facility]  3/28/2019

## 2019-03-28 NOTE — PATIENT INSTRUCTIONS
"Try taking melatonin 1mg - 3mg at night (around 5-6pm) for 1-2 weeks to help with getting to sleep.    You can also try a chamomile-valerian tea or supplement to help with falling asleep. Try "Sleepytime Extra" herbal tea.      "

## 2019-03-29 ENCOUNTER — TELEPHONE (OUTPATIENT)
Dept: INTERNAL MEDICINE | Facility: CLINIC | Age: 54
End: 2019-03-29

## 2019-03-29 NOTE — TELEPHONE ENCOUNTER
Batool Rivera Juárez: [id number] - PA Case ID: [id number] - Rx #: [id number]   Need help? Call us at [phone]       Outcome    Approvedtoday  Request Reference Number: [id number]. EZETIMIBE TAB 10MG is approved through 03/29/2020. For further questions, call [facility] Community Plan at [phone].

## 2019-03-29 NOTE — TELEPHONE ENCOUNTER
"----- Message from Yajaira Montana sent at 3/29/2019 11:30 AM CDT -----  Contact: Ignacio #[id number] [phone]      ----- Message -----  From: Leona Mancini  Sent: 3/29/2019  10:49 AM  To: Erika Jiménez    Prior Authorization Needed    Rx: ezetimibe (ZETIA) 10 mg tablet    To submit the PA:    1: Go to " [email or web address] " and click "Enter a Key"    2. Enter the patient's last name and date of birth and the morton.      MORTON: [id number]    3. Complete the forms and click "send to Plan"    Note chart when prior authorization has been submitted.    Please notify pharmacy when prior authorization has been approved.    Thank You    "

## 2019-03-29 NOTE — TELEPHONE ENCOUNTER
Drug  Ezetimibe 10MG tablets  Form  [employer] Community [employer] Medicaid Electronic Prior Authorization Form      Original Claim Info  75 PA Required, MD Call [phone] Required, MD Call [phone] SUBMIT PA# [id number] PA TYPE 8Drug Requires Prior Authorization

## 2019-03-29 NOTE — TELEPHONE ENCOUNTER
Batool Rivera (Juárez: [id number])       The [employer] [facility] is reviewing your PA request. Typically an electronic response will be received within 72 hours. To check for an update later, open this request from your dashboard.  You may close this dialog and return to your dashboard to perform other tasks.

## 2019-04-02 ENCOUNTER — OFFICE VISIT (OUTPATIENT)
Dept: PHYSICAL MEDICINE AND REHAB | Facility: CLINIC | Age: 54
End: 2019-04-02
Payer: COMMERCIAL

## 2019-04-02 VITALS
WEIGHT: 233 LBS | BODY MASS INDEX: 38.82 KG/M2 | SYSTOLIC BLOOD PRESSURE: 130 MMHG | HEART RATE: 62 BPM | HEIGHT: 65 IN | DIASTOLIC BLOOD PRESSURE: 72 MMHG

## 2019-04-02 DIAGNOSIS — G89.29 CHRONIC RIGHT SHOULDER PAIN: ICD-10-CM

## 2019-04-02 DIAGNOSIS — G89.29 CHRONIC MIDLINE LOW BACK PAIN WITHOUT SCIATICA: Primary | ICD-10-CM

## 2019-04-02 DIAGNOSIS — M25.511 CHRONIC RIGHT SHOULDER PAIN: ICD-10-CM

## 2019-04-02 DIAGNOSIS — M16.11 PRIMARY OSTEOARTHRITIS OF RIGHT HIP: ICD-10-CM

## 2019-04-02 DIAGNOSIS — M54.50 CHRONIC MIDLINE LOW BACK PAIN WITHOUT SCIATICA: Primary | ICD-10-CM

## 2019-04-02 DIAGNOSIS — M17.11 PRIMARY OSTEOARTHRITIS OF RIGHT KNEE: ICD-10-CM

## 2019-04-02 DIAGNOSIS — M47.816 SPONDYLOSIS OF LUMBAR REGION WITHOUT MYELOPATHY OR RADICULOPATHY: ICD-10-CM

## 2019-04-02 DIAGNOSIS — F33.2 SEVERE EPISODE OF RECURRENT MAJOR DEPRESSIVE DISORDER, WITHOUT PSYCHOTIC FEATURES: Chronic | ICD-10-CM

## 2019-04-02 DIAGNOSIS — E66.01 SEVERE OBESITY (BMI 35.0-35.9 WITH COMORBIDITY): ICD-10-CM

## 2019-04-02 PROCEDURE — 3008F PR BODY MASS INDEX (BMI) DOCUMENTED: ICD-10-PCS | Mod: CPTII,S$GLB,, | Performed by: PHYSICAL MEDICINE & REHABILITATION

## 2019-04-02 PROCEDURE — 99214 PR OFFICE/OUTPT VISIT, EST, LEVL IV, 30-39 MIN: ICD-10-PCS | Mod: S$GLB,,, | Performed by: PHYSICAL MEDICINE & REHABILITATION

## 2019-04-02 PROCEDURE — 99214 OFFICE O/P EST MOD 30 MIN: CPT | Mod: S$GLB,,, | Performed by: PHYSICAL MEDICINE & REHABILITATION

## 2019-04-02 PROCEDURE — 3078F PR MOST RECENT DIASTOLIC BLOOD PRESSURE < 80 MM HG: ICD-10-PCS | Mod: CPTII,S$GLB,, | Performed by: PHYSICAL MEDICINE & REHABILITATION

## 2019-04-02 PROCEDURE — 3078F DIAST BP <80 MM HG: CPT | Mod: CPTII,S$GLB,, | Performed by: PHYSICAL MEDICINE & REHABILITATION

## 2019-04-02 PROCEDURE — 3075F SYST BP GE 130 - 139MM HG: CPT | Mod: CPTII,S$GLB,, | Performed by: PHYSICAL MEDICINE & REHABILITATION

## 2019-04-02 PROCEDURE — 3075F PR MOST RECENT SYSTOLIC BLOOD PRESS GE 130-139MM HG: ICD-10-PCS | Mod: CPTII,S$GLB,, | Performed by: PHYSICAL MEDICINE & REHABILITATION

## 2019-04-02 PROCEDURE — 99999 PR PBB SHADOW E&M-EST. PATIENT-LVL III: ICD-10-PCS | Mod: PBBFAC,,, | Performed by: PHYSICAL MEDICINE & REHABILITATION

## 2019-04-02 PROCEDURE — 99999 PR PBB SHADOW E&M-EST. PATIENT-LVL III: CPT | Mod: PBBFAC,,, | Performed by: PHYSICAL MEDICINE & REHABILITATION

## 2019-04-02 PROCEDURE — 3008F BODY MASS INDEX DOCD: CPT | Mod: CPTII,S$GLB,, | Performed by: PHYSICAL MEDICINE & REHABILITATION

## 2019-04-02 RX ORDER — TRAZODONE HYDROCHLORIDE 50 MG/1
TABLET ORAL
Qty: 60 TABLET | Refills: 3 | Status: SHIPPED | OUTPATIENT
Start: 2019-04-02 | End: 2019-08-04 | Stop reason: SDUPTHER

## 2019-04-02 RX ORDER — DICLOFENAC SODIUM 10 MG/G
2 GEL TOPICAL 4 TIMES DAILY PRN
Qty: 3 TUBE | Refills: 2 | Status: SHIPPED | OUTPATIENT
Start: 2019-04-02 | End: 2019-08-01

## 2019-04-02 NOTE — PATIENT INSTRUCTIONS
Neck/Back Pain [General]    Both neck and back pain are usually caused by injury to the muscles or ligaments of the spine. Sometimes the disks that separate each bone of the spine may cause pain by putting pressure on a nearby nerve. Back and neck pain may appear after a sudden twisting/bending force (such as in a car accident), or sometimes after a simple awkward movement. In either case, muscle spasm is often present and adds to the pain.  Acute neck and back pain usually gets better in one to two weeks. Pain related to disk disease, arthritis in the spinal joints or spinal stenosis (narrowing of the spinal canal) can become chronic and last for months or years.  Home Care:  · FOR NECK PAIN: Use a comfortable pillow that supports the head and keeps the spine in a neutral position. The position of the head should not be tilted forward or backward.  · FOR BACK PAIN: You may need to stay in bed the first few days. But, as soon as possible, begin sitting or walking to avoid problems with prolonged bed rest (muscle weakness, worsening back stiffness and pain, blood clots in the legs).  · When in bed, try to find a position of comfort. A firm mattress is best. Try lying flat on your back with pillows under your knees. You can also try lying on your side with your knees bent up towards your chest and a pillow between your knees.  · Avoid prolonged sitting. This puts more stress on the lower back than standing or walking.  · During the first two days after injury, apply an ICE PACK to the painful area for 20 minutes every 2-4 hours. This will reduce swelling and pain. HEAT (hot shower, hot bath or heating pad) works well for muscle spasm. You can start with ice, then switch to heat after two days. Some patients feel best alternating ice and heat treatments. Use the one method that feels the best to you.  · You may use acetaminophen (Tylenol) or ibuprofen (Motrin, Advil) to control pain, unless another pain medicine was  prescribed. [NOTE: If you have chronic liver or kidney disease or ever had a stomach ulcer or GI bleeding, talk with your doctor before using these medicines.]  · Be aware of safe lifting methods and do not lift anything over 15 pounds until all the pain is gone.  Follow Up  with your physician or this facility if your symptoms do not start to improve after one week. Physical therapy or further tests may be needed.  [NOTE: If X-rays were taken, they will be reviewed by a radiologist. You will be notified of any new findings that may affect your care.]  Get Prompt Medical Attention  if any of the following occur:  · Pain becomes worse or spreads into your arms or legs  · Weakness, numbness or pain in one or both arms or legs  · Loss of bowel or bladder control  · Numbness in the groin area  · Difficulty walking  · Fever of 100.4ºF (38ºC) or higher, or as directed by your healthcare provider  © 4774-9604 [facility], [address] [place] [address]. All rights reserved. This information is not intended as a substitute for professional medical care. Always follow your healthcare professional's instructions.    Back Exercises: Back Press  Do this exercise on your hands and knees. Keep your knees under your hips and your hands under your shoulders. Keep your spine in a neutral position (not arched or sagging). Be sure to maintain your necks natural curve.  · Tighten your abdominal and buttocks muscles to press your back upward. Let your head drop slightly.  · Hold for 5 seconds. Return to starting position.  · Repeat 5 times.     © 6115-8217 [facility], [address] [place] [address]. All rights reserved. This information is not intended as a substitute for professional medical care. Always follow your healthcare professional's instructions.    Back Exercises: Back Release  Do this exercise on your hands and knees. Keep your knees under your hips and your hands under your shoulders. Keep  your spine in a neutral position (not arched or sagging). Be sure to maintain your necks natural curve.  · Relax your abdominal and buttocks muscles, lift your head, and let your back sag. Be sure to keep your weight evenly distributed. Dont sit back on your hips.   · Hold for 5 seconds.  · Return to starting position.  · Repeat 5 times.  © 6690-5714 [facility] [place] [address]. All rights reserved. This information is not intended as a substitute for professional medical care. Always follow your healthcare professional's instructions.    Back Exercises: Bridge  The Bridge exercise strengthens your abdominal, buttocks, and hamstring muscles. This helps keep your back stable and aligned when you walk.  · Lie on the floor with your back and palms flat. Bend your knees. Keep your feet flat on the floor.  · Contract your abdominal and buttocks muscles. Slowly lift your buttocks off the floor until there is a straight line from your knees to your shoulders.  · Hold for 5  seconds. Repeat 10 times.    © 8140-4085 [place] [address]. All rights reserved. This information is not intended as a substitute for professional medical care. Always follow your healthcare professional's instructions.    Back Exercises: Elbow Press    To start, lie face down on your stomach, feet slightly apart, forehead on the floor. Breathe deeply. You should feel comfortable and relaxed in this position.  · Press up on your forearms. Keep your abdomen and hips on the floor.  · Hold for 20 seconds. Lower slowly.  · Repeat 2 times.  · Return to starting position.  © 0956-3238 [facility], [address] [place] [address]. All rights reserved. This information is not intended as a substitute for professional medical care. Always follow your healthcare professional's instructions.    Back Exercises: Pelvic Tilt  To start, lie on your back with your knees bent and  feet flat on the floor. Dont press your neck or lower back to the floor. Breathe deeply. You should feel comfortable and relaxed in this position.  · Tighten your abdomen and buttocks, and press your lower back toward the floor. This should be a small, subtle movement.  · Hold for 5 seconds. Release.  · Repeat 5 times.         © 2025-4183 [facility], [address] [place] [address]. All rights reserved. This information is not intended as a substitute for professional medical care. Always follow your healthcare professional's instructions.    Back Exercises: Partial Curl-Ups          To start, lie on your back with your knees bent and feet flat on the floor. Dont press your neck or lower back to the floor. Breathe deeply. You should feel comfortable and relaxed in this position.  · Cross your arms loosely.  · Tighten your abdomen and curl senior care up, keeping your head in line with your shoulders.  · Hold for 5 seconds. Uncurl to lie down.  · Repeat 5 times.   © 6057-5195 [facility], [address] [place] [address]. All rights reserved. This information is not intended as a substitute for professional medical care. Always follow your healthcare professional's instructions.    Back Exercises: Lower Back Stretch                            To start, sit in a chair with your feet flat on the floor. Shift your weight slightly forward to avoid rounding your back. Relax, and keep your ears, shoulders, and hips aligned.  · Sit with your feet well apart.  · Bend forward and touch the floor with the backs of your hands. Relax and let your body drop.  · Hold for 20 seconds. Return to starting position.  · Repeat 2 times.   © 4467-5195 [facility], [address] [place] [address]. All rights reserved. This information is not intended as a substitute for professional medical care. Always follow your healthcare professional's instructions.    Back Exercises: Seated Rotation      To  start, sit in a chair with your feet flat on the floor. Shift your weight slightly forward to avoid rounding your back. Relax, and keep your ears, shoulders, and hips aligned.  · Fold your arms, elbows just below shoulder height.  · Turn from the waist with hips forward. Turn your head last.  · Hold for a count of 5. Return to starting position.  · Repeat 5 times on one side. Then switch sides.  © 6299-4979 [facility] [place] [address]. All rights reserved. This information is not intended as a substitute for professional medical care. Always follow your healthcare professional's instructions.    Back Exercises: Side Stretch  To start, sit in a chair with your feet flat on the floor. Shift your weight slightly forward to avoid rounding your back. Relax. Keep your ears, shoulders, and hips aligned.  · Stretch your right arm overhead.  · Slowly bend to the left. Dont twist your torso.  · Hold for 20 seconds. Return to starting position.  · Repeat 2 times. Then, switch to the other side.  © 6000-8826 [place] [address]. All rights reserved. This information is not intended as a substitute for professional medical care. Always follow your healthcare professional's instructions        Shoulder Clock Exercise         To start, stand tall with your ears, shoulders, and hips in line. Your feet should be slightly apart, positioned just under your hips. Focus your eyes directly in front of you.  this position for a few seconds before starting your exercise. This helps increase your awareness of proper posture.  · Imagine that your right shoulder is the center of a clock. With your shoulder, slowly trace the outer edge of the clock.  · Move clockwise first, then counterclockwise.  · Repeat 3 times. Switch shoulders.     © 4585-3032 [facility], [address] [place] [address]. All rights reserved. This information is not intended as a  substitute for professional medical care. Always follow your healthcare professional's instructions.          Shoulder and Upper Back Stretch  To start, stand tall with your ears, shoulders, and hips in line. Your feet should be slightly apart, positioned just under your hips. Focus your eyes directly in front of you.  this position for a few seconds before starting your exercise. This helps increase your awareness of proper posture.  · Reach overhead and slightly back with both arms. Keep your shoulders and neck aligned and your elbows behind your shoulders.  · With your palms facing the ceiling, turn your fingers inward.  · Take a deep breath. Breathe out and lower your elbows toward your buttocks. Hold for 5 seconds, then return to starting position.  · Repeat 3 times.          © 4255-7504 [facility], [address] [place] [address]. All rights reserved. This information is not intended as a substitute for professional medical care. Always follow your healthcare professional's instructions.          Shoulder Girdle Stretch        To start, sit in a chair with your feet flat on the floor. Your weight should be slightly forward so that youre balanced evenly on your buttocks. Relax your shoulders and keep your head level. Using a chair with arms may help you keep your balance.  · Place one hand on the outside elbow of the other arm.  · Pull the arm across your body. Hold for 30-60 seconds. Repeat once.  · Switch sides.  For your safety, check with your healthcare provider before starting an exercise program.    © 2123-5836 [facility], [address] [place] [address], [place] [address]. All rights reserved. This information is not intended as a substitute for professional medical care. Always follow your healthcare professional's instructions.          Shoulder Isometric Exercises      To start, sit in a chair with your feet flat on the floor. Your weight should be slightly forward so that youre  balanced evenly on your buttocks. Relax your shoulders and keep your head level. Avoid arching your back or rounding your shoulders. Using a chair with arms may help you keep your balance.  · Raise your arms, elbows bent, to shoulder height.  · Slowly move your forearms together. Hold for 5 seconds.  · Return to starting position. Repeat 5 times.  © 0719-8047 [facility], [address] [place] [address], [place] [address]. All rights reserved. This information is not intended as a substitute for professional medical care. Always follow your healthcare professional's instructions.

## 2019-04-02 NOTE — PROGRESS NOTES
Subjective:       Patient ID: Batool Rivera is a 53 y.o. female.    Chief Complaint: No chief complaint on file.    HPI     Mrs. Rivera is a 53-year-old black female with past medical history of HTN,   depression/anxiety, OA, obesity, and/or pain.  She is followed up in the   [facility] Medicine Clinic for chronic low back pain, chronic right knee pain,   bilateral hip pain and recurrent right shoulder pain.  Her last visit to the   clinic was on 05/28/2018.  She was maintained on gabapentin and p.r.n. Tylenol.    Meloxicam was discontinued due to chest pain and suspected to be due to gastric   or cardiac origin.    The patient was lost to follow up.  She is coming today to the clinic for her   pain.  Her back pain has been recently worse.  She denies any preceding trauma.    It is a constant aching pain in the lumbar spine and across her back.  She   denies any radiation to her legs, but does complain of persistent numbness in   the left toes, usually associated with her back pain.  Her pain is aggravated by   activity.  Her maximum pain is 10/10 and minimum 5-6/10.  Today, it is 7/10.    The patient denies any lower extremity weakness.  She denies any bowel or   bladder incontinence.  Her right knee pain has been under good control.  She   continues to complain of bilateral hip pain.  It is an intermittent aching pain   aggravated by slow pace and better with walking a little faster.  Her maximum   pain is 9/10 and minimum 1-2/10.  Today, it is 1-2/10.    She continues to complain of right shoulder pain.  It is an intermittent aching   pain aggravated by shoulder movement.    She is currently taking gabapentin 400 mg capsules two or three times per day,   but not consistently.  She states that when she takes a medicine during the day   it makes her a little sleepy.  She takes Tylenol 500 mg p.r.n., usually two to   three tablets per day.  She had a prescription of meloxicam refill, but she has   not been taking  it.      MS/IN  dd: 04/02/2019 10:15:18 (CDT)  td: 04/02/2019 15:32:39 (CDT)  Doc ID   #[id number]  Job ID #[id number]    CC:             Review of Systems   Constitutional: Negative for fatigue.   Eyes: Negative for visual disturbance.   Respiratory: Negative for shortness of breath.    Cardiovascular: Positive for chest pain.   Gastrointestinal: Negative for blood in stool, constipation, nausea and vomiting.   Genitourinary: Negative for difficulty urinating.   Musculoskeletal: Positive for arthralgias and back pain. Negative for gait problem, joint swelling and neck pain.   Neurological: Positive for headaches. Negative for dizziness.   Psychiatric/Behavioral: Positive for sleep disturbance. Negative for behavioral problems.       Objective:      Physical Exam   Constitutional: She is oriented to person, place, and time. She appears well-developed and well-nourished.   HENT:   Head: Normocephalic and atraumatic.   Neck: Normal range of motion.   Mild tenderness low C-spine.   Musculoskeletal:   BUE:  ROM:   RUE: full.   LUE: full.  Strength:    RUE: 4/5 at shoulder abduction, 5 elbow flexion, 5 elbow extension, 5 hand .   LUE: 5-/5 at shoulder abduction, 5 elbow flexion, 5 elbow extension, 5 hand .  Sensation to pinprick:   RUE: intact.   LUE: intact.    Impingement Signs:  Neer:  RUE: +ve    LUE: +ve  Terry: RUE: +ve    LUE: -ve   AC tenderness:RUE: +ve    LUE: -ve    BLE:  ROM:   RLE: full.   LLE: full.  Knee crepitus:   RLE: +ve.   LLE: +ve.   Strength:    RLE: 5/5 at hip flexion, 5 knee extension, 5 ankle DF, 5 PF.   LLE: 5/5 at hip flexion, 5 knee extension, 5 ankle DF, 5 PF.  Sensation to pinprick:     RLE: intact.      LLE: intact.   SLR (sitting):      RLE: -ve.      LLE: -ve.    -ve tenderness over lumbar spine.    Gait: WNL     Neurological: She is alert and oriented to person, place, and time.   Skin: Skin is warm.   Psychiatric: She has a normal mood and affect. Her behavior is normal.   Vitals  reviewed.      Assessment:       1. Chronic midline low back pain without sciatica    2. Spondylosis of lumbar region without myelopathy or radiculopathy    3. Primary osteoarthritis of right knee    4. Primary osteoarthritis of right hip    5. Chronic right shoulder pain    6. Severe obesity (BMI 35.0-35.9 with comorbidity)        Plan:         - Increase acetaminophen (TYLENOL) 500 MG tablet; Take 1-2 tablets (500-1,000 mg total) by mouth 3 (three) times daily as needed for Pain.  - Continue gabapentin 400 mg po tid. MAy alternatively take one capsule qam and 2 qhs.  - Meloxicam was again stopped due to chest pain.  - The patient was encouraged to adopt a regular Home Exercise Program, and provided with printouts.  - Weight loss was encouraged.   - Follow up in about 4 months (around 8/2/2019).     This was a 25 minute visit, more than 50% of which was spent counseling the patient about the diagnosis and the treatment plan.

## 2019-04-03 ENCOUNTER — TELEPHONE (OUTPATIENT)
Dept: PHYSICAL MEDICINE AND REHAB | Facility: CLINIC | Age: 54
End: 2019-04-03

## 2019-04-09 DIAGNOSIS — I10 ESSENTIAL HYPERTENSION: ICD-10-CM

## 2019-04-09 RX ORDER — AMLODIPINE BESYLATE 5 MG/1
TABLET ORAL
Qty: 90 TABLET | Refills: 0 | Status: SHIPPED | OUTPATIENT
Start: 2019-04-09 | End: 2019-07-05 | Stop reason: SDUPTHER

## 2019-04-10 ENCOUNTER — OFFICE VISIT (OUTPATIENT)
Dept: CARDIOLOGY | Facility: CLINIC | Age: 54
End: 2019-04-10
Payer: COMMERCIAL

## 2019-04-10 VITALS
SYSTOLIC BLOOD PRESSURE: 177 MMHG | WEIGHT: 233 LBS | DIASTOLIC BLOOD PRESSURE: 83 MMHG | BODY MASS INDEX: 38.82 KG/M2 | HEIGHT: 65 IN | HEART RATE: 56 BPM | OXYGEN SATURATION: 97 %

## 2019-04-10 DIAGNOSIS — I10 ESSENTIAL HYPERTENSION: ICD-10-CM

## 2019-04-10 DIAGNOSIS — E66.01 CLASS 2 SEVERE OBESITY DUE TO EXCESS CALORIES WITH SERIOUS COMORBIDITY AND BODY MASS INDEX (BMI) OF 39.0 TO 39.9 IN ADULT: ICD-10-CM

## 2019-04-10 DIAGNOSIS — E78.2 MIXED HYPERLIPIDEMIA: Primary | ICD-10-CM

## 2019-04-10 DIAGNOSIS — G47.33 OSA ON CPAP: ICD-10-CM

## 2019-04-10 DIAGNOSIS — F39 MOOD DISORDER: ICD-10-CM

## 2019-04-10 DIAGNOSIS — R07.9 CHEST PAIN, UNSPECIFIED TYPE: ICD-10-CM

## 2019-04-10 PROCEDURE — 3008F BODY MASS INDEX DOCD: CPT | Mod: CPTII,S$GLB,, | Performed by: INTERNAL MEDICINE

## 2019-04-10 PROCEDURE — 99204 OFFICE O/P NEW MOD 45 MIN: CPT | Mod: S$GLB,,, | Performed by: INTERNAL MEDICINE

## 2019-04-10 PROCEDURE — 99204 PR OFFICE/OUTPT VISIT, NEW, LEVL IV, 45-59 MIN: ICD-10-PCS | Mod: S$GLB,,, | Performed by: INTERNAL MEDICINE

## 2019-04-10 PROCEDURE — 3079F DIAST BP 80-89 MM HG: CPT | Mod: CPTII,S$GLB,, | Performed by: INTERNAL MEDICINE

## 2019-04-10 PROCEDURE — 99999 PR PBB SHADOW E&M-EST. PATIENT-LVL III: CPT | Mod: PBBFAC,,, | Performed by: INTERNAL MEDICINE

## 2019-04-10 PROCEDURE — 3077F SYST BP >= 140 MM HG: CPT | Mod: CPTII,S$GLB,, | Performed by: INTERNAL MEDICINE

## 2019-04-10 PROCEDURE — 3077F PR MOST RECENT SYSTOLIC BLOOD PRESSURE >= 140 MM HG: ICD-10-PCS | Mod: CPTII,S$GLB,, | Performed by: INTERNAL MEDICINE

## 2019-04-10 PROCEDURE — 99999 PR PBB SHADOW E&M-EST. PATIENT-LVL III: ICD-10-PCS | Mod: PBBFAC,,, | Performed by: INTERNAL MEDICINE

## 2019-04-10 PROCEDURE — 3079F PR MOST RECENT DIASTOLIC BLOOD PRESSURE 80-89 MM HG: ICD-10-PCS | Mod: CPTII,S$GLB,, | Performed by: INTERNAL MEDICINE

## 2019-04-10 PROCEDURE — 3008F PR BODY MASS INDEX (BMI) DOCUMENTED: ICD-10-PCS | Mod: CPTII,S$GLB,, | Performed by: INTERNAL MEDICINE

## 2019-04-10 RX ORDER — ESCITALOPRAM OXALATE 20 MG/1
TABLET ORAL
Qty: 90 TABLET | Refills: 0 | OUTPATIENT
Start: 2019-04-10

## 2019-04-10 NOTE — TELEPHONE ENCOUNTER
2 rx requests for lexapro received (through automated system). New rx was just sent in less than 2 weeks ago sufficient for 6 months.  Please call pharmacy to see what the problem is.

## 2019-04-10 NOTE — LETTER
April 10, 2019      Karan James MD  [address]  [place] [address]           [facility] - Cardiology  [address]  [facility] [address]  Phone: [phone]          Patient: Batool Rivera   MR Number: [id number]   YOB: 1965   Date of Visit: 4/10/2019       Dear Dr. Karan James:    Thank you for referring Batool Rivera to me for evaluation. Attached you will find relevant portions of my assessment and plan of care.    If you have questions, please do not hesitate to call me. I look forward to following Batool Rivera along with you.    Sincerely,    Kaylin Yo MD    Enclosure  CC:  No Recipients    If you would like to receive this communication electronically, please contact [email or web address] or [phone] to request more information on [employer] access.    For providers and/or their staff who would like to refer a patient to [facility], please contact us through our one-stop-shop provider referral line, [facility], at [phone].    If you feel you have received this communication in error or would no longer like to receive these types of communications, please e-mail [email or web address]

## 2019-04-10 NOTE — PROGRESS NOTES
Subjective:   Patient ID:  Batool Rivera is a 53 y.o. female who presents for evaluation of Essential hypertension; Shortness of Breath (began this am with exertion); and Hyperlipidemia, unspecified hyperlipidemia type      HPI: She was last seen in 2016. She is not sure why she is here today. She has treated HTN and hyperlipidemia. She had stopped her atorvastatin but started it about 3 weeks ago. Zetia was recently added to her regimen. She did not take her medications yet today. Her lisinopril was recently changed to losartan. She reports a 3 year history of sharp sticking pains under her left breat which are non-exertional and last about 3 hours. They are partially relieved by raising her left arm over her head or drinking sugar water. She has no exertional symptoms. She recently started using an exercise CD but could only last 5 minutes.    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     Heart disease, unspecified     Hyperlipidemia     Hypertension        Past Surgical History:   Procedure Laterality Date     SECTION      COLONOSCOPY N/A 2013    Performed by [facility] at [facility] ([facility])    HYSTERECTOMY   or     OOPHORECTOMY      one ovary was removed    TUBAL LIGATION         Social History     Socioeconomic History    Marital status: Single     Spouse name: Not on file    Number of children: Not on file    Years of education: Not on file    Highest education level: Not on file   Occupational History    Not on file   Social Needs    Financial resource strain: Not on file    Food insecurity:     Worry: Not on file     Inability: Not on file    Transportation needs:     Medical: Not on file     Non-medical: Not on file   Tobacco Use    Smoking status: Former Smoker     Packs/day: 2.00      Years: 28.00     Pack years: 56.00     Last attempt to quit: 2011     Years since quittin.6    Smokeless tobacco: Never Used   Substance and Sexual Activity    Alcohol use: No     Alcohol/week: 0.0 oz    Drug use: No    Sexual activity: Yes     Partners: Male     Birth control/protection: See Surgical Hx     Comment: BTL   Lifestyle    Physical activity:     Days per week: Not on file     Minutes per session: Not on file    Stress: Not on file   Relationships    Social connections:     Talks on phone: Not on file     Gets together: Not on file     Attends Anabaptist service: Not on file     Active member of club or organization: Not on file     Attends meetings of clubs or organizations: Not on file     Relationship status: Not on file   Other Topics Concern    Not on file   Social History Narrative    Living with her grandbran, who is 7 y/o.        Family History   Problem Relation Age of Onset    Hypertension Mother     Heart disease Mother     Asthma Mother     Cataracts Mother     Glaucoma Mother     Brain cancer Father     Stroke Brother     Kidney disease Brother     Glaucoma Sister     Diabetes Sister     Heart disease Brother         MI x 5    Depression Brother     Blindness Neg Hx     Macular degeneration Neg Hx     Retinal detachment Neg Hx     Breast cancer Neg Hx     Colon cancer Neg Hx     Ovarian cancer Neg Hx        Patient's Medications   New Prescriptions    No medications on file   Previous Medications    AMLODIPINE (NORVASC) 5 MG TABLET    TAKE 1 TABLET(5 MG) BY MOUTH EVERY DAY    ASPIRIN (ECOTRIN) 81 MG EC TABLET    Take 81 mg by mouth once daily.    ATORVASTATIN (LIPITOR) 80 MG TABLET    TAKE 1 TABLET(80 MG) BY MOUTH EVERY DAY    DICLOFENAC SODIUM (VOLTAREN) 1 % GEL    Apply 2 g topically 4 (four) times daily as needed. Apply to painful joints    ERGOCALCIFEROL (ERGOCALCIFEROL) 50,000 UNIT CAP    Take 1 capsule (50,000 Units total) by mouth every 7 days.     ESCITALOPRAM OXALATE (LEXAPRO) 20 MG TABLET    TAKE 1 TABLET(20 MG) BY MOUTH EVERY DAY    EZETIMIBE (ZETIA) 10 MG TABLET    TAKE 1 TABLET(10 MG) BY MOUTH EVERY DAY for cholesterol    GABAPENTIN (NEURONTIN) 400 MG CAPSULE    Take 1 capsule (400 mg total) by mouth 3 (three) times daily.    LOSARTAN (COZAAR) 50 MG TABLET    Take 2 tablets (100 mg total) by mouth once daily. For blood pressure, switch from lisinopril    TRAZODONE (DESYREL) 50 MG TABLET    TAKE 2 TABLETS(100 MG) BY MOUTH EVERY EVENING   Modified Medications    No medications on file   Discontinued Medications    No medications on file       Review of Systems   Constitution: Negative for malaise/fatigue and weight gain.   HENT: Negative for hearing loss.    Eyes: Negative for visual disturbance.   Cardiovascular: Negative for chest pain, claudication, dyspnea on exertion, leg swelling, near-syncope, orthopnea, palpitations, paroxysmal nocturnal dyspnea and syncope.   Respiratory: Negative for cough, shortness of breath, sleep disturbances due to breathing, snoring and wheezing.    Endocrine: Negative for cold intolerance, heat intolerance, polydipsia, polyphagia and polyuria.   Hematologic/Lymphatic: Negative for bleeding problem. Does not bruise/bleed easily.   Skin: Negative for rash and suspicious lesions.   Musculoskeletal: Negative for arthritis, falls, joint pain, muscle weakness and myalgias.   Gastrointestinal: Negative for abdominal pain, change in bowel habit, constipation, diarrhea, heartburn, hematochezia, melena and nausea.   Genitourinary: Negative for hematuria and nocturia.   Neurological: Negative for excessive daytime sleepiness, dizziness, headaches, light-headedness, loss of balance and weakness.   Psychiatric/Behavioral: Negative for depression. The patient is not nervous/anxious.    Allergic/Immunologic: Negative for environmental allergies.       BP (!) 177/83 (BP Location: Left arm, Patient Position: Sitting, BP Method: X-Large  "(Automatic))   Pulse (!) 56   Ht 5' 5" (1.651 m)   Wt 105.7 kg (233 lb 0.4 oz)   SpO2 97%   BMI 38.78 kg/m²     Objective:   Physical Exam   Constitutional: She is oriented to person, place, and time. She appears well-developed and well-nourished.        HENT:   Head: Normocephalic and atraumatic.   Mouth/Throat: Oropharynx is clear and moist.   Eyes: Pupils are equal, round, and reactive to light. Conjunctivae and EOM are normal. No scleral icterus.   Neck: Normal range of motion. Neck supple. No hepatojugular reflux and no JVD present. No tracheal deviation present. No thyromegaly present.   Cardiovascular: Regular rhythm, normal heart sounds and intact distal pulses. Bradycardia present. PMI is not displaced.   Pulses:       Carotid pulses are 2+ on the right side, and 2+ on the left side.       Radial pulses are 2+ on the right side, and 2+ on the left side.        Dorsalis pedis pulses are 2+ on the right side, and 2+ on the left side.        Posterior tibial pulses are 2+ on the right side, and 2+ on the left side.   Pulmonary/Chest: Effort normal and breath sounds normal.   Abdominal: Soft. Bowel sounds are normal. She exhibits no distension and no mass. There is no hepatosplenomegaly. There is no tenderness.   Musculoskeletal: She exhibits no edema or tenderness.   Lymphadenopathy:     She has no cervical adenopathy.   Neurological: She is alert and oriented to person, place, and time.   Skin: Skin is warm and dry. No rash noted. No cyanosis or erythema. Nails show no clubbing.   Psychiatric: She has a normal mood and affect. Her speech is normal and behavior is normal.       Lab Results   Component Value Date     04/04/2018    K 4.0 04/04/2018     04/04/2018    CO2 27 04/04/2018    BUN 14 04/04/2018    CREATININE 0.8 04/04/2018     04/04/2018    HGBA1C 5.7 (H) 04/04/2018    AST 15 04/04/2018    ALT 16 04/04/2018    ALBUMIN 3.9 04/04/2018    PROT 7.4 04/04/2018    BILITOT 0.3 " 04/04/2018    WBC 9.35 04/04/2018    HGB 13.6 04/04/2018    HCT 40.4 04/04/2018    MCV 74 (L) 04/04/2018     04/04/2018    INR 1.1 02/17/2012    TSH 2.641 04/04/2018    CHOL 312 (H) 04/04/2018    HDL 42 04/04/2018    LDLCALC 241.0 (H) 04/04/2018    TRIG 145 04/04/2018    BNP 26 04/04/2015       Assessment:     1. Mixed hyperlipidemia : She just recently started back on atorvastatin. Goal LDL < 100. She has an upcoming CT scan ordered for lung screening. If she has abundant coronary calcification on the CT, would recommend an exercise stress echo.   2. Essential hypertension : She did not take her medication today. Other readings in Epic have been at goal.   3. BELLO on CPAP    4. Class 2 severe obesity due to excess calories with serious comorbidity and body mass index (BMI) of 39.0 to 39.9 in adult : Following a heart health diet such as the Mediterranean Diet is recommended in additions to 150 minutes a week of moderate intensity exercise to lower cholesterol, maintain a healthy body weight, and improve overall cardiovascular health. Copy of Mediterranean Diet given.   5,      Chest pain: Non-cardiac.    Plan:     Batool was seen today for essential hypertension, shortness of breath and hyperlipidemia, unspecified hyperlipidemia type.    Diagnoses and all orders for this visit:    Mixed hyperlipidemia    Essential hypertension    BELLO on CPAP    Class 2 severe obesity due to excess calories with serious comorbidity and body mass index (BMI) of 39.0 to 39.9 in adult        Thank you for allowing me to participate in this patient's care. Please do not hesitate to contact me with any questions or concerns.

## 2019-04-11 NOTE — TELEPHONE ENCOUNTER
Pt has available Rx refills at the pharmacy that she has not picked up yet. Pharmacy will notify pt.

## 2019-04-12 ENCOUNTER — TELEPHONE (OUTPATIENT)
Dept: INTERNAL MEDICINE | Facility: CLINIC | Age: 54
End: 2019-04-12

## 2019-04-12 NOTE — TELEPHONE ENCOUNTER
Pt states since starting new bp medication she has been feeling funny, with body aches, and fatigued. Pt states her last dose was this morning and will not be taking Rx again. Please provide recommendations if appropriate.

## 2019-04-16 NOTE — TELEPHONE ENCOUNTER
The pt was informed and verbalized her understanding. Scheduled f/u appt with pt and mailed reminder letter.

## 2019-05-13 ENCOUNTER — HOSPITAL ENCOUNTER (OUTPATIENT)
Dept: RADIOLOGY | Facility: HOSPITAL | Age: 54
Discharge: HOME | End: 2019-05-13
Attending: INTERNAL MEDICINE
Payer: COMMERCIAL

## 2019-05-13 DIAGNOSIS — Z12.31 ENCOUNTER FOR SCREENING MAMMOGRAM FOR BREAST CANCER: ICD-10-CM

## 2019-05-13 DIAGNOSIS — Z12.9 SCREENING FOR CANCER: ICD-10-CM

## 2019-05-13 PROCEDURE — 77067 SCR MAMMO BI INCL CAD: CPT | Mod: TC

## 2019-05-13 PROCEDURE — G0297 LDCT FOR LUNG CA SCREEN: HCPCS | Mod: 26,,, | Performed by: RADIOLOGY

## 2019-05-13 PROCEDURE — 77063 BREAST TOMOSYNTHESIS BI: CPT | Mod: 26,,, | Performed by: RADIOLOGY

## 2019-05-13 PROCEDURE — 77067 MAMMO DIGITAL SCREENING BILAT WITH TOMOSYNTHESIS_CAD: ICD-10-PCS | Mod: 26,,, | Performed by: RADIOLOGY

## 2019-05-13 PROCEDURE — G0297 LDCT FOR LUNG CA SCREEN: HCPCS | Mod: TC

## 2019-05-13 PROCEDURE — 77067 SCR MAMMO BI INCL CAD: CPT | Mod: 26,,, | Performed by: RADIOLOGY

## 2019-05-13 PROCEDURE — 77063 MAMMO DIGITAL SCREENING BILAT WITH TOMOSYNTHESIS_CAD: ICD-10-PCS | Mod: 26,,, | Performed by: RADIOLOGY

## 2019-05-13 PROCEDURE — G0297 CT CHEST LUNG SCREENING LOW DOSE: ICD-10-PCS | Mod: 26,,, | Performed by: RADIOLOGY

## 2019-05-15 ENCOUNTER — OFFICE VISIT (OUTPATIENT)
Dept: SLEEP MEDICINE | Facility: CLINIC | Age: 54
End: 2019-05-15
Payer: COMMERCIAL

## 2019-05-15 VITALS
SYSTOLIC BLOOD PRESSURE: 138 MMHG | DIASTOLIC BLOOD PRESSURE: 88 MMHG | BODY MASS INDEX: 35.38 KG/M2 | HEART RATE: 58 BPM | WEIGHT: 233.44 LBS | HEIGHT: 68 IN

## 2019-05-15 DIAGNOSIS — G47.33 OBSTRUCTIVE SLEEP APNEA: Primary | ICD-10-CM

## 2019-05-15 DIAGNOSIS — I10 ESSENTIAL HYPERTENSION: ICD-10-CM

## 2019-05-15 DIAGNOSIS — F33.1 MODERATE EPISODE OF RECURRENT MAJOR DEPRESSIVE DISORDER: Chronic | ICD-10-CM

## 2019-05-15 DIAGNOSIS — G47.33 OSA ON CPAP: ICD-10-CM

## 2019-05-15 PROCEDURE — 3079F DIAST BP 80-89 MM HG: CPT | Mod: CPTII,S$GLB,, | Performed by: NURSE PRACTITIONER

## 2019-05-15 PROCEDURE — 3008F BODY MASS INDEX DOCD: CPT | Mod: CPTII,S$GLB,, | Performed by: NURSE PRACTITIONER

## 2019-05-15 PROCEDURE — 99999 PR PBB SHADOW E&M-EST. PATIENT-LVL III: CPT | Mod: PBBFAC,,, | Performed by: NURSE PRACTITIONER

## 2019-05-15 PROCEDURE — 99214 OFFICE O/P EST MOD 30 MIN: CPT | Mod: S$GLB,,, | Performed by: NURSE PRACTITIONER

## 2019-05-15 PROCEDURE — 99999 PR PBB SHADOW E&M-EST. PATIENT-LVL III: ICD-10-PCS | Mod: PBBFAC,,, | Performed by: NURSE PRACTITIONER

## 2019-05-15 PROCEDURE — 3008F PR BODY MASS INDEX (BMI) DOCUMENTED: ICD-10-PCS | Mod: CPTII,S$GLB,, | Performed by: NURSE PRACTITIONER

## 2019-05-15 PROCEDURE — 3075F PR MOST RECENT SYSTOLIC BLOOD PRESS GE 130-139MM HG: ICD-10-PCS | Mod: CPTII,S$GLB,, | Performed by: NURSE PRACTITIONER

## 2019-05-15 PROCEDURE — 3075F SYST BP GE 130 - 139MM HG: CPT | Mod: CPTII,S$GLB,, | Performed by: NURSE PRACTITIONER

## 2019-05-15 PROCEDURE — 99214 PR OFFICE/OUTPT VISIT, EST, LEVL IV, 30-39 MIN: ICD-10-PCS | Mod: S$GLB,,, | Performed by: NURSE PRACTITIONER

## 2019-05-15 PROCEDURE — 3079F PR MOST RECENT DIASTOLIC BLOOD PRESSURE 80-89 MM HG: ICD-10-PCS | Mod: CPTII,S$GLB,, | Performed by: NURSE PRACTITIONER

## 2019-05-15 NOTE — PROGRESS NOTES
Batool Rivera  was seen as a f/u for the management of obstructive sleep apnea. Last seen 2018    Since seen continues to use cpap 9cm but inconsistently due to sinus congestion. Does not like nasal sprays. Taking benadrly at times ineffective. Encouraged to use claritin-D. Getting calls from equip company often about new supplies. Sleep is consolidated with use. Trazadone 50mg 3 tabs helps her fall asleep after evening shower. BP stable. Mood stable. Going to [place] 2 wks upcoming with mary. Snoring resolved with use.   Date Range: 10/27/2018 - 4/24/2019   1% leak      Compliance Summary  Apnea Indices    Days with Device Usage:  58 days  Average AHI:  1.8   Percentage of Days >=4 Hours:  15.6%     Average Usage (Days Used):  4 hrs. 2 mins.     Average Usage (All Days):  1 hrs. 17 mins. 58 secs.             HISTORY:  8/11/16:  CHIEF COMPLAINT: Snoring, frequent nocturia    HISTORY OF PRESENT ILLNESS:Batool Rivera a 53 y.o. female presents for the evaluation of possible obstructive sleep apnea. She has never had a sleep study. She reports symptoms of disruptive snoring. Her ex- told her she stopped breathing during sleep. Nocturia 5x. Denies dyspnea. Denies am headaches. Denies dry mouth in am. Sleep is not refreshing. + excessive daytime sleepiness with occasional naps. She finds 2mg tizanidine helps her fall asleep quicker than typical (2hr). She has hard time losing weight. + witnessed apneic pauses. Denies air gasping, nocturnal choking or coughing.     Denies symptoms of restless legs or kicking during sleep.     On todays Randall Sleepiness Scale the patient scores a 13/24.     Norco prn for back pain/knee pain    BT 11p  WT 0530    8/29/16: She has since undergone a sleep study which was reviewed with her in detail today. She reports right leg pinched nerve (takes gabapentin tid/norco prn). She continues to report excessive daytime sleepiness with occasional naps, snoring, frequent  "nocturia. +apneic pauses. '    7/19/17:   Since last seen, she was setup with apap 5-16cm Sept'16. She used it almost nightly but would often remove mask and not reapply. Sometimes had better longer consolidated sleep. Felt less sleepy during daytime when using and had less leg pain. +less nocturia also. Was using chin strap with dreamwear mask. +sinus congestion "worse this year". Ready to reuse cpap. She returned equipment 1/11/17 ~. Having flu few weeks also affected her adherence.     2/20/18:  She underwent titration study and got cpap 9cm setup 10/3/17. Doing well.using it nightly. Sleeping better. Still taking bp med qhs (causes n octuria). Mask leaks with side sleep. Wants to switch to FFM. No more snoring or apneic pauses. Denies oral drying w/o use of chin strap.   Date Range: 11/1/2017 - 12/2/2017     Hide      Compliance Summary  Apnea Indices    Days with Device Usage:  32 days  Average AHI:  2.5    Percentage of Days >=4 Hours:  87.5%  Average OA Index:  1.2    Average Usage (Days Used):  6 hrs. 9 mins. 53 secs.  Average CA Index:  0.4    Average Usage (All Days):  6 hrs. 9 mins. 53 secs.       PSG: (241#) 8/18/16: AHI was 9.2 with an oxygen grant of 83.0%. The supine AHI was 11.9 and the REM AHI was 33.0. The patient did not qualify for a split night study due to an insufficient number of events in the first half of the study.   8/3/17 Titration study, effective cpap 9cm /eson mask      FAMILY HISTORY: No known sleep disorders.   SOCIAL HISTORY: . No tobacco or ETOH    REVIEW OF SYSTEMS:  Sleep related symptoms as per HPI; 2# gain; Sinus congestion. Otherwise, a balance of 10 systems reviewed is negative        PHYSICAL EXAM:   /88   Pulse (!) 58   Ht 5' 8" (1.727 m)   Wt 105.9 kg (233 lb 7.5 oz)   BMI 35.50 kg/m²   GENERAL: Obese body habitus, well groomed       ASSESSMENT:     BELLO, mild REM predominant. Was adherent with autopap but had no adherence monitroing and had mask removal. " Returned equipment due to not meeting ins guidelines. Ready to resume. 2/20/18:adherent with cpap 9cm, AHI<5. Symptoms are improved 5/15/19: Remains adherent but inconsistent. AHI<5 with use and benefits from therapy. Sinus congestion affects use  She has medical comorbidities of obesity, hypertension (stable), hyperlipidemia, depression. Warrants continued definitive treatment for sleep apnea.      PLAN:   1. Continue cpap 9cm. [facility] DME. Improve consistent mask on time. Consider alternative FFM. Discussed effectiveness of thearpy and potential ramifications of untreated BELLO, including heart disease, hypertension, cognitive difficulties, stroke, and diabetes.   2 She does not drive.  RTC annually. See PCP re: trazadone RX/mood and HTN (stable.

## 2019-05-16 ENCOUNTER — TELEPHONE (OUTPATIENT)
Dept: INTERNAL MEDICINE | Facility: CLINIC | Age: 54
End: 2019-05-16

## 2019-05-21 ENCOUNTER — TELEPHONE (OUTPATIENT)
Dept: INTERNAL MEDICINE | Facility: CLINIC | Age: 54
End: 2019-05-21

## 2019-05-21 NOTE — TELEPHONE ENCOUNTER
----- Message from Vikram Martinez sent at 5/21/2019 12:19 PM CDT -----  Contact: Patient [phone]  Patient is returning a phone call.  Who left a message for the patient: Dr Raya  Does patient know what this is regarding:  Previous message  Comments:     Please call an advise  Thank you

## 2019-05-21 NOTE — TELEPHONE ENCOUNTER
----- Message from Karan James MD sent at 5/15/2019  6:56 PM CDT -----  Patient doesn't use portal regularly, please call with results  There is one very small nodule and one area that may be a nodule. These overall look benign - no sign of cancer. I would recommend repeating the CT in 12 months for surveillance to make sure they are not changing.  Karan James MD

## 2019-05-21 NOTE — TELEPHONE ENCOUNTER
----- Message from Gabrielle Jerry sent at 5/21/2019 11:10 AM CDT -----  Contact: Patient [phone]  Type: Returning a call    Who left a message?Maria Antonia Boggs LPN    When did the practice call?Today- 05/21/19    Comments:Please call back.      Thanks

## 2019-05-21 NOTE — TELEPHONE ENCOUNTER
Pt returned call again and she was informed. Message sent to the appointment desk to place test on the hold list.

## 2019-06-06 DIAGNOSIS — G89.29 CHRONIC MIDLINE LOW BACK PAIN WITHOUT SCIATICA: ICD-10-CM

## 2019-06-06 DIAGNOSIS — G89.29 CHRONIC RIGHT SHOULDER PAIN: ICD-10-CM

## 2019-06-06 DIAGNOSIS — M25.511 CHRONIC RIGHT SHOULDER PAIN: ICD-10-CM

## 2019-06-06 DIAGNOSIS — M25.551 RIGHT HIP PAIN: ICD-10-CM

## 2019-06-06 DIAGNOSIS — M17.11 PRIMARY OSTEOARTHRITIS OF RIGHT KNEE: ICD-10-CM

## 2019-06-06 DIAGNOSIS — M54.50 CHRONIC MIDLINE LOW BACK PAIN WITHOUT SCIATICA: ICD-10-CM

## 2019-06-06 RX ORDER — MELOXICAM 15 MG/1
TABLET ORAL
Qty: 30 TABLET | Refills: 1 | Status: SHIPPED | OUTPATIENT
Start: 2019-06-06 | End: 2019-08-04 | Stop reason: SDUPTHER

## 2019-06-10 ENCOUNTER — DOCUMENTATION ONLY (OUTPATIENT)
Dept: REHABILITATION | Facility: OTHER | Age: 54
End: 2019-06-10

## 2019-06-10 NOTE — PROGRESS NOTES
DC NOTE FOR [facility] PT     Pt was treated 3 times, last visit on 11/5/19.  Treatment consisted of stretching and strengthening exercises for the lower back.  Goals of PT not met.  Pt did not return for any further care after last visit.  DC from [facility] as pt did not return for follow up.

## 2019-06-20 DIAGNOSIS — E55.9 VITAMIN D DEFICIENCY: ICD-10-CM

## 2019-06-20 RX ORDER — ERGOCALCIFEROL 1.25 MG/1
CAPSULE ORAL
Qty: 12 CAPSULE | Refills: 3 | Status: SHIPPED | OUTPATIENT
Start: 2019-06-20 | End: 2019-09-12 | Stop reason: SDUPTHER

## 2019-07-05 ENCOUNTER — PATIENT OUTREACH (OUTPATIENT)
Dept: ADMINISTRATIVE | Facility: HOSPITAL | Age: 54
End: 2019-07-05

## 2019-07-05 DIAGNOSIS — I10 ESSENTIAL HYPERTENSION: ICD-10-CM

## 2019-07-05 RX ORDER — AMLODIPINE BESYLATE 5 MG/1
TABLET ORAL
Qty: 90 TABLET | Refills: 1 | Status: SHIPPED | OUTPATIENT
Start: 2019-07-05 | End: 2019-11-19 | Stop reason: SDUPTHER

## 2019-07-05 NOTE — PROGRESS NOTES
Health Maintenance Due   Topic Date Due    TETANUS VACCINE  06/10/1983     Shingles vaccine due.    Pre-visit chart check complete.

## 2019-07-17 ENCOUNTER — OFFICE VISIT (OUTPATIENT)
Dept: INTERNAL MEDICINE | Facility: CLINIC | Age: 54
End: 2019-07-17
Payer: COMMERCIAL

## 2019-07-17 DIAGNOSIS — E78.2 MIXED HYPERLIPIDEMIA: ICD-10-CM

## 2019-07-17 DIAGNOSIS — E55.9 VITAMIN D DEFICIENCY: ICD-10-CM

## 2019-07-17 DIAGNOSIS — I10 ESSENTIAL HYPERTENSION: ICD-10-CM

## 2019-07-17 DIAGNOSIS — G47.9 DIFFICULTY SLEEPING: ICD-10-CM

## 2019-07-17 DIAGNOSIS — Z87.891 HISTORY OF SMOKING GREATER THAN 50 PACK YEARS: ICD-10-CM

## 2019-07-17 DIAGNOSIS — G47.33 OSA ON CPAP: Primary | ICD-10-CM

## 2019-07-17 DIAGNOSIS — R73.09 ELEVATED HEMOGLOBIN A1C: ICD-10-CM

## 2019-07-17 DIAGNOSIS — R91.1 PULMONARY NODULE: ICD-10-CM

## 2019-07-17 DIAGNOSIS — F41.9 ANXIETY: ICD-10-CM

## 2019-07-17 DIAGNOSIS — F33.41 RECURRENT MAJOR DEPRESSIVE DISORDER, IN PARTIAL REMISSION: ICD-10-CM

## 2019-07-17 PROCEDURE — 3079F DIAST BP 80-89 MM HG: CPT | Mod: CPTII,S$GLB,, | Performed by: INTERNAL MEDICINE

## 2019-07-17 PROCEDURE — 3008F BODY MASS INDEX DOCD: CPT | Mod: CPTII,S$GLB,, | Performed by: INTERNAL MEDICINE

## 2019-07-17 PROCEDURE — 99215 PR OFFICE/OUTPT VISIT, EST, LEVL V, 40-54 MIN: ICD-10-PCS | Mod: S$GLB,,, | Performed by: INTERNAL MEDICINE

## 2019-07-17 PROCEDURE — 99999 PR PBB SHADOW E&M-EST. PATIENT-LVL IV: ICD-10-PCS | Mod: PBBFAC,,, | Performed by: INTERNAL MEDICINE

## 2019-07-17 PROCEDURE — 99999 PR PBB SHADOW E&M-EST. PATIENT-LVL IV: CPT | Mod: PBBFAC,,, | Performed by: INTERNAL MEDICINE

## 2019-07-17 PROCEDURE — 99215 OFFICE O/P EST HI 40 MIN: CPT | Mod: S$GLB,,, | Performed by: INTERNAL MEDICINE

## 2019-07-17 PROCEDURE — 3008F PR BODY MASS INDEX (BMI) DOCUMENTED: ICD-10-PCS | Mod: CPTII,S$GLB,, | Performed by: INTERNAL MEDICINE

## 2019-07-17 PROCEDURE — 3079F PR MOST RECENT DIASTOLIC BLOOD PRESSURE 80-89 MM HG: ICD-10-PCS | Mod: CPTII,S$GLB,, | Performed by: INTERNAL MEDICINE

## 2019-07-17 PROCEDURE — 3075F PR MOST RECENT SYSTOLIC BLOOD PRESS GE 130-139MM HG: ICD-10-PCS | Mod: CPTII,S$GLB,, | Performed by: INTERNAL MEDICINE

## 2019-07-17 PROCEDURE — 3075F SYST BP GE 130 - 139MM HG: CPT | Mod: CPTII,S$GLB,, | Performed by: INTERNAL MEDICINE

## 2019-07-17 NOTE — PATIENT INSTRUCTIONS
Restart taking the amlodipine for your blood pressure. Continue to check your blood pressure at home -- your goal blood pressure is <130/80    Please add a daily vitamin D supplement that is 2000IU daily in addition to the once a week vitamin D.     There is a new shingles vaccine available (Shingrix) that is both safer and more effective than the old shingles vaccine (Zostavax). I do recommend that you get this, as it can help prevent a shingles outbreak even if you have had one in the past.   For this vaccine, you will need a series of 2 shots, first one and then a second 2 to 6 months later.  Please check in with the [facility] or with your local pharmacy about getting this vaccine; they should be able to check on insurance coverage and whether there is any cost to you.

## 2019-07-17 NOTE — PROGRESS NOTES
Subjective:       Patient ID: Batool Rivera is a 54 y.o. female.    Chief Complaint: Follow-up    HPI  55 y/o woman with h/o chronic back pain, obesity, HTN, HLD, GERD, BELLO, multiple other issues here for follow up.    BELLO on CPAP - following with Sleep Medicine clinic here, had titration study done 8/3/17 - 8cmH2O recommended  Saw Sleep Medicine   Not using CPAP recently - today says she has trouble using this, reminds her of when her twin brother was very ill.    HTN - on amlodipine 5mg  Didn't take medications yet today - on discussion says that she had side effects (?hallucinations, sleeping too much) with the losartan so stopped taking this along with the amlodipine     Dyslipidemia - taking atorvastatin 80mg, +zetia 10mg - taking these sometimes but not consistently    Elevated A1c - 5.7 on last check 4/2018    MDD, Anxiety, difficulty sleeping - On lexapro 20mg, taking this consistently every day; trazodone to help with sleep added, then increased to 100mg nightly, and since last visit has increased to 150mg nightly. Has continued to take this.  Struggling more recently as above related to her twin brother's death 3 yrs ago and other family stressors  No SI or thoughts of self-harm. +irritable at times, does better if spending time by herself regularly. Doesn't like being around crowds.    Vitamin D deficiency - Taking vitamin D supplement weekly.     Smoking history 2 packs/day x >25 years, quit >5 years ago  Screening lung CT done after last visit, no clearly abnormal findings, one micronodule, recommended for repeat at 12 months    Review of Systems   Constitutional: Negative for activity change and unexpected weight change.   HENT: Negative.    Eyes: Negative for visual disturbance.   Respiratory: Negative for cough and shortness of breath.    Cardiovascular: Negative.  Negative for chest pain and leg swelling.   Gastrointestinal: Negative.  Negative for abdominal pain.   Endocrine: Negative for  "polyuria.   Genitourinary: Negative for difficulty urinating and dysuria.   Musculoskeletal: Positive for back pain.   Skin: Negative.    Neurological: Negative for weakness and numbness.   Psychiatric/Behavioral: Positive for sleep disturbance. Negative for dysphoric mood. The patient is nervous/anxious.          Past medical history, surgical history, and family medical history reviewed and updated as appropriate.    Medications and allergies reviewed.     Objective:          Vitals:    07/17/19 0853 07/17/19 0916   BP: (!) 140/84 134/80   BP Location: Left arm    Patient Position: Sitting    Pulse: (!) 58    Temp: 98.7 °F (37.1 °C)    TempSrc: Oral    SpO2: 97%    Weight: 107.8 kg (237 lb 10.5 oz)    Height: 5' 8" (1.727 m)      Body mass index is 36.14 kg/m².  Physical Exam   Constitutional: She is oriented to person, place, and time. She appears well-developed and well-nourished. No distress.   HENT:   Head: Normocephalic and atraumatic.   Mouth/Throat: Oropharynx is clear and moist. No oropharyngeal exudate.   Eyes: Conjunctivae and EOM are normal. No scleral icterus.   Neck: Neck supple.   Cardiovascular: Normal rate, regular rhythm and normal heart sounds.   No murmur heard.  Pulmonary/Chest: Effort normal and breath sounds normal. No respiratory distress.   Abdominal: Soft. She exhibits no distension. There is no tenderness.   Musculoskeletal: She exhibits no edema or tenderness.   Limited ROM of low back   Lymphadenopathy:     She has no cervical adenopathy.   Neurological: She is alert and oriented to person, place, and time. She has normal strength. No cranial nerve deficit or sensory deficit. Gait normal.   Skin: Skin is warm and dry.   Psychiatric: Her behavior is normal.   Anxiety as noted   Vitals reviewed.      Lab Results   Component Value Date    WBC 8.82 05/13/2019    HGB 14.0 05/13/2019    HCT 39.8 05/13/2019     05/13/2019    CHOL 245 (H) 05/13/2019    TRIG 103 05/13/2019    HDL 40 " 05/13/2019    ALT 19 05/13/2019    AST 19 05/13/2019     05/13/2019    K 4.2 05/13/2019     05/13/2019    CREATININE 0.9 05/13/2019    BUN 12 05/13/2019    CO2 28 05/13/2019    TSH 2.641 04/04/2018    INR 1.1 02/17/2012    HGBA1C 5.7 (H) 05/13/2019       Assessment:       1. BELLO on CPAP    2. Difficulty sleeping    3. Elevated hemoglobin A1c    4. Mixed hyperlipidemia    5. Essential hypertension    6. Recurrent major depressive disorder, in partial remission    7. Anxiety    8. Vitamin D deficiency    9. History of smoking greater than 50 pack years    10. Pulmonary nodule        Plan:   Batool was seen today for follow-up.    Diagnoses and all orders for this visit:    BELLO on CPAP - reviewed health importance of treating BELLO appropriately, recommended to restart this. Follow up with Sleep Medicine clinic    Difficulty sleeping - ok to continue trazodone; taking this at higher dose that may also help with depression/anxiety, though with lexapro will also need to watch for any signs/sx of serotonin syndrome. None today.    Elevated hemoglobin A1c - will follow    Mixed hyperlipidemia - reviewed, continue statin    Essential hypertension - at goal on recheck, continue current regimen    Recurrent major depressive disorder, in partial remission  Anxiety - again encouraged her to look into counseling.  Medications as noted above    Vitamin D deficiency  Comments:  continue weekly supplement, will monitor. If still low should add 2000IU daily    History of smoking greater than 50 pack years  Pulmonary nodule  Comments:  Smoking history 2 ppd x >25 years, quit >5 years ago. Screening lung CT done - no clearly abnormal findings, one micronodule, repeat in 12 months    Health maintenance reviewed with patient.   Should get flu vaccine once available  Discussed shingrix, rec'd check with pharmacy  Follow up in about 4 months (around 11/17/2019) for hypertension.    Karan James MD  Internal Medicine  [facility]  7/17/2019    45 minutes spent with patient with >50% of visit spent counseling patient regarding conditions documented above and planning for care coordination. All questions answered.

## 2019-07-30 VITALS
HEIGHT: 68 IN | WEIGHT: 237.63 LBS | BODY MASS INDEX: 36.02 KG/M2 | DIASTOLIC BLOOD PRESSURE: 80 MMHG | HEART RATE: 58 BPM | OXYGEN SATURATION: 97 % | TEMPERATURE: 99 F | SYSTOLIC BLOOD PRESSURE: 134 MMHG

## 2019-07-30 PROBLEM — F33.41 RECURRENT MAJOR DEPRESSIVE DISORDER, IN PARTIAL REMISSION: Status: ACTIVE | Noted: 2019-07-30

## 2019-08-01 ENCOUNTER — OFFICE VISIT (OUTPATIENT)
Dept: PODIATRY | Facility: CLINIC | Age: 54
End: 2019-08-01
Payer: COMMERCIAL

## 2019-08-01 VITALS
HEIGHT: 68 IN | WEIGHT: 237 LBS | DIASTOLIC BLOOD PRESSURE: 84 MMHG | HEART RATE: 79 BPM | SYSTOLIC BLOOD PRESSURE: 152 MMHG | BODY MASS INDEX: 35.92 KG/M2

## 2019-08-01 DIAGNOSIS — M76.62 ACHILLES TENDINITIS OF LEFT LOWER EXTREMITY: Primary | ICD-10-CM

## 2019-08-01 DIAGNOSIS — L97.909 ULCER OF LOWER EXTREMITY, UNSPECIFIED LATERALITY, UNSPECIFIED ULCER STAGE: ICD-10-CM

## 2019-08-01 PROCEDURE — 99999 PR PBB SHADOW E&M-EST. PATIENT-LVL III: ICD-10-PCS | Mod: PBBFAC,,, | Performed by: PODIATRIST

## 2019-08-01 PROCEDURE — 99203 OFFICE O/P NEW LOW 30 MIN: CPT | Mod: S$PBB,,, | Performed by: PODIATRIST

## 2019-08-01 PROCEDURE — 99999 PR PBB SHADOW E&M-EST. PATIENT-LVL III: CPT | Mod: PBBFAC,,, | Performed by: PODIATRIST

## 2019-08-01 PROCEDURE — 99203 PR OFFICE/OUTPT VISIT, NEW, LEVL III, 30-44 MIN: ICD-10-PCS | Mod: S$PBB,,, | Performed by: PODIATRIST

## 2019-08-01 RX ORDER — TERBINAFINE HYDROCHLORIDE 250 MG/1
250 TABLET ORAL DAILY
Qty: 30 TABLET | Refills: 2 | Status: SHIPPED | OUTPATIENT
Start: 2019-08-01 | End: 2019-08-31

## 2019-08-01 RX ORDER — DICLOFENAC SODIUM 10 MG/G
2 GEL TOPICAL 4 TIMES DAILY
Qty: 1 TUBE | Refills: 2 | Status: SHIPPED | OUTPATIENT
Start: 2019-08-01 | End: 2019-08-08 | Stop reason: SDUPTHER

## 2019-08-04 DIAGNOSIS — M25.511 CHRONIC RIGHT SHOULDER PAIN: ICD-10-CM

## 2019-08-04 DIAGNOSIS — G89.29 CHRONIC MIDLINE LOW BACK PAIN WITHOUT SCIATICA: ICD-10-CM

## 2019-08-04 DIAGNOSIS — G89.29 CHRONIC RIGHT SHOULDER PAIN: ICD-10-CM

## 2019-08-04 DIAGNOSIS — F33.2 SEVERE EPISODE OF RECURRENT MAJOR DEPRESSIVE DISORDER, WITHOUT PSYCHOTIC FEATURES: Chronic | ICD-10-CM

## 2019-08-04 DIAGNOSIS — M25.551 RIGHT HIP PAIN: ICD-10-CM

## 2019-08-04 DIAGNOSIS — M54.50 CHRONIC MIDLINE LOW BACK PAIN WITHOUT SCIATICA: ICD-10-CM

## 2019-08-04 DIAGNOSIS — M17.11 PRIMARY OSTEOARTHRITIS OF RIGHT KNEE: ICD-10-CM

## 2019-08-05 PROBLEM — F33.41 RECURRENT MAJOR DEPRESSIVE DISORDER, IN PARTIAL REMISSION: Chronic | Status: ACTIVE | Noted: 2019-07-30

## 2019-08-05 PROBLEM — R91.1 PULMONARY NODULE: Status: ACTIVE | Noted: 2019-08-05

## 2019-08-05 PROBLEM — Z87.891 HISTORY OF SMOKING GREATER THAN 50 PACK YEARS: Status: ACTIVE | Noted: 2019-08-05

## 2019-08-05 RX ORDER — TRAZODONE HYDROCHLORIDE 100 MG/1
TABLET ORAL
Qty: 45 TABLET | Refills: 3 | Status: SHIPPED | OUTPATIENT
Start: 2019-08-05 | End: 2019-11-19 | Stop reason: SDUPTHER

## 2019-08-05 RX ORDER — MELOXICAM 15 MG/1
TABLET ORAL
Qty: 90 TABLET | Refills: 0 | Status: SHIPPED | OUTPATIENT
Start: 2019-08-05 | End: 2019-10-28 | Stop reason: SDUPTHER

## 2019-08-06 NOTE — PROGRESS NOTES
Subjective:      Patient ID: Batool Rivera is a 54 y.o. female.    Chief Complaint: numbess (lt more severe than rt foot pain)    Batool is a 54 y.o. female who presents to the clinic complaining of thick and discolored toenails on both feet. Batool is inquiring about treatment options.      Review of Systems   Constitution: Negative for chills and fever.   HENT: Negative for congestion and tinnitus.    Eyes: Negative for double vision and visual disturbance.   Cardiovascular: Negative for chest pain and claudication.   Respiratory: Negative for hemoptysis and shortness of breath.    Endocrine: Negative for cold intolerance and heat intolerance.   Hematologic/Lymphatic: Negative for adenopathy and bleeding problem.   Skin: Positive for color change, dry skin and nail changes.   Musculoskeletal: Positive for myalgias and stiffness.   Gastrointestinal: Negative for nausea and vomiting.   Genitourinary: Negative for dysuria and hematuria.   Neurological: Positive for numbness and paresthesias.   Psychiatric/Behavioral: Negative for altered mental status and suicidal ideas.   Allergic/Immunologic: Negative for environmental allergies and persistent infections.           Objective:      Physical Exam   Constitutional: She is oriented to person, place, and time. She appears well-developed and well-nourished.   Cardiovascular:   Pulses:       Dorsalis pedis pulses are 1+ on the right side, and 1+ on the left side.        Posterior tibial pulses are 1+ on the right side, and 1+ on the left side.   Pulmonary/Chest: Effort normal.   Musculoskeletal: Normal range of motion.   Anterior, lateral, and posterior muscle groups bilateral lower extremities show strength 4 over 5 symmetrically. Inspection and palpation of the joints and bones reveal no crepitus or joint effusion. No tenderness upon palpation.  Tenderness to the left Achilles tendon insertion site increased tenderness with plantar flexion against resistance.   Feet:    Right Foot:   Skin Integrity: Positive for callus and dry skin.   Left Foot:   Skin Integrity: Positive for callus and dry skin.   Neurological: She is alert and oriented to person, place, and time. She displays atrophy and abnormal reflex. A sensory deficit is present.   Reflex Scores:       Patellar reflexes are 1+ on the right side and 1+ on the left side.       Achilles reflexes are 1+ on the right side and 1+ on the left side.  Sharp, dull, light touch, and vibratory sensation are diminished bilaterally. Proprioceptive sensation is intact to both lower extremities. [place] monofilament exam shows loss of protective sensation to plantar toes 1 through 5 bilaterally. Deep tendon reflexes to the patellar tendons is 1 over 4 bilaterally symmetrical. Deep tendon reflexes to the Achilles tendon is 0 over 4 bilaterally symmetrical. No ankle clonus or Babinski reflex noted bilaterally. Coordination is fair to both lower extremities.  Patient admits to intermittent burning and tingling in the feet.   Skin: Skin is warm and dry. Capillary refill takes 2 to 3 seconds. There is pallor.   Skin bilateral lower extremities noted to be thin, dry, and atrophic.  Toenails thickened, discolored, with subungual fungal debris and tenderness noted.  Hyperkeratotic lesions noted to both feet plantarly with tenderness.   Psychiatric: She has a normal mood and affect.   Vitals reviewed.            Assessment:       Encounter Diagnoses   Name Primary?    Achilles tendinitis of left lower extremity Yes    Ulcer of lower extremity, unspecified laterality, unspecified ulcer stage          Plan:       Batool was seen today for numbess.    Diagnoses and all orders for this visit:    Achilles tendinitis of left lower extremity  -     diclofenac sodium (VOLTAREN) 1 % Gel; Apply 2 g topically 4 (four) times daily.    Ulcer of lower extremity, unspecified laterality, unspecified ulcer stage    Other orders  -     terbinafine HCl  (LAMISIL) 250 mg tablet; Take 1 tablet (250 mg total) by mouth once daily.      I counseled the patient on her conditions, their implications and medical management.        . We discussed using Lamisil for onychomycosis. This drug offers a fairly high but not universal cure rate. A 12 week treatment course is recommended. The patient is aware that rare cases of liver injury have been reported; and agrees to come in for liver function tests at 6 weeks of treatment. The symptoms of liver disease have been discussed; call if such occurs. In addition, some insurance plans do not cover the expense of this drug for treating a cosmetic condition, and the patient understands they may have to pay for the medication. Other side effects, such as headaches and rashes, have also been discussed.    Begin icing, stretching mildly, topical anti-inflammatory medication as well as a night splint.  Appropriate shoe gear and inserts with heel lift discussed in detail.  Follow-up in 4 weeks.

## 2019-08-08 ENCOUNTER — OFFICE VISIT (OUTPATIENT)
Dept: PHYSICAL MEDICINE AND REHAB | Facility: CLINIC | Age: 54
End: 2019-08-08
Payer: MEDICAID

## 2019-08-08 VITALS
HEART RATE: 68 BPM | WEIGHT: 233.69 LBS | BODY MASS INDEX: 38.93 KG/M2 | SYSTOLIC BLOOD PRESSURE: 152 MMHG | DIASTOLIC BLOOD PRESSURE: 80 MMHG | HEIGHT: 65 IN

## 2019-08-08 DIAGNOSIS — G89.29 CHRONIC MIDLINE LOW BACK PAIN WITHOUT SCIATICA: Primary | ICD-10-CM

## 2019-08-08 DIAGNOSIS — G89.29 CHRONIC RIGHT SHOULDER PAIN: ICD-10-CM

## 2019-08-08 DIAGNOSIS — M25.511 CHRONIC RIGHT SHOULDER PAIN: ICD-10-CM

## 2019-08-08 DIAGNOSIS — M16.11 PRIMARY OSTEOARTHRITIS OF RIGHT HIP: ICD-10-CM

## 2019-08-08 DIAGNOSIS — M54.50 CHRONIC MIDLINE LOW BACK PAIN WITHOUT SCIATICA: Primary | ICD-10-CM

## 2019-08-08 DIAGNOSIS — M76.62 ACHILLES TENDINITIS OF LEFT LOWER EXTREMITY: ICD-10-CM

## 2019-08-08 DIAGNOSIS — E66.01 SEVERE OBESITY (BMI 35.0-35.9 WITH COMORBIDITY): ICD-10-CM

## 2019-08-08 DIAGNOSIS — M47.816 SPONDYLOSIS OF LUMBAR REGION WITHOUT MYELOPATHY OR RADICULOPATHY: ICD-10-CM

## 2019-08-08 DIAGNOSIS — M17.11 PRIMARY OSTEOARTHRITIS OF RIGHT KNEE: ICD-10-CM

## 2019-08-08 PROCEDURE — 99214 PR OFFICE/OUTPT VISIT, EST, LEVL IV, 30-39 MIN: ICD-10-PCS | Mod: S$GLB,,, | Performed by: PHYSICAL MEDICINE & REHABILITATION

## 2019-08-08 PROCEDURE — 99214 OFFICE O/P EST MOD 30 MIN: CPT | Mod: S$GLB,,, | Performed by: PHYSICAL MEDICINE & REHABILITATION

## 2019-08-08 PROCEDURE — 99999 PR PBB SHADOW E&M-EST. PATIENT-LVL II: CPT | Mod: PBBFAC,,, | Performed by: PHYSICAL MEDICINE & REHABILITATION

## 2019-08-08 PROCEDURE — 99999 PR PBB SHADOW E&M-EST. PATIENT-LVL II: ICD-10-PCS | Mod: PBBFAC,,, | Performed by: PHYSICAL MEDICINE & REHABILITATION

## 2019-08-08 PROCEDURE — 99212 OFFICE O/P EST SF 10 MIN: CPT | Mod: PBBFAC | Performed by: PHYSICAL MEDICINE & REHABILITATION

## 2019-08-08 RX ORDER — DICLOFENAC SODIUM 10 MG/G
2 GEL TOPICAL 3 TIMES DAILY
Qty: 3 TUBE | Refills: 2 | Status: SHIPPED | OUTPATIENT
Start: 2019-08-08 | End: 2020-11-24

## 2019-08-08 RX ORDER — GABAPENTIN 400 MG/1
400 CAPSULE ORAL 2 TIMES DAILY
Start: 2019-08-08 | End: 2019-12-20 | Stop reason: SDUPTHER

## 2019-08-08 NOTE — PROGRESS NOTES
Subjective:       Patient ID: Baotol Rivera is a 54 y.o. female.    Chief Complaint: No chief complaint on file.    HPI     Mrs. Rivera is a 54-year-old black female with past medical history of HTN, depression/anxiety, OA, obesity, and/or pain.  She is followed up in the Physical Medicine Clinic for chronic low back pain, chronic right knee pain, bilateral hip pain and recurrent right shoulder pain.  Her last visit to the clinic was on 4/2/19.  She was maintained on gabapentin and p.r.n. Tylenol.  Meloxicam was not tolerated in the past due to chest pain suspected to be due to gastric or cardiac origin.    The patient is coming today to the clinic for follow up.  Her back pain has been under good control.   It is an intermittent aching pain in the lumbar spine and across her back.  She denies any radiation to her legs, but  still complain of persistent numbness in the left toes, not associated with her back pain.  Her back pain is aggravated by activity.  Her maximum pain is 10/10 and minimum 0/10.  Today, it is 0/10.  The patient has mild lower extremity weakness which she attributes to trazodone.  She denies any bowel or bladder incontinence.      Her right knee pain has been under good control.  Her bilateral hip pain is stable.  It is an intermittent aching pain aggravated by slow pace and better with walking a little faster.  Her maximum pain is 8/10 and minimum 0/10.  Today, it is 0/10.    Her right shoulder pain has been under good control.  It is an intermittent aching pain aggravated by shoulder movement. Her maximum pain is 7/10 an minimum 0/10; today it is 0/10.    She is currently taking gabapentin 400 mg capsules only as needed.  She has been out of all terrain gel for a while.  She takes Tylenol 500 mg p.r.n..      Review of Systems   Constitutional: Negative for fatigue.   Eyes: Negative for visual disturbance.   Respiratory: Negative for shortness of breath.    Cardiovascular: Negative for chest  pain.   Gastrointestinal: Negative for blood in stool, constipation, nausea and vomiting.   Genitourinary: Negative for difficulty urinating.   Musculoskeletal: Positive for arthralgias and back pain. Negative for gait problem, joint swelling and neck pain.   Neurological: Positive for headaches. Negative for dizziness.   Psychiatric/Behavioral: Positive for sleep disturbance. Negative for behavioral problems.       Objective:      Physical Exam   Constitutional: She is oriented to person, place, and time. She appears well-developed and well-nourished.   HENT:   Head: Normocephalic and atraumatic.   Neck: Normal range of motion.   Mild tenderness low C-spine.   Musculoskeletal:   BUE:  ROM:   RUE: full.   LUE: full.  Strength:    RUE: 5-/5 at shoulder abduction, 5 elbow flexion, 5 elbow extension, 5 hand .   LUE: 5-/5 at shoulder abduction, 5 elbow flexion, 5 elbow extension, 5 hand .  Sensation to pinprick:   RUE: intact.   LUE: intact.    BLE:  ROM:   RLE: full.   LLE: full.  Knee crepitus:   RLE: +ve.   LLE: +ve.   Strength:    RLE: 5/5 at hip flexion, 5 knee extension, 5 ankle DF, 5 PF.   LLE: 5/5 at hip flexion, 5 knee extension, 5 ankle DF, 5 PF.  Sensation to pinprick:     RLE: intact.      LLE: intact.   SLR (sitting):      RLE: -ve.      LLE: -ve.    -ve tenderness over lumbar spine.    Gait: WNL     Neurological: She is alert and oriented to person, place, and time.   Skin: Skin is warm.   Psychiatric: She has a normal mood and affect. Her behavior is normal.   Vitals reviewed.      Assessment:       1. Chronic midline low back pain without sciatica    2. Spondylosis of lumbar region without myelopathy or radiculopathy    3. Primary osteoarthritis of right knee    4. Primary osteoarthritis of right hip    5. Chronic right shoulder pain    6. Severe obesity (BMI 35.0-35.9 with comorbidity)        Plan:         - Continue acetaminophen (TYLENOL) 500 MG tablet; Take 1-2 tablets (500-1,000 mg total) by  mouth 3 (three) times daily as needed for Pain.  - Take consistently gabapentin 400 mg po qhs. .  May increase to twice daily (early evening and late evening due to sedation)..  - Regular home exercise program was encouraged.  - Weight loss was encouraged.   - Follow up in about 4 months (around 12/8/2019).     This was a 25 minute visit, more than 50% of which was spent counseling the patient about the diagnosis and the treatment plan.

## 2019-09-12 DIAGNOSIS — E55.9 VITAMIN D DEFICIENCY: ICD-10-CM

## 2019-09-12 RX ORDER — ERGOCALCIFEROL 1.25 MG/1
50000 CAPSULE ORAL
Qty: 12 CAPSULE | Refills: 3 | Status: SHIPPED | OUTPATIENT
Start: 2019-09-12 | End: 2020-07-12

## 2019-10-03 DIAGNOSIS — F39 MOOD DISORDER: ICD-10-CM

## 2019-10-03 RX ORDER — ESCITALOPRAM OXALATE 20 MG/1
TABLET ORAL
Qty: 90 TABLET | Refills: 0 | Status: SHIPPED | OUTPATIENT
Start: 2019-10-03 | End: 2019-12-30

## 2019-10-28 DIAGNOSIS — M17.11 PRIMARY OSTEOARTHRITIS OF RIGHT KNEE: ICD-10-CM

## 2019-10-28 DIAGNOSIS — G89.29 CHRONIC RIGHT SHOULDER PAIN: ICD-10-CM

## 2019-10-28 DIAGNOSIS — M25.511 CHRONIC RIGHT SHOULDER PAIN: ICD-10-CM

## 2019-10-28 DIAGNOSIS — G89.29 CHRONIC MIDLINE LOW BACK PAIN WITHOUT SCIATICA: ICD-10-CM

## 2019-10-28 DIAGNOSIS — M25.551 RIGHT HIP PAIN: ICD-10-CM

## 2019-10-28 DIAGNOSIS — M54.50 CHRONIC MIDLINE LOW BACK PAIN WITHOUT SCIATICA: ICD-10-CM

## 2019-10-28 RX ORDER — MELOXICAM 15 MG/1
TABLET ORAL
Qty: 90 TABLET | Refills: 0 | Status: SHIPPED | OUTPATIENT
Start: 2019-10-28 | End: 2019-12-20 | Stop reason: SINTOL

## 2019-11-04 ENCOUNTER — PATIENT OUTREACH (OUTPATIENT)
Dept: ADMINISTRATIVE | Facility: HOSPITAL | Age: 54
End: 2019-11-04

## 2019-11-04 ENCOUNTER — OFFICE VISIT (OUTPATIENT)
Dept: PODIATRY | Facility: CLINIC | Age: 54
End: 2019-11-04
Payer: COMMERCIAL

## 2019-11-04 VITALS
BODY MASS INDEX: 38.82 KG/M2 | HEIGHT: 65 IN | WEIGHT: 233 LBS | SYSTOLIC BLOOD PRESSURE: 156 MMHG | DIASTOLIC BLOOD PRESSURE: 82 MMHG | HEART RATE: 74 BPM

## 2019-11-04 DIAGNOSIS — B35.1 ONYCHOMYCOSIS DUE TO DERMATOPHYTE: ICD-10-CM

## 2019-11-04 DIAGNOSIS — M76.62 ACHILLES TENDINITIS OF LEFT LOWER EXTREMITY: Primary | ICD-10-CM

## 2019-11-04 DIAGNOSIS — D36.10 NEUROMA: ICD-10-CM

## 2019-11-04 PROCEDURE — 99214 OFFICE O/P EST MOD 30 MIN: CPT | Mod: S$GLB,,, | Performed by: PODIATRIST

## 2019-11-04 PROCEDURE — 99999 PR PBB SHADOW E&M-EST. PATIENT-LVL III: ICD-10-PCS | Mod: PBBFAC,,, | Performed by: PODIATRIST

## 2019-11-04 PROCEDURE — 3077F PR MOST RECENT SYSTOLIC BLOOD PRESSURE >= 140 MM HG: ICD-10-PCS | Mod: CPTII,S$GLB,, | Performed by: PODIATRIST

## 2019-11-04 PROCEDURE — 3008F BODY MASS INDEX DOCD: CPT | Mod: CPTII,S$GLB,, | Performed by: PODIATRIST

## 2019-11-04 PROCEDURE — 3077F SYST BP >= 140 MM HG: CPT | Mod: CPTII,S$GLB,, | Performed by: PODIATRIST

## 2019-11-04 PROCEDURE — 3079F DIAST BP 80-89 MM HG: CPT | Mod: CPTII,S$GLB,, | Performed by: PODIATRIST

## 2019-11-04 PROCEDURE — 99214 PR OFFICE/OUTPT VISIT, EST, LEVL IV, 30-39 MIN: ICD-10-PCS | Mod: S$GLB,,, | Performed by: PODIATRIST

## 2019-11-04 PROCEDURE — 3008F PR BODY MASS INDEX (BMI) DOCUMENTED: ICD-10-PCS | Mod: CPTII,S$GLB,, | Performed by: PODIATRIST

## 2019-11-04 PROCEDURE — 99999 PR PBB SHADOW E&M-EST. PATIENT-LVL III: CPT | Mod: PBBFAC,,, | Performed by: PODIATRIST

## 2019-11-04 PROCEDURE — 3079F PR MOST RECENT DIASTOLIC BLOOD PRESSURE 80-89 MM HG: ICD-10-PCS | Mod: CPTII,S$GLB,, | Performed by: PODIATRIST

## 2019-11-04 RX ORDER — TERBINAFINE HYDROCHLORIDE 250 MG/1
250 TABLET ORAL DAILY
Qty: 30 TABLET | Refills: 0 | Status: SHIPPED | OUTPATIENT
Start: 2019-11-04 | End: 2020-02-02

## 2019-11-04 NOTE — PROGRESS NOTES
Spoke with pt, made aware to take scheduled Am blood pressure medications and to bring blood pressure readings and monitor to PCP visit. Vaccines Updated.

## 2019-11-06 NOTE — PROGRESS NOTES
Subjective:      Patient ID: Batool Rivera is a 54 y.o. female.    Chief Complaint: Toe Pain (rt great toe burning sensation)    Batool is a 54 y.o. female who presents to the clinic complaining of thick and discolored toenails on both feet as well as follow-up Achilles tendon which is improving. Batool is inquiring about treatment options.      Review of Systems   Constitution: Negative for chills and fever.   HENT: Negative for congestion and tinnitus.    Eyes: Negative for double vision and visual disturbance.   Cardiovascular: Negative for chest pain and claudication.   Respiratory: Negative for hemoptysis and shortness of breath.    Endocrine: Negative for cold intolerance and heat intolerance.   Hematologic/Lymphatic: Negative for adenopathy and bleeding problem.   Skin: Positive for color change, dry skin and nail changes.   Musculoskeletal: Positive for myalgias and stiffness.   Gastrointestinal: Negative for nausea and vomiting.   Genitourinary: Negative for dysuria and hematuria.   Neurological: Positive for numbness and paresthesias.   Psychiatric/Behavioral: Negative for altered mental status and suicidal ideas.   Allergic/Immunologic: Negative for environmental allergies and persistent infections.           Objective:      Physical Exam   Constitutional: She is oriented to person, place, and time. She appears well-developed and well-nourished.   Cardiovascular:   Pulses:       Dorsalis pedis pulses are 1+ on the right side, and 1+ on the left side.        Posterior tibial pulses are 1+ on the right side, and 1+ on the left side.   Pulmonary/Chest: Effort normal.   Musculoskeletal: Normal range of motion.   Anterior, lateral, and posterior muscle groups bilateral lower extremities show strength 4 over 5 symmetrically. Inspection and palpation of the joints and bones reveal no crepitus or joint effusion. No tenderness upon palpation.  Resolved  tenderness to the left Achilles tendon insertion site.    Feet:   Right Foot:   Skin Integrity: Positive for callus and dry skin.   Left Foot:   Skin Integrity: Positive for callus and dry skin.   Neurological: She is alert and oriented to person, place, and time. She displays atrophy and abnormal reflex. A sensory deficit is present.   Reflex Scores:       Patellar reflexes are 1+ on the right side and 1+ on the left side.       Achilles reflexes are 1+ on the right side and 1+ on the left side.  Sharp, dull, light touch, and vibratory sensation are diminished bilaterally. Proprioceptive sensation is intact to both lower extremities. [place] monofilament exam shows loss of protective sensation to plantar toes 1 through 5 bilaterally. Deep tendon reflexes to the patellar tendons is 1 over 4 bilaterally symmetrical. Deep tendon reflexes to the Achilles tendon is 0 over 4 bilaterally symmetrical. No ankle clonus or Babinski reflex noted bilaterally. Coordination is fair to both lower extremities.  Patient admits to intermittent burning and tingling in the feet.   Skin: Skin is warm and dry. Capillary refill takes 2 to 3 seconds. There is pallor.   Skin bilateral lower extremities noted to be thin, dry, and atrophic.  Toenails thickened, discolored, with subungual fungal debris and tenderness noted.  Hyperkeratotic lesions noted to both feet plantarly with tenderness.   Psychiatric: She has a normal mood and affect.   Vitals reviewed.            Assessment:       Encounter Diagnoses   Name Primary?    Achilles tendinitis of left lower extremity Yes    Neuroma     Onychomycosis due to dermatophyte          Plan:       Batool was seen today for toe pain.    Diagnoses and all orders for this visit:    Achilles tendinitis of left lower extremity    Neuroma    Onychomycosis due to dermatophyte    Other orders  -     terbinafine HCl (LAMISIL) 250 mg tablet; Take 1 tablet (250 mg total) by mouth once daily.      I counseled the patient on her conditions, their  implications and medical management.        . We discussed using Lamisil for onychomycosis. This drug offers a fairly high but not universal cure rate. A 12 week treatment course is recommended. The patient is aware that rare cases of liver injury have been reported; and agrees to come in for liver function tests at 6 weeks of treatment. The symptoms of liver disease have been discussed; call if such occurs. In addition, some insurance plans do not cover the expense of this drug for treating a cosmetic condition, and the patient understands they may have to pay for the medication. Other side effects, such as headaches and rashes, have also been discussed.     Follow-up in 3 months.

## 2019-11-19 ENCOUNTER — OFFICE VISIT (OUTPATIENT)
Dept: INTERNAL MEDICINE | Facility: CLINIC | Age: 54
End: 2019-11-19
Payer: COMMERCIAL

## 2019-11-19 VITALS
DIASTOLIC BLOOD PRESSURE: 88 MMHG | HEIGHT: 65 IN | BODY MASS INDEX: 39.59 KG/M2 | SYSTOLIC BLOOD PRESSURE: 134 MMHG | WEIGHT: 237.63 LBS | OXYGEN SATURATION: 99 % | TEMPERATURE: 98 F | HEART RATE: 62 BPM

## 2019-11-19 DIAGNOSIS — G47.9 SLEEP DIFFICULTIES: ICD-10-CM

## 2019-11-19 DIAGNOSIS — R73.09 ELEVATED HEMOGLOBIN A1C: ICD-10-CM

## 2019-11-19 DIAGNOSIS — E78.5 HYPERLIPIDEMIA, UNSPECIFIED HYPERLIPIDEMIA TYPE: ICD-10-CM

## 2019-11-19 DIAGNOSIS — I10 ESSENTIAL HYPERTENSION: Primary | ICD-10-CM

## 2019-11-19 DIAGNOSIS — G47.33 OSA (OBSTRUCTIVE SLEEP APNEA): ICD-10-CM

## 2019-11-19 DIAGNOSIS — E55.9 VITAMIN D DEFICIENCY: ICD-10-CM

## 2019-11-19 DIAGNOSIS — F33.41 RECURRENT MAJOR DEPRESSIVE DISORDER, IN PARTIAL REMISSION: ICD-10-CM

## 2019-11-19 PROCEDURE — 3075F SYST BP GE 130 - 139MM HG: CPT | Mod: CPTII,S$GLB,, | Performed by: INTERNAL MEDICINE

## 2019-11-19 PROCEDURE — 99999 PR PBB SHADOW E&M-EST. PATIENT-LVL IV: ICD-10-PCS | Mod: PBBFAC,,, | Performed by: INTERNAL MEDICINE

## 2019-11-19 PROCEDURE — 3079F PR MOST RECENT DIASTOLIC BLOOD PRESSURE 80-89 MM HG: ICD-10-PCS | Mod: CPTII,S$GLB,, | Performed by: INTERNAL MEDICINE

## 2019-11-19 PROCEDURE — 99999 PR PBB SHADOW E&M-EST. PATIENT-LVL IV: CPT | Mod: PBBFAC,,, | Performed by: INTERNAL MEDICINE

## 2019-11-19 PROCEDURE — 3079F DIAST BP 80-89 MM HG: CPT | Mod: CPTII,S$GLB,, | Performed by: INTERNAL MEDICINE

## 2019-11-19 PROCEDURE — 99214 OFFICE O/P EST MOD 30 MIN: CPT | Mod: S$GLB,,, | Performed by: INTERNAL MEDICINE

## 2019-11-19 PROCEDURE — 3008F BODY MASS INDEX DOCD: CPT | Mod: CPTII,S$GLB,, | Performed by: INTERNAL MEDICINE

## 2019-11-19 PROCEDURE — 99214 PR OFFICE/OUTPT VISIT, EST, LEVL IV, 30-39 MIN: ICD-10-PCS | Mod: S$GLB,,, | Performed by: INTERNAL MEDICINE

## 2019-11-19 PROCEDURE — 3075F PR MOST RECENT SYSTOLIC BLOOD PRESS GE 130-139MM HG: ICD-10-PCS | Mod: CPTII,S$GLB,, | Performed by: INTERNAL MEDICINE

## 2019-11-19 PROCEDURE — 3008F PR BODY MASS INDEX (BMI) DOCUMENTED: ICD-10-PCS | Mod: CPTII,S$GLB,, | Performed by: INTERNAL MEDICINE

## 2019-11-19 RX ORDER — TRAZODONE HYDROCHLORIDE 150 MG/1
150 TABLET ORAL NIGHTLY
Qty: 90 TABLET | Refills: 1 | Status: SHIPPED | OUTPATIENT
Start: 2019-11-19 | End: 2020-04-13

## 2019-11-19 RX ORDER — ATORVASTATIN CALCIUM 80 MG/1
TABLET, FILM COATED ORAL
Qty: 90 TABLET | Refills: 3 | Status: SHIPPED | OUTPATIENT
Start: 2019-11-19 | End: 2020-02-05

## 2019-11-19 RX ORDER — AMLODIPINE BESYLATE 10 MG/1
10 TABLET ORAL NIGHTLY
Qty: 90 TABLET | Refills: 1 | Status: SHIPPED | OUTPATIENT
Start: 2019-11-19 | End: 2020-04-13

## 2019-11-19 NOTE — PROGRESS NOTES
"Subjective:       Patient ID: Batool Rivera is a 54 y.o. female.    Chief Complaint: Follow-up (4 MONTH) and Blood Pressure Check    HPI  55 y/o woman with h/o chronic back pain, obesity, HTN, HLD, GERD, BELLO, multiple other issues here for follow up focused on blood pressure.    HTN - was previously on losartan + amlodipine. Didn't tolerate the losartan (reported possible hallucinations, sleeping too much that improved with stopping this), but stopped both meds prior to last visit.  Was instructed to restart amlodipine last visit - has been filling this, not taking every day.   Checking BP at home sometimes, reports last check in 130-140/60s when taking the amlodipine.  Has trouble remembering to take medications sometimes. Not currently using alarms or pill box    BELLO on CPAP - following with Sleep Medicine clinic here, had titration study done 8/3/17. Wasn't using this at last visit; has tried using this again but "I feel like I'm smothered" so again stopped.   Always sleeps on her side; doesn't sleep on her back.  Would consider trying a different mask with different position of tubing    Dyslipidemia - prescribed atorvastatin 80mg, +zetia 10mg   Now taking these more consistently but not every day    MDD, Anxiety, difficulty sleeping - On lexapro 20mg, taking this consistently every day; trazodone to help with sleep added - taking 150mg nightly which does help her to sleep.    Review of Systems   Constitutional: Negative for activity change, fatigue and unexpected weight change.   HENT: Negative.    Eyes: Negative for visual disturbance.   Respiratory: Negative for cough and shortness of breath.    Cardiovascular: Negative for chest pain, palpitations and leg swelling.   Gastrointestinal: Negative for abdominal pain.   Endocrine: Negative.    Genitourinary: Negative for difficulty urinating and dysuria.   Musculoskeletal: Positive for back pain. Negative for joint swelling.   Skin: Negative for rash. " "  Neurological: Negative for weakness and numbness.   Psychiatric/Behavioral: Positive for dysphoric mood (stable; doing ok today). Negative for sleep disturbance (improved). The patient is not nervous/anxious.          Past medical history, surgical history, and family medical history reviewed and updated as appropriate.    Medications and allergies reviewed.     Objective:          Vitals:    11/19/19 0905   BP: 134/88   BP Location: Left arm   Patient Position: Sitting   BP Method: Medium (Manual)   Pulse: 62   Temp: 98.2 °F (36.8 °C)   SpO2: 99%   Weight: 107.8 kg (237 lb 10.5 oz)   Height: 5' 5" (1.651 m)     Body mass index is 39.55 kg/m².  Physical Exam   Constitutional: She is oriented to person, place, and time. She appears well-developed and well-nourished. No distress.   HENT:   Head: Normocephalic and atraumatic.   Mouth/Throat: Oropharynx is clear and moist. No oropharyngeal exudate.   Eyes: Conjunctivae and EOM are normal. No scleral icterus.   Neck: Neck supple.   Cardiovascular: Normal rate, regular rhythm and normal heart sounds.   No murmur heard.  Pulmonary/Chest: Effort normal and breath sounds normal. No respiratory distress.   Abdominal: Soft. There is no tenderness.   Musculoskeletal: She exhibits no edema.   Limited ROM of low back   Lymphadenopathy:     She has no cervical adenopathy.   Neurological: She is alert and oriented to person, place, and time. She has normal strength. No cranial nerve deficit. Gait normal.   Skin: Skin is warm and dry.   Psychiatric: Her behavior is normal.   Anxiety as noted   Vitals reviewed.      Lab Results   Component Value Date    WBC 8.82 05/13/2019    HGB 14.0 05/13/2019    HCT 39.8 05/13/2019     05/13/2019    CHOL 245 (H) 05/13/2019    TRIG 103 05/13/2019    HDL 40 05/13/2019    ALT 19 05/13/2019    AST 19 05/13/2019     05/13/2019    K 4.2 05/13/2019     05/13/2019    CREATININE 0.9 05/13/2019    BUN 12 05/13/2019    CO2 28 05/13/2019 "    TSH 2.641 04/04/2018    INR 1.1 02/17/2012    HGBA1C 5.7 (H) 05/13/2019       Assessment:       1. Essential hypertension    2. Hyperlipidemia, unspecified hyperlipidemia type    3. Vitamin D deficiency    4. BELLO (obstructive sleep apnea)    5. Recurrent major depressive disorder, in partial remission    6. Sleep difficulties        Plan:   Batool was seen today for follow-up and blood pressure check.    Diagnoses and all orders for this visit:    Essential hypertension - improved, but generally at top end of goal range or just above. Will titrate amlodipine up to 10mg but also focus on medication adherence. Worked with patient to set alarms on her phone during visit for medication reminders.  -     amLODIPine (NORVASC) 10 MG tablet; Take 1 tablet (10 mg total) by mouth every evening. For blood pressure  -     Comprehensive metabolic panel; Future    Hyperlipidemia, unspecified hyperlipidemia type - continue, doing well  -     atorvastatin (LIPITOR) 80 MG tablet; TAKE 1 TABLET(80 MG) BY MOUTH EVERY DAY for cholesterol and to protect your heart  -     Lipid panel; Future  -     Comprehensive metabolic panel; Future    Vitamin D deficiency - continue weekly vitamin D; if not taking weekly high-dose vitamin D should switch to at least 5000IU daily  -     Vitamin D; Future    BELLO (obstructive sleep apnea) - not currently using CPAP; she will follow up with DME and Sleep Med to look into different mask    Recurrent major depressive disorder, in partial remission  -     traZODone (DESYREL) 150 MG tablet; Take 1 tablet (150 mg total) by mouth every evening. Take at bedtime as needed for insomnia  Sleep difficulties  Comments:  improved with trazodone 150mg; continue this. No sign of serotonin syndrome effect with trazodone + lexapro    Health maintenance reviewed with patient.   Fasting labs today  Follow up in about 6 months (around 5/19/2020) for annual physical; earlier if needed.    Karan James MD  Internal  Medicine [facility]  11/19/2019

## 2019-11-19 NOTE — PATIENT INSTRUCTIONS
Call the medical equipment company who provides your CPAP supplies and let them know you are having trouble tolerating it at night and that you feel smothered, ask if you can try different masks to see if this might work better.  If they are unable to help, please check in with the Sleep Clinic again to see if they can help.    We are increasing the dose of your amlodipine blood pressure medication to 10mg. Take this every day -- you can take this at night as it may cause some sleepiness.    Try taking a claritin when your skin is feeling very itchy - see if this helps!  You can take regular claritin (not claritin-D) for several weeks at a time if needed.

## 2019-11-20 ENCOUNTER — TELEPHONE (OUTPATIENT)
Dept: INTERNAL MEDICINE | Facility: CLINIC | Age: 54
End: 2019-11-20

## 2019-11-20 NOTE — TELEPHONE ENCOUNTER
----- Message from Karan James MD sent at 11/20/2019  1:07 AM CST -----  Please call patient to review results  LDL cholesterol is again very high - please check in on how consistently she has been taking the atorvastatin and zetia (discussed at visit but needs verification given lab result with LDL increased).   Vitamin D low - continue high-dose weekly supplement (prescription) but also add 2000IU daily  Metabolic panel (electrolytes, glucose, kidney function, liver function) is normal.  IF she has been not taking cholesterol medications daily, would recommend working on this and rechecking in 6 months  If she has been taking them consistently every day, she may need to add / change medications

## 2019-12-01 DIAGNOSIS — F33.2 SEVERE EPISODE OF RECURRENT MAJOR DEPRESSIVE DISORDER, WITHOUT PSYCHOTIC FEATURES: Chronic | ICD-10-CM

## 2019-12-03 RX ORDER — TRAZODONE HYDROCHLORIDE 100 MG/1
TABLET ORAL
Qty: 45 TABLET | Refills: 0 | OUTPATIENT
Start: 2019-12-03

## 2019-12-03 NOTE — PROGRESS NOTES
Refill Authorization Note     is requesting a refill authorization.    Brief assessment and rationale for refill: QUICK DC: rts(5/20)                                         Comments:   Last Prescribed Info:   Ordering Provider: -- JAYLA #:  -- NPI:  --    Authorizing Provider: Karan James MD JAYLA #:  [id number] NPI:  [id number]    Ordering User:  Karan James MD            Diagnosis Association: Recurrent major depressive disorder, in partial remission (F33.41)      Original Order:  traZODone (DESYREL) 100 MG tablet [[id number]]      [id number] [address] JAYLA #:  [id number]     Pharmacy Comments:  --          Fill quantity remaining:  -- Fill quantity used:  -- Next fill due: --       Outpatient Medication Detail      Disp Refills Start End    traZODone (DESYREL) 150 MG tablet 90 tablet 1 11/19/2019     Sig - Route: Take 1 tablet (150 mg total) by mouth every evening. Take at bedtime as needed for insomnia - Oral    Sent to pharmacy as: traZODone (DESYREL) 150 MG tablet    E-Prescribing Status: Receipt confirmed by pharmacy (11/19/2019 10:09 AM CST)           Appointments     Date Provider   Last Visit   11/19/2019 Karan James MD   Next Visit   5/19/2020 Karan James MD

## 2019-12-20 ENCOUNTER — OFFICE VISIT (OUTPATIENT)
Dept: PHYSICAL MEDICINE AND REHAB | Facility: CLINIC | Age: 54
End: 2019-12-20
Payer: COMMERCIAL

## 2019-12-20 VITALS
BODY MASS INDEX: 39.13 KG/M2 | WEIGHT: 234.88 LBS | SYSTOLIC BLOOD PRESSURE: 138 MMHG | HEART RATE: 66 BPM | DIASTOLIC BLOOD PRESSURE: 83 MMHG | HEIGHT: 65 IN

## 2019-12-20 DIAGNOSIS — M17.11 PRIMARY OSTEOARTHRITIS OF RIGHT KNEE: ICD-10-CM

## 2019-12-20 DIAGNOSIS — M47.816 SPONDYLOSIS OF LUMBAR REGION WITHOUT MYELOPATHY OR RADICULOPATHY: ICD-10-CM

## 2019-12-20 DIAGNOSIS — E66.01 SEVERE OBESITY (BMI 35.0-35.9 WITH COMORBIDITY): ICD-10-CM

## 2019-12-20 DIAGNOSIS — M25.511 CHRONIC RIGHT SHOULDER PAIN: ICD-10-CM

## 2019-12-20 DIAGNOSIS — M16.11 PRIMARY OSTEOARTHRITIS OF RIGHT HIP: ICD-10-CM

## 2019-12-20 DIAGNOSIS — G89.29 CHRONIC MIDLINE LOW BACK PAIN WITHOUT SCIATICA: Primary | ICD-10-CM

## 2019-12-20 DIAGNOSIS — M54.50 CHRONIC MIDLINE LOW BACK PAIN WITHOUT SCIATICA: Primary | ICD-10-CM

## 2019-12-20 DIAGNOSIS — G89.29 CHRONIC RIGHT SHOULDER PAIN: ICD-10-CM

## 2019-12-20 PROCEDURE — 3008F BODY MASS INDEX DOCD: CPT | Mod: CPTII,S$GLB,, | Performed by: PHYSICAL MEDICINE & REHABILITATION

## 2019-12-20 PROCEDURE — 3075F SYST BP GE 130 - 139MM HG: CPT | Mod: CPTII,S$GLB,, | Performed by: PHYSICAL MEDICINE & REHABILITATION

## 2019-12-20 PROCEDURE — 99214 OFFICE O/P EST MOD 30 MIN: CPT | Mod: S$GLB,,, | Performed by: PHYSICAL MEDICINE & REHABILITATION

## 2019-12-20 PROCEDURE — 99999 PR PBB SHADOW E&M-EST. PATIENT-LVL III: CPT | Mod: PBBFAC,,, | Performed by: PHYSICAL MEDICINE & REHABILITATION

## 2019-12-20 PROCEDURE — 3079F PR MOST RECENT DIASTOLIC BLOOD PRESSURE 80-89 MM HG: ICD-10-PCS | Mod: CPTII,S$GLB,, | Performed by: PHYSICAL MEDICINE & REHABILITATION

## 2019-12-20 PROCEDURE — 99999 PR PBB SHADOW E&M-EST. PATIENT-LVL III: ICD-10-PCS | Mod: PBBFAC,,, | Performed by: PHYSICAL MEDICINE & REHABILITATION

## 2019-12-20 PROCEDURE — 3079F DIAST BP 80-89 MM HG: CPT | Mod: CPTII,S$GLB,, | Performed by: PHYSICAL MEDICINE & REHABILITATION

## 2019-12-20 PROCEDURE — 3075F PR MOST RECENT SYSTOLIC BLOOD PRESS GE 130-139MM HG: ICD-10-PCS | Mod: CPTII,S$GLB,, | Performed by: PHYSICAL MEDICINE & REHABILITATION

## 2019-12-20 PROCEDURE — 99214 PR OFFICE/OUTPT VISIT, EST, LEVL IV, 30-39 MIN: ICD-10-PCS | Mod: S$GLB,,, | Performed by: PHYSICAL MEDICINE & REHABILITATION

## 2019-12-20 PROCEDURE — 3008F PR BODY MASS INDEX (BMI) DOCUMENTED: ICD-10-PCS | Mod: CPTII,S$GLB,, | Performed by: PHYSICAL MEDICINE & REHABILITATION

## 2019-12-20 RX ORDER — GABAPENTIN 400 MG/1
400 CAPSULE ORAL NIGHTLY
Qty: 60 CAPSULE | Refills: 3
Start: 2019-12-20 | End: 2021-10-18

## 2019-12-20 NOTE — PATIENT INSTRUCTIONS
Neck/Back Pain [General]    Both neck and back pain are usually caused by injury to the muscles or ligaments of the spine. Sometimes the disks that separate each bone of the spine may cause pain by putting pressure on a nearby nerve. Back and neck pain may appear after a sudden twisting/bending force (such as in a car accident), or sometimes after a simple awkward movement. In either case, muscle spasm is often present and adds to the pain.  Acute neck and back pain usually gets better in one to two weeks. Pain related to disk disease, arthritis in the spinal joints or spinal stenosis (narrowing of the spinal canal) can become chronic and last for months or years.  Home Care:  · FOR NECK PAIN: Use a comfortable pillow that supports the head and keeps the spine in a neutral position. The position of the head should not be tilted forward or backward.  · FOR BACK PAIN: You may need to stay in bed the first few days. But, as soon as possible, begin sitting or walking to avoid problems with prolonged bed rest (muscle weakness, worsening back stiffness and pain, blood clots in the legs).  · When in bed, try to find a position of comfort. A firm mattress is best. Try lying flat on your back with pillows under your knees. You can also try lying on your side with your knees bent up towards your chest and a pillow between your knees.  · Avoid prolonged sitting. This puts more stress on the lower back than standing or walking.  · During the first two days after injury, apply an ICE PACK to the painful area for 20 minutes every 2-4 hours. This will reduce swelling and pain. HEAT (hot shower, hot bath or heating pad) works well for muscle spasm. You can start with ice, then switch to heat after two days. Some patients feel best alternating ice and heat treatments. Use the one method that feels the best to you.  · You may use acetaminophen (Tylenol) or ibuprofen (Motrin, Advil) to control pain, unless another pain medicine was  prescribed. [NOTE: If you have chronic liver or kidney disease or ever had a stomach ulcer or GI bleeding, talk with your doctor before using these medicines.]  · Be aware of safe lifting methods and do not lift anything over 15 pounds until all the pain is gone.  Follow Up  with your physician or this facility if your symptoms do not start to improve after one week. Physical therapy or further tests may be needed.  [NOTE: If X-rays were taken, they will be reviewed by a radiologist. You will be notified of any new findings that may affect your care.]  Get Prompt Medical Attention  if any of the following occur:  · Pain becomes worse or spreads into your arms or legs  · Weakness, numbness or pain in one or both arms or legs  · Loss of bowel or bladder control  · Numbness in the groin area  · Difficulty walking  · Fever of 100.4ºF (38ºC) or higher, or as directed by your healthcare provider  © 0237-3309 [facility], [address] [place] [address]. All rights reserved. This information is not intended as a substitute for professional medical care. Always follow your healthcare professional's instructions.    Neck Exercises: Passive Neck Rotation          To start, lie on your back, knees bent and feet flat on the floor. Keep your ears, shoulders, and hips aligned, but dont press your lower back to the floor. Rest your hands on your pelvis. Breathe deeply and relax.  · With your neck relaxed, place the palm of one hand on your forehead. Use your hand to turn your head to one side until you feel a stretch in the neck muscles. Do not push through pain.  · Hold for 5 seconds. Then turn to the other side.  · Repeat 5 times on each side.   Note: Keep your shoulders on the floor. Dont lift your chin as you turn your head.  © 3777-1936 [facility], [address] [place] [address]. All rights reserved. This information is not intended as a substitute for professional medical care. Always follow  your healthcare professional's instructions.    Exercises: Neck Isometrics  To start, sit in a chair with your feet flat on the floor. Your weight should be slightly forward so that youre balanced evenly on your buttocks. Relax your shoulders and keep your head level. Using a chair with arms may help you keep your balance.  1. Press your palm against your forehead. Resist with your neck muscles. Hold for 10 seconds. Relax. Repeat 5 times.  2. Do the exercise again, pressing on the side of your head. Repeat 5 times. Switch sides.  3. Do the exercise again, pressing on the back of your head. Repeat 5 times.          For your safety, check with your healthcare provider before starting an exercise program.    © 8708-4507 [facility], [address] [place] [address], [place] [address]. All rights reserved. This information is not intended as a substitute for professional medical care. Always follow your healthcare professional's instructions.    Shoulder and Upper Back Stretch  To start, stand tall with your ears, shoulders, and hips in line. Your feet should be slightly apart, positioned just under your hips. Focus your eyes directly in front of you.  this position for a few seconds before starting your exercise. This helps increase your awareness of proper posture.  · Reach overhead and slightly back with both arms. Keep your shoulders and neck aligned and your elbows behind your shoulders.  · With your palms facing the ceiling, turn your fingers inward.  · Take a deep breath. Breathe out and lower your elbows toward your buttocks. Hold for 5 seconds, then return to starting position.  · Repeat 3 times.          © 7809-4954 [facility], [address] [place] [address]. All rights reserved. This information is not intended as a substitute for professional medical care. Always follow your healthcare professional's instructions.

## 2019-12-20 NOTE — PROGRESS NOTES
Subjective:       Patient ID: Batool Rivera is a 54 y.o. female.    Chief Complaint: No chief complaint on file.    HPI     Mrs. Rivera is a 54-year-old black female with past medical history of HTN, depression/anxiety, OA, obesity, and/or pain.  She is followed up in the Physical Medicine Clinic for chronic low back pain, chronic right knee pain, bilateral hip pain and recurrent right shoulder pain.  Her last visit to the clinic was on 8/8/19.  She was maintained on gabapentin and p.r.n. Tylenol.      The patient is coming today to the clinic for follow up.  Her back pain has been worse.   It is an intermittent aching pain in the lumbar spine and across her back.  She denies any radiation to her legs.  Her back pain is aggravated by activity.  Her maximum pain is 8/10 and minimum 6-7/10.  Today, it is 9/10.  She denies lower extremity weakness.  She denies any bowel or bladder incontinence.  She continues to complain of  persistent numbness in the left toes, not associated with her back pain.  She has an appointment for evaluation by Podiatry.    Her right knee pain has been under good control.  Her bilateral hip pain is stable.  It is an intermittent aching pain aggravated by slow pace and better with walking a little faster.  Her maximum pain is 10/10 and minimum 6/10.  Today, it is 6/10.    Her right shoulder pain has been under good control.   It is an intermittent aching pain aggravated by shoulder movement. Her maximum pain is 6/10 and minimum 0/10; today it is 0/10.  She has been exercising her shoulder.    She is currently taking gabapentin 400 mg capsules only as needed.  She has not been taking Tylenol..  Meloxicam was not tolerated in the past due to chest pain suspected to be due to gastric or cardiac origin.      Review of Systems   Constitutional: Positive for fatigue.   Eyes: Positive for visual disturbance.   Respiratory: Positive for shortness of breath.    Cardiovascular: Negative for chest  pain.   Gastrointestinal: Negative for blood in stool, constipation, nausea and vomiting.   Genitourinary: Negative for difficulty urinating.   Musculoskeletal: Positive for arthralgias, back pain and neck pain. Negative for gait problem and joint swelling.   Neurological: Negative for dizziness and headaches.   Psychiatric/Behavioral: Positive for sleep disturbance. Negative for behavioral problems.       Objective:      Physical Exam   Constitutional: She is oriented to person, place, and time. She appears well-developed and well-nourished.   HENT:   Head: Normocephalic and atraumatic.   Neck: Normal range of motion.   -ve tenderness low C-spine.   Musculoskeletal:   BUE:  ROM:   RUE: full.   LUE: full.  Strength:    RUE: 5-/5 at shoulder abduction, 5 elbow flexion, 5 elbow extension, 5 hand .   LUE: 5-/5 at shoulder abduction, 5 elbow flexion, 5 elbow extension, 5 hand .  Sensation to pinprick:   RUE: intact.   LUE: intact.    BLE:  ROM:   RLE: full.   LLE: full.  Knee crepitus:   RLE: +ve.   LLE: +ve.   Strength:    RLE: 5/5 at hip flexion, 5 knee extension, 5 ankle DF, 5 PF.   LLE: 5/5 at hip flexion, 5 knee extension, 5 ankle DF, 5 PF.  Sensation to pinprick:     RLE: intact.      LLE: intact.   SLR (sitting):      RLE: -ve.      LLE: -ve.    -ve tenderness over lumbar spine.    Gait: WNL     Neurological: She is alert and oriented to person, place, and time.   Skin: Skin is warm.   Psychiatric: She has a normal mood and affect. Her behavior is normal.   Vitals reviewed.      Assessment:       1. Chronic midline low back pain without sciatica    2. Spondylosis of lumbar region without myelopathy or radiculopathy    3. Primary osteoarthritis of right knee    4. Primary osteoarthritis of right hip    5. Chronic right shoulder pain    6. Severe obesity (BMI 35.0-35.9 with comorbidity)        Plan:         - Start acetaminophen (TYLENOL) 500 MG tablet; Take 1-2 tablets (500-1,000 mg total) by mouth 3  (three) times daily as needed for Pain.  - Take consistently gabapentin 400 mg po qhs. .  May increase to twice daily (early evening and late evening due to sedation)..  - The patient was encouraged to adopt a regular Home Exercise Program, and provided with printouts.  - Weight loss was encouraged.   - Follow up in about 4 months (around 4/20/2020).     This was a 25 minute visit, more than 50% of which was spent counseling the patient about the diagnosis and the treatment plan.

## 2019-12-30 DIAGNOSIS — F39 MOOD DISORDER: ICD-10-CM

## 2019-12-30 RX ORDER — ESCITALOPRAM OXALATE 20 MG/1
TABLET ORAL
Qty: 90 TABLET | Refills: 1 | Status: SHIPPED | OUTPATIENT
Start: 2019-12-30 | End: 2020-05-04

## 2019-12-30 NOTE — TELEPHONE ENCOUNTER
Refill Authorization Note     is requesting a refill authorization.    Brief assessment and rationale for refill: APPROVE: prr                                         Comments:     Requested Prescriptions   Signed Prescriptions Disp Refills    escitalopram oxalate (LEXAPRO) 20 MG tablet 90 tablet 1     Sig: TAKE 1 TABLET(20 MG) BY MOUTH EVERY DAY       Psychiatry:  Antidepressants - SSRI Passed - 12/30/2019  3:36 AM        Passed - Patient is at least 18 years old        Passed - Last BP in normal range within 360 days     BP Readings from Last 3 Encounters:   12/20/19 138/83   11/19/19 134/88   11/04/19 (!) 156/82              Passed - Office visit in past 6 months or future 90 days     Recent Outpatient Visits            1 week ago Chronic midline low back pain without sciatica    [facility] Diamond Moraes MD    1 month ago Essential hypertension    [facility] - Internal Medicine Karan James MD    1 month ago Achilles tendinitis of left lower extremity    [facility] DPM    4 months ago Chronic midline low back pain without sciatica    [facility] Diamond Moraes [facility]    5 months ago Achilles tendinitis of left lower extremity    [facility]auer DPM          Future Appointments              In 1 month [facility]    In 4 months MD [facility]    In 4 months Karan James MD [facility]

## 2020-01-02 DIAGNOSIS — I10 ESSENTIAL HYPERTENSION: ICD-10-CM

## 2020-01-02 RX ORDER — AMLODIPINE BESYLATE 5 MG/1
TABLET ORAL
Qty: 90 TABLET | Refills: 1 | OUTPATIENT
Start: 2020-01-02

## 2020-01-02 NOTE — PROGRESS NOTES
Refill Authorization Note     is requesting a refill authorization.    Brief assessment and rationale for refill: QUICK DC: last commented as no longer taking                                         Comments:     Requested Prescriptions     Refused Prescriptions Disp Refills    amLODIPine (NORVASC) 5 MG tablet [Pharmacy Med Name: AMLODIPINE BESYLATE 5MG TABLETS] 90 tablet 1     Sig: TAKE 1 TABLET(5 MG) BY MOUTH EVERY DAY     Refused By: NOAH GOMES     Reason for Refusal: Refill not appropriate       Last Prescribed Info:   Original Order:  amLODIPine (NORVASC) 5 MG tablet [[id number]]      Pharmacy:  [facility] #[id number] [address] JAYLA #:  [id number]     Pharmacy Comments:  --          Fill quantity remaining:  -- Fill quantity used:  -- Next fill due: --       Outpatient Medication Detail      Disp Refills Start End    amLODIPine (NORVASC) 10 MG tablet 90 tablet 1 11/19/2019     Sig - Route: Take 1 tablet (10 mg total) by mouth every evening. For blood pressure - Oral    Sent to pharmacy as: amLODIPine (NORVASC) 10 MG tablet    E-Prescribing Status: Receipt confirmed by pharmacy (11/19/2019 10:11 AM CST)

## 2020-01-29 ENCOUNTER — HOSPITAL ENCOUNTER (OUTPATIENT)
Dept: RADIOLOGY | Facility: HOSPITAL | Age: 55
Discharge: HOME | End: 2020-01-29
Attending: ORTHOPAEDIC SURGERY
Payer: MEDICAID

## 2020-01-29 ENCOUNTER — OFFICE VISIT (OUTPATIENT)
Dept: ORTHOPEDICS | Facility: CLINIC | Age: 55
End: 2020-01-29
Payer: MEDICAID

## 2020-01-29 VITALS — WEIGHT: 237.19 LBS | BODY MASS INDEX: 39.52 KG/M2 | HEIGHT: 65 IN

## 2020-01-29 DIAGNOSIS — M79.672 LEFT FOOT PAIN: Primary | ICD-10-CM

## 2020-01-29 DIAGNOSIS — G57.62 MORTON'S NEUROMA OF LEFT FOOT: ICD-10-CM

## 2020-01-29 DIAGNOSIS — M79.672 LEFT FOOT PAIN: ICD-10-CM

## 2020-01-29 PROCEDURE — 99999 PR PBB SHADOW E&M-EST. PATIENT-LVL III: ICD-10-PCS | Mod: PBBFAC,,, | Performed by: ORTHOPAEDIC SURGERY

## 2020-01-29 PROCEDURE — 99213 OFFICE O/P EST LOW 20 MIN: CPT | Mod: PBBFAC,25 | Performed by: ORTHOPAEDIC SURGERY

## 2020-01-29 PROCEDURE — 99203 OFFICE O/P NEW LOW 30 MIN: CPT | Mod: S$PBB,,, | Performed by: ORTHOPAEDIC SURGERY

## 2020-01-29 PROCEDURE — 73630 X-RAY EXAM OF FOOT: CPT | Mod: TC,LT

## 2020-01-29 PROCEDURE — 99999 PR PBB SHADOW E&M-EST. PATIENT-LVL III: CPT | Mod: PBBFAC,,, | Performed by: ORTHOPAEDIC SURGERY

## 2020-01-29 PROCEDURE — 73630 X-RAY EXAM OF FOOT: CPT | Mod: 26,LT,, | Performed by: RADIOLOGY

## 2020-01-29 PROCEDURE — 99203 PR OFFICE/OUTPT VISIT, NEW, LEVL III, 30-44 MIN: ICD-10-PCS | Mod: S$PBB,,, | Performed by: ORTHOPAEDIC SURGERY

## 2020-01-29 PROCEDURE — 73630 XR FOOT COMPLETE 3 VIEW LEFT: ICD-10-PCS | Mod: 26,LT,, | Performed by: RADIOLOGY

## 2020-01-29 NOTE — PROGRESS NOTES
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  States that the pain is in the ball of her left foot between her 3rd and 4th toes.  Describes the pain as electric-like and shooting.  Worse with weight-bearing and narrow toed shoes.  Has tried buying larger shoes to help alleviate her pain.  Takes occasional over-the-counter analgesics with some relief.  Currently states that the pain is 4/10 dull achy with occasional sharp stabbing electric-like pain in the above area. Worse with compression of her toes.  Does have some altered sensation to 2nd 3rd 4th toes.    No prior injections or foot surgeries.    ROS:  Constitutional: Denies fever/chills  Neurological: Denies numbness/tingling (any exceptions noted in orthopaedic exam)   Psychiatric/Behavioral: Denies change in normal mood  Eyes: Denies change in vision  Cardiovascular: Denies chest pain  Respiratory: Denies shortness of breath  Hematologic/Lymphatic: Denies easy bleeding/bruising   Skin: Denies new rash or skin lesions   Gastrointestinal: Denies nausea/vomitting/diarrhea, change in bowel habits, abdominal pain   Allergic/Immunologic: Denies adverse reactions to current medications  Musculoskeletal: see HPI    PAST MEDICAL HISTORY:   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     Heart disease, unspecified     Hyperlipidemia     Hypertension      PAST SURGICAL HISTORY:   Past Surgical History:   Procedure Laterality Date     SECTION      HYSTERECTOMY   or     OOPHORECTOMY      one ovary was removed    TUBAL LIGATION       FAMILY HISTORY:   Family History   Problem Relation Age of Onset    Hypertension Mother     Heart disease Mother     Asthma Mother     Cataracts Mother     Glaucoma Mother     Brain cancer Father     Stroke Brother      Kidney disease Brother     Glaucoma Sister     Diabetes Sister     Heart disease Brother         MI x 5    Depression Brother     Blindness Neg Hx     Macular degeneration Neg Hx     Retinal detachment Neg Hx     Breast cancer Neg Hx     Colon cancer Neg Hx     Ovarian cancer Neg Hx      SOCIAL HISTORY:   Social History     Socioeconomic History    Marital status: Single     Spouse name: Not on file    Number of children: Not on file    Years of education: Not on file    Highest education level: Not on file   Occupational History    Not on file   Social Needs    Financial resource strain: Not on file    Food insecurity:     Worry: Not on file     Inability: Not on file    Transportation needs:     Medical: Not on file     Non-medical: Not on file   Tobacco Use    Smoking status: Former Smoker     Packs/day: 2.00     Years: 28.00     Pack years: 56.00     Last attempt to quit: 2011     Years since quittin.4    Smokeless tobacco: Never Used   Substance and Sexual Activity    Alcohol use: No     Alcohol/week: 0.0 standard drinks    Drug use: No    Sexual activity: Yes     Partners: Male     Birth control/protection: See Surgical Hx     Comment: BTL   Lifestyle    Physical activity:     Days per week: Not on file     Minutes per session: Not on file    Stress: Not on file   Relationships    Social connections:     Talks on phone: Not on file     Gets together: Not on file     Attends Gnosticism service: Not on file     Active member of club or organization: Not on file     Attends meetings of clubs or organizations: Not on file     Relationship status: Not on file   Other Topics Concern    Not on file   Social History Narrative    Living with her felicitas, who is 9 y/o.      MEDICATIONS:   Current Outpatient Medications:     amLODIPine (NORVASC) 10 MG tablet, Take 1 tablet (10 mg total) by mouth every evening. For blood pressure, Disp: 90 tablet, Rfl: 1    aspirin (ECOTRIN)  "81 MG EC tablet, Take 81 mg by mouth once daily., Disp: , Rfl:     atorvastatin (LIPITOR) 80 MG tablet, TAKE 1 TABLET(80 MG) BY MOUTH EVERY DAY for cholesterol and to protect your heart, Disp: 90 tablet, Rfl: 3    diclofenac sodium (VOLTAREN) 1 % Gel, Apply 2 g topically 3 (three) times daily., Disp: 3 Tube, Rfl: 2    ergocalciferol (ERGOCALCIFEROL) 50,000 unit Cap, Take 1 capsule (50,000 Units total) by mouth every 7 days. For vitamin D deficiency., Disp: 12 capsule, Rfl: 3    escitalopram oxalate (LEXAPRO) 20 MG tablet, TAKE 1 TABLET(20 MG) BY MOUTH EVERY DAY, Disp: 90 tablet, Rfl: 1    ezetimibe (ZETIA) 10 mg tablet, TAKE 1 TABLET(10 MG) BY MOUTH EVERY DAY for cholesterol, Disp: 90 tablet, Rfl: 3    gabapentin (NEURONTIN) 400 MG capsule, Take 1 capsule (400 mg total) by mouth every evening. If no relief, may increase dose to twice per day., Disp: 60 capsule, Rfl: 3    terbinafine HCl (LAMISIL) 250 mg tablet, Take 1 tablet (250 mg total) by mouth once daily., Disp: 30 tablet, Rfl: 0    traZODone (DESYREL) 150 MG tablet, Take 1 tablet (150 mg total) by mouth every evening. Take at bedtime as needed for insomnia, Disp: 90 tablet, Rfl: 1  ALLERGIES: Review of patient's allergies indicates:  No Known Allergies      EXAM      VITAL SIGNS:   Ht 5' 5" (1.651 m)   Wt 107.6 kg (237 lb 3.4 oz)   BMI 39.47 kg/m²       PE:  General:  no acute distress, appears stated age    Neuro: alert and oriented x3  Psych: normal mood  Head: normocephalic, atraumatic.   Eyes: no scleral icterus  Mouth: moist mucous membranes  Cardiovascular: extremities warm and well perfused  Lungs: breathing comfortably, equal chest rise bilat  Skin: clean, dry, intact (any exceptions noted in below musculoskeletal exam)    Musculoskeletal:  RLE:  Pes planus  No crepitus, No TTP  Motor intact hip flex, quad, Tib Ant, gastroc, EHL, FHL.  Sensation intact saphenous, sural, deep/superficial peroneal, tibial nerves  Palp DP/PT pulse, " BCR    LLE:  Pes planus bilaterally  Recreation of arch with heel elevation  No wounds  No crepitus  Pain with compression of medial lateral border foot, recreates above symptoms.  Ankle range of motion 15° dorsiflexion 25° plantar flexion.  Supple subtalar motion  Motor intact 5/5 hip flex, quad, Tib Ant, gastroc, EHL, FHL.  Sensation intact saphenous, sural, deep/superficial peroneal, tibial nerves  Palp DP/PT pulse, BCR        XRAYS:  X-ray left foot, nonweightbearing, demonstrates no acute fracture dislocation.  Pes planus  (I independently reviewed and interpreted the above imaging)    MEDICAL DECISION MAKING       Encounter Diagnoses   Name Primary?    Benavidez's neuroma of left foot     Left foot pain Yes       54-year-old female with left foot pain concerning for Benavidez's neuroma between 3rd and 4th toes possibly 2nd and 3rd toes.    Counseled patient on medical diagnosis and treatment options.    Briefly discussed conservative management with shoe inserts, wide toe box shoes, injections, surgery.    Recommended referral to my foot and ankle colleague Dr. Phillip for further evaluation and treatment.          =====================  Sai Cook MD  Orthopaedic Surgery

## 2020-01-30 ENCOUNTER — TELEPHONE (OUTPATIENT)
Dept: ORTHOPEDICS | Facility: CLINIC | Age: 55
End: 2020-01-30

## 2020-01-30 NOTE — TELEPHONE ENCOUNTER
Spoke to pt and scheduled an appt with Dr. Phillip on 2/3 at 8:30am, pt verbalized understanding.----- Message from Joleen Hunt sent at 1/30/2020  1:37 PM CST -----  Contact: pt   Please call pt at [phone]    Patient is returning staff call     Thank you

## 2020-01-30 NOTE — TELEPHONE ENCOUNTER
Left detailed VM for pt to give me a call back in regards to scheduling an appt with Dr. Phillip.----- Message from Cliff Whitley sent at 1/30/2020  9:46 AM CST -----  Contact: self  Pt would like a call back with surgery dates and times.    Contact Info

## 2020-02-02 DIAGNOSIS — M25.511 CHRONIC RIGHT SHOULDER PAIN: ICD-10-CM

## 2020-02-02 DIAGNOSIS — M54.50 CHRONIC MIDLINE LOW BACK PAIN WITHOUT SCIATICA: ICD-10-CM

## 2020-02-02 DIAGNOSIS — M17.11 PRIMARY OSTEOARTHRITIS OF RIGHT KNEE: ICD-10-CM

## 2020-02-02 DIAGNOSIS — M25.551 RIGHT HIP PAIN: ICD-10-CM

## 2020-02-02 DIAGNOSIS — G89.29 CHRONIC RIGHT SHOULDER PAIN: ICD-10-CM

## 2020-02-02 DIAGNOSIS — G89.29 CHRONIC MIDLINE LOW BACK PAIN WITHOUT SCIATICA: ICD-10-CM

## 2020-02-03 ENCOUNTER — OFFICE VISIT (OUTPATIENT)
Dept: ORTHOPEDICS | Facility: CLINIC | Age: 55
End: 2020-02-03
Payer: MEDICAID

## 2020-02-03 VITALS — WEIGHT: 237 LBS | BODY MASS INDEX: 39.49 KG/M2 | HEIGHT: 65 IN

## 2020-02-03 DIAGNOSIS — G57.62 MORTON NEUROMA, LEFT: Primary | ICD-10-CM

## 2020-02-03 PROCEDURE — 99214 PR OFFICE/OUTPT VISIT, EST, LEVL IV, 30-39 MIN: ICD-10-PCS | Mod: S$PBB,25,, | Performed by: ORTHOPAEDIC SURGERY

## 2020-02-03 PROCEDURE — 64455 PR INJECT ANES/STEROID PLANTAR COMMON DIGITAL NERVE: ICD-10-PCS | Mod: S$PBB,LT,, | Performed by: ORTHOPAEDIC SURGERY

## 2020-02-03 PROCEDURE — 20600 DRAIN/INJ JOINT/BURSA W/O US: CPT | Mod: PBBFAC | Performed by: ORTHOPAEDIC SURGERY

## 2020-02-03 PROCEDURE — 99213 OFFICE O/P EST LOW 20 MIN: CPT | Mod: PBBFAC,25 | Performed by: ORTHOPAEDIC SURGERY

## 2020-02-03 PROCEDURE — 64455 NJX AA&/STRD PLTR COM DG NRV: CPT | Mod: PBBFAC | Performed by: ORTHOPAEDIC SURGERY

## 2020-02-03 PROCEDURE — 99214 OFFICE O/P EST MOD 30 MIN: CPT | Mod: S$PBB,25,, | Performed by: ORTHOPAEDIC SURGERY

## 2020-02-03 PROCEDURE — 99999 PR PBB SHADOW E&M-EST. PATIENT-LVL III: ICD-10-PCS | Mod: PBBFAC,,, | Performed by: ORTHOPAEDIC SURGERY

## 2020-02-03 PROCEDURE — 64455 NJX AA&/STRD PLTR COM DG NRV: CPT | Mod: S$PBB,LT,, | Performed by: ORTHOPAEDIC SURGERY

## 2020-02-03 PROCEDURE — 99999 PR PBB SHADOW E&M-EST. PATIENT-LVL III: CPT | Mod: PBBFAC,,, | Performed by: ORTHOPAEDIC SURGERY

## 2020-02-03 RX ORDER — MELOXICAM 15 MG/1
TABLET ORAL
Qty: 90 TABLET | Refills: 0 | Status: SHIPPED | OUTPATIENT
Start: 2020-02-03 | End: 2020-05-11

## 2020-02-03 RX ORDER — TRIAMCINOLONE ACETONIDE 40 MG/ML
40 INJECTION, SUSPENSION INTRA-ARTICULAR; INTRAMUSCULAR
Status: DISCONTINUED | OUTPATIENT
Start: 2020-02-03 | End: 2020-11-24

## 2020-02-03 RX ADMIN — TRIAMCINOLONE ACETONIDE 40 MG: 40 INJECTION, SUSPENSION INTRA-ARTICULAR; INTRAMUSCULAR at 08:02

## 2020-02-03 NOTE — PROGRESS NOTES
Subjective:   Chief complaint:   Chief Complaint   Patient presents with    Left Foot - Pain       HPI:   Batool Rivera is a 54 y.o. female referred to me today by Dr. Sai Hernandez* for evaluation of left foot pain.  Rates pain as 0/10 currently.  Pain has been ongoing for 2-3 years.  Inciting event: none known.  Treatments tried:  Shoe modifications.    Localizes pain to the 2nd webspace.  Describes it as a irritation.  It is worse with shoe wear.  It radiates into the 2nd and 3rd toes and is associated with numbness.  Other than shoe modifications, no previous treatments tried.  Notes feels like she is walking on a pebble.    ROS:  Musculoskeletal: per HPI  Constitutional: Negative for fever  Cardiovascular: Negative for chest pain  Respiratory: Negative for shortness of breath  Skin: Negative for ulcers or lesions  Neurological:  Positive for burning, tingling and numbness  Vascular: Negative for peripheral edema  Heme: Negative for chronic anticoagulation; Negative for history of blood clot- positive family history though  Endocrine: Negative for diabetes  Immune: Negative for inflammatory arthritis  /Nephrology: Negative for ESRD-on hemodialysis       Objective:   Exam:  There were no vitals filed for this visit.  General: No acute distress, well-appearing  Neurologic: Alert and oriented x3  Psychiatric: Appropriate mood and affect, cooperative  Cardiovascular: Regular pulse  Respiratory: Breathing on room air  Skin: No rashes or ulcers  Vascular:  Left foot palpable dorsalis pedis pulse.  Musculoskeletal:  Standing examination demonstrates pes planus alignment.  Ankle and hindfoot range of motion or maintained and not irritable.  No swelling or ecchymosis or skin lesions seen.  Forefoot alignment is neutral.  Fires tibialis anterior, gastrocsoleus, extensor hallucis longus strongly.  No abnormal callusing over the lesser metatarsal heads.  No metatarsal head pain.  Tenderness to palpation in the  2nd webspace.  This is worsened with squeezing of the forefoot.  Jayashree's click is negative.  Pain however is reproduced with this maneuver.  2nd and 3rd MP joints are not irritable.  Sensation intact light touch in able localize throughout superficial peroneal, deep peroneal, tibial nerve distributions.    Imaging:  Prior radiographs were personally reviewed by me.    Nonweightbearing three views left foot demonstrate no acute osseous abnormalities.  No deformity of the lesser MP joints seen.    Additional records/labs reviewed:  None new      Assessment:     1. Benavidez neuroma, left, 2nd webspace         I reviewed imaging, clinical history, and diagnosis as above with the patient. I attempted to use layman's terms to educate the patient as well as utilize foot models and/or pictures.   I personally went through imaging with the patient.  I emphasized the role for non-operative treatment.  Non-operative treatment discussed with patient include: Anti-inflammatory medications or creams, RICE modalities and Corticosteroid injections.  Surgery would be reserved for refractory symptoms.    I discussed that for Benavidez's neuroma the most common location is the 3rd webspace, but her exam is fairly reliable for the 2nd webspace.  I discussed the stepwise diagnostic and treatment approach utilizing shoe modifications, over-the-counter anti-inflammatories and injections.  Injections are the purpose both diagnostic and therapeutic utility.  I discussed that surgery would not be considered without a documented good response to the injection to confirm the nerve is indeed the problem.  I also discussed that surgery to address a Benavidez's neuroma does involve nerve excision and would leave her with permanent numbness of the 2nd and 3rd toes.    I discussed the risks and benefits of corticosteroid injection. I discussed the effects expected when combining anesthetic with corticosteroid.  I discussed the potential benefits include  pain relief (of undetermined duration) and further information about pain generators in the foot/ankle.  I discussed that risks of injection include inflammatory response/synovitis, infection, damage to the joint, as well as possible subcutaneous tissue atrophy and skin color changes.  Verbal consent was a firm following this discussion.  Please see separate note for injection.         Plan:       1.  Therapy: None  2.  Symptomatic treatment: OTC NSAIDs recommended and RICE modalities recommended with emphasis on ice and elevation as needed  3.  Restrictions: Advance activity as tolerated, use pain as guide  4.  Brace/orthotics/etc: None  5.  Follow-up: PRN- injection can be repeated up to 2x and earliest it can be repeated is in 3 months      No orders of the defined types were placed in this encounter.      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     Heart disease, unspecified     Hyperlipidemia     Hypertension        Past Surgical History:   Procedure Laterality Date     SECTION      HYSTERECTOMY   or     OOPHORECTOMY      one ovary was removed    TUBAL LIGATION         Family History   Problem Relation Age of Onset    Hypertension Mother     Heart disease Mother     Asthma Mother     Cataracts Mother     Glaucoma Mother     Brain cancer Father     Stroke Brother     Kidney disease Brother     Glaucoma Sister     Diabetes Sister     Heart disease Brother         MI x 5    Depression Brother     Blindness Neg Hx     Macular degeneration Neg Hx     Retinal detachment Neg Hx     Breast cancer Neg Hx     Colon cancer Neg Hx     Ovarian cancer Neg Hx        Social History     Socioeconomic History    Marital status: Single     Spouse name: Not on file    Number of children: Not on file    Years of education: Not on file    Highest education  level: Not on file   Occupational History    Not on file   Social Needs    Financial resource strain: Not on file    Food insecurity:     Worry: Not on file     Inability: Not on file    Transportation needs:     Medical: Not on file     Non-medical: Not on file   Tobacco Use    Smoking status: Former Smoker     Packs/day: 2.00     Years: 28.00     Pack years: 56.00     Last attempt to quit: 2011     Years since quittin.4    Smokeless tobacco: Never Used   Substance and Sexual Activity    Alcohol use: No     Alcohol/week: 0.0 standard drinks    Drug use: No    Sexual activity: Yes     Partners: Male     Birth control/protection: See Surgical Hx     Comment: BTL   Lifestyle    Physical activity:     Days per week: Not on file     Minutes per session: Not on file    Stress: Not on file   Relationships    Social connections:     Talks on phone: Not on file     Gets together: Not on file     Attends Judaism service: Not on file     Active member of club or organization: Not on file     Attends meetings of clubs or organizations: Not on file     Relationship status: Not on file   Other Topics Concern    Not on file   Social History Narrative    Living with her grandbran, who is 9 y/o.

## 2020-02-03 NOTE — PROCEDURES
Small Joint Aspiration/Injection  Date/Time: 2/3/2020 8:30 AM  Performed by: Eliza Phillip MD  Authorized by: Eliza Phillip MD     Consent Done?:  Yes (Verbal)  Indications:  Diagnostic evaluation and pain  Site marked: The procedure site was marked    Timeout: Prior to procedure the correct patient, procedure, and site was verified      Location: left 2nd webspace.  Needle size:  22 G  Approach:  Dorsal  Medications:  40 mg triamcinolone acetonide 40 mg/mL  Patient tolerance:  Patient tolerated the procedure well with no immediate complications     Additional Comments: Noted immediate relief in pain.

## 2020-02-03 NOTE — LETTER
February 3, 2020      Sai Cook MD  [address]  [facility] [address]           [facility] - Orthopedics  [address]  [facility] [address]  Phone: [phone]          Patient: Batool Rivera   MR Number: [id number]   YOB: 1965   Date of Visit: 2/3/2020       Dear Dr. Sai Cook:    Thank you for referring Batool Rivera to me for evaluation. Attached you will find relevant portions of my assessment and plan of care.    If you have questions, please do not hesitate to call me. I look forward to following Batool Rivera along with you.    Sincerely,    Eliza Phillip MD    Enclosure  CC:  No Recipients    If you would like to receive this communication electronically, please contact [email or web address] or [phone] to request more information on [facility] access.    For providers and/or their staff who would like to refer a patient to [facility], please contact us through our one-stop-shop provider referral line, [facility], at [phone].    If you feel you have received this communication in error or would no longer like to receive these types of communications, please e-mail [email or web address]

## 2020-02-04 DIAGNOSIS — E78.5 HYPERLIPIDEMIA, UNSPECIFIED HYPERLIPIDEMIA TYPE: ICD-10-CM

## 2020-02-05 NOTE — PROGRESS NOTES
Refill Authorization Note     is requesting a refill authorization.    Brief assessment and rationale for refill: APPROVE: prr                                         Comments:   Requested Prescriptions   Pending Prescriptions Disp Refills    atorvastatin (LIPITOR) 80 MG tablet [Pharmacy Med Name: ATORVASTATIN 80MG TABLETS] 90 tablet 2     Sig: TAKE 1 TABLET(80 MG) BY MOUTH EVERY DAY       Cardiovascular:  Antilipid - Statins Passed - 2/4/2020  3:36 AM        Passed - Patient is at least 18 years old        Passed - Office visit in past 12 months or future 90 days.     Recent Outpatient Visits            2 days ago Benavidez neuroma, left, 2nd webspace    [facility] - Orthopedics Eliza Phillip MD    1 week ago Left foot pain    [facility] Orthopedics Sai Cook MD    1 month ago Chronic midline low back pain without sciatica    [facility]-Physical Med & Rehab Diamond Moraes MD    2 months ago Essential hypertension    [facility] Internal Medicine Karan James MD    3 months ago Achilles tendinitis of left lower extremity    [facility] - Podiatry Terrence Bauer, HYACINTH          Future Appointments              In 1 week Terrence Bauer DPM [facility] - Podiatry, [facility]    In 2 months Diamond Moraes MD [facility]-Physical Med & Rehab, [facility]    In 3 months Karan James MD [facility] Internal Medicine, [facility] PCW                Passed - Lipid Panel completed in last 360 days     Lab Results   Component Value Date    CHOL 299 (H) 11/19/2019    HDL 41 11/19/2019    LDLCALC 221.4 (H) 11/19/2019    TRIG 183 (H) 11/19/2019             Passed - ALT is 94 or below and within 360 days     ALT   Date Value Ref Range Status   11/19/2019 15 10 - 44 U/L Final   05/13/2019 19 10 - 44 U/L Final   04/04/2018 16 10 - 44 U/L Final              Passed - AST is 54 or below and within 360 days     AST   Date Value Ref Range Status   11/19/2019 17 10 - 40 U/L Final   05/13/2019 19 10 - 40 U/L  Final   04/04/2018 15 10 - 40 U/L Final

## 2020-02-06 RX ORDER — ATORVASTATIN CALCIUM 80 MG/1
TABLET, FILM COATED ORAL
Qty: 90 TABLET | Refills: 2 | Status: SHIPPED | OUTPATIENT
Start: 2020-02-06 | End: 2020-09-29 | Stop reason: SDUPTHER

## 2020-02-06 NOTE — TELEPHONE ENCOUNTER
I have reviewed and agree with the assessment below.  Pt appears to be adherent to atorvastatin/ezetimibe; emilio 9 more.  Thanks

## 2020-02-12 ENCOUNTER — TELEPHONE (OUTPATIENT)
Dept: PODIATRY | Facility: CLINIC | Age: 55
End: 2020-02-12

## 2020-02-12 NOTE — TELEPHONE ENCOUNTER
----- Message from Roxi Calderon MA sent at 2/12/2020  8:51 AM CST -----  Contact: self/[phone]  Pt is requesting a call back to R/s appt.

## 2020-03-20 ENCOUNTER — TELEPHONE (OUTPATIENT)
Dept: PODIATRY | Facility: CLINIC | Age: 55
End: 2020-03-20

## 2020-03-20 DIAGNOSIS — I10 ESSENTIAL HYPERTENSION: ICD-10-CM

## 2020-03-20 NOTE — TELEPHONE ENCOUNTER
"Call to patient, she wished to cancel at this time and have us call her back "when all this calms down" for f/u nail appt.  "

## 2020-03-24 RX ORDER — LOSARTAN POTASSIUM 50 MG/1
TABLET ORAL
Qty: 180 TABLET | Refills: 3 | OUTPATIENT
Start: 2020-03-24

## 2020-03-24 NOTE — PROGRESS NOTES
Refill Authorization Note     is requesting a refill authorization.    Brief assessment and rationale for refill: REVIEW: recently dc'd          Medication Therapy Plan: Didn't tolerate the losartan (reported possible hallucinations, sleeping too much that improved with stopping this), but stopped both meds prior to last visit.(11/19-[place])                              Comments:   Refill Center Care Gap Closure protocols temporarily suspended.   Requested Prescriptions   Pending Prescriptions Disp Refills    losartan (COZAAR) 50 MG tablet [Pharmacy Med Name: LOSARTAN 50MG TABLETS] 180 tablet 3     Sig: TAKE 2 TABLETS BY MOUTH EVERY DAY AS NEEDED FOR BLOOD PRESSURE. SWITCH FROM LISINOPRIL       Cardiovascular:  Angiotensin Receptor Blockers Passed - 3/20/2020  3:36 AM        Passed - Patient is at least 18 years old        Passed - Last BP in normal range within 360 days.     BP Readings from Last 3 Encounters:   12/20/19 138/83   11/19/19 134/88   11/04/19 (!) 156/82              Passed - Office visit in past 12 months or future 90 days.     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    [facility] Eliza Phillip MD    1 month ago Left foot pain    [facility] - Orthoped[facility] Sai Cook MD    3 months ago Chronic midline low back pain without sciatica    [facility] Med [facility] Re[facility] Diamond Moraes MD    4 months ago Essential hypertension    [facility] - Internal Medicine Karan James MD    4 months ago Achilles tendinitis of left lower extremity    [facility] - Podiatry Terrence Bauer, DPM          Future Appointments              In 1 week PERIMETRY, [facility] - Ophthalmology, [facility]    In 1 week [facility] - Optometry, [facility]    In 1 month MD [facility]-Physical Med & Rehab, [facility]    In 1 month MD [facility] - Internal Medicine, [facility] PCW                Passed - Cr  is 1.3 or below and within 360 days     Creatinine   Date Value Ref Range Status   11/19/2019 0.9 0.5 - 1.4 mg/dL Final   05/13/2019 0.9 0.5 - 1.4 mg/dL Final   04/04/2018 0.8 0.5 - 1.4 mg/dL Final              Passed - K in normal range and within 360 days     Potassium   Date Value Ref Range Status   11/19/2019 4.0 3.5 - 5.1 mmol/L Final   05/13/2019 4.2 3.5 - 5.1 mmol/L Final   04/04/2018 4.0 3.5 - 5.1 mmol/L Final              Passed - eGFR within 360 days     eGFR if non    Date Value Ref Range Status   11/19/2019 >60 >60 mL/min/1.73 m^2 Final     Comment:     Calculation used to obtain the estimated glomerular filtration  rate (eGFR) is the CKD-EPI equation.      05/13/2019 >60 >60 mL/min/1.73 m^2 Final     Comment:     Calculation used to obtain the estimated glomerular filtration  rate (eGFR) is the CKD-EPI equation.      04/04/2018 >60 >60 mL/min/1.73 m^2 Final     Comment:     Calculation used to obtain the estimated glomerular filtration  rate (eGFR) is the CKD-EPI equation.        eGFR if    Date Value Ref Range Status   11/19/2019 >60 >60 mL/min/1.73 m^2 Final   05/13/2019 >60 >60 mL/min/1.73 m^2 Final   04/04/2018 >60 >60 mL/min/1.73 m^2 Final               Appointments  past 12m or future 3m with PCP    Date Provider   Last Visit   11/19/2019 Karan James MD   Next Visit   5/19/2020 Karan James MD   .  ED visits in past 90 days: 0       Note composed:8:34 AM 03/24/2020

## 2020-03-24 NOTE — TELEPHONE ENCOUNTER
I have reviewed the assessment below. Losartan d/c as pt could not tolerate (Detailed Report). Quick dc pharmacy refill request.

## 2020-03-27 DIAGNOSIS — E78.5 HYPERLIPIDEMIA, UNSPECIFIED HYPERLIPIDEMIA TYPE: ICD-10-CM

## 2020-03-30 RX ORDER — EZETIMIBE 10 MG/1
TABLET ORAL
Qty: 90 TABLET | Refills: 2 | Status: SHIPPED | OUTPATIENT
Start: 2020-03-30 | End: 2021-05-27

## 2020-03-31 NOTE — PROGRESS NOTES
Refill Authorization Note     is requesting a refill authorization.    Brief assessment and rationale for refill: APPROVE: prr                                          Comments:   Refill Center Care Gap Closure protocols temporarily suspended.   Requested Prescriptions   Pending Prescriptions Disp Refills    ezetimibe (ZETIA) 10 mg tablet [Pharmacy Med Name: EZETIMIBE 10MG TABLETS] 90 tablet 2     Sig: TAKE 1 TABLET(10 MG) BY MOUTH EVERY DAY FOR CHOLESTEROL       Cardiovascular:  Antilipid - Sterol Transport Inhibitors Passed - 3/30/2020 10:30 PM        Passed - Patient is at least 18 years old        Passed - Office visit in past 12 months or future 90 days.     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    [facility] - Orthopedics Eliza Phillip MD    2 months ago Left foot pain    [facility] - Orthopedics Sai Cook MD    3 months ago Chronic midline low back pain without sciatica    [facility]-Physical Med [facility] Rehab Diamond Moraes MD    4 months ago Essential hypertension    [facility] - Internal Medicine Karan James MD    4 months ago Achilles tendinitis of left lower extremity    [facility] - Podiatry Terrence Bauer, DPM          Future Appointments              In 1 month Diamond Moraes MD [facility] Med [facility] Re[facility], [facility]    In 1 month Karan James MD [facility] Internal Medicine, [facility] PCW                Passed - Lipid Panel completed in last 360 days     Lab Results   Component Value Date    CHOL 299 (H) 11/19/2019    HDL 41 11/19/2019    LDLCALC 221.4 (H) 11/19/2019    TRIG 183 (H) 11/19/2019             Powered by [employer] - 3/30/2020 10:30 PM        Unable to evaluate active med list or whether request is a duplicate.         Appointments  past 12m or future 3m with PCP    Date Provider   Last Visit   11/19/2019 Karan James MD   Next Visit   5/19/2020 Karan James MD   .  ED visits in past 90 days:  0       Note composed:10:32 PM 03/30/2020

## 2020-04-08 DIAGNOSIS — F33.41 RECURRENT MAJOR DEPRESSIVE DISORDER, IN PARTIAL REMISSION: ICD-10-CM

## 2020-04-08 DIAGNOSIS — I10 ESSENTIAL HYPERTENSION: ICD-10-CM

## 2020-04-13 RX ORDER — TRAZODONE HYDROCHLORIDE 150 MG/1
TABLET ORAL
Qty: 90 TABLET | Refills: 0 | Status: SHIPPED | OUTPATIENT
Start: 2020-04-13 | End: 2020-07-10

## 2020-04-13 RX ORDER — AMLODIPINE BESYLATE 10 MG/1
TABLET ORAL
Qty: 90 TABLET | Refills: 2 | Status: SHIPPED | OUTPATIENT
Start: 2020-04-13 | End: 2021-03-09 | Stop reason: SDUPTHER

## 2020-04-13 NOTE — PROGRESS NOTES
Refill Authorization Note     is requesting a refill authorization.    Brief assessment and rationale for refill: APPROVE: prr                                          Comments:     Refill Center Care Gap Closure protocols temporarily suspended.   Requested Prescriptions   Pending Prescriptions Disp Refills    amLODIPine (NORVASC) 10 MG tablet [Pharmacy Med Name: AMLODIPINE BESYLATE 10MG TABLETS] 90 tablet 2     Sig: TAKE 1 TABLET(10 MG) BY MOUTH EVERY EVENING FOR BLOOD PRESSURE       Cardiovascular:  Calcium Channel Blockers Passed - 4/8/2020  2:35 PM        Passed - Patient is at least 18 years old        Passed - Last BP in normal range within 360 days.     BP Readings from Last 3 Encounters:   12/20/19 138/83   11/19/19 134/88   11/04/19 (!) 156/82              Passed - Office visit in past 12 months or future 90 days.     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    [facility] - Orthopedics Eliza Phillip MD    2 months ago Left foot pain    [facility] Orthoped[facility] Sai Cook MD    3 months ago Chronic midline low back pain without sciatica    [facility] Med [facility] Re[facility] Diamond Moraes MD    4 months ago Essential hypertension    [facility] - Internal Medicine Karan James MD    5 months ago Achilles tendinitis of left lower extremity    [facility] - Podiatry Terrence Bauer DPANISH          Future Appointments              In 3 weeks Diamond Moraes MD [facility] Re[facility], [facility]    In 1 month Karan James MD [facility] Internal Medicine, [facility] PCW               traZODone (DESYREL) 150 MG tablet [Pharmacy Med Name: TRAZODONE 150MG (HUNDRED-FIFTY) TAB] 90 tablet 0     Sig: TAKE 1 TABLET(150 MG) BY MOUTH EVERY EVENING AT BEDTIME AS NEEDED FOR INSOMNIA       Psychiatry: Antidepressants - Serotonin Modulator Passed - 4/8/2020  2:35 PM        Passed - Patient is at least 18 years old        Passed - Office visit in  past 6 months or future 90 days.     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    [facility] - Orthopedics Eliza Phillip MD    2 months ago Left foot pain    [facility] - Orthopedics Sai Cook MD    3 months ago Chronic midline low back pain without sciatica    [facility] Med [facility] Re[facility] Diamond Moraes MD    4 months ago Essential hypertension    [facility] - Internal Medicine Karan James MD    5 months ago Achilles tendinitis of left lower extremity    [facility] - Podiatry Terrence Bauer, DPM          Future Appointments              In 3 weeks Diamond Moraes MD [facility] Med [facility] Rehab, [facility]    In 1 month Karan James MD [facility] - Internal Medicine, [facility] PCW                 Appointments  past 12m or future 3m with PCP    Date Provider   Last Visit   11/19/2019 Karan James MD   Next Visit   5/19/2020 Karan James MD   .  ED visits in past 90 days: 0       Note composed:2:41 PM 04/13/2020

## 2020-04-29 ENCOUNTER — TELEPHONE (OUTPATIENT)
Dept: PODIATRY | Facility: CLINIC | Age: 55
End: 2020-04-29

## 2020-04-29 NOTE — TELEPHONE ENCOUNTER
Call to pt states she is having tingling and numbness in feet.  Will call her Medicaid rep to see if she has full benefits here as she does not want to go to [employer].  She will call us back

## 2020-05-01 DIAGNOSIS — F39 MOOD DISORDER: ICD-10-CM

## 2020-05-04 RX ORDER — ESCITALOPRAM OXALATE 20 MG/1
TABLET ORAL
Qty: 90 TABLET | Refills: 1 | Status: SHIPPED | OUTPATIENT
Start: 2020-05-04 | End: 2021-05-27

## 2020-05-04 NOTE — PROGRESS NOTES
Refill Authorization Note    is requesting a refill authorization.    Brief assessment and rationale for refill: APROVE: PRR               Medication reconciliation completed: No                         Comments:      Requested Prescriptions   Pending Prescriptions Disp Refills    escitalopram oxalate (LEXAPRO) 20 MG tablet [Pharmacy Med Name: ESCITALOPRAM 20MG TABLETS] 90 tablet 1     Sig: TAKE 1 TABLET(20 MG) BY MOUTH EVERY DAY       Psychiatry:  Antidepressants - SSRI Passed - 5/1/2020  3:36 AM        Passed - Patient is at least 18 years old        Passed - Office visit in past 6 months or future 90 days.     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    [facility] - Orthopedics Eliza Phillip MD    3 months ago Left foot pain    [facility] - Orthopedics Sai Cook MD    4 months ago Chronic midline low back pain without sciatica    [facility] Diamond Moraes MD    5 months ago Essential hypertension    [facility] - Internal Medicine Karan James MD    6 months ago Achilles tendinitis of left lower extremity    [facility] - Podiatry Terrence Bauer DPANISH          Future Appointments              In 2 weeks Karan James MD [facility] - Internal Medicine, [facility] PCW                 Appointments  past 12m or future 3m with PCP    Date Provider   Last Visit   11/19/2019 Karan James MD   Next Visit   5/19/2020 Karan James MD   ED visits in past 90 days: 0     Note composed:1:06 PM 05/04/2020

## 2020-05-11 DIAGNOSIS — M54.50 CHRONIC MIDLINE LOW BACK PAIN WITHOUT SCIATICA: ICD-10-CM

## 2020-05-11 DIAGNOSIS — M25.511 CHRONIC RIGHT SHOULDER PAIN: ICD-10-CM

## 2020-05-11 DIAGNOSIS — M25.551 RIGHT HIP PAIN: ICD-10-CM

## 2020-05-11 DIAGNOSIS — M17.11 PRIMARY OSTEOARTHRITIS OF RIGHT KNEE: ICD-10-CM

## 2020-05-11 DIAGNOSIS — G89.29 CHRONIC MIDLINE LOW BACK PAIN WITHOUT SCIATICA: ICD-10-CM

## 2020-05-11 DIAGNOSIS — G89.29 CHRONIC RIGHT SHOULDER PAIN: ICD-10-CM

## 2020-05-11 RX ORDER — MELOXICAM 15 MG/1
TABLET ORAL
Qty: 90 TABLET | Refills: 0 | Status: SHIPPED | OUTPATIENT
Start: 2020-05-11 | End: 2021-10-18

## 2020-05-12 ENCOUNTER — TELEPHONE (OUTPATIENT)
Dept: ORTHOPEDICS | Facility: CLINIC | Age: 55
End: 2020-05-12

## 2020-05-12 DIAGNOSIS — G57.62 MORTON NEUROMA, LEFT: Primary | ICD-10-CM

## 2020-05-12 NOTE — TELEPHONE ENCOUNTER
----- Message from Khushboo Case MA sent at 5/12/2020  2:16 PM CDT -----  Contact: pt   Can you place a referral and I will call her once done  ----- Message -----  From: Joleen Hunt  Sent: 5/12/2020   2:09 PM CDT  To: Marjan Kay Staff    Please call pt at [phone]    Patient is requesting a immediate Medicaid/Ortho referral to continue care at [facility] with her Medicaid Insurance     Thank you

## 2020-05-12 NOTE — TELEPHONE ENCOUNTER
Spoke to pt and informed her the referral has been placed and she asked for me to mail the referral to her.----- Message from Eliza Phillip MD sent at 5/12/2020  3:31 PM CDT -----  Contact: pt   Done.  Thanks!    ----- Message -----  From: Khushboo Case MA  Sent: 5/12/2020   2:16 PM CDT  To: Eliza Phillip MD    Can you place a referral and I will call her once done  ----- Message -----  From: Joleen Hunt  Sent: 5/12/2020   2:09 PM CDT  To: Marjan Kay Staff    Please call pt at [phone]    Patient is requesting a immediate Medicaid/Ortho referral to continue care at [facility] with her Medicaid Insurance     Thank you

## 2020-05-15 ENCOUNTER — TELEPHONE (OUTPATIENT)
Dept: INTERNAL MEDICINE | Facility: CLINIC | Age: 55
End: 2020-05-15

## 2020-05-18 ENCOUNTER — TELEPHONE (OUTPATIENT)
Dept: INTERNAL MEDICINE | Facility: CLINIC | Age: 55
End: 2020-05-18

## 2020-05-18 NOTE — TELEPHONE ENCOUNTER
----- Message from Ernesto Maguire sent at 5/18/2020  9:11 AM CDT -----  Contact: self    Patient is returning a phone call.  Who left a message for the patient: not sure  Does patient know what this is regarding: appointment    Comments: Patient would like to change to Audio emilio I tried but could not get the same date

## 2020-05-20 ENCOUNTER — TELEPHONE (OUTPATIENT)
Dept: ORTHOPEDICS | Facility: CLINIC | Age: 55
End: 2020-05-20

## 2020-05-20 NOTE — TELEPHONE ENCOUNTER
Spoke to pt and informed her the white streak is a side effect and nothing to worry about but if she wants to be seen she can or she can wait until she is seen at [facility]. Pt stated she would like to be seen by  and once I told her we are at [facility] she asked for me to mail the address and she will call back when she feel she can be seen.----- Message from Joleen Hunt sent at 5/20/2020 11:48 AM CDT -----  Contact: pt   Please call pt at [phone]    Patient is having possible side effects from the injection given ( pt has a white spot on her foot here injection was given)    Also still having severe foot pain    Thank you

## 2020-05-28 ENCOUNTER — TELEPHONE (OUTPATIENT)
Dept: ORTHOPEDICS | Facility: CLINIC | Age: 55
End: 2020-05-28

## 2020-05-28 NOTE — TELEPHONE ENCOUNTER
Spoke with pt, she states that her foot is changing colors and wants to come to be seen. Scheduled her an appt. She was pleased and had no further questions.    ----- Message from Joleen Hunt sent at 5/28/2020  1:13 PM CDT -----  Contact: pt  Please call pt at [phone]    Patient is requesting a sooner appt     Thank you

## 2020-07-02 ENCOUNTER — PATIENT OUTREACH (OUTPATIENT)
Dept: ADMINISTRATIVE | Facility: HOSPITAL | Age: 55
End: 2020-07-02

## 2020-07-07 ENCOUNTER — TELEPHONE (OUTPATIENT)
Dept: INTERNAL MEDICINE | Facility: CLINIC | Age: 55
End: 2020-07-07

## 2020-07-07 NOTE — TELEPHONE ENCOUNTER
----- Message from Yajaira Montana sent at 7/7/2020 11:19 AM CDT -----  Contact: self [phone]  Patient is calling to ask if she can get an audio apt, as soon as possible, the next available that came up is on 9/9. Please call patient back    thanks

## 2020-07-10 DIAGNOSIS — F33.41 RECURRENT MAJOR DEPRESSIVE DISORDER, IN PARTIAL REMISSION: ICD-10-CM

## 2020-07-10 RX ORDER — TRAZODONE HYDROCHLORIDE 150 MG/1
TABLET ORAL
Qty: 90 TABLET | Refills: 0 | Status: SHIPPED | OUTPATIENT
Start: 2020-07-10 | End: 2021-03-09 | Stop reason: SDUPTHER

## 2020-07-10 NOTE — TELEPHONE ENCOUNTER
Due for follow up, please contact to schedule  Next available ok if no new/urgent issues  Please remind patient to check BP at home at least 2-3 times/week and keep log

## 2020-07-10 NOTE — PROGRESS NOTES
Refill Routing Note   Medication(s) are not appropriate for processing by [facility]:       - Outside of protocol(INSOMNIA)           Medication reconciliation completed: No      Automatic Epic Generated Protocol Data:    Requested Prescriptions   Pending Prescriptions Disp Refills    traZODone (DESYREL) 150 MG tablet [Pharmacy Med Name: TRAZODONE 150MG (HUNDRED-FIFTY) TAB] 90 tablet 0     Sig: TAKE 1 TABLET(150 MG) BY MOUTH EVERY EVENING AT BEDTIME AS NEEDED FOR INSOMNIA       Psychiatry: Antidepressants - Serotonin Modulator Failed - 7/10/2020  3:37 AM        Failed - Office visit in past 6 months or future 90 days.     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    [facility] - Orthopedics Eliza Phillip MD    5 months ago Left foot pain    [facility] - Orthopedics Sai Cook MD    6 months ago Chronic midline low back pain without sciatica    [facility]-Physical Med & Rehab Diamond Moraes MD    7 months ago Essential hypertension    [facility] - Internal Medicine Karan James MD    8 months ago Achilles tendinitis of left lower extremity    [facility] - Podiatry Terrence Bauer DPM                    Passed - Patient is at least 18 years old              Appointments  past 12m or future 3m with PCP    Date Provider   Last Visit   11/19/2019 Karan James MD   Next Visit   Visit date not found Karan James MD   ED visits in past 90 days: 0     Note composed:6:54 AM 07/10/2020

## 2020-07-11 DIAGNOSIS — E55.9 VITAMIN D DEFICIENCY: ICD-10-CM

## 2020-07-12 RX ORDER — ERGOCALCIFEROL 1.25 MG/1
CAPSULE ORAL
Qty: 12 CAPSULE | Refills: 1 | Status: SHIPPED | OUTPATIENT
Start: 2020-07-12 | End: 2020-09-29 | Stop reason: SDUPTHER

## 2020-07-12 NOTE — PROGRESS NOTES
Refill Routing Note     Medication(s) are not appropriate for processing by [facility]:    Medication Outside of Protocol    Appointments  past 12m or future 3m with PCP    Date Provider   Last Visit   11/19/2019 Karan James MD   Next Visit   Visit date not found Karan James MD           Automatic Epic Protocol Generated Data:    Requested Prescriptions   Pending Prescriptions Disp Refills    ergocalciferol (ERGOCALCIFEROL) 50,000 unit Cap [Pharmacy Med Name: VITAMIN D2 50,000IU (ERGO) CAP RX] 12 capsule 3     Sig: TAKE ONE CAPSULE BY MOUTH EVERY 7 DAYS FOR VITAMIN DAILY DEFICIENCY       Endocrinology:  Vitamins - Vitamin D Supplementation Passed - 7/11/2020 10:36 AM        Passed - Patient is at least 18 years old        Passed - Hypercalcemia is not present on problem list        Passed - Office visit in past 12 months or future 90 days.     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    [facility] - Orthopedics Eliza Phillip MD    5 months ago Left foot pain    [facility] - Orthopedics Sai Cook MD    6 months ago Chronic midline low back pain without sciatica    [facility]-Physical Med & Rehab Diamond Moraes MD    7 months ago Essential hypertension    [facility] - Internal Medicine Karan James MD    8 months ago Achilles tendinitis of left lower extremity    [facility] - Podiatry Terrence Bauer DPM                    Passed - Ca in normal range and within 360 days     Calcium   Date Value Ref Range Status   11/19/2019 9.8 8.7 - 10.5 mg/dL Final   05/13/2019 9.8 8.7 - 10.5 mg/dL Final   04/04/2018 9.4 8.7 - 10.5 mg/dL Final              Passed - Vitamin D is 20 or above and within 360 days     Vit D, 25-Hydroxy   Date Value Ref Range Status   11/19/2019 21 (L) 30 - 96 ng/mL Final     Comment:     Vitamin D deficiency.........<10 ng/mL                              Vitamin D insufficiency......10-29 ng/mL        Vitamin D sufficiency........> or equal to 30 ng/mL  Vitamin D toxicity............>100 ng/mL     05/13/2019 21 (L) 30 - 96 ng/mL Final     Comment:     Vitamin D deficiency.........<10 ng/mL                              Vitamin D insufficiency......10-29 ng/mL       Vitamin D sufficiency........> or equal to 30 ng/mL  Vitamin D toxicity............>100 ng/mL     04/04/2018 25 (L) 30 - 96 ng/mL Final     Comment:     Vitamin D deficiency.........<10 ng/mL                              Vitamin D insufficiency......10-29 ng/mL       Vitamin D sufficiency........> or equal to 30 ng/mL  Vitamin D toxicity............>100 ng/mL                      Note composed:10:20 PM 07/11/2020

## 2020-07-21 ENCOUNTER — TELEPHONE (OUTPATIENT)
Dept: PODIATRY | Facility: CLINIC | Age: 55
End: 2020-07-21

## 2020-07-21 NOTE — TELEPHONE ENCOUNTER
SPOKE WITH PATIENT REGARDING APPOINTMENT SCHEDULING PATIENT WILL BEEN SEEN TOMORROW.----- Message from Joleen Hunt sent at 7/21/2020 10:23 AM CDT -----  Contact: pt  Please call pt at [phone]    Patient is requesting an appt for 07/22/2020 for left foot numbness, burning and discoloration of the skin    Thank you

## 2020-07-22 ENCOUNTER — PATIENT OUTREACH (OUTPATIENT)
Dept: ADMINISTRATIVE | Facility: OTHER | Age: 55
End: 2020-07-22

## 2020-07-22 DIAGNOSIS — Z12.31 ENCOUNTER FOR SCREENING MAMMOGRAM FOR MALIGNANT NEOPLASM OF BREAST: Primary | ICD-10-CM

## 2020-07-22 NOTE — PROGRESS NOTES
Care Everywhere: updated  Immunization: updated  Health Maintenance: updated  Media Review: reviewed for outside mammogram report  Legacy Review:   Order placed: mammogram  Upcoming appts:

## 2020-08-10 ENCOUNTER — HOSPITAL ENCOUNTER (OUTPATIENT)
Dept: RADIOLOGY | Facility: HOSPITAL | Age: 55
Discharge: HOME | End: 2020-08-10
Attending: INTERNAL MEDICINE
Payer: MEDICAID

## 2020-08-10 DIAGNOSIS — Z12.31 ENCOUNTER FOR SCREENING MAMMOGRAM FOR MALIGNANT NEOPLASM OF BREAST: ICD-10-CM

## 2020-08-10 PROCEDURE — 77063 MAMMO DIGITAL SCREENING BILAT WITH TOMOSYNTHESIS_CAD: ICD-10-PCS | Mod: 26,,, | Performed by: RADIOLOGY

## 2020-08-10 PROCEDURE — 77067 SCR MAMMO BI INCL CAD: CPT | Mod: TC

## 2020-08-10 PROCEDURE — 77067 SCR MAMMO BI INCL CAD: CPT | Mod: 26,,, | Performed by: RADIOLOGY

## 2020-08-10 PROCEDURE — 77067 MAMMO DIGITAL SCREENING BILAT WITH TOMOSYNTHESIS_CAD: ICD-10-PCS | Mod: 26,,, | Performed by: RADIOLOGY

## 2020-08-10 PROCEDURE — 77063 BREAST TOMOSYNTHESIS BI: CPT | Mod: 26,,, | Performed by: RADIOLOGY

## 2020-09-24 ENCOUNTER — OFFICE VISIT (OUTPATIENT)
Dept: INTERNAL MEDICINE | Facility: CLINIC | Age: 55
End: 2020-09-24
Payer: MEDICAID

## 2020-09-24 ENCOUNTER — IMMUNIZATION (OUTPATIENT)
Dept: PHARMACY | Facility: CLINIC | Age: 55
End: 2020-09-24

## 2020-09-24 ENCOUNTER — LAB VISIT (OUTPATIENT)
Dept: LAB | Facility: HOSPITAL | Age: 55
End: 2020-09-24
Attending: INTERNAL MEDICINE
Payer: MEDICAID

## 2020-09-24 ENCOUNTER — IMMUNIZATION (OUTPATIENT)
Dept: PHARMACY | Facility: CLINIC | Age: 55
End: 2020-09-24
Payer: MEDICAID

## 2020-09-24 VITALS
OXYGEN SATURATION: 98 % | SYSTOLIC BLOOD PRESSURE: 138 MMHG | TEMPERATURE: 99 F | WEIGHT: 236.75 LBS | HEIGHT: 65 IN | BODY MASS INDEX: 39.45 KG/M2 | HEART RATE: 69 BPM | DIASTOLIC BLOOD PRESSURE: 88 MMHG | RESPIRATION RATE: 18 BRPM

## 2020-09-24 DIAGNOSIS — Z12.2 ENCOUNTER FOR SCREENING FOR MALIGNANT NEOPLASM OF RESPIRATORY ORGANS: ICD-10-CM

## 2020-09-24 DIAGNOSIS — I10 ESSENTIAL HYPERTENSION: ICD-10-CM

## 2020-09-24 DIAGNOSIS — E55.9 VITAMIN D DEFICIENCY: ICD-10-CM

## 2020-09-24 DIAGNOSIS — R73.09 ELEVATED HEMOGLOBIN A1C: ICD-10-CM

## 2020-09-24 DIAGNOSIS — G57.62 MORTON'S NEUROMA OF LEFT FOOT: ICD-10-CM

## 2020-09-24 DIAGNOSIS — G47.33 OSA ON CPAP: ICD-10-CM

## 2020-09-24 DIAGNOSIS — R91.1 SOLITARY PULMONARY NODULE: ICD-10-CM

## 2020-09-24 DIAGNOSIS — Z87.891 HISTORY OF SMOKING GREATER THAN 50 PACK YEARS: ICD-10-CM

## 2020-09-24 DIAGNOSIS — E78.5 HYPERLIPIDEMIA, UNSPECIFIED HYPERLIPIDEMIA TYPE: ICD-10-CM

## 2020-09-24 DIAGNOSIS — M79.672 LEFT FOOT PAIN: ICD-10-CM

## 2020-09-24 DIAGNOSIS — F33.41 RECURRENT MAJOR DEPRESSIVE DISORDER, IN PARTIAL REMISSION: ICD-10-CM

## 2020-09-24 DIAGNOSIS — G47.33 OSA (OBSTRUCTIVE SLEEP APNEA): ICD-10-CM

## 2020-09-24 DIAGNOSIS — Z00.00 ANNUAL PHYSICAL EXAM: Primary | ICD-10-CM

## 2020-09-24 DIAGNOSIS — E78.2 MIXED HYPERLIPIDEMIA: ICD-10-CM

## 2020-09-24 LAB
25(OH)D3+25(OH)D2 SERPL-MCNC: 31 NG/ML (ref 30–96)
BASOPHILS # BLD AUTO: 0.05 K/UL (ref 0–0.2)
BASOPHILS NFR BLD: 0.4 % (ref 0–1.9)
DIFFERENTIAL METHOD: ABNORMAL
EOSINOPHIL # BLD AUTO: 0.2 K/UL (ref 0–0.5)
EOSINOPHIL NFR BLD: 1.6 % (ref 0–8)
ERYTHROCYTE [DISTWIDTH] IN BLOOD BY AUTOMATED COUNT: 17.2 % (ref 11.5–14.5)
HCT VFR BLD AUTO: 42.9 % (ref 37–48.5)
HGB BLD-MCNC: 14.5 G/DL (ref 12–16)
IMM GRANULOCYTES # BLD AUTO: 0.05 K/UL (ref 0–0.04)
IMM GRANULOCYTES NFR BLD AUTO: 0.4 % (ref 0–0.5)
LYMPHOCYTES # BLD AUTO: 4.1 K/UL (ref 1–4.8)
LYMPHOCYTES NFR BLD: 34.6 % (ref 18–48)
MCH RBC QN AUTO: 25.5 PG (ref 27–31)
MCHC RBC AUTO-ENTMCNC: 33.8 G/DL (ref 32–36)
MCV RBC AUTO: 75 FL (ref 82–98)
MONOCYTES # BLD AUTO: 0.7 K/UL (ref 0.3–1)
MONOCYTES NFR BLD: 5.6 % (ref 4–15)
NEUTROPHILS # BLD AUTO: 6.8 K/UL (ref 1.8–7.7)
NEUTROPHILS NFR BLD: 57.4 % (ref 38–73)
NRBC BLD-RTO: 0 /100 WBC
PLATELET # BLD AUTO: 262 K/UL (ref 150–350)
PMV BLD AUTO: 10.6 FL (ref 9.2–12.9)
RBC # BLD AUTO: 5.69 M/UL (ref 4–5.4)
WBC # BLD AUTO: 11.89 K/UL (ref 3.9–12.7)

## 2020-09-24 PROCEDURE — 99999 PR PBB SHADOW E&M-EST. PATIENT-LVL V: ICD-10-PCS | Mod: PBBFAC,,, | Performed by: INTERNAL MEDICINE

## 2020-09-24 PROCEDURE — 99215 OFFICE O/P EST HI 40 MIN: CPT | Mod: PBBFAC | Performed by: INTERNAL MEDICINE

## 2020-09-24 PROCEDURE — 99396 PREV VISIT EST AGE 40-64: CPT | Mod: S$PBB,,, | Performed by: INTERNAL MEDICINE

## 2020-09-24 PROCEDURE — 99999 PR PBB SHADOW E&M-EST. PATIENT-LVL V: CPT | Mod: PBBFAC,,, | Performed by: INTERNAL MEDICINE

## 2020-09-24 PROCEDURE — 36415 COLL VENOUS BLD VENIPUNCTURE: CPT

## 2020-09-24 PROCEDURE — 80061 LIPID PANEL: CPT

## 2020-09-24 PROCEDURE — 85025 COMPLETE CBC W/AUTO DIFF WBC: CPT

## 2020-09-24 PROCEDURE — 82306 VITAMIN D 25 HYDROXY: CPT

## 2020-09-24 PROCEDURE — 83036 HEMOGLOBIN GLYCOSYLATED A1C: CPT

## 2020-09-24 PROCEDURE — 84443 ASSAY THYROID STIM HORMONE: CPT

## 2020-09-24 PROCEDURE — 99396 PR PREVENTIVE VISIT,EST,40-64: ICD-10-PCS | Mod: S$PBB,,, | Performed by: INTERNAL MEDICINE

## 2020-09-24 PROCEDURE — 80053 COMPREHEN METABOLIC PANEL: CPT

## 2020-09-24 NOTE — PATIENT INSTRUCTIONS
Restart taking your lexapro - start with 1/2 tablet for 1-2 weeks, then go up to 1 tablet every day.    Today we will have you get your flu vaccine as well as your second shingles vaccine.

## 2020-09-24 NOTE — PROGRESS NOTES
Subjective:       Patient ID: Batool Rivera is a 55 y.o. female.    Chief Complaint: Annual Exam    HPI  53 y/o woman with h/o chronic back pain, obesity, HTN, HLD, GERD, BELLO, multiple other issues here for follow up     Concerned about foot problem.  Had injection done 2/3/20 by Dr Phillip for Benavidez neuroma, reports had area of foot/toe that turned white after this and was concerned. Has photo of this - linear area of hyperpigmentation from between 2nd &3rd MTP up dorsum of foot to midfoot, not extending to ankle or across dorsum of foot. Says this has now resolved and now is darker than her usual skin color in that area.    Due for eye exam, last seen early 2019, recommended to return in 1 year  Feels her glasses are no longer the right prescription for her    HTN - was previously on losartan + amlodipine, stopped both, then restarted amlodipine 10mg. Takes this regularly but didn't take this AM  Reports home -140    BELLO on CPAP - following with Sleep Medicine clinic here, had titration study done 8/3/17.  Hasn't been using this recently, needs to re-set-up machine    Dyslipidemia - prescribed atorvastatin 80mg, +zetia 10mg   Not taking these every day    MDD, Anxiety, difficulty sleeping - On lexapro 20mg, not taking this consistently. Has been feeling more depressed recently  Taking 150mg nightly which does help her to sleep.    H/o elevated A1c, not on medications, not recently following consistent diet    Vitamin D deficiency - taking supplement    Chronic back pain - No severe/acute back pain issues at present. Follows with Dr Moraes. On mobic, gabapentin    Review of Systems   Constitutional: Negative for activity change, fatigue and unexpected weight change.   HENT: Negative.    Eyes: Positive for visual disturbance (gradual).   Respiratory: Negative for cough and shortness of breath.    Cardiovascular: Negative for chest pain, palpitations and leg swelling.   Gastrointestinal: Negative for  "abdominal pain.   Endocrine: Negative.    Genitourinary: Negative for difficulty urinating and dysuria.   Musculoskeletal: Positive for arthralgias, back pain and gait problem (due to foot sometimes). Negative for joint swelling.   Skin: Negative for rash.   Neurological: Negative for weakness and numbness.   Psychiatric/Behavioral: Positive for dysphoric mood (more depressed mood recently). Negative for self-injury and sleep disturbance (BELLO). The patient is not nervous/anxious.          Past medical history, surgical history, and family medical history reviewed and updated as appropriate.    Medications and allergies reviewed.     Objective:          Vitals:    09/24/20 1118 09/24/20 1237   BP: (!) 140/88 138/88   BP Location: Left arm    Patient Position: Sitting    BP Method: Large (Manual)    Pulse: 69    Resp: 18    Temp: 98.7 °F (37.1 °C)    SpO2: 98%    Weight: 107.4 kg (236 lb 12.4 oz)    Height: 5' 5" (1.651 m)      Body mass index is 39.4 kg/m².  Physical Exam  Vitals signs reviewed.   Constitutional:       General: She is not in acute distress.     Appearance: She is well-developed.   HENT:      Head: Normocephalic and atraumatic.      Mouth/Throat:      Mouth: Mucous membranes are moist.      Pharynx: No oropharyngeal exudate.   Eyes:      General: No scleral icterus.     Conjunctiva/sclera: Conjunctivae normal.      Pupils: Pupils are equal, round, and reactive to light.   Neck:      Musculoskeletal: Neck supple.   Cardiovascular:      Rate and Rhythm: Normal rate and regular rhythm.      Heart sounds: Normal heart sounds. No murmur.   Pulmonary:      Effort: Pulmonary effort is normal. No respiratory distress.      Breath sounds: Normal breath sounds.   Abdominal:      General: There is no distension.      Palpations: Abdomen is soft.      Tenderness: There is no abdominal tenderness.   Musculoskeletal:         General: No tenderness.      Right lower leg: No edema.      Left lower leg: No edema.      " Comments: Limited ROM of low back   Lymphadenopathy:      Cervical: No cervical adenopathy.   Skin:     General: Skin is warm and dry.      Findings: No erythema.   Neurological:      General: No focal deficit present.      Mental Status: She is alert and oriented to person, place, and time.      Cranial Nerves: No cranial nerve deficit.      Gait: Gait normal.   Psychiatric:         Mood and Affect: Mood is anxious (mild) and depressed.         Behavior: Behavior normal.         Lab Results   Component Value Date    WBC 8.82 05/13/2019    HGB 14.0 05/13/2019    HCT 39.8 05/13/2019     05/13/2019    CHOL 299 (H) 11/19/2019    TRIG 183 (H) 11/19/2019    HDL 41 11/19/2019    ALT 15 11/19/2019    AST 17 11/19/2019     11/19/2019    K 4.0 11/19/2019     11/19/2019    CREATININE 0.9 11/19/2019    BUN 13 11/19/2019    CO2 29 11/19/2019    TSH 2.641 04/04/2018    INR 1.1 02/17/2012    HGBA1C 5.7 (H) 05/13/2019       Assessment:       1. Annual physical exam    2. Left foot pain    3. Benavidez's neuroma of left foot    4. Encounter for screening for malignant neoplasm of respiratory organs    5. Solitary pulmonary nodule    6. History of smoking greater than 50 pack years    7. BELLO on CPAP    8. Mixed hyperlipidemia    9. Essential hypertension    10. Recurrent major depressive disorder, in partial remission        Plan:   Batool was seen today for annual exam.    Diagnoses and all orders for this visit:    Annual physical exam - Overall stable, though dealing with multiple chronic conditions.  Reviewed chronic and preventive health concerns.    Left foot pain  -     Ambulatory referral/consult to Orthopedics; Future  Benavidez's neuroma of left foot  -     Ambulatory referral/consult to Orthopedics; Future  Follows with Dr Phillip but due to insurance may also try to see Ortho at [facility], would like new / paper copy of referral there.    Encounter for screening for malignant neoplasm of respiratory organs  -     CT  Chest Lung Screening Low Dose; Future  Solitary pulmonary nodule  -     CT Chest Lung Screening Low Dose; Future  History of smoking greater than 50 pack years  -     CT Chest Lung Screening Low Dose; Future  Reviewed, continue screening, due for next CT    BELLO on CPAP - recommended restart with CPAP, f/u with sleep clinic if needed    Mixed hyperlipidemia - continue statin, zetia    Essential hypertension - above goal but not taking medication regularly. Counseled re: strategies for taking medications more consistently, reminded to take med before visits. Monitor BP at home.    Recurrent major depressive disorder, in partial remission; anxiety - stable but with more depressed mood. Recommended restart lexapro as this has helped in the past    Health maintenance reviewed with patient.   Flu vaccine and shingrix #2 today  Recommended schedule with optometry for eye exam  Labs today    Follow up in about 4 months (around 1/24/2021) for depression.    Karan James MD  Internal Medicine  [facility]  9/24/2020

## 2020-09-25 LAB
ALBUMIN SERPL BCP-MCNC: 4.2 G/DL (ref 3.5–5.2)
ALP SERPL-CCNC: 128 U/L (ref 55–135)
ALT SERPL W/O P-5'-P-CCNC: 23 U/L (ref 10–44)
ANION GAP SERPL CALC-SCNC: 14 MMOL/L (ref 8–16)
AST SERPL-CCNC: 27 U/L (ref 10–40)
BILIRUB SERPL-MCNC: 0.4 MG/DL (ref 0.1–1)
BUN SERPL-MCNC: 12 MG/DL (ref 6–20)
CALCIUM SERPL-MCNC: 9.7 MG/DL (ref 8.7–10.5)
CHLORIDE SERPL-SCNC: 103 MMOL/L (ref 95–110)
CHOLEST SERPL-MCNC: 260 MG/DL (ref 120–199)
CHOLEST/HDLC SERPL: 6 {RATIO} (ref 2–5)
CO2 SERPL-SCNC: 23 MMOL/L (ref 23–29)
CREAT SERPL-MCNC: 0.9 MG/DL (ref 0.5–1.4)
EST. GFR  (AFRICAN AMERICAN): >60 ML/MIN/1.73 M^2
EST. GFR  (NON AFRICAN AMERICAN): >60 ML/MIN/1.73 M^2
ESTIMATED AVG GLUCOSE: 123 MG/DL (ref 68–131)
GLUCOSE SERPL-MCNC: 83 MG/DL (ref 70–110)
HBA1C MFR BLD HPLC: 5.9 % (ref 4–5.6)
HDLC SERPL-MCNC: 43 MG/DL (ref 40–75)
HDLC SERPL: 16.5 % (ref 20–50)
LDLC SERPL CALC-MCNC: 191.6 MG/DL (ref 63–159)
NONHDLC SERPL-MCNC: 217 MG/DL
POTASSIUM SERPL-SCNC: 4.6 MMOL/L (ref 3.5–5.1)
PROT SERPL-MCNC: 8.5 G/DL (ref 6–8.4)
SODIUM SERPL-SCNC: 140 MMOL/L (ref 136–145)
TRIGL SERPL-MCNC: 127 MG/DL (ref 30–150)
TSH SERPL DL<=0.005 MIU/L-ACNC: 1.06 UIU/ML (ref 0.4–4)

## 2020-09-29 ENCOUNTER — HOSPITAL ENCOUNTER (OUTPATIENT)
Dept: RADIOLOGY | Facility: HOSPITAL | Age: 55
Discharge: HOME | End: 2020-09-29
Attending: INTERNAL MEDICINE
Payer: MEDICAID

## 2020-09-29 ENCOUNTER — TELEPHONE (OUTPATIENT)
Dept: INTERNAL MEDICINE | Facility: CLINIC | Age: 55
End: 2020-09-29

## 2020-09-29 DIAGNOSIS — Z87.891 HISTORY OF SMOKING GREATER THAN 50 PACK YEARS: ICD-10-CM

## 2020-09-29 DIAGNOSIS — R91.1 SOLITARY PULMONARY NODULE: ICD-10-CM

## 2020-09-29 DIAGNOSIS — Z12.2 ENCOUNTER FOR SCREENING FOR MALIGNANT NEOPLASM OF RESPIRATORY ORGANS: ICD-10-CM

## 2020-09-29 PROBLEM — M79.672 LEFT FOOT PAIN: Status: ACTIVE | Noted: 2020-09-29

## 2020-09-29 PROBLEM — G57.62 MORTON'S NEUROMA OF LEFT FOOT: Status: ACTIVE | Noted: 2020-09-29

## 2020-09-29 PROCEDURE — G0297 LDCT FOR LUNG CA SCREEN: HCPCS | Mod: TC

## 2020-09-29 PROCEDURE — G0297 CT CHEST LUNG SCREENING LOW DOSE: ICD-10-PCS | Mod: 26,,, | Performed by: RADIOLOGY

## 2020-09-29 PROCEDURE — G0297 LDCT FOR LUNG CA SCREEN: HCPCS | Mod: 26,,, | Performed by: RADIOLOGY

## 2020-09-29 RX ORDER — ACETAMINOPHEN AND CODEINE PHOSPHATE 300; 30 MG/1; MG/1
TABLET ORAL
COMMUNITY
Start: 2020-08-19 | End: 2020-09-29

## 2020-09-29 RX ORDER — ATORVASTATIN CALCIUM 80 MG/1
TABLET, FILM COATED ORAL
Qty: 90 TABLET | Refills: 2 | Status: SHIPPED | OUTPATIENT
Start: 2020-09-29 | End: 2021-03-09 | Stop reason: SDUPTHER

## 2020-09-29 RX ORDER — ERGOCALCIFEROL 1.25 MG/1
CAPSULE ORAL
Qty: 12 CAPSULE | Refills: 1 | Status: SHIPPED | OUTPATIENT
Start: 2020-09-29

## 2020-09-29 NOTE — TELEPHONE ENCOUNTER
----- Message from Karan James MD sent at 9/29/2020 12:04 PM CDT -----  Follow-up screening low-dose CT shows two nodules which are stable and appear benign. Repeat screening in 1 year is recommended. No other major findings noted. Please let her know.

## 2020-09-29 NOTE — PROGRESS NOTES
Follow-up screening low-dose CT shows two nodules which are stable and appear benign. Repeat screening in 1 year is recommended. No other major findings noted. Please let her know.

## 2020-09-30 NOTE — PROGRESS NOTES
Labs reviewed and released; patient does not check portal.  Please call to review labs:  A1c (which gives a sense of average blood sugars over the past 3 months) is just into the borderline diabetes range, which means you have a higher risk of developing diabetes in the future. To help keep your blood sugar under control, I recommend working on a consistent healthy low-carb diet, regular exercise. Losing weight will also help with this.     Metabolic panel (electrolytes, glucose, kidney function, liver function) is overall normal. Slight low calcium so should make sure to get enough calcium in diet (1200mg daily).    LDL still >190. She is prescribed a statin as well as zetia but isn't taking these regularly. Please review that this shows she has a significantly higher risk for heart attack or stroke due to vascular disease, and the best way to help reduce this is to take the statin and zetia every day, and to keep as active as possible, work on healthy diet and weight loss. Will recheck this in 2 months as it is very important to get this under control. New rx for statin sent in.    Thyroid function normal.  Complete blood count overall stable, no anemia.  Vitamin D is now in the normal range, should continue taking current weekly supplement for this.    I would like her to have her cholesterol level rechecked (along with CMP to recheck calcium level) in 2 months, with taking the statin every day. Please schedule for this lab visit and appt to review lab results in 2 months (this can be with me or with Nellie Le NP if no appts available with me).  Please help to schedule for

## 2020-11-18 ENCOUNTER — LAB VISIT (OUTPATIENT)
Dept: LAB | Facility: HOSPITAL | Age: 55
End: 2020-11-18
Attending: INTERNAL MEDICINE
Payer: MEDICAID

## 2020-11-18 DIAGNOSIS — I10 ESSENTIAL HYPERTENSION: ICD-10-CM

## 2020-11-18 DIAGNOSIS — R73.09 ELEVATED HEMOGLOBIN A1C: ICD-10-CM

## 2020-11-18 DIAGNOSIS — E78.5 HYPERLIPIDEMIA, UNSPECIFIED HYPERLIPIDEMIA TYPE: ICD-10-CM

## 2020-11-18 LAB
ALBUMIN SERPL BCP-MCNC: 4 G/DL (ref 3.5–5.2)
ALP SERPL-CCNC: 127 U/L (ref 55–135)
ALT SERPL W/O P-5'-P-CCNC: 19 U/L (ref 10–44)
ANION GAP SERPL CALC-SCNC: 5 MMOL/L (ref 8–16)
AST SERPL-CCNC: 18 U/L (ref 10–40)
BILIRUB SERPL-MCNC: 0.5 MG/DL (ref 0.1–1)
BUN SERPL-MCNC: 11 MG/DL (ref 6–20)
CALCIUM SERPL-MCNC: 9.3 MG/DL (ref 8.7–10.5)
CHLORIDE SERPL-SCNC: 105 MMOL/L (ref 95–110)
CHOLEST SERPL-MCNC: 260 MG/DL (ref 120–199)
CHOLEST/HDLC SERPL: 6.7 {RATIO} (ref 2–5)
CO2 SERPL-SCNC: 29 MMOL/L (ref 23–29)
CREAT SERPL-MCNC: 0.9 MG/DL (ref 0.5–1.4)
EST. GFR  (AFRICAN AMERICAN): >60 ML/MIN/1.73 M^2
EST. GFR  (NON AFRICAN AMERICAN): >60 ML/MIN/1.73 M^2
GLUCOSE SERPL-MCNC: 85 MG/DL (ref 70–110)
HDLC SERPL-MCNC: 39 MG/DL (ref 40–75)
HDLC SERPL: 15 % (ref 20–50)
LDLC SERPL CALC-MCNC: 196.8 MG/DL (ref 63–159)
NONHDLC SERPL-MCNC: 221 MG/DL
POTASSIUM SERPL-SCNC: 4.3 MMOL/L (ref 3.5–5.1)
PROT SERPL-MCNC: 7.9 G/DL (ref 6–8.4)
SODIUM SERPL-SCNC: 139 MMOL/L (ref 136–145)
TRIGL SERPL-MCNC: 121 MG/DL (ref 30–150)

## 2020-11-18 PROCEDURE — 36415 COLL VENOUS BLD VENIPUNCTURE: CPT

## 2020-11-18 PROCEDURE — 80053 COMPREHEN METABOLIC PANEL: CPT

## 2020-11-18 PROCEDURE — 80061 LIPID PANEL: CPT

## 2020-12-11 ENCOUNTER — PATIENT MESSAGE (OUTPATIENT)
Dept: OTHER | Facility: OTHER | Age: 55
End: 2020-12-11

## 2021-03-09 DIAGNOSIS — E78.5 HYPERLIPIDEMIA, UNSPECIFIED HYPERLIPIDEMIA TYPE: ICD-10-CM

## 2021-03-09 DIAGNOSIS — F33.41 RECURRENT MAJOR DEPRESSIVE DISORDER, IN PARTIAL REMISSION: ICD-10-CM

## 2021-03-09 DIAGNOSIS — I10 ESSENTIAL HYPERTENSION: ICD-10-CM

## 2021-03-09 RX ORDER — AMLODIPINE BESYLATE 10 MG/1
TABLET ORAL
Qty: 90 TABLET | Refills: 2 | Status: SHIPPED | OUTPATIENT
Start: 2021-03-09 | End: 2021-10-18 | Stop reason: SDUPTHER

## 2021-03-09 RX ORDER — TRAZODONE HYDROCHLORIDE 150 MG/1
TABLET ORAL
Qty: 90 TABLET | Refills: 2 | Status: SHIPPED | OUTPATIENT
Start: 2021-03-09

## 2021-03-09 RX ORDER — ATORVASTATIN CALCIUM 80 MG/1
TABLET, FILM COATED ORAL
Qty: 90 TABLET | Refills: 2 | Status: SHIPPED | OUTPATIENT
Start: 2021-03-09 | End: 2021-05-27

## 2021-05-13 ENCOUNTER — TELEPHONE (OUTPATIENT)
Dept: INTERNAL MEDICINE | Facility: CLINIC | Age: 56
End: 2021-05-13

## 2021-05-24 DIAGNOSIS — E78.5 HYPERLIPIDEMIA, UNSPECIFIED HYPERLIPIDEMIA TYPE: ICD-10-CM

## 2021-05-24 DIAGNOSIS — F39 MOOD DISORDER: ICD-10-CM

## 2021-05-27 RX ORDER — ESCITALOPRAM OXALATE 20 MG/1
TABLET ORAL
Qty: 90 TABLET | Refills: 1 | Status: SHIPPED | OUTPATIENT
Start: 2021-05-27 | End: 2021-10-18 | Stop reason: SDUPTHER

## 2021-05-27 RX ORDER — ATORVASTATIN CALCIUM 80 MG/1
TABLET, FILM COATED ORAL
Qty: 90 TABLET | Refills: 1 | Status: SHIPPED | OUTPATIENT
Start: 2021-05-27 | End: 2021-10-18 | Stop reason: SDUPTHER

## 2021-05-27 RX ORDER — EZETIMIBE 10 MG/1
TABLET ORAL
Qty: 90 TABLET | Refills: 1 | Status: SHIPPED | OUTPATIENT
Start: 2021-05-27 | End: 2021-10-18 | Stop reason: SDUPTHER

## 2021-06-22 ENCOUNTER — TELEPHONE (OUTPATIENT)
Dept: INTERNAL MEDICINE | Facility: CLINIC | Age: 56
End: 2021-06-22

## 2021-06-22 DIAGNOSIS — E78.2 MIXED HYPERLIPIDEMIA: ICD-10-CM

## 2021-06-22 DIAGNOSIS — I10 ESSENTIAL HYPERTENSION: Primary | ICD-10-CM

## 2021-06-22 DIAGNOSIS — R73.09 ELEVATED HEMOGLOBIN A1C: ICD-10-CM

## 2021-10-18 ENCOUNTER — OFFICE VISIT (OUTPATIENT)
Dept: INTERNAL MEDICINE | Facility: CLINIC | Age: 56
End: 2021-10-18
Payer: MEDICAID

## 2021-10-18 VITALS
SYSTOLIC BLOOD PRESSURE: 160 MMHG | HEIGHT: 65 IN | BODY MASS INDEX: 37.57 KG/M2 | WEIGHT: 225.5 LBS | HEART RATE: 64 BPM | DIASTOLIC BLOOD PRESSURE: 85 MMHG

## 2021-10-18 DIAGNOSIS — M79.672 LEFT FOOT PAIN: ICD-10-CM

## 2021-10-18 DIAGNOSIS — Z00.00 ANNUAL PHYSICAL EXAM: Primary | ICD-10-CM

## 2021-10-18 DIAGNOSIS — E78.5 HYPERLIPIDEMIA, UNSPECIFIED HYPERLIPIDEMIA TYPE: ICD-10-CM

## 2021-10-18 DIAGNOSIS — Z12.31 ENCOUNTER FOR SCREENING MAMMOGRAM FOR BREAST CANCER: ICD-10-CM

## 2021-10-18 DIAGNOSIS — I10 ESSENTIAL HYPERTENSION: ICD-10-CM

## 2021-10-18 DIAGNOSIS — F39 MOOD DISORDER: ICD-10-CM

## 2021-10-18 DIAGNOSIS — R91.8 OTHER NONSPECIFIC ABNORMAL FINDING OF LUNG FIELD: ICD-10-CM

## 2021-10-18 DIAGNOSIS — H54.7 DECREASED VISION: ICD-10-CM

## 2021-10-18 DIAGNOSIS — G57.62 MORTON'S NEUROMA OF LEFT FOOT: ICD-10-CM

## 2021-10-18 PROCEDURE — 99213 PR OFFICE/OUTPT VISIT, EST, LEVL III, 20-29 MIN: ICD-10-PCS | Mod: S$PBB,,, | Performed by: INTERNAL MEDICINE

## 2021-10-18 PROCEDURE — 99213 OFFICE O/P EST LOW 20 MIN: CPT | Mod: S$PBB,,, | Performed by: INTERNAL MEDICINE

## 2021-10-18 PROCEDURE — 99214 OFFICE O/P EST MOD 30 MIN: CPT | Mod: PBBFAC | Performed by: INTERNAL MEDICINE

## 2021-10-18 PROCEDURE — 99999 PR PBB SHADOW E&M-EST. PATIENT-LVL IV: ICD-10-PCS | Mod: PBBFAC,,, | Performed by: INTERNAL MEDICINE

## 2021-10-18 PROCEDURE — 99999 PR PBB SHADOW E&M-EST. PATIENT-LVL IV: CPT | Mod: PBBFAC,,, | Performed by: INTERNAL MEDICINE

## 2021-10-18 RX ORDER — ATORVASTATIN CALCIUM 80 MG/1
TABLET, FILM COATED ORAL
Qty: 90 TABLET | Refills: 4 | Status: SHIPPED | OUTPATIENT
Start: 2021-10-18

## 2021-10-18 RX ORDER — ASPIRIN 81 MG/1
81 TABLET ORAL DAILY
Qty: 30 TABLET | Refills: 11 | Status: SHIPPED | OUTPATIENT
Start: 2021-10-18 | End: 2023-06-20

## 2021-10-18 RX ORDER — GABAPENTIN 400 MG/1
400 CAPSULE ORAL NIGHTLY
Qty: 60 CAPSULE | Refills: 3 | Status: SHIPPED | OUTPATIENT
Start: 2021-10-18

## 2021-10-18 RX ORDER — ESCITALOPRAM OXALATE 20 MG/1
20 TABLET ORAL DAILY
Qty: 90 TABLET | Refills: 1 | Status: SHIPPED | OUTPATIENT
Start: 2021-10-18 | End: 2023-07-28 | Stop reason: SDUPTHER

## 2021-10-18 RX ORDER — EZETIMIBE 10 MG/1
10 TABLET ORAL NIGHTLY
Qty: 90 TABLET | Refills: 3 | Status: SHIPPED | OUTPATIENT
Start: 2021-10-18

## 2021-10-18 RX ORDER — AMLODIPINE BESYLATE 10 MG/1
TABLET ORAL
Qty: 90 TABLET | Refills: 3 | Status: SHIPPED | OUTPATIENT
Start: 2021-10-18 | End: 2023-01-03 | Stop reason: SDUPTHER

## 2021-10-18 RX ORDER — GABAPENTIN 400 MG/1
400 CAPSULE ORAL NIGHTLY
Qty: 60 CAPSULE | Refills: 3
Start: 2021-10-18 | End: 2021-10-18 | Stop reason: SDUPTHER

## 2021-10-25 DIAGNOSIS — E55.9 VITAMIN D DEFICIENCY: ICD-10-CM

## 2021-10-27 RX ORDER — ERGOCALCIFEROL 1.25 MG/1
CAPSULE ORAL
Qty: 12 CAPSULE | Refills: 1 | OUTPATIENT
Start: 2021-10-27

## 2021-11-05 ENCOUNTER — OFFICE VISIT (OUTPATIENT)
Dept: INTERNAL MEDICINE | Facility: CLINIC | Age: 56
End: 2021-11-05
Payer: MEDICAID

## 2021-11-05 ENCOUNTER — HOSPITAL ENCOUNTER (OUTPATIENT)
Dept: RADIOLOGY | Facility: HOSPITAL | Age: 56
Discharge: HOME | End: 2021-11-05
Attending: INTERNAL MEDICINE
Payer: MEDICAID

## 2021-11-05 VITALS
BODY MASS INDEX: 36.58 KG/M2 | DIASTOLIC BLOOD PRESSURE: 80 MMHG | HEIGHT: 65 IN | SYSTOLIC BLOOD PRESSURE: 156 MMHG | WEIGHT: 219.56 LBS | HEART RATE: 60 BPM

## 2021-11-05 VITALS — WEIGHT: 219 LBS | HEIGHT: 65 IN | BODY MASS INDEX: 36.49 KG/M2

## 2021-11-05 DIAGNOSIS — H53.8 BLURRED VISION, BILATERAL: ICD-10-CM

## 2021-11-05 DIAGNOSIS — R91.1 PULMONARY NODULE: ICD-10-CM

## 2021-11-05 DIAGNOSIS — E78.5 HYPERLIPIDEMIA, UNSPECIFIED HYPERLIPIDEMIA TYPE: ICD-10-CM

## 2021-11-05 DIAGNOSIS — F33.41 RECURRENT MAJOR DEPRESSIVE DISORDER, IN PARTIAL REMISSION: Chronic | ICD-10-CM

## 2021-11-05 DIAGNOSIS — E78.2 MIXED HYPERLIPIDEMIA: ICD-10-CM

## 2021-11-05 DIAGNOSIS — Z12.31 ENCOUNTER FOR SCREENING MAMMOGRAM FOR BREAST CANCER: ICD-10-CM

## 2021-11-05 DIAGNOSIS — G47.33 OSA ON CPAP: ICD-10-CM

## 2021-11-05 DIAGNOSIS — R91.8 OTHER NONSPECIFIC ABNORMAL FINDING OF LUNG FIELD: ICD-10-CM

## 2021-11-05 DIAGNOSIS — E66.01 SEVERE OBESITY (BMI 35.0-35.9 WITH COMORBIDITY): ICD-10-CM

## 2021-11-05 DIAGNOSIS — I10 PRIMARY HYPERTENSION: Primary | ICD-10-CM

## 2021-11-05 DIAGNOSIS — M79.672 LEFT FOOT PAIN: ICD-10-CM

## 2021-11-05 DIAGNOSIS — G57.62 MORTON'S NEUROMA OF LEFT FOOT: ICD-10-CM

## 2021-11-05 DIAGNOSIS — R73.03 PREDIABETES: ICD-10-CM

## 2021-11-05 PROCEDURE — 71271 CT THORAX LUNG CANCER SCR C-: CPT | Mod: TC

## 2021-11-05 PROCEDURE — 71271 CT THORAX LUNG CANCER SCR C-: CPT | Mod: 26,,, | Performed by: RADIOLOGY

## 2021-11-05 PROCEDURE — 77067 MAMMO DIGITAL SCREENING BILAT WITH TOMO: ICD-10-PCS | Mod: 26,,, | Performed by: RADIOLOGY

## 2021-11-05 PROCEDURE — 99999 PR PBB SHADOW E&M-EST. PATIENT-LVL IV: CPT | Mod: PBBFAC,,, | Performed by: STUDENT IN AN ORGANIZED HEALTH CARE EDUCATION/TRAINING PROGRAM

## 2021-11-05 PROCEDURE — 99214 OFFICE O/P EST MOD 30 MIN: CPT | Mod: PBBFAC,25 | Performed by: STUDENT IN AN ORGANIZED HEALTH CARE EDUCATION/TRAINING PROGRAM

## 2021-11-05 PROCEDURE — 71271 CT CHEST LUNG SCREENING LOW DOSE: ICD-10-PCS | Mod: 26,,, | Performed by: RADIOLOGY

## 2021-11-05 PROCEDURE — 77067 SCR MAMMO BI INCL CAD: CPT | Mod: 26,,, | Performed by: RADIOLOGY

## 2021-11-05 PROCEDURE — 77063 BREAST TOMOSYNTHESIS BI: CPT | Mod: 26,,, | Performed by: RADIOLOGY

## 2021-11-05 PROCEDURE — 77067 SCR MAMMO BI INCL CAD: CPT | Mod: TC

## 2021-11-05 PROCEDURE — 99213 PR OFFICE/OUTPT VISIT, EST, LEVL III, 20-29 MIN: ICD-10-PCS | Mod: S$PBB,,, | Performed by: STUDENT IN AN ORGANIZED HEALTH CARE EDUCATION/TRAINING PROGRAM

## 2021-11-05 PROCEDURE — 99999 PR PBB SHADOW E&M-EST. PATIENT-LVL IV: ICD-10-PCS | Mod: PBBFAC,,, | Performed by: STUDENT IN AN ORGANIZED HEALTH CARE EDUCATION/TRAINING PROGRAM

## 2021-11-05 PROCEDURE — 99213 OFFICE O/P EST LOW 20 MIN: CPT | Mod: S$PBB,,, | Performed by: STUDENT IN AN ORGANIZED HEALTH CARE EDUCATION/TRAINING PROGRAM

## 2021-11-05 PROCEDURE — 77063 MAMMO DIGITAL SCREENING BILAT WITH TOMO: ICD-10-PCS | Mod: 26,,, | Performed by: RADIOLOGY

## 2021-11-05 RX ORDER — LOSARTAN POTASSIUM 50 MG/1
50 TABLET ORAL DAILY
Qty: 30 TABLET | Refills: 11 | Status: SHIPPED | OUTPATIENT
Start: 2021-11-05 | End: 2023-01-03

## 2021-11-06 PROBLEM — H53.8 BLURRED VISION, BILATERAL: Status: ACTIVE | Noted: 2021-11-06

## 2021-11-06 PROBLEM — R73.03 PREDIABETES: Status: ACTIVE | Noted: 2021-11-06

## 2021-11-08 ENCOUNTER — TELEPHONE (OUTPATIENT)
Dept: HEPATOLOGY | Facility: HOSPITAL | Age: 56
End: 2021-11-08
Payer: MEDICAID

## 2021-12-07 ENCOUNTER — PATIENT MESSAGE (OUTPATIENT)
Dept: INTERNAL MEDICINE | Facility: CLINIC | Age: 56
End: 2021-12-07
Payer: MEDICAID

## 2021-12-09 ENCOUNTER — TELEPHONE (OUTPATIENT)
Dept: INTERNAL MEDICINE | Facility: CLINIC | Age: 56
End: 2021-12-09
Payer: MEDICAID

## 2022-06-01 ENCOUNTER — TELEPHONE (OUTPATIENT)
Dept: INTERNAL MEDICINE | Facility: CLINIC | Age: 57
End: 2022-06-01
Payer: MEDICAID

## 2022-06-13 ENCOUNTER — TELEPHONE (OUTPATIENT)
Dept: PODIATRY | Facility: CLINIC | Age: 57
End: 2022-06-13
Payer: MEDICAID

## 2022-06-13 NOTE — TELEPHONE ENCOUNTER
----- Message from Acacia Toledo sent at 6/13/2022  8:09 AM CDT -----  Contact: [phone] @ Patient  Good Morning  Patient is calling to reestablish with Dr Deluca due to foot pain    Please call and advise

## 2022-06-22 ENCOUNTER — OFFICE VISIT (OUTPATIENT)
Dept: PODIATRY | Facility: CLINIC | Age: 57
End: 2022-06-22
Payer: MEDICAID

## 2022-06-22 VITALS
SYSTOLIC BLOOD PRESSURE: 160 MMHG | HEART RATE: 72 BPM | HEIGHT: 65 IN | WEIGHT: 218.94 LBS | DIASTOLIC BLOOD PRESSURE: 84 MMHG | BODY MASS INDEX: 36.48 KG/M2

## 2022-06-22 DIAGNOSIS — M54.41 CHRONIC BILATERAL LOW BACK PAIN WITH BILATERAL SCIATICA: ICD-10-CM

## 2022-06-22 DIAGNOSIS — M79.605 PAIN OF LEFT LOWER EXTREMITY: ICD-10-CM

## 2022-06-22 DIAGNOSIS — M54.42 CHRONIC BILATERAL LOW BACK PAIN WITH BILATERAL SCIATICA: ICD-10-CM

## 2022-06-22 DIAGNOSIS — G89.29 CHRONIC BILATERAL LOW BACK PAIN WITH BILATERAL SCIATICA: ICD-10-CM

## 2022-06-22 DIAGNOSIS — M79.672 FOOT PAIN, LEFT: Primary | ICD-10-CM

## 2022-06-22 PROCEDURE — 3079F DIAST BP 80-89 MM HG: CPT | Mod: CPTII,,, | Performed by: PODIATRIST

## 2022-06-22 PROCEDURE — 3008F BODY MASS INDEX DOCD: CPT | Mod: CPTII,,, | Performed by: PODIATRIST

## 2022-06-22 PROCEDURE — 99214 OFFICE O/P EST MOD 30 MIN: CPT | Mod: S$PBB,,, | Performed by: PODIATRIST

## 2022-06-22 PROCEDURE — 1159F PR MEDICATION LIST DOCUMENTED IN MEDICAL RECORD: ICD-10-PCS | Mod: CPTII,,, | Performed by: PODIATRIST

## 2022-06-22 PROCEDURE — 99999 PR PBB SHADOW E&M-EST. PATIENT-LVL III: CPT | Mod: PBBFAC,,, | Performed by: PODIATRIST

## 2022-06-22 PROCEDURE — 99214 PR OFFICE/OUTPT VISIT, EST, LEVL IV, 30-39 MIN: ICD-10-PCS | Mod: S$PBB,,, | Performed by: PODIATRIST

## 2022-06-22 PROCEDURE — 3077F PR MOST RECENT SYSTOLIC BLOOD PRESSURE >= 140 MM HG: ICD-10-PCS | Mod: CPTII,,, | Performed by: PODIATRIST

## 2022-06-22 PROCEDURE — 3077F SYST BP >= 140 MM HG: CPT | Mod: CPTII,,, | Performed by: PODIATRIST

## 2022-06-22 PROCEDURE — 3079F PR MOST RECENT DIASTOLIC BLOOD PRESSURE 80-89 MM HG: ICD-10-PCS | Mod: CPTII,,, | Performed by: PODIATRIST

## 2022-06-22 PROCEDURE — 99213 OFFICE O/P EST LOW 20 MIN: CPT | Mod: PBBFAC,PN | Performed by: PODIATRIST

## 2022-06-22 PROCEDURE — 99999 PR PBB SHADOW E&M-EST. PATIENT-LVL III: ICD-10-PCS | Mod: PBBFAC,,, | Performed by: PODIATRIST

## 2022-06-22 PROCEDURE — 1159F MED LIST DOCD IN RCRD: CPT | Mod: CPTII,,, | Performed by: PODIATRIST

## 2022-06-22 PROCEDURE — 3008F PR BODY MASS INDEX (BMI) DOCUMENTED: ICD-10-PCS | Mod: CPTII,,, | Performed by: PODIATRIST

## 2022-06-22 NOTE — PROGRESS NOTES
Subjective:      Patient ID: Batool Rivera is a 57 y.o. female.    Chief Complaint:   Foot Pain (Left foot)    Batool is a 57 y.o. female who presents to the podiatry clinic  with complaint of  left foot and leg pain. Onset of the symptoms was several years ago.   Patient relates she is able to walk without any pain however the left foot is and leg have a numbness painful feel primarily at night.  Patient does take gabapentin.  She relates that she did have some lower back evaluation and treatments many years ago.    She is really concerned that it may be bad blood flow as she was told after her hysterectomy that it was possible that she had bad blood flow in her legs.    Patient relates that her twin brother lost his leg to amputation.  Patient relates she does not smoke or drink.     Patient relates she did see orthopedic Dr. Phillip and after an injection her foot turn white.  She relates that the injection did not help the pain.  Patient denies diabetes        Per PCP note10/21    Foot numbness : Reports she had injection done 2/3/20 by Dr Phillip for Benavidez neuroma, reports had area of foot/toe that turned white after this and was concerned. Has photo of this - linear area of hyperpigmentation from between 2nd &3rd MTP up dorsum of foot to midfoot, not extending to ankle or across dorsum of foot. Says this has now resolved and now is darker than her usual skin color in that area. She is concerned about her feet because her twin sister had similar issues and had to had her foot amputated.      HTN - was previously on losartan + amlodipine, stopped both, then restarted amlodipine 10mg. BP at home have been in the 160-190s per patient. She ran out of amlodipine.      BELLO on CPAP - following with Sleep Medicine clinic here, had titration study done 8/3/17.  Hasn't been using this recently, needs to re-set-up machine     Dyslipidemia - prescribed atorvastatin 80mg, +zetia 10mg   Not taking these every day     MDD,  Anxiety, difficulty sleeping - On lexapro 20mg, not taking this consistently. Has been feeling more depressed recently  Taking 150mg nightly which does help her to sleep.     H/o elevated A1c, not on medications, not recently following consistent diet     Vitamin D deficiency - taking supplement. Last checked in  and it was >30     Chronic back pain - No severe/acute back pain issues at present. Follows with Dr Moraes. 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 30 tablet 11    atorvastatin (LIPITOR) 80 MG tablet TAKE 1 TABLET BY MOUTH ONCE DAILY FOR CHOLESTEROL AND TO PROTECT YOUR HEART 90 tablet 4    ergocalciferol (ERGOCALCIFEROL) 50,000 unit Cap TAKE ONE CAPSULE BY MOUTH EVERY 7 DAYS FOR VITAMIN D DEFICIENCY 12 capsule 1    EScitalopram oxalate (LEXAPRO) 20 MG tablet Take 1 tablet (20 mg total) by mouth once daily. 90 tablet 1    ezetimibe (ZETIA) 10 mg tablet Take 1 tablet (10 mg total) by mouth every evening. 90 tablet 3    gabapentin (NEURONTIN) 400 MG capsule Take 1 capsule (400 mg total) by mouth every evening. If no relief, may increase dose to twice per day. 60 capsule 3    losartan (COZAAR) 50 MG tablet Take 1 tablet (50 mg total) by mouth once daily. 30 tablet 11    traZODone (DESYREL) 150 MG tablet TAKE 1  "TABLET(150 MG) BY MOUTH EVERY EVENING AT BEDTIME AS NEEDED FOR INSOMNIA 90 tablet 2     No current facility-administered medications on file prior to visit.     Review of patient's allergies indicates:   Allergen Reactions    Coconut Itching    Losartan Hallucinations    Voltaren [diclofenac sodium] Rash     Rash with voltaren GEL, not to NSAIDs in general       Review of Systems   Constitutional: Negative for chills, decreased appetite, fever, malaise/fatigue, night sweats, weight gain and weight loss.   Cardiovascular: Negative for chest pain, claudication, dyspnea on exertion, leg swelling, palpitations and syncope.   Respiratory: Negative for cough and shortness of breath.    Endocrine: Negative for cold intolerance and heat intolerance.   Hematologic/Lymphatic: Negative for bleeding problem. Does not bruise/bleed easily.   Skin: Negative for color change, dry skin, flushing, itching, nail changes, poor wound healing, rash, skin cancer, suspicious lesions and unusual hair distribution.   Musculoskeletal: Positive for back pain, myalgias and stiffness. Negative for arthritis, falls, gout, joint pain, joint swelling, muscle cramps, muscle weakness and neck pain.   Gastrointestinal: Negative for diarrhea, nausea and vomiting.   Neurological: Positive for numbness and paresthesias. Negative for dizziness, focal weakness, light-headedness, tremors, vertigo and weakness.   Psychiatric/Behavioral: Negative for altered mental status and depression. The patient does not have insomnia.    Allergic/Immunologic: Negative.            Objective:       Vitals:    06/22/22 1510   BP: (!) 160/84   Pulse: 72   Weight: 99.3 kg (218 lb 14.7 oz)   Height: 5' 5" (1.651 m)   PainSc: 10-Worst pain ever   PainLoc: Foot   99.3 kg (218 lb 14.7 oz)     Physical Exam  Vitals reviewed.   Constitutional:       General: She is not in acute distress.     Appearance: She is not ill-appearing.      Comments: ** physical exam deferred as " patient had granddaughter who is 1 years old who refused to sit in the exam chair with her.    Patient was seen sitting and regular chair holding granddaughter   Feet:      Comments: No open lesions noted  Neurological:      Mental Status: She is alert.   Psychiatric:         Attention and Perception: Attention normal.         Mood and Affect: Mood normal.         Speech: Speech normal.         Behavior: Behavior is cooperative.               Assessment:       Encounter Diagnoses   Name Primary?    Foot pain, left Yes    Pain of left lower extremity     Chronic bilateral low back pain with bilateral sciatica          Plan:       Batool was seen today for foot pain.    Diagnoses and all orders for this visit:    Foot pain, left  -     Ankle Brachial Indices (VIDAL); Future  -     Ambulatory referral/consult to Physical Medicine Rehab; Future    Pain of left lower extremity  -     Ankle Brachial Indices (VIDAL); Future  -     Ambulatory referral/consult to Physical Medicine Rehab; Future    Chronic bilateral low back pain with bilateral sciatica  -     Ambulatory referral/consult to Physical Medicine Rehab; Future      I counseled the patient on her conditions, their implications and medical management.      Physical Exam was limited secondary to patient's 1-year-old granddaughter unable to not be held and refusing to sit in exam chair.  Encourage patient to arrange for  prior to next appointment    Was able to obtain most history from chart and patient      Secondary to patient's subjective lacing that her she was told she has bad blood flow in the past in her legs and she is concerned that her twin brother lost his leg to amputation from PVD recommend vascular workup starting with exercise VIDAL testing  Patient is not a known smoker however could be genetic    If abnormal will send vascular medicine for referral    Also per chart review patient has been seeing Pain Management Dr. Moraes in the past last was  2019. Patient relates she did have an epidural injection not helped however she is unclear why she was lost to follow-up she is having current low back pain    Recommend re-evaluation from Phys med for possible etiology of left foot and leg pain    Discussed with patient after those evaluations will consider further workup of foot such as MRI.  Patient has seen multiple physicians including ortho and podiatry for this left foot pain    P.r.n.        No follow-ups on file.

## 2022-06-24 ENCOUNTER — HOSPITAL ENCOUNTER (OUTPATIENT)
Dept: CARDIOLOGY | Facility: HOSPITAL | Age: 57
Discharge: HOME | End: 2022-06-24
Attending: PODIATRIST
Payer: MEDICAID

## 2022-06-24 ENCOUNTER — TELEPHONE (OUTPATIENT)
Dept: INTERNAL MEDICINE | Facility: CLINIC | Age: 57
End: 2022-06-24
Payer: MEDICAID

## 2022-06-24 DIAGNOSIS — M79.605 PAIN OF LEFT LOWER EXTREMITY: ICD-10-CM

## 2022-06-24 DIAGNOSIS — M79.672 FOOT PAIN, LEFT: ICD-10-CM

## 2022-06-24 DIAGNOSIS — M79.672 FOOT PAIN, LEFT: Primary | ICD-10-CM

## 2022-06-24 LAB
IMMEDIATE ARM BP: 177 MMHG
IMMEDIATE LEFT ABI: 1.09
IMMEDIATE LEFT TIBIAL: 193 MMHG
IMMEDIATE RIGHT ABI: 1.1
IMMEDIATE RIGHT TIBIAL: 195 MMHG
LEFT ABI: 1.14
LEFT ARM BP: 170 MMHG
LEFT DORSALIS PEDIS: 199 MMHG
LEFT POSTERIOR TIBIAL: 192 MMHG
LEFT TBI: 0.72
LEFT TOE PRESSURE: 125 MMHG
RIGHT ABI: 1.08
RIGHT ARM BP: 174 MMHG
RIGHT DORSALIS PEDIS: 178 MMHG
RIGHT POSTERIOR TIBIAL: 188 MMHG
RIGHT TBI: 0.67
RIGHT TOE PRESSURE: 117 MMHG
TOE RAISES: 25

## 2022-06-24 PROCEDURE — 93924 ANKLE BRACHIAL INDICES (ABI): ICD-10-PCS | Mod: 26,,, | Performed by: INTERNAL MEDICINE

## 2022-06-24 PROCEDURE — 93924 LWR XTR VASC STDY BILAT: CPT

## 2022-06-24 PROCEDURE — 93924 LWR XTR VASC STDY BILAT: CPT | Mod: 26,,, | Performed by: INTERNAL MEDICINE

## 2022-06-24 NOTE — TELEPHONE ENCOUNTER
Spoke w/ pt to confirm if she was coming to her 9:45 am appt. Pt states she was not aware of the appt , and rescheduled for a later date and I notified her that I would send a letter to her home.

## 2022-07-01 ENCOUNTER — OFFICE VISIT (OUTPATIENT)
Dept: CARDIOLOGY | Facility: CLINIC | Age: 57
End: 2022-07-01
Payer: MEDICAID

## 2022-07-01 VITALS
WEIGHT: 215.38 LBS | SYSTOLIC BLOOD PRESSURE: 123 MMHG | HEIGHT: 65 IN | BODY MASS INDEX: 35.88 KG/M2 | OXYGEN SATURATION: 98 % | HEART RATE: 70 BPM | DIASTOLIC BLOOD PRESSURE: 64 MMHG

## 2022-07-01 DIAGNOSIS — E66.01 SEVERE OBESITY (BMI 35.0-35.9 WITH COMORBIDITY): ICD-10-CM

## 2022-07-01 DIAGNOSIS — R20.0 NUMBNESS OF LEFT FOOT: ICD-10-CM

## 2022-07-01 DIAGNOSIS — M79.605 PAIN OF LEFT LOWER EXTREMITY: ICD-10-CM

## 2022-07-01 DIAGNOSIS — I73.9 PERIPHERAL VASCULAR DISEASE, UNSPECIFIED: ICD-10-CM

## 2022-07-01 DIAGNOSIS — M79.672 FOOT PAIN, LEFT: Primary | ICD-10-CM

## 2022-07-01 DIAGNOSIS — Z87.891 HISTORY OF SMOKING GREATER THAN 50 PACK YEARS: ICD-10-CM

## 2022-07-01 DIAGNOSIS — G47.33 OSA ON CPAP: ICD-10-CM

## 2022-07-01 DIAGNOSIS — M79.672 LEFT FOOT PAIN: ICD-10-CM

## 2022-07-01 DIAGNOSIS — E78.2 MIXED HYPERLIPIDEMIA: ICD-10-CM

## 2022-07-01 DIAGNOSIS — I10 PRIMARY HYPERTENSION: ICD-10-CM

## 2022-07-01 DIAGNOSIS — R68.89 ABNORMAL ANKLE BRACHIAL INDEX (ABI): ICD-10-CM

## 2022-07-01 DIAGNOSIS — M48.07 SPINAL STENOSIS, LUMBOSACRAL REGION: ICD-10-CM

## 2022-07-01 DIAGNOSIS — M79.605 LEFT LEG PAIN: ICD-10-CM

## 2022-07-01 PROCEDURE — 99205 OFFICE O/P NEW HI 60 MIN: CPT | Mod: S$PBB,,, | Performed by: INTERNAL MEDICINE

## 2022-07-01 PROCEDURE — 3074F PR MOST RECENT SYSTOLIC BLOOD PRESSURE < 130 MM HG: ICD-10-PCS | Mod: CPTII,,, | Performed by: INTERNAL MEDICINE

## 2022-07-01 PROCEDURE — 1159F PR MEDICATION LIST DOCUMENTED IN MEDICAL RECORD: ICD-10-PCS | Mod: CPTII,,, | Performed by: INTERNAL MEDICINE

## 2022-07-01 PROCEDURE — 1159F MED LIST DOCD IN RCRD: CPT | Mod: CPTII,,, | Performed by: INTERNAL MEDICINE

## 2022-07-01 PROCEDURE — 3074F SYST BP LT 130 MM HG: CPT | Mod: CPTII,,, | Performed by: INTERNAL MEDICINE

## 2022-07-01 PROCEDURE — 3008F BODY MASS INDEX DOCD: CPT | Mod: CPTII,,, | Performed by: INTERNAL MEDICINE

## 2022-07-01 PROCEDURE — 99999 PR PBB SHADOW E&M-EST. PATIENT-LVL V: CPT | Mod: PBBFAC,,, | Performed by: INTERNAL MEDICINE

## 2022-07-01 PROCEDURE — 99205 PR OFFICE/OUTPT VISIT, NEW, LEVL V, 60-74 MIN: ICD-10-PCS | Mod: S$PBB,,, | Performed by: INTERNAL MEDICINE

## 2022-07-01 PROCEDURE — 3078F DIAST BP <80 MM HG: CPT | Mod: CPTII,,, | Performed by: INTERNAL MEDICINE

## 2022-07-01 PROCEDURE — 3008F PR BODY MASS INDEX (BMI) DOCUMENTED: ICD-10-PCS | Mod: CPTII,,, | Performed by: INTERNAL MEDICINE

## 2022-07-01 PROCEDURE — 99999 PR PBB SHADOW E&M-EST. PATIENT-LVL V: ICD-10-PCS | Mod: PBBFAC,,, | Performed by: INTERNAL MEDICINE

## 2022-07-01 PROCEDURE — 3078F PR MOST RECENT DIASTOLIC BLOOD PRESSURE < 80 MM HG: ICD-10-PCS | Mod: CPTII,,, | Performed by: INTERNAL MEDICINE

## 2022-07-01 PROCEDURE — 99215 OFFICE O/P EST HI 40 MIN: CPT | Mod: PBBFAC | Performed by: INTERNAL MEDICINE

## 2022-07-01 NOTE — PATIENT INSTRUCTIONS
Assessment/Plan:  Batool Rivera is a 57 y.o. female with HTN, HLD, BELLO  (not on CPAP), severe obesity, former smoking, who presents for an initial appointment.    1. Left Foot/Leg Pain- Etiology likely multifactorial with contribution from lumbar degenerative disc disease and PAD.  Check CTA abd/pelvis with iliofemoral runoff, CT lumbar spine and echo.      2. Left Foot Numbness- Likely neuropathic in origin.  Refer to Neurology for evaluation.    3. HLD- Pt states she is not taking statin or zetia.  Pt encouraged to take atorvasatatin 80 mg daily, Zetia 10 mg daily, and ASA 81 mg daily as prescribed.      4. HTN- Continue current medications.    5. Severe Obesity- Encourage diet, exercise and weight loss.     6. BELLO- Encourage CPAP usage.     Follow up in 3 months with lipids prior

## 2022-07-01 NOTE — PROGRESS NOTES
"[facility]      Chief Complaint   Patient presents with    Establish Care    Follow-up       Patient ID: Batool Rivera is a 57 y.o. female with HTN, HLD, BELLO  (not on CPAP), severe obesity, former smoking, who presents for an initial appointment.  Pertinent history/events are as follows:     -Pt kindly referred by Dr. Stevens for evaluation of left foot pain/left lower extremity pain.    HPI:  Ms. Rivera reports numbness in left foot at plantar surface at toes.  She reports pain in left leg "if I stay on it too long".  She reports no claudication symptoms and no tissue loss.  Formerly smoked 2 packs a day for 22 years.  Quit at age 47.  Works in security.  VIDAL Study on 2022 revealed normal rest and exercise VIDAL bilaterally.  Mildly dampened PVR waveforms bilaterally.  Note made in drop in exercise VIDAL on left suggesting possible occult PAD (1.14->1.09).     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     Heart disease, unspecified     Hyperlipidemia     Hypertension      Past Surgical History:   Procedure Laterality Date     SECTION      HYSTERECTOMY   or     OOPHORECTOMY      one ovary was removed    TUBAL LIGATION       Social History     Socioeconomic History    Marital status: Single   Tobacco Use    Smoking status: Former Smoker     Packs/day: 2.00     Years: 28.00     Pack years: 56.00     Quit date: 2011     Years since quitting: 10.8    Smokeless tobacco: Never Used   Substance and Sexual Activity    Alcohol use: No     Alcohol/week: 0.0 standard drinks    Drug use: No    Sexual activity: Yes     Partners: Male     Birth control/protection: See Surgical Hx     Comment: BTL   Social History Narrative    Living with her grandbran, who is 7 y/o.      Family History   Problem Relation Age of Onset    Hypertension Mother  "    Heart disease Mother     Asthma Mother     Cataracts Mother     Glaucoma Mother     Brain cancer Father     Stroke Brother     Kidney disease Brother     Glaucoma Sister     Diabetes Sister     Heart disease Brother         MI x 5    Depression Brother     Blindness Neg Hx     Macular degeneration Neg Hx     Retinal detachment Neg Hx     Breast cancer Neg Hx     Colon cancer Neg Hx     Ovarian cancer Neg Hx        Review of patient's allergies indicates:   Allergen Reactions    Coconut Itching    Losartan Hallucinations    Voltaren [diclofenac sodium] Rash     Rash with voltaren GEL, not to NSAIDs in general       Medication List with Changes/Refills   Current Medications    AMLODIPINE (NORVASC) 10 MG TABLET    TAKE 1 TABLET(10 MG) BY MOUTH EVERY EVENING FOR BLOOD PRESSURE    ASPIRIN (ECOTRIN) 81 MG EC TABLET    Take 1 tablet (81 mg total) by mouth once daily.    ATORVASTATIN (LIPITOR) 80 MG TABLET    TAKE 1 TABLET BY MOUTH ONCE DAILY FOR CHOLESTEROL AND TO PROTECT YOUR HEART    ERGOCALCIFEROL (ERGOCALCIFEROL) 50,000 UNIT CAP    TAKE ONE CAPSULE BY MOUTH EVERY 7 DAYS FOR VITAMIN D DEFICIENCY    ESCITALOPRAM OXALATE (LEXAPRO) 20 MG TABLET    Take 1 tablet (20 mg total) by mouth once daily.    EZETIMIBE (ZETIA) 10 MG TABLET    Take 1 tablet (10 mg total) by mouth every evening.    GABAPENTIN (NEURONTIN) 400 MG CAPSULE    Take 1 capsule (400 mg total) by mouth every evening. If no relief, may increase dose to twice per day.    LOSARTAN (COZAAR) 50 MG TABLET    Take 1 tablet (50 mg total) by mouth once daily.    TRAZODONE (DESYREL) 150 MG TABLET    TAKE 1 TABLET(150 MG) BY MOUTH EVERY EVENING AT BEDTIME AS NEEDED FOR INSOMNIA       Review of Systems  Constitution: Denies chills, fever, and sweats.  HENT: Denies headaches or blurry vision.  Cardiovascular: Denies chest pain or irregular heart beat.  Respiratory: Denies cough or shortness of breath.  Gastrointestinal: Denies abdominal pain, nausea,  "or vomiting.  Musculoskeletal: Denies muscle cramps.  Neurological: Denies dizziness or focal weakness.  Psychiatric/Behavioral: Normal mental status.  Hematologic/Lymphatic: Denies bleeding problem or easy bruising/bleeding.  Skin: Denies rash or suspicious lesions    Physical Examination  /64 (BP Location: Left arm, Patient Position: Sitting, BP Method: Large (Automatic))   Pulse 70   Ht 5' 5" (1.651 m)   Wt 97.7 kg (215 lb 6.2 oz)   SpO2 98%   BMI 35.84 kg/m²     Constitutional: No acute distress, conversant  HEENT: Sclera anicteric, Pupils equal, round and reactive to light, extraocular motions intact, Oropharynx clear  Neck: No JVD, no carotid bruits  Cardiovascular: regular rate and rhythm, no murmur, rubs or gallops, normal S1/S2  Pulmonary: Clear to auscultation bilaterally  Abdominal: Abdomen soft, nontender, nondistended, positive bowel sounds  Extremities: No lower extremity edema,   Pulses:  Carotid pulses are 2+ on the right side, and 2+ on the left side.  Radial pulses are 2+ on the right side, and 2+ on the left side.   Femoral pulses are 2+ on the right side, and 2+ on the left side.  Popliteal pulses are 2+ on the right side, and 2+ on the left side.   Dorsalis pedis pulses are 2+ on the right side, and 2+ on the left side.   Posterior tibial pulses are 2+ on the right side, and 2+ on the left side.    Skin: No ecchymosis, erythema, or ulcers  Psych: Alert and oriented x 3, appropriate affect  Neuro: CNII-XII intact, no focal deficits    Labs:  Most Recent Data  CBC:   Lab Results   Component Value Date    WBC 10.67 11/05/2021    HGB 14.6 11/05/2021    HCT 43.5 11/05/2021     11/05/2021    MCV 75 (L) 11/05/2021    RDW 16.5 (H) 11/05/2021     BMP:   Lab Results   Component Value Date     11/05/2021    K 4.1 11/05/2021     11/05/2021    CO2 25 11/05/2021    BUN 16 11/05/2021    CREATININE 0.9 11/05/2021    GLU 93 11/05/2021    CALCIUM 10.2 11/05/2021     LFTS;   Lab " Results   Component Value Date    PROT 7.7 11/05/2021    ALBUMIN 4.1 11/05/2021    BILITOT 0.5 11/05/2021    AST 16 11/05/2021    ALKPHOS 118 11/05/2021    ALT 15 11/05/2021     COAGS:   Lab Results   Component Value Date    INR 1.1 02/17/2012     FLP:   Lab Results   Component Value Date    CHOL 279 (H) 11/05/2021    HDL 37 (L) 11/05/2021    LDLCALC 218.6 (H) 11/05/2021    TRIG 117 11/05/2021    CHOLHDL 13.3 (L) 11/05/2021     CARDIAC:   Lab Results   Component Value Date    TROPONINI <0.006 04/04/2015    BNP 26 04/04/2015       Imaging:    EKG 8/9/2016:  Sinus bradycardia  Otherwise normal ECG    VIDAL Study 6/24/2022:  Normal rest and exercise VIDAL bilaterally.  Mildly dampened PVR waveforms bilaterally.  Note made in drop in exercise VIDAL on left suggesting possible occult PAD (1.14->1.09).     Assessment/Plan:  Batool Rivera is a 57 y.o. female with HTN, HLD, BELLO  (not on CPAP), severe obesity, former smoking, who presents for an initial appointment.    1. Left Foot/Leg Pain- Etiology likely multifactorial with contribution from lumbar degenerative disc disease and PAD.  Check CTA abd/pelvis with iliofemoral runoff, CT lumbar spine and echo.      2. Left Foot Numbness- Likely neuropathic in origin.  Refer to Neurology for evaluation.    3. HLD- Pt states she is not taking statin or zetia.  Pt encouraged to take atorvasatatin 80 mg daily, Zetia 10 mg daily, and ASA 81 mg daily as prescribed.      4. HTN- Continue current medications.    5. Severe Obesity- Encourage diet, exercise and weight loss.     6. BELLO- Encourage CPAP usage.     Follow up in 3 months with lipids prior    Total duration of face to face visit time 30 minutes.  Total time spent counseling greater than fifty percent of total visit time.  Counseling included discussion regarding imaging findings, diagnosis, possibilities, treatment options, risks and benefits.  The patient had many questions regarding the options and long-term  effects.    Lon Roblero MD, PhD  Interventional Cardiology

## 2022-07-08 ENCOUNTER — OFFICE VISIT (OUTPATIENT)
Dept: INTERNAL MEDICINE | Facility: CLINIC | Age: 57
End: 2022-07-08
Payer: MEDICAID

## 2022-07-08 DIAGNOSIS — R73.09 ELEVATED HEMOGLOBIN A1C: ICD-10-CM

## 2022-07-08 DIAGNOSIS — I10 PRIMARY HYPERTENSION: Primary | ICD-10-CM

## 2022-07-08 DIAGNOSIS — R91.1 PULMONARY NODULE: ICD-10-CM

## 2022-07-08 PROCEDURE — 99999 PR PBB SHADOW E&M-EST. PATIENT-LVL II: CPT | Mod: PBBFAC,,,

## 2022-07-08 PROCEDURE — 99213 OFFICE O/P EST LOW 20 MIN: CPT | Mod: S$PBB,,,

## 2022-07-08 PROCEDURE — 99212 OFFICE O/P EST SF 10 MIN: CPT | Mod: PBBFAC

## 2022-07-08 PROCEDURE — 99213 PR OFFICE/OUTPT VISIT, EST, LEVL III, 20-29 MIN: ICD-10-PCS | Mod: S$PBB,,,

## 2022-07-08 PROCEDURE — 99999 PR PBB SHADOW E&M-EST. PATIENT-LVL II: ICD-10-PCS | Mod: PBBFAC,,,

## 2022-07-08 NOTE — PATIENT INSTRUCTIONS
For your symptoms (sinusitis, congestion, cough):  1. Saline nasal spray ([facility]) or nasal rinse ([facility]) at least 2-3 times/day  2. Flonase (fluticasone) or other steroid nasal spray - twice a day for 1 week, then once a day thereafter  3. Claritin (loratadine), Zyrtec (cetirizine), or Allegra (fexofenadine) once a day  4. Mucinex (guaifenesin) every 8-12 hours with plenty of water. If you are having a cough, you can use Mucinex-DM (guaifenesin-dextromethorphan).   5. Hot soup, hot tea with honey and lemon.  6. Plenty of sleep!    These medications are ok to take even if you have high blood pressure.

## 2022-07-08 NOTE — PROGRESS NOTES
Internal Medicine Clinic Note    Subjective     Chief Complaint:    History of Present Illness:  Ms. Batool Rivera is a 57 y.o. female w PMH of HTN, HLD, BELLO, and depression who presents today for follow-up of hypertension. Patient's BP today was 138/79 and pt reports that she has not been regularly measuring her BP at home. When she does measure it, she reports systolic pressures that range from 130-170s. Pt stopped taking Losartan 50mg due to unpleasant side effects and is currently only taking Amlodipine 10mg, however she did not take it this morning. Pt also complains of having sinus issues such as headaches and congestion due to seasonal allergies. Pt denies any fevers, chills, n/v, sore throat, shortness of breath, wheezing, and denies any pain.    Review of Systems   Constitutional: Negative for chills, fever and weight loss.   HENT: Positive for congestion. Negative for hearing loss and sore throat.    Eyes: Negative.    Respiratory: Negative for cough, hemoptysis and shortness of breath.    Cardiovascular: Negative for chest pain and palpitations.   Gastrointestinal: Negative for abdominal pain, diarrhea, nausea and vomiting.   Genitourinary: Negative.    Musculoskeletal: Negative.    Skin: Negative for rash.   Neurological: Positive for headaches.   Endo/Heme/Allergies: Negative.    Psychiatric/Behavioral: Negative.        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     Heart disease, unspecified     Hyperlipidemia     Hypertension        Past Surgical History:   Procedure Laterality Date     SECTION      HYSTERECTOMY   or     OOPHORECTOMY      one ovary was removed    TUBAL LIGATION         Family History   Problem Relation Age of Onset    Hypertension Mother     Heart disease Mother     Asthma Mother     Cataracts Mother      Glaucoma Mother     Brain cancer Father     Stroke Brother     Kidney disease Brother     Glaucoma Sister     Diabetes Sister     Heart disease Brother         MI x 5    Depression Brother     Blindness Neg Hx     Macular degeneration Neg Hx     Retinal detachment Neg Hx     Breast cancer Neg Hx     Colon cancer Neg Hx     Ovarian cancer Neg Hx        Social History     Socioeconomic History    Marital status: Single   Tobacco Use    Smoking status: Former Smoker     Packs/day: 2.00     Years: 28.00     Pack years: 56.00     Quit date: 8/24/2011     Years since quitting: 10.8    Smokeless tobacco: Never Used   Substance and Sexual Activity    Alcohol use: No     Alcohol/week: 0.0 standard drinks    Drug use: No    Sexual activity: Yes     Partners: Male     Birth control/protection: See Surgical Hx     Comment: BTL   Social History Narrative    Living with her grandbran, who is 7 y/o.        MEDICATIONS & ALLERGIES:     Current Outpatient Medications on File Prior to Visit   Medication Sig    amLODIPine (NORVASC) 10 MG tablet TAKE 1 TABLET(10 MG) BY MOUTH EVERY EVENING FOR BLOOD PRESSURE    aspirin (ECOTRIN) 81 MG EC tablet Take 1 tablet (81 mg total) by mouth once daily.    atorvastatin (LIPITOR) 80 MG tablet TAKE 1 TABLET BY MOUTH ONCE DAILY FOR CHOLESTEROL AND TO PROTECT YOUR HEART    ergocalciferol (ERGOCALCIFEROL) 50,000 unit Cap TAKE ONE CAPSULE BY MOUTH EVERY 7 DAYS FOR VITAMIN D DEFICIENCY    EScitalopram oxalate (LEXAPRO) 20 MG tablet Take 1 tablet (20 mg total) by mouth once daily.    ezetimibe (ZETIA) 10 mg tablet Take 1 tablet (10 mg total) by mouth every evening.    gabapentin (NEURONTIN) 400 MG capsule Take 1 capsule (400 mg total) by mouth every evening. If no relief, may increase dose to twice per day.    losartan (COZAAR) 50 MG tablet Take 1 tablet (50 mg total) by mouth once daily.    traZODone (DESYREL) 150 MG tablet TAKE 1 TABLET(150 MG) BY MOUTH EVERY EVENING AT  BEDTIME AS NEEDED FOR INSOMNIA     No current facility-administered medications on file prior to visit.       Review of patient's allergies indicates:   Allergen Reactions    Coconut Itching    Losartan Hallucinations    Voltaren [diclofenac sodium] Rash     Rash with voltaren GEL, not to NSAIDs in general       OBJECTIVE:     Vital Signs:  There were no vitals filed for this visit.    There is no height or weight on file to calculate BMI.     Physical Exam  Vitals reviewed.   Constitutional:       Appearance: Normal appearance. She is normal weight.   HENT:      Head: Normocephalic and atraumatic.      Nose: No congestion or rhinorrhea.   Eyes:      Extraocular Movements: Extraocular movements intact.      Pupils: Pupils are equal, round, and reactive to light.   Cardiovascular:      Rate and Rhythm: Normal rate and regular rhythm.      Pulses: Normal pulses.      Heart sounds: Normal heart sounds.   Pulmonary:      Effort: No respiratory distress.      Breath sounds: Normal breath sounds.   Abdominal:      General: Abdomen is flat.      Palpations: Abdomen is soft.   Musculoskeletal:         General: Normal range of motion.      Cervical back: Normal range of motion.   Neurological:      Mental Status: She is alert and oriented to person, place, and time. Mental status is at baseline.   Psychiatric:         Mood and Affect: Mood normal.         Behavior: Behavior normal.       Laboratory  Lab Results   Component Value Date    WBC 10.67 11/05/2021    HGB 14.6 11/05/2021    HCT 43.5 11/05/2021    MCV 75 (L) 11/05/2021     11/05/2021     BMP  Lab Results   Component Value Date     11/05/2021    K 4.1 11/05/2021     11/05/2021    CO2 25 11/05/2021    BUN 16 11/05/2021    CREATININE 0.9 11/05/2021    CALCIUM 10.2 11/05/2021    ANIONGAP 11 11/05/2021    ESTGFRAFRICA >60.0 11/05/2021    EGFRNONAA >60.0 11/05/2021     Lab Results   Component Value Date    INR 1.1 02/17/2012     Lab Results    Component Value Date    HGBA1C 5.9 (H) 09/24/2020         Health Maintenance Due   Topic Date Due    COVID-19 Vaccine (4 - Booster for Pfizer series) 05/08/2022         ASSESSMENT & PLAN:   Ms. Batool Rivera is a 57 y.o. female w PMH of PMH of HTN, HLD, BELLO, and depression who presents today for follow-up of hypertension.    Primary hypertension  - Hypertension Digital Medicine (HDMP) Enrollment Order  - Provided info for pt on how to take accurate BP readings at home and recommended taking more regular readings, especially after taking her medication.  - Discussed dietary modification and Mediterranean diet with pt    Pulmonary nodule  - CT Chest Lung Screening Low Dose; Future; Expected date: 10/08/2022  - Quit smoking 10 years ago (~60 pack year history)    Elevated hemoglobin A1c  - Hemoglobin A1C; Future; Expected date: 07/08/2022  - Last a1c was 5.9 on 9/24/20, discussed diet changes with patient    Sinus problems   - Provided pt with info on using OTC nasal allergy sprays      RTC in 4-6 weeks to follow-up BPs, or sooner if needed.    Discussed with Dr. Aguila  - staff attestation to follow      Anselmo Trammell MD  Internal Medicine, PGY1  [facility]

## 2022-07-08 NOTE — PROGRESS NOTES
INTERNAL MEDICINE RESIDENT CLINIC  CLINIC NOTE    Patient Name: Batool Rivera  YOB: 1965    PRESENTING HISTORY       History of Present Illness:  Ms. Batool Rivera is a 57 y.o. female w/ a PMHx of *** who presents for ***.     ROS    OBJECTIVE:   Vital Signs:  There were no vitals filed for this visit.    No results found for this or any previous visit (from the past 24 hour(s)).      Physical Exam    ASSESSMENT & PLAN:     Diagnoses and all orders for this visit:    Primary hypertension  -     Hypertension Digital Medicine (HDMP) Enrollment Order    Pulmonary nodule  -     CT Chest Lung Screening Low Dose; Future    Elevated hemoglobin A1c  -     Hemoglobin A1C; Future            Discussed with  ***  Will contact w/ lab results and will No follow-ups on file.     Signed:

## 2022-07-15 ENCOUNTER — HOSPITAL ENCOUNTER (OUTPATIENT)
Dept: RADIOLOGY | Facility: HOSPITAL | Age: 57
Discharge: HOME | End: 2022-07-15
Attending: INTERNAL MEDICINE
Payer: MEDICAID

## 2022-07-15 DIAGNOSIS — M48.07 SPINAL STENOSIS, LUMBOSACRAL REGION: ICD-10-CM

## 2022-07-15 DIAGNOSIS — I73.9 PERIPHERAL VASCULAR DISEASE, UNSPECIFIED: ICD-10-CM

## 2022-07-15 LAB
CREAT SERPL-MCNC: 0.8 MG/DL (ref 0.5–1.4)
SAMPLE: NORMAL

## 2022-07-15 PROCEDURE — 75635 CT ANGIO ABDOMINAL ARTERIES: CPT | Mod: TC

## 2022-07-15 PROCEDURE — 72131 CT LUMBAR SPINE WITHOUT CONTRAST: ICD-10-PCS | Mod: 26,,, | Performed by: INTERNAL MEDICINE

## 2022-07-15 PROCEDURE — 75635 CT ANGIO ABDOMINAL ARTERIES: CPT | Mod: 26,,, | Performed by: RADIOLOGY

## 2022-07-15 PROCEDURE — 72131 CT LUMBAR SPINE W/O DYE: CPT | Mod: TC

## 2022-07-15 PROCEDURE — 75635 CTA RUNOFF ABD PEL BILAT LOWER EXT: ICD-10-PCS | Mod: 26,,, | Performed by: RADIOLOGY

## 2022-07-15 PROCEDURE — 72131 CT LUMBAR SPINE W/O DYE: CPT | Mod: 26,,, | Performed by: INTERNAL MEDICINE

## 2022-07-15 PROCEDURE — 25500020 PHARM REV CODE 255: Performed by: INTERNAL MEDICINE

## 2022-07-15 RX ADMIN — IOHEXOL 100 ML: 350 INJECTION, SOLUTION INTRAVENOUS at 09:07

## 2022-07-22 ENCOUNTER — HOSPITAL ENCOUNTER (OUTPATIENT)
Dept: CARDIOLOGY | Facility: HOSPITAL | Age: 57
Discharge: HOME | End: 2022-07-22
Attending: INTERNAL MEDICINE
Payer: MEDICAID

## 2022-07-22 VITALS
WEIGHT: 214 LBS | BODY MASS INDEX: 35.65 KG/M2 | DIASTOLIC BLOOD PRESSURE: 80 MMHG | HEART RATE: 66 BPM | HEIGHT: 65 IN | SYSTOLIC BLOOD PRESSURE: 148 MMHG

## 2022-07-22 DIAGNOSIS — M79.672 FOOT PAIN, LEFT: ICD-10-CM

## 2022-07-22 DIAGNOSIS — I73.9 PERIPHERAL VASCULAR DISEASE, UNSPECIFIED: ICD-10-CM

## 2022-07-22 DIAGNOSIS — M79.605 PAIN OF LEFT LOWER EXTREMITY: ICD-10-CM

## 2022-07-22 LAB
ASCENDING AORTA: 3.05 CM
AV INDEX (PROSTH): 0.72
AV MEAN GRADIENT: 6 MMHG
AV PEAK GRADIENT: 12 MMHG
AV VALVE AREA: 2.37 CM2
AV VELOCITY RATIO: 0.63
BSA FOR ECHO PROCEDURE: 2.11 M2
CV ECHO LV RWT: 0.37 CM
DOP CALC AO PEAK VEL: 1.76 M/S
DOP CALC AO VTI: 36.12 CM
DOP CALC LVOT AREA: 3.3 CM2
DOP CALC LVOT DIAMETER: 2.05 CM
DOP CALC LVOT PEAK VEL: 1.11 M/S
DOP CALC LVOT STROKE VOLUME: 85.44 CM3
DOP CALCLVOT PEAK VEL VTI: 25.9 CM
E WAVE DECELERATION TIME: 244.72 MSEC
E/A RATIO: 1.17
E/E' RATIO: 9 M/S
ECHO LV POSTERIOR WALL: 0.82 CM (ref 0.6–1.1)
EJECTION FRACTION: 65 %
FRACTIONAL SHORTENING: 36 % (ref 28–44)
INTERVENTRICULAR SEPTUM: 0.81 CM (ref 0.6–1.1)
LA MAJOR: 5.23 CM
LA MINOR: 4.62 CM
LA WIDTH: 4.29 CM
LEFT ATRIUM SIZE: 3.35 CM
LEFT ATRIUM VOLUME INDEX MOD: 29.5 ML/M2
LEFT ATRIUM VOLUME INDEX: 29.4 ML/M2
LEFT ATRIUM VOLUME MOD: 60.22 CM3
LEFT ATRIUM VOLUME: 59.93 CM3
LEFT INTERNAL DIMENSION IN SYSTOLE: 2.86 CM (ref 2.1–4)
LEFT VENTRICLE DIASTOLIC VOLUME INDEX: 43.9 ML/M2
LEFT VENTRICLE DIASTOLIC VOLUME: 89.55 ML
LEFT VENTRICLE MASS INDEX: 56 G/M2
LEFT VENTRICLE SYSTOLIC VOLUME INDEX: 15.3 ML/M2
LEFT VENTRICLE SYSTOLIC VOLUME: 31.13 ML
LEFT VENTRICULAR INTERNAL DIMENSION IN DIASTOLE: 4.44 CM (ref 3.5–6)
LEFT VENTRICULAR MASS: 113.85 G
LV LATERAL E/E' RATIO: 9 M/S
LV SEPTAL E/E' RATIO: 9 M/S
MV A" WAVE DURATION": 8.56 MSEC
MV PEAK A VEL: 0.69 M/S
MV PEAK E VEL: 0.81 M/S
MV STENOSIS PRESSURE HALF TIME: 70.97 MS
MV VALVE AREA P 1/2 METHOD: 3.1 CM2
PISA TR MAX VEL: 2.31 M/S
PULM VEIN S/D RATIO: 1.5
PV PEAK D VEL: 0.34 M/S
PV PEAK S VEL: 0.51 M/S
RA MAJOR: 5.07 CM
RA PRESSURE: 3 MMHG
RA WIDTH: 3.47 CM
RIGHT VENTRICULAR END-DIASTOLIC DIMENSION: 3.05 CM
RV TISSUE DOPPLER FREE WALL SYSTOLIC VELOCITY 1 (APICAL 4 CHAMBER VIEW): 12.29 CM/S
SINUS: 2.79 CM
STJ: 2.53 CM
TDI LATERAL: 0.09 M/S
TDI SEPTAL: 0.09 M/S
TDI: 0.09 M/S
TR MAX PG: 21 MMHG
TRICUSPID ANNULAR PLANE SYSTOLIC EXCURSION: 2.05 CM
TV REST PULMONARY ARTERY PRESSURE: 24 MMHG

## 2022-07-22 PROCEDURE — 93306 ECHO (CUPID ONLY): ICD-10-PCS | Mod: 26,,, | Performed by: INTERNAL MEDICINE

## 2022-07-22 PROCEDURE — 93306 TTE W/DOPPLER COMPLETE: CPT | Mod: 26,,, | Performed by: INTERNAL MEDICINE

## 2022-07-22 PROCEDURE — 93306 TTE W/DOPPLER COMPLETE: CPT

## 2022-07-24 NOTE — PROGRESS NOTES
I have reviewed and concur with the resident's assessment, and plan.     Jennifer Aguila MD   Internal Medicine  [facility]

## 2022-11-04 ENCOUNTER — TELEPHONE (OUTPATIENT)
Dept: INTERNAL MEDICINE | Facility: CLINIC | Age: 57
End: 2022-11-04
Payer: MEDICAID

## 2022-12-09 ENCOUNTER — TELEPHONE (OUTPATIENT)
Dept: CARDIOLOGY | Facility: CLINIC | Age: 57
End: 2022-12-09
Payer: MEDICAID

## 2023-01-03 ENCOUNTER — LAB VISIT (OUTPATIENT)
Dept: LAB | Facility: HOSPITAL | Age: 58
End: 2023-01-03
Payer: MEDICAID

## 2023-01-03 ENCOUNTER — OFFICE VISIT (OUTPATIENT)
Dept: INTERNAL MEDICINE | Facility: CLINIC | Age: 58
End: 2023-01-03
Payer: MEDICAID

## 2023-01-03 VITALS
BODY MASS INDEX: 36.33 KG/M2 | DIASTOLIC BLOOD PRESSURE: 85 MMHG | SYSTOLIC BLOOD PRESSURE: 184 MMHG | WEIGHT: 218.06 LBS | HEIGHT: 65 IN | OXYGEN SATURATION: 99 % | HEART RATE: 62 BPM

## 2023-01-03 DIAGNOSIS — I10 ESSENTIAL HYPERTENSION: ICD-10-CM

## 2023-01-03 DIAGNOSIS — G44.039 EPISODIC PAROXYSMAL HEMICRANIA, NOT INTRACTABLE: Primary | ICD-10-CM

## 2023-01-03 DIAGNOSIS — M54.2 NECK PAIN: ICD-10-CM

## 2023-01-03 DIAGNOSIS — Z12.31 BREAST CANCER SCREENING BY MAMMOGRAM: ICD-10-CM

## 2023-01-03 DIAGNOSIS — Z12.11 COLON CANCER SCREENING: ICD-10-CM

## 2023-01-03 DIAGNOSIS — R73.09 ELEVATED HEMOGLOBIN A1C: ICD-10-CM

## 2023-01-03 LAB
ESTIMATED AVG GLUCOSE: 114 MG/DL (ref 68–131)
HBA1C MFR BLD: 5.6 % (ref 4–5.6)

## 2023-01-03 PROCEDURE — 3077F SYST BP >= 140 MM HG: CPT | Mod: CPTII,,, | Performed by: STUDENT IN AN ORGANIZED HEALTH CARE EDUCATION/TRAINING PROGRAM

## 2023-01-03 PROCEDURE — 99999 PR PBB SHADOW E&M-EST. PATIENT-LVL IV: ICD-10-PCS | Mod: PBBFAC,,, | Performed by: STUDENT IN AN ORGANIZED HEALTH CARE EDUCATION/TRAINING PROGRAM

## 2023-01-03 PROCEDURE — 4010F PR ACE/ARB THEARPY RXD/TAKEN: ICD-10-PCS | Mod: CPTII,,, | Performed by: STUDENT IN AN ORGANIZED HEALTH CARE EDUCATION/TRAINING PROGRAM

## 2023-01-03 PROCEDURE — 3044F PR MOST RECENT HEMOGLOBIN A1C LEVEL <7.0%: ICD-10-PCS | Mod: CPTII,,, | Performed by: STUDENT IN AN ORGANIZED HEALTH CARE EDUCATION/TRAINING PROGRAM

## 2023-01-03 PROCEDURE — 3077F PR MOST RECENT SYSTOLIC BLOOD PRESSURE >= 140 MM HG: ICD-10-PCS | Mod: CPTII,,, | Performed by: STUDENT IN AN ORGANIZED HEALTH CARE EDUCATION/TRAINING PROGRAM

## 2023-01-03 PROCEDURE — 99214 OFFICE O/P EST MOD 30 MIN: CPT | Mod: PBBFAC | Performed by: STUDENT IN AN ORGANIZED HEALTH CARE EDUCATION/TRAINING PROGRAM

## 2023-01-03 PROCEDURE — 3044F HG A1C LEVEL LT 7.0%: CPT | Mod: CPTII,,, | Performed by: STUDENT IN AN ORGANIZED HEALTH CARE EDUCATION/TRAINING PROGRAM

## 2023-01-03 PROCEDURE — 36415 COLL VENOUS BLD VENIPUNCTURE: CPT | Performed by: STUDENT IN AN ORGANIZED HEALTH CARE EDUCATION/TRAINING PROGRAM

## 2023-01-03 PROCEDURE — 3079F PR MOST RECENT DIASTOLIC BLOOD PRESSURE 80-89 MM HG: ICD-10-PCS | Mod: CPTII,,, | Performed by: STUDENT IN AN ORGANIZED HEALTH CARE EDUCATION/TRAINING PROGRAM

## 2023-01-03 PROCEDURE — 4010F ACE/ARB THERAPY RXD/TAKEN: CPT | Mod: CPTII,,, | Performed by: STUDENT IN AN ORGANIZED HEALTH CARE EDUCATION/TRAINING PROGRAM

## 2023-01-03 PROCEDURE — 3079F DIAST BP 80-89 MM HG: CPT | Mod: CPTII,,, | Performed by: STUDENT IN AN ORGANIZED HEALTH CARE EDUCATION/TRAINING PROGRAM

## 2023-01-03 PROCEDURE — 83036 HEMOGLOBIN GLYCOSYLATED A1C: CPT | Performed by: STUDENT IN AN ORGANIZED HEALTH CARE EDUCATION/TRAINING PROGRAM

## 2023-01-03 PROCEDURE — 99213 PR OFFICE/OUTPT VISIT, EST, LEVL III, 20-29 MIN: ICD-10-PCS | Mod: S$PBB,,, | Performed by: STUDENT IN AN ORGANIZED HEALTH CARE EDUCATION/TRAINING PROGRAM

## 2023-01-03 PROCEDURE — 3008F PR BODY MASS INDEX (BMI) DOCUMENTED: ICD-10-PCS | Mod: CPTII,,, | Performed by: STUDENT IN AN ORGANIZED HEALTH CARE EDUCATION/TRAINING PROGRAM

## 2023-01-03 PROCEDURE — 99213 OFFICE O/P EST LOW 20 MIN: CPT | Mod: S$PBB,,, | Performed by: STUDENT IN AN ORGANIZED HEALTH CARE EDUCATION/TRAINING PROGRAM

## 2023-01-03 PROCEDURE — 3008F BODY MASS INDEX DOCD: CPT | Mod: CPTII,,, | Performed by: STUDENT IN AN ORGANIZED HEALTH CARE EDUCATION/TRAINING PROGRAM

## 2023-01-03 PROCEDURE — 99999 PR PBB SHADOW E&M-EST. PATIENT-LVL IV: CPT | Mod: PBBFAC,,, | Performed by: STUDENT IN AN ORGANIZED HEALTH CARE EDUCATION/TRAINING PROGRAM

## 2023-01-03 RX ORDER — AMLODIPINE BESYLATE 10 MG/1
TABLET ORAL
Qty: 90 TABLET | Refills: 3 | Status: SHIPPED | OUTPATIENT
Start: 2023-01-03 | End: 2023-06-14 | Stop reason: SDUPTHER

## 2023-01-03 RX ORDER — IBUPROFEN 800 MG/1
800 TABLET ORAL 3 TIMES DAILY
Qty: 30 TABLET | Refills: 0 | Status: SHIPPED | OUTPATIENT
Start: 2023-01-03 | End: 2023-01-13

## 2023-01-03 RX ORDER — LISINOPRIL 10 MG/1
10 TABLET ORAL DAILY
Qty: 90 TABLET | Refills: 3 | Status: SHIPPED | OUTPATIENT
Start: 2023-01-03 | End: 2023-03-21 | Stop reason: SDUPTHER

## 2023-01-03 RX ORDER — METHOCARBAMOL 500 MG/1
500 TABLET, FILM COATED ORAL 4 TIMES DAILY
Qty: 40 TABLET | Refills: 0 | Status: SHIPPED | OUTPATIENT
Start: 2023-01-03 | End: 2023-01-13

## 2023-01-03 NOTE — PATIENT INSTRUCTIONS
Take 800 mg ibuprofen three times a day along with tylenol. You can also take the muscle relaxer (methocarbamol). Follow up with physical therapy.

## 2023-01-03 NOTE — PROGRESS NOTES
I have reviewed and concur with the resident's history, physical, assessment, and plan. Except recommended resident physician advise patient minimizing use of NSAIDS if BP continues uncontrolled on current regimen. At clinic presentation, patient noted to not have taken all meds prior to visit. I did not personally interview or examine the patient at bedside.

## 2023-01-03 NOTE — PROGRESS NOTES
INTERNAL MEDICINE RESIDENT CLINIC  CLINIC NOTE    Patient Name: Batool Rivera  YOB: 1965    PRESENTING HISTORY       History of Present Illness:  Ms. Batool Rivera is a 57 y.o. female w/ a PMHx of HTN, HLD, BELLO, and depression who presents for physical exam and L-sided head pain radiating down her right side. It is aching and sharp pain and 10/10 in severity. Onset of pain was 2 weeks ago. It got worse and was associated with dizziness, so she went to the ER at [facility] over the weekend and was told there was a 25 hour wait, so she left. It has not been persistent, but only resolved when she ate and when her granddaughter massaged it. Eating helps. Turning her head and leaning her head makes it worse. The pain starts in her upper neck and shoots up towards her L eye and down her L arm. She has not been sleeping. She denies HA, vision changes, trouble swallowing, difficulty speaking, numbness, tingling, CP, SOB, fevers, chills. She endorses neck pain shooting to her eye and down her L arm, decreased ROM. She has been taking tylenol. She has not taken any other medications for this pain. She cannot identify any specific injury that caused this pain.    HTN:  She has not been taking her amlodipine and reports hallucinations with losartan. She needs refills on her amlodipine. In the past, her pressures have been 150s-160s on amlodipine alone. She is interested in the digital HTN monitoring program.    Annual Exam:    Thyroid:   History of Thyroid Disease: no    American Thyroid Association and the American Association of Clinical Endocrinologists recommend measuring TSH in individuals at risk for hypothyroidism (personal history of autoimmune disease, neck radiation, or thyroid surgery); and considering screening adults 60 years and older.    Sexual History: deferred at this visit     Screening for Substance Abuse:  Tobacco use:   Cigarette smoker: no   Vaping: no   Chewing tobacco: no  Alcohol use:  "2-3 drinks of beer per week.   Illicit Substance abuse:   Marijuana: no   Cocaine: no   Other Illicit drug use: no  Use of prescriptions not prescribed to patient: no  Vitamins/Supplements/Homeopathic meds/substances: no    Screening for Abuse:  Lives in a house that does have stairs. Lives alone. Does feel safe at home.    Screening for Cancer:   Breast (every year after 40, every 2 years after 50): mammogram ordered  Colon (c-scope every 10 years after age 45): Most recently performed on 6/2013 and next due on 6/2023.    Vaccines:  Due for the following vaccines: Flu.  Pt agreed to receive the recommended vaccinations.      Review of Systems   Constitutional:  Negative for chills, fever and weight loss.   HENT:  Negative for congestion, hearing loss and sore throat.    Eyes: Negative.    Respiratory:  Negative for cough, hemoptysis and shortness of breath.    Cardiovascular:  Negative for chest pain and palpitations.   Gastrointestinal:  Negative for abdominal pain, diarrhea, nausea and vomiting.   Genitourinary: Negative.    Musculoskeletal:  Positive for neck pain. Negative for falls.   Skin:  Negative for rash.   Neurological:  Positive for headaches. Negative for dizziness, tingling and weakness.   Endo/Heme/Allergies: Negative.    Psychiatric/Behavioral:  The patient has insomnia.      OBJECTIVE:   Vital Signs:  Vitals:    01/03/23 0948   BP: (!) 184/85   Pulse: 62   SpO2: 99%   Weight: 98.9 kg (218 lb 0.6 oz)   Height: 5' 5" (1.651 m)       No results found for this or any previous visit (from the past 24 hour(s)).      Physical Exam  Vitals reviewed.   Constitutional:       Appearance: Normal appearance. She is normal weight.   HENT:      Head: Normocephalic and atraumatic.      Nose: No congestion or rhinorrhea.   Eyes:      Extraocular Movements: Extraocular movements intact.      Pupils: Pupils are equal, round, and reactive to light.   Cardiovascular:      Rate and Rhythm: Normal rate and regular " rhythm.      Pulses: Normal pulses.      Heart sounds: Normal heart sounds.   Pulmonary:      Effort: No respiratory distress.      Breath sounds: Normal breath sounds.   Abdominal:      General: Abdomen is flat.      Palpations: Abdomen is soft.   Musculoskeletal:         General: Tenderness (L neck) present. No swelling or signs of injury.      Cervical back: Normal range of motion.      Right lower leg: No edema.      Left lower leg: No edema.      Comments: Decreased ROM neck due to pain   Neurological:      Mental Status: She is alert and oriented to person, place, and time. Mental status is at baseline.   Psychiatric:         Mood and Affect: Mood normal.         Behavior: Behavior normal.       ASSESSMENT & PLAN:     Batool was seen today for annual exam and headache.    Diagnoses and all orders for this visit:    Episodic paroxysmal hemicrania, not intractable  -     Ambulatory referral/consult to Physical/Occupational Therapy; Future  -     methocarbamoL (ROBAXIN) 500 MG Tab; Take 1 tablet (500 mg total) by mouth 4 (four) times daily. for 10 days  -     ibuprofen (ADVIL,MOTRIN) 800 MG tablet; Take 1 tablet (800 mg total) by mouth 3 (three) times daily. for 10 days    Essential hypertension  -     amLODIPine (NORVASC) 10 MG tablet; TAKE 1 TABLET(10 MG) BY MOUTH EVERY EVENING FOR BLOOD PRESSURE  -     lisinopriL 10 MG tablet; Take 1 tablet (10 mg total) by mouth once daily.  - Enrollment in digital HTN monitoring program    Breast cancer screening by mammogram  -     Mammo Digital Screening Bilat; Future    Colon cancer screening  -     Ambulatory referral/consult to Endo Procedure ; Future    Elevated hemoglobin A1c  -     Hemoglobin A1C; Future    Neck pain  -     Ambulatory referral/consult to Physical/Occupational Therapy; Future  -     methocarbamoL (ROBAXIN) 500 MG Tab; Take 1 tablet (500 mg total) by mouth 4 (four) times daily. for 10 days  -     ibuprofen (ADVIL,MOTRIN) 800 MG tablet; Take 1  tablet (800 mg total) by mouth 3 (three) times daily. for 10 days            Discussed with Dr. Fierro.  Will contact w/ lab results and will Follow up in 4 weeks (on 1/31/2023), or if symptoms worsen or fail to improve.     Signed:    Kenzie Caputo M.D.  Internal Medicine PGY-3  01/03/2023 10:59 AM

## 2023-01-09 NOTE — PROGRESS NOTES
[facility]      Name: Batool Rivera  Clinic Number: [id number]  Date of Treatment: 11/05/2018   Diagnosis:   No diagnosis found.  Physician: Heri Montiel MD    Pain pattern determined: Pattern 1 PEN  Plan of care signed: 10/3/18   Time in: 1100  Time Out: 1200  Total Treatment time: 60  Precautions: standard  Visit #: 3/20  (190)    POC due: 1/3/19   Reassessment due: 11/3/18 Completed 11/5/18  Next Due: 12/5/18    Subjective   Batool arrives early to PT today. She denies any back pain or radicular symptoms. She mentions B knee aches, an no more. She reports she feels the exercises at home have made her feel better, and she could marcos/stoop easier now. She reports feeling a bit sore after the last session, but it did not last long.     Patient reports their pain to be 0/10 on a 0-10 scale with 0 being no pain and 10 being the worst pain imaginable.    Pain Location:lower back pain     Occupation: none  Leisure: trey, drawing               Pts goals: get rid of pain    Objective     MOVEMENT LOSS 11/5/18    ROM Loss   Flexion WFL   Extension moderate loss   Side bending Right Min loss   Side bending Left Min loss   Rotation Right min loss   Rotation Left min loss       Baseline Isometric Testing on Med X equipment: Testing administered by PT  Date of testing:10/2/18  ROM  0-36 deg   Max Peak Torque 91   Min Peak Torque 31   Flex/Ext Ratio 3;1   % below normative data 49   Counter weight 285   femur 5   Seat pad 0      Visit 2: start at 45 ftlbs     CMS Impairment/Limitation/Restriction for FOTO Lumbar Survey     Therapist reviewed FOTO scores for Batool Rivera on 10/2/2018.   FOTO documents entered into [employer] - see Media section.     Limitation Score: 55%  Category: Mobility     Current : CK = at least 40% but < 60% impaired, limited or restricted  Goal: CJ = at least 20% but < 40% impaired, limited or restricted  Discharge: CJ = at least 20% but < 40% impaired, limited  or restricted          Treatment    Pt was instructed in and performed the following:     Batool received therapeutic exercises to develop/improved posture, cardiovascular endurance, muscular endurance, lumbar  ROM, strength and muscular endurance for 60 minutes including the following exercises:     [facility] 11/5/2018   Visit Number 3   VAS Pain Rating 0   Treadmill Time (in min.) -   Speed -   Recumbent Bike Seat Pos. 15   Time 10   Extension in Standing 10   Flexion in Lying 10   Lumbar Extension Seat Pad -   Femur Restraint -   Top Dead Center -   Counterweight -   Lumbar Flexion -   Lumbar Extension -   Lumbar Peak Torque -   Min Torque -   Test Percent Below Normative Data -   Lumbar Weight 45   Repetitions 18   Rating of Perceived Exertion 2   Ice - Z Lie (in min.) 10         Peripheral muscle strengthening which included 1 set of 15-20 repetitions at a slow, controlled 7 second per rep pace focused on strengthening supporting musculature for improved body mechanics and functional mobility.  Pt and therapist focused on proper form during treatment to ensure optimal strengthening of each targeted muscle group.  Machines were utilized including torso rotation, leg extension, leg curl, chest press, upright row. Tricep extension, bicep curl, leg press, and hip abduction added on third visit.       Batool received the following manual therapy techniques:none      Home Exercise Program as follows:     RADU 10 reps with ball  Added PPT  EIS 10 reps  Ice and z lie    Handouts were given to the patient. Pt demo good understanding of the education provided. Batool demonstrated good return demonstration of activities.     Lumbar roll use compliance: not yet  Additional exercises taught this treatment session: none    Assessment     Patient displayed good tolerance to exercises and lumbar extension machine with completion of 18 reps at 45# with an RPE of 2. Lumbar AROM has improved to min-mod limitation, and   activity and exercise tolerance are improving at this time.     Patient is making good progress towards established goals.  Pt will continue to benefit from skilled outpatient physical therapy to address the deficits stated in the impairment chart, provide pt/family education and to maximize pt's level of independence in the home and community environment.       Pt's spiritual, cultural and educational needs considered and pt agreeable to plan of care and goals as stated below:       Medical necessity is demonstrated by the following problem list.    Pt presents with the following impairments:      History  Co-morbidities and personal factors that may impact the plan of care Co-morbidities:   BMI, age, chronicity     Personal Factors:   no deficits       moderate   Examination  Body Structures and Functions, activity limitations and participation restrictions that may impact the plan of care Body Regions:   back  lower extremities     Body Systems:    ROM  strength  motor control  motor learning     Participation Restrictions:   none     Activity limitations:   Learning and applying knowledge  no deficits     General Tasks and Commands  no deficits     Communication  no deficits     Mobility  lifting and carrying objects  walking     Self care  washing oneself (bathing, drying, washing hands)  caring for body parts (brushing teeth, shaving, grooming)  dressing     Domestic Life  shopping  cooking  doing house work (cleaning house, washing dishes, laundry)     Interactions/Relationships  no deficits     Life Areas  no deficits     Community and Social Life  no deficits             moderate   Clinical Presentation evolving clinical presentation with changing clinical characteristics moderate   Decision Making/ Complexity Score: moderate         GOALS: Pt is in agreement with the following goals.     Short term goals:  6 weeks or 10 visits   1.  Pt will demonstrate increased lumbar ROM by at least 3 degrees from the  initial ROM value with improvements noted in functional ROM and ability to perform ADLs  2.  Pt will demonstrate increased maximum isometric torque value by 10% when compared to the initial value resulting in improved ability to perform bending, lifting, and carrying activities safely, confidently.     3.  Patient report a reduction in worst pain score by 1-2 points for improved tolerance during work and recreational activities  4.  Pt able to perform HEP correctly with minimal cueing or supervision for therapist        Long term goals: 13 weeks or 20 visits   1. Pt will demonstrate increased lumbar ROM by at least 6 degrees from initial ROM value, resulting in improved ability to perform functional fwd bending while standing and sitting.   2. Pt will demonstrate increased maximum isometric torque value by 20% when compared to the initial value resulting in improved ability to perform bending, lifting, and carrying activities safely, confidently.  3. Pt to demonstrate ability to independently control and reduce their pain through posture positioning and mechanical movements throughout a typical day.  4.  Patient will demonstrate improved overall function per FOTO Survey to CJ = at least 20% but < 40% impaired, limited or restricted score or less.        Plan   Outpatient physical therapy 2x week for 13 weeks or 20 visits   Weight loss.../Inadequate energy intake.../Fluid accumulation...

## 2023-01-23 ENCOUNTER — TELEPHONE (OUTPATIENT)
Dept: INTERNAL MEDICINE | Facility: CLINIC | Age: 58
End: 2023-01-23
Payer: MEDICAID

## 2023-01-23 NOTE — TELEPHONE ENCOUNTER
Pt states that she is having migraines since January 3. She states that her sinuses are bothering her. She states that she is suffering congestion as well. Pt is requesting something called in for her symptoms. Please contact pt.

## 2023-01-23 NOTE — TELEPHONE ENCOUNTER
----- Message from Chris Banks sent at 1/23/2023  1:09 PM CST -----  Contact: [phone]  Pt needs a call back. Please call pt back.

## 2023-01-24 ENCOUNTER — PATIENT MESSAGE (OUTPATIENT)
Dept: INTERNAL MEDICINE | Facility: CLINIC | Age: 58
End: 2023-01-24
Payer: MEDICAID

## 2023-01-24 RX ORDER — FLUTICASONE PROPIONATE 50 MCG
1 SPRAY, SUSPENSION (ML) NASAL DAILY
Qty: 18.2 ML | Refills: 0 | Status: SHIPPED | OUTPATIENT
Start: 2023-01-24 | End: 2023-06-14 | Stop reason: SDUPTHER

## 2023-02-16 ENCOUNTER — HOSPITAL ENCOUNTER (OUTPATIENT)
Dept: RADIOLOGY | Facility: HOSPITAL | Age: 58
Discharge: HOME | End: 2023-02-16
Attending: STUDENT IN AN ORGANIZED HEALTH CARE EDUCATION/TRAINING PROGRAM
Payer: MEDICAID

## 2023-02-16 DIAGNOSIS — Z12.31 BREAST CANCER SCREENING BY MAMMOGRAM: ICD-10-CM

## 2023-02-16 PROCEDURE — 77063 BREAST TOMOSYNTHESIS BI: CPT | Mod: 26,,, | Performed by: RADIOLOGY

## 2023-02-16 PROCEDURE — 77067 SCR MAMMO BI INCL CAD: CPT | Mod: TC

## 2023-02-16 PROCEDURE — 77067 SCR MAMMO BI INCL CAD: CPT | Mod: 26,,, | Performed by: RADIOLOGY

## 2023-02-16 PROCEDURE — 77063 MAMMO DIGITAL SCREENING BILAT WITH TOMO: ICD-10-PCS | Mod: 26,,, | Performed by: RADIOLOGY

## 2023-02-16 PROCEDURE — 77067 MAMMO DIGITAL SCREENING BILAT WITH TOMO: ICD-10-PCS | Mod: 26,,, | Performed by: RADIOLOGY

## 2023-02-24 ENCOUNTER — CLINICAL SUPPORT (OUTPATIENT)
Dept: ENDOSCOPY | Facility: HOSPITAL | Age: 58
End: 2023-02-24
Payer: MEDICAID

## 2023-02-24 VITALS — WEIGHT: 214 LBS | BODY MASS INDEX: 35.65 KG/M2 | HEIGHT: 65 IN

## 2023-02-24 DIAGNOSIS — Z12.11 COLON CANCER SCREENING: ICD-10-CM

## 2023-02-24 NOTE — PLAN OF CARE
Contacted patient for 3:30 pm PAT appointment. Patient wants to be scheduled in 6/2023. Patient rescheduled for PAT appointment on 5/16/23.

## 2023-03-05 ENCOUNTER — HOSPITAL ENCOUNTER (EMERGENCY)
Facility: OTHER | Age: 58
Discharge: HOME | End: 2023-03-05
Attending: EMERGENCY MEDICINE
Payer: MEDICAID

## 2023-03-05 VITALS
SYSTOLIC BLOOD PRESSURE: 193 MMHG | HEART RATE: 76 BPM | DIASTOLIC BLOOD PRESSURE: 95 MMHG | OXYGEN SATURATION: 100 % | TEMPERATURE: 98 F | RESPIRATION RATE: 20 BRPM

## 2023-03-05 DIAGNOSIS — S51.812A FOREARM LACERATION, LEFT, INITIAL ENCOUNTER: Primary | ICD-10-CM
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
## 2023-03-05 DIAGNOSIS — T07.XXXA MULTIPLE ABRASIONS: ICD-10-CM

## 2023-03-05 PROCEDURE — 63600175 PHARM REV CODE 636 W HCPCS: Performed by: NURSE PRACTITIONER

## 2023-03-05 PROCEDURE — 12002 RPR S/N/AX/GEN/TRNK2.6-7.5CM: CPT

## 2023-03-05 PROCEDURE — 99284 EMERGENCY DEPT VISIT MOD MDM: CPT | Mod: 25

## 2023-03-05 PROCEDURE — 25000003 PHARM REV CODE 250: Performed by: NURSE PRACTITIONER

## 2023-03-05 PROCEDURE — 90715 TDAP VACCINE 7 YRS/> IM: CPT | Performed by: NURSE PRACTITIONER

## 2023-03-05 PROCEDURE — 90471 IMMUNIZATION ADMIN: CPT | Performed by: NURSE PRACTITIONER

## 2023-03-05 RX ORDER — LIDOCAINE HYDROCHLORIDE 10 MG/ML
10 INJECTION INFILTRATION; PERINEURAL
Status: COMPLETED | OUTPATIENT
Start: 2023-03-05 | End: 2023-03-05

## 2023-03-05 RX ORDER — BACITRACIN ZINC 500 UNIT/G
1 OINTMENT (GRAM) TOPICAL
Status: COMPLETED | OUTPATIENT
Start: 2023-03-05 | End: 2023-03-05

## 2023-03-05 RX ADMIN — TETANUS TOXOID, REDUCED DIPHTHERIA TOXOID AND ACELLULAR PERTUSSIS VACCINE, ADSORBED 0.5 ML: 5; 2.5; 8; 8; 2.5 SUSPENSION INTRAMUSCULAR at 02:03

## 2023-03-05 RX ADMIN — BACITRACIN ZINC 1 TUBE: 500 OINTMENT TOPICAL at 03:03

## 2023-03-05 RX ADMIN — LIDOCAINE HYDROCHLORIDE 10 ML: 10 INJECTION, SOLUTION INFILTRATION; PERINEURAL at 03:03

## 2023-03-05 NOTE — ED TRIAGE NOTES
Pt reports to ED with c/o falling at [facility], states she stumbled over a piece of broken concrete in the parking lot. Pt has abrasions on left and right hand, right elbow. Pt had glass dish in hand and fell onto it when she fell. Reports pain 10/10. Denies hitting head or losing consciousness.

## 2023-03-05 NOTE — ED PROVIDER NOTES
Source of History:  Patient     Chief complaint:  Fall (Trip and fall coming [place] [employer]; multiple abrasions to L hand/wrist and large abrasion on R elbow; pt has ~4-5 cm lac to L forearm, oozing bright red blood, wrapped in triage; pt denies hitting head, LOC; unknown date of last tetanus)      HPI:  Batool Rivera is a 57 y.o. female presenting with complaint of trip and fall that occurred prior to arrival.  Multiple abrasions noted to right forearm, left hand.  She also has a 4 cm lack to her left forearm.  Tetanus is not up-to-date.  Bleeding is controlled.  She denies hitting her head or any LOC.  Patient has ambulated since the fall.  She denies any wrist or elbow pain.    This is the extent to the patients complaints today here in the emergency department.    PMH:  As per HPI and below:  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     Heart disease, unspecified     Hyperlipidemia     Hypertension      Past Surgical History:   Procedure Laterality Date     SECTION      HYSTERECTOMY   or     OOPHORECTOMY      one ovary was removed    TUBAL LIGATION         Social History     Tobacco Use    Smoking status: Former     Packs/day: 2.00     Years: 28.00     Pack years: 56.00     Types: Cigarettes     Quit date: 2011     Years since quittin.5    Smokeless tobacco: Never   Substance Use Topics    Alcohol use: No     Alcohol/week: 0.0 standard drinks    Drug use: No       Review of patient's allergies indicates:   Allergen Reactions    Coconut Itching    Losartan Hallucinations    Voltaren [diclofenac sodium] Rash     Rash with voltaren GEL, not to NSAIDs in general       ROS: As per HPI and below:  Constitutional: No fever.  No chills.  Eyes: No visual changes.   ENT: No sore throat. No ear pain.  Urinary: No abnormal urination.  MSK: No back pain. No joint pain.    Integument:  Abrasions, laceration    Physical Exam:    BP (!) 193/95 (BP Location: Right arm, Patient Position: Sitting)   Pulse 76   Temp 98.3 °F (36.8 °C) (Oral)   Resp 20   SpO2 100%   Breastfeeding No   Vitals:    03/05/23 1256 03/05/23 1550   BP: (!) 140/90 (!) 193/95   Pulse: 72 76   Resp: 20 20   Temp: 98.2 °F (36.8 °C) 98.3 °F (36.8 °C)   TempSrc: Oral Oral   SpO2: 100% 100%       Nursing note and vital signs reviewed.  Constitutional: No acute distress.  Eyes: No conjunctival injection.  Extraocular muscles are intact.  ENT: Normal phonation.  Mucous membranes are pink and moist.  Neck:  Full range motion of the neck is present.  Musculoskeletal:  Full range motion of bilateral wrist and elbows.  Good range of motion all joints.  No deformities. Neck supple.  No meningismus. Neurovascularly intact.  Skin:  Abrasion noted to posterior right elbow.  She also has abrasions to the posterior left thumb.  There is a 4 cm laceration to her left anterior mid forearm.  Bleeding is controlled.  There appears to be no tendon injury.  Psych: Appropriate, conversant.    Initial MDM:  57-year-old female with a trip and fall prior to arrival sustained a laceration to her left forearm multiple abrasions to right and left forearms and hands.  Will obtain x-rays to rule out a foreign body proceed with primary closure.  She would no head injury, neck pain.  Patient has ambulated since the fall.    Labs Reviewed - No data to display    X-Ray Forearm Left   Final Result      As above         Electronically signed by: Valentín Snyder MD   Date:    03/05/2023   Time:    14:52        Lac Repair    Date/Time: 3/5/2023 12:35 PM  Performed by: JERED Billy  Authorized by: Lauro Wilson MD     Consent:     Consent obtained:  Verbal    Consent given by:  Patient    Risks discussed:  Infection, poor cosmetic result and poor wound healing  Universal protocol:     Procedure explained and questions answered to patient or  proxy's satisfaction: yes      Patient identity confirmed:  Verbally with patient  Anesthesia:     Anesthesia method:  Local infiltration    Local anesthetic:  Lidocaine 1% w/o epi  Laceration details:     Location:  Shoulder/arm    Shoulder/arm location:  L lower arm    Length (cm):  4  Pre-procedure details:     Preparation:  Patient was prepped and draped in usual sterile fashion and imaging obtained to evaluate for foreign bodies  Exploration:     Hemostasis achieved with:  Direct pressure    Imaging obtained: x-ray      Imaging outcome: foreign body not noted      Wound exploration: wound explored through full range of motion and entire depth of wound visualized    Treatment:     Area cleansed with:  Saline    Amount of cleaning:  Standard    Irrigation solution:  Sterile saline    Irrigation method:  Syringe    Debridement:  None  Skin repair:     Repair method:  Sutures    Suture size:  4-0    Suture technique:  Simple interrupted    Number of sutures:  6  Approximation:     Approximation:  Close  Repair type:     Repair type:  Simple  Post-procedure details:     Dressing:  Non-adherent dressing and antibiotic ointment    Procedure completion:  Tolerated      MDM/ Differential Dx:  57 y.o. female with fall that occurred prior to arrival.  Sustained a laceration to her left forearm and abrasions to her right forearm.  There was no head injury or LOC. After complete evaluation, including thorough history and physical exam, the patient's injury and laceration was deemed to be appropriate for primary closure in the ED. The wound was irrigated extensively and inspected for foreign body, underlying structure injury, or other associated complications, and none were found. The wound was repaired using sutures. The patient was given appropriate wound care instructions, including keeping wound clean/dry, use of sterile bandages, and avoiding submersion in standing water. Due to the nature of the wound, no antibiotics  were deemed necessary. The patient was instructed to regularly monitor the wound for any evidence of infection, including redness, fever, or drainage. The patient was counseled on follow-up with PCP or return to ER in 7 to 10 days for wound re-check and suture removal. Patient stated understanding and comfortable with plan of care.            Diagnostic Impression:    1. Forearm laceration, left, initial encounter    2. Fall    3. Multiple abrasions         ED Disposition Condition    Discharge Stable            ED Prescriptions    None       Follow-up Information       Follow up With Specialties Details Why Contact Info    Anselmo Trammell MD Internal Medicine Schedule an appointment as soon as possible for a visit in 3 days  [address]  [place] [address]  [phone]      [facility] Emergency Medicine Go in 1 week For suture removal [address] [facility] [id number] [phone]             [place], JERED  03/06/23 3363

## 2023-03-21 ENCOUNTER — TELEPHONE (OUTPATIENT)
Dept: INTERNAL MEDICINE | Facility: CLINIC | Age: 58
End: 2023-03-21

## 2023-03-21 ENCOUNTER — OFFICE VISIT (OUTPATIENT)
Dept: INTERNAL MEDICINE | Facility: CLINIC | Age: 58
End: 2023-03-21
Payer: MEDICAID

## 2023-03-21 VITALS
DIASTOLIC BLOOD PRESSURE: 85 MMHG | OXYGEN SATURATION: 100 % | HEIGHT: 65 IN | HEART RATE: 68 BPM | SYSTOLIC BLOOD PRESSURE: 162 MMHG | BODY MASS INDEX: 36.03 KG/M2 | WEIGHT: 216.25 LBS

## 2023-03-21 DIAGNOSIS — I10 ESSENTIAL HYPERTENSION: ICD-10-CM

## 2023-03-21 DIAGNOSIS — M54.50 LUMBAR BACK PAIN: Primary | ICD-10-CM

## 2023-03-21 DIAGNOSIS — E66.01 SEVERE OBESITY (BMI 35.0-35.9 WITH COMORBIDITY): ICD-10-CM

## 2023-03-21 PROCEDURE — 3079F PR MOST RECENT DIASTOLIC BLOOD PRESSURE 80-89 MM HG: ICD-10-PCS | Mod: CPTII,,, | Performed by: STUDENT IN AN ORGANIZED HEALTH CARE EDUCATION/TRAINING PROGRAM

## 2023-03-21 PROCEDURE — 99999 PR PBB SHADOW E&M-EST. PATIENT-LVL IV: ICD-10-PCS | Mod: PBBFAC,,, | Performed by: STUDENT IN AN ORGANIZED HEALTH CARE EDUCATION/TRAINING PROGRAM

## 2023-03-21 PROCEDURE — 3077F SYST BP >= 140 MM HG: CPT | Mod: CPTII,,, | Performed by: STUDENT IN AN ORGANIZED HEALTH CARE EDUCATION/TRAINING PROGRAM

## 2023-03-21 PROCEDURE — 3079F DIAST BP 80-89 MM HG: CPT | Mod: CPTII,,, | Performed by: STUDENT IN AN ORGANIZED HEALTH CARE EDUCATION/TRAINING PROGRAM

## 2023-03-21 PROCEDURE — 99213 PR OFFICE/OUTPT VISIT, EST, LEVL III, 20-29 MIN: ICD-10-PCS | Mod: S$PBB,,, | Performed by: STUDENT IN AN ORGANIZED HEALTH CARE EDUCATION/TRAINING PROGRAM

## 2023-03-21 PROCEDURE — 3044F PR MOST RECENT HEMOGLOBIN A1C LEVEL <7.0%: ICD-10-PCS | Mod: CPTII,,, | Performed by: STUDENT IN AN ORGANIZED HEALTH CARE EDUCATION/TRAINING PROGRAM

## 2023-03-21 PROCEDURE — 4010F ACE/ARB THERAPY RXD/TAKEN: CPT | Mod: CPTII,,, | Performed by: STUDENT IN AN ORGANIZED HEALTH CARE EDUCATION/TRAINING PROGRAM

## 2023-03-21 PROCEDURE — 3008F BODY MASS INDEX DOCD: CPT | Mod: CPTII,,, | Performed by: STUDENT IN AN ORGANIZED HEALTH CARE EDUCATION/TRAINING PROGRAM

## 2023-03-21 PROCEDURE — 3008F PR BODY MASS INDEX (BMI) DOCUMENTED: ICD-10-PCS | Mod: CPTII,,, | Performed by: STUDENT IN AN ORGANIZED HEALTH CARE EDUCATION/TRAINING PROGRAM

## 2023-03-21 PROCEDURE — 99214 OFFICE O/P EST MOD 30 MIN: CPT | Mod: PBBFAC | Performed by: STUDENT IN AN ORGANIZED HEALTH CARE EDUCATION/TRAINING PROGRAM

## 2023-03-21 PROCEDURE — 99999 PR PBB SHADOW E&M-EST. PATIENT-LVL IV: CPT | Mod: PBBFAC,,, | Performed by: STUDENT IN AN ORGANIZED HEALTH CARE EDUCATION/TRAINING PROGRAM

## 2023-03-21 PROCEDURE — 4010F PR ACE/ARB THEARPY RXD/TAKEN: ICD-10-PCS | Mod: CPTII,,, | Performed by: STUDENT IN AN ORGANIZED HEALTH CARE EDUCATION/TRAINING PROGRAM

## 2023-03-21 PROCEDURE — 3077F PR MOST RECENT SYSTOLIC BLOOD PRESSURE >= 140 MM HG: ICD-10-PCS | Mod: CPTII,,, | Performed by: STUDENT IN AN ORGANIZED HEALTH CARE EDUCATION/TRAINING PROGRAM

## 2023-03-21 PROCEDURE — 99213 OFFICE O/P EST LOW 20 MIN: CPT | Mod: S$PBB,,, | Performed by: STUDENT IN AN ORGANIZED HEALTH CARE EDUCATION/TRAINING PROGRAM

## 2023-03-21 PROCEDURE — 3044F HG A1C LEVEL LT 7.0%: CPT | Mod: CPTII,,, | Performed by: STUDENT IN AN ORGANIZED HEALTH CARE EDUCATION/TRAINING PROGRAM

## 2023-03-21 RX ORDER — LISINOPRIL 20 MG/1
20 TABLET ORAL DAILY
Qty: 90 TABLET | Refills: 3 | Status: SHIPPED | OUTPATIENT
Start: 2023-03-21 | End: 2023-06-14 | Stop reason: SDUPTHER

## 2023-03-21 NOTE — TELEPHONE ENCOUNTER
----- Message from Maria G Sierra MD sent at 3/21/2023  4:15 PM CDT -----  Cristin Armenta, and Ms Sylvia,   I hope you are well. I was wondering if it will be possible to schedule appointment with Dr. Anselmo Trammell PCP to follow regarding back symptoms and other chronic disease issues.      Thanks so much for all you do.     Sincerely,   Maria G

## 2023-03-21 NOTE — PROGRESS NOTES
57 y.o. female w/ a PMHx of HTN, HLD, BELLO, and depression is here f/u after seeing at the ED for fall.     S/p Fall  - Fell on ; Per ED note, pt was trip and fall coming out of [employer]; multiple abrasions to L hand/wrist and large abrasion on R elbow; pt has ~4-5 cm lac to L forearm. Denied hitting in the head and no change in mental status noted.   - The wound has healed; has residual itchiness.     Back pain  - Complains of back pain today. On  CT scan, pt has Multilevel degenerative changes, most advanced at L5-S1.   - Also has left fore foot numbness and it radiates up to the back.  - Has chronic urinary incontinence, but did not report new symptoms/change today.    - A1c was WNL.   - Takes gabapentin nightly and it does not help.     Elevated bp  - BP is elevated today.   - Took BP meds this morning.   - On amlodopine 10 and lisinopril 10 mg; pt took med this morning.          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     Heart disease, unspecified     Hyperlipidemia     Hypertension      Past Surgical History:   Procedure Laterality Date     SECTION      HYSTERECTOMY   or     OOPHORECTOMY      one ovary was removed    TUBAL LIGATION       Review of patient's allergies indicates:   Allergen Reactions    Coconut Itching    Losartan Hallucinations    Voltaren [diclofenac sodium] Rash     Rash with voltaren GEL, not to NSAIDs in general       Current Outpatient Medications on File Prior to Visit   Medication Sig Dispense Refill    amLODIPine (NORVASC) 10 MG tablet TAKE 1 TABLET(10 MG) BY MOUTH EVERY EVENING FOR BLOOD PRESSURE 90 tablet 3    aspirin (ECOTRIN) 81 MG EC tablet Take 1 tablet (81 mg total) by mouth once daily. 30 tablet 11    atorvastatin (LIPITOR) 80 MG tablet TAKE 1 TABLET BY MOUTH ONCE DAILY FOR CHOLESTEROL AND TO PROTECT YOUR HEART 90 tablet 4     ergocalciferol (ERGOCALCIFEROL) 50,000 unit Cap TAKE ONE CAPSULE BY MOUTH EVERY 7 DAYS FOR VITAMIN D DEFICIENCY 12 capsule 1    EScitalopram oxalate (LEXAPRO) 20 MG tablet Take 1 tablet (20 mg total) by mouth once daily. 90 tablet 1    ezetimibe (ZETIA) 10 mg tablet Take 1 tablet (10 mg total) by mouth every evening. 90 tablet 3    fluticasone propionate (FLONASE) 50 mcg/actuation nasal spray 1 spray (50 mcg total) by Each Nostril route once daily. 18.2 mL 0    gabapentin (NEURONTIN) 400 MG capsule Take 1 capsule (400 mg total) by mouth every evening. If no relief, may increase dose to twice per day. 60 capsule 3    traZODone (DESYREL) 150 MG tablet TAKE 1 TABLET(150 MG) BY MOUTH EVERY EVENING AT BEDTIME AS NEEDED FOR INSOMNIA 90 tablet 2    [DISCONTINUED] lisinopriL 10 MG tablet Take 1 tablet (10 mg total) by mouth once daily. 90 tablet 3     No current facility-administered medications on file prior to visit.     Social History     Socioeconomic History    Marital status: Single   Tobacco Use    Smoking status: Former     Packs/day: 2.00     Years: 28.00     Pack years: 56.00     Types: Cigarettes     Quit date: 2011     Years since quittin.5    Smokeless tobacco: Never   Substance and Sexual Activity    Alcohol use: No     Alcohol/week: 0.0 standard drinks    Drug use: No    Sexual activity: Yes     Partners: Male     Birth control/protection: See Surgical Hx     Comment: BTL   Social History Narrative    Living with her felicitas, who is 7 y/o.      Review of Systems   Constitutional:  Negative for fever, malaise/fatigue and weight loss.   HENT:  Negative for congestion and hearing loss.    Eyes:  Negative for blurred vision and redness.   Respiratory:  Negative for cough and shortness of breath.    Cardiovascular:  Negative for chest pain, palpitations and leg swelling.   Gastrointestinal:  Negative for abdominal pain, diarrhea, heartburn, nausea and vomiting.   Genitourinary:  Negative for  dysuria, flank pain and hematuria.   Musculoskeletal:  Positive for back pain. Negative for joint pain, myalgias and neck pain.   Skin:  Negative for rash.   Neurological:  Positive for sensory change. Negative for dizziness, tingling, tremors, focal weakness, seizures, loss of consciousness, weakness and headaches.        Left foot numbness.    Endo/Heme/Allergies:  Does not bruise/bleed easily.   Psychiatric/Behavioral:  Negative for depression, hallucinations, substance abuse and suicidal ideas. The patient is not nervous/anxious and does not have insomnia.      Vitals:    03/21/23 1004   BP: (!) 162/85   Pulse: 68     Physical Exam  HENT:      Head: Normocephalic and atraumatic.   Eyes:      Conjunctiva/sclera: Conjunctivae normal.      Pupils: Pupils are equal, round, and reactive to light.   Cardiovascular:      Rate and Rhythm: Normal rate and regular rhythm.   Pulmonary:      Effort: Pulmonary effort is normal.      Breath sounds: Normal breath sounds.   Abdominal:      General: Bowel sounds are normal.      Palpations: Abdomen is soft.   Musculoskeletal:      Cervical back: Normal, normal range of motion and neck supple.      Thoracic back: Normal.      Lumbar back: Bony tenderness present. No swelling or deformity. Decreased range of motion.      Comments: Negative straight leg test.      Skin:     General: Skin is warm and dry.   Neurological:      Mental Status: She is alert and oriented to person, place, and time.      Gait: Gait is intact.      Deep Tendon Reflexes: Babinski sign absent on the right side. Babinski sign absent on the left side.      Reflex Scores:       Patellar reflexes are 1+ on the right side and 1+ on the left side.       Achilles reflexes are 1+ on the right side and 1+ on the left side.     Comments: Reduced sensation at left forefoot plantar side with pin prick testing.   Negative babinski sign   Psychiatric:         Mood and Affect: Mood and affect normal.         Cognition and  Memory: Memory normal.         Judgment: Judgment normal.     A/P     1. Lumbar back pain  Comments:  - will refer to [facility] for physical therapy.   Orders:  -     Ambulatory referral/consult to [facility]; Future; Expected date: 03/28/2023  -     Ambulatory referral[facility]; Future; Expected date: 03/28/2023    2. Essential hypertension  Comments:  - increase lisinopril   - Referral to digital hypertension   - CMP in 1 week.   Orders:  -     lisinopriL (PRINIVIL,ZESTRIL) 20 MG tablet; Take 1 tablet (20 mg total) by mouth once daily.  Dispense: 90 tablet; Refill: 3  -     BASIC METABOLIC PANEL; Future; Expected date: 03/28/2023  -     Hypertension Digital Medicine (HDMP) Enrollment Order    3. Severe obesity (BMI 35.0-35.9 with comorbidity)  -     Ambulatory referral/consult to [facility] (PCP INITIATION); Future; Expected date: 03/28/2023      RTC to PCP.

## 2023-03-23 ENCOUNTER — PATIENT MESSAGE (OUTPATIENT)
Dept: RESEARCH | Facility: CLINIC | Age: 58
End: 2023-03-23
Payer: MEDICAID

## 2023-03-23 ENCOUNTER — DOCUMENTATION ONLY (OUTPATIENT)
Dept: RESEARCH | Facility: CLINIC | Age: 58
End: 2023-03-23

## 2023-03-23 NOTE — PROGRESS NOTES
[facility]: Eligibility Confirmation  Remember to add Participant ID in Research Study  PCP Referred: Send invite if potentially eligble.   Age as of Today: 57 y.o.    Is patient age 40 to 70 years yes    Is patient race Black/: Epic: Black or      Primary care provider at [facility]: Anselmo Trammell MD   Referred:   03/21/2023 10:35 Maria G Sierra MD       Does the patient have an active [facility] portal account?  yes    Does the patient have prediabetes or Type 2 diabetes? yes  If yes, Based on: (Prediabetes) HbA1c 5.7- 6.4%    LAST HBA1C   Lab Results   Component Value Date    HGBA1C 5.6 01/03/2023    HGBA1C 5.9 (H) 09/24/2020    HGBA1C 5.7 (H) 05/13/2019          LAST BMI:   BMI Readings from Last 1 Encounters:   03/21/23 35.99 kg/m²      Was the patient's most recent BMI (within the past 12 months) between 30 and 50  yes    PCP REFERRAL  Did provider acknowledge or deny patient's participation? Acknowledged  _____________________________________________  LAST WEIGHT  (verify if this was an outpatient):   Wt Readings from Last 4 Encounters:   03/21/23 98.1 kg (216 lb 4.3 oz)   02/24/23 97.1 kg (214 lb)   01/03/23 98.9 kg (218 lb 0.6 oz)   07/22/22 97.1 kg (214 lb)        [facility] Screening Date (enter from [employer]):   Does the patient have a baseline clinic weight collected within 4 weeks (34 days) of Screening Date?  N/A, pt has not completed Screening                     If the last weight is outside of the Screening date window (4 weeks /34 days)  when is the patient's next scheduled clinic appointment?  N/A, pt has not completed Screening    Status Updated: 03/23/2023

## 2023-05-05 ENCOUNTER — TELEPHONE (OUTPATIENT)
Dept: PODIATRY | Facility: CLINIC | Age: 58
End: 2023-05-05
Payer: MEDICAID

## 2023-05-05 NOTE — TELEPHONE ENCOUNTER
Spoke with patient in regards to an appointment request, I was unable to schedule patient due to an override authorization, patient will be scheduled on 06/16/2023 @ 10:15am at [facility] location        ----- Message from Breann Jerry sent at 5/5/2023 12:05 PM CDT -----  Contact: [phone]  No blue slot available to schedule an appointment for the patient.  Patient is established with which PCP: Dr Stevens  Reason for the visit: knot under left foot   Would the patient like a call back, or a response through their [facility] portal?: callback

## 2023-05-16 ENCOUNTER — CLINICAL SUPPORT (OUTPATIENT)
Dept: ENDOSCOPY | Facility: HOSPITAL | Age: 58
End: 2023-05-16
Payer: MEDICAID

## 2023-05-16 VITALS — WEIGHT: 216 LBS | BODY MASS INDEX: 35.99 KG/M2 | HEIGHT: 65 IN

## 2023-05-16 DIAGNOSIS — Z12.11 COLON CANCER SCREENING: ICD-10-CM

## 2023-05-23 ENCOUNTER — PATIENT MESSAGE (OUTPATIENT)
Dept: RESEARCH | Facility: CLINIC | Age: 58
End: 2023-05-23
Payer: MEDICAID

## 2023-06-14 DIAGNOSIS — I10 ESSENTIAL HYPERTENSION: ICD-10-CM

## 2023-06-14 RX ORDER — LISINOPRIL 20 MG/1
20 TABLET ORAL DAILY
Qty: 90 TABLET | Refills: 3 | Status: SHIPPED | OUTPATIENT
Start: 2023-06-14 | End: 2023-07-28

## 2023-06-14 RX ORDER — FLUTICASONE PROPIONATE 50 MCG
1 SPRAY, SUSPENSION (ML) NASAL DAILY
Qty: 18.2 ML | Refills: 2 | Status: SHIPPED | OUTPATIENT
Start: 2023-06-14

## 2023-06-19 RX ORDER — AMLODIPINE BESYLATE 10 MG/1
TABLET ORAL
Qty: 90 TABLET | Refills: 3 | Status: SHIPPED | OUTPATIENT
Start: 2023-06-19 | End: 2024-06-13

## 2023-06-20 ENCOUNTER — HOSPITAL ENCOUNTER (OUTPATIENT)
Facility: HOSPITAL | Age: 58
Discharge: HOME | End: 2023-06-20
Attending: STUDENT IN AN ORGANIZED HEALTH CARE EDUCATION/TRAINING PROGRAM | Admitting: STUDENT IN AN ORGANIZED HEALTH CARE EDUCATION/TRAINING PROGRAM
Payer: MEDICAID

## 2023-06-20 ENCOUNTER — ANESTHESIA (OUTPATIENT)
Dept: ENDOSCOPY | Facility: HOSPITAL | Age: 58
End: 2023-06-20
Payer: MEDICAID

## 2023-06-20 ENCOUNTER — ANESTHESIA EVENT (OUTPATIENT)
Dept: ENDOSCOPY | Facility: HOSPITAL | Age: 58
End: 2023-06-20
Payer: MEDICAID

## 2023-06-20 VITALS
BODY MASS INDEX: 35.82 KG/M2 | TEMPERATURE: 98 F | DIASTOLIC BLOOD PRESSURE: 73 MMHG | OXYGEN SATURATION: 97 % | WEIGHT: 215 LBS | HEIGHT: 65 IN | HEART RATE: 79 BPM | SYSTOLIC BLOOD PRESSURE: 151 MMHG | RESPIRATION RATE: 16 BRPM

## 2023-06-20 DIAGNOSIS — Z12.11 ENCOUNTER FOR SCREENING COLONOSCOPY: ICD-10-CM

## 2023-06-20 PROCEDURE — E9220 PRA ENDO ANESTHESIA: ICD-10-PCS | Mod: ,,, | Performed by: NURSE ANESTHETIST, CERTIFIED REGISTERED

## 2023-06-20 PROCEDURE — 45385 PR COLONOSCOPY,REMV LESN,SNARE: ICD-10-PCS | Mod: ,,, | Performed by: STUDENT IN AN ORGANIZED HEALTH CARE EDUCATION/TRAINING PROGRAM

## 2023-06-20 PROCEDURE — 00811 ANES LWR INTST NDSC NOS: CPT | Performed by: STUDENT IN AN ORGANIZED HEALTH CARE EDUCATION/TRAINING PROGRAM

## 2023-06-20 PROCEDURE — 63600175 PHARM REV CODE 636 W HCPCS: Performed by: NURSE ANESTHETIST, CERTIFIED REGISTERED

## 2023-06-20 PROCEDURE — 25000003 PHARM REV CODE 250: Performed by: NURSE ANESTHETIST, CERTIFIED REGISTERED

## 2023-06-20 PROCEDURE — 37000008 HC ANESTHESIA 1ST 15 MINUTES: Performed by: STUDENT IN AN ORGANIZED HEALTH CARE EDUCATION/TRAINING PROGRAM

## 2023-06-20 PROCEDURE — 37000009 HC ANESTHESIA EA ADD 15 MINS: Performed by: STUDENT IN AN ORGANIZED HEALTH CARE EDUCATION/TRAINING PROGRAM

## 2023-06-20 PROCEDURE — 88305 TISSUE EXAM BY PATHOLOGIST: ICD-10-PCS | Mod: 26,,, | Performed by: PATHOLOGY

## 2023-06-20 PROCEDURE — 45385 COLONOSCOPY W/LESION REMOVAL: CPT | Performed by: STUDENT IN AN ORGANIZED HEALTH CARE EDUCATION/TRAINING PROGRAM

## 2023-06-20 PROCEDURE — E9220 PRA ENDO ANESTHESIA: HCPCS | Mod: ,,, | Performed by: NURSE ANESTHETIST, CERTIFIED REGISTERED

## 2023-06-20 PROCEDURE — 45385 COLONOSCOPY W/LESION REMOVAL: CPT | Mod: ,,, | Performed by: STUDENT IN AN ORGANIZED HEALTH CARE EDUCATION/TRAINING PROGRAM

## 2023-06-20 PROCEDURE — 88305 TISSUE EXAM BY PATHOLOGIST: CPT | Mod: 26,,, | Performed by: PATHOLOGY

## 2023-06-20 PROCEDURE — 27201089 HC SNARE, DISP (ANY): Performed by: STUDENT IN AN ORGANIZED HEALTH CARE EDUCATION/TRAINING PROGRAM

## 2023-06-20 PROCEDURE — 88305 TISSUE EXAM BY PATHOLOGIST: CPT | Performed by: PATHOLOGY

## 2023-06-20 RX ORDER — LIDOCAINE HYDROCHLORIDE 20 MG/ML
INJECTION INTRAVENOUS
Status: DISCONTINUED | OUTPATIENT
Start: 2023-06-20 | End: 2023-06-20

## 2023-06-20 RX ORDER — PROPOFOL 10 MG/ML
VIAL (ML) INTRAVENOUS
Status: DISCONTINUED | OUTPATIENT
Start: 2023-06-20 | End: 2023-06-20

## 2023-06-20 RX ORDER — SODIUM CHLORIDE 9 MG/ML
INJECTION, SOLUTION INTRAVENOUS CONTINUOUS
Status: DISCONTINUED | OUTPATIENT
Start: 2023-06-20 | End: 2023-06-20 | Stop reason: HOSPADM

## 2023-06-20 RX ORDER — PROPOFOL 10 MG/ML
VIAL (ML) INTRAVENOUS CONTINUOUS PRN
Status: DISCONTINUED | OUTPATIENT
Start: 2023-06-20 | End: 2023-06-20

## 2023-06-20 RX ADMIN — SODIUM CHLORIDE: 0.9 INJECTION, SOLUTION INTRAVENOUS at 08:06

## 2023-06-20 RX ADMIN — PROPOFOL 50 MG: 10 INJECTION, EMULSION INTRAVENOUS at 08:06

## 2023-06-20 RX ADMIN — PROPOFOL 150 MCG/KG/MIN: 10 INJECTION, EMULSION INTRAVENOUS at 08:06

## 2023-06-20 RX ADMIN — LIDOCAINE HYDROCHLORIDE 50 MG: 20 INJECTION INTRAVENOUS at 08:06

## 2023-06-20 NOTE — H&P
[facility]  Colon and Rectal Surgery    [address]  Tel: [phone]  Fax: [phone]  [email or web address]   MD Olegario Madden MD Brian Kann, MD W. Forrest Johnston, MD Matthew Giglia, MD Jennifer Paruch, MD William Kethman, MD Danielle Kay, MD     Patient name: Batool Rivera   YOB: 1965   MRN: [id number]  Date of procedure: 2023    Procedure: Colonoscopy  Indications: Screening for colon cancer and No family history of colorectal cancer    Last colonoscopy:  (Complete)  Normal    The patient was informed of the availability of a certified  without charge. A certified  was not necessary for this visit.    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     Heart disease, unspecified     Hyperlipidemia     Hypertension      Past Surgical History:   Procedure Laterality Date     SECTION      HYSTERECTOMY   or     OOPHORECTOMY      one ovary was removed    TUBAL LIGATION       Family History   Problem Relation Age of Onset    Hypertension Mother     Heart disease Mother     Asthma Mother     Cataracts Mother     Glaucoma Mother     Brain cancer Father     Stroke Brother     Kidney disease Brother     Glaucoma Sister     Diabetes Sister     Heart disease Brother         MI x 5    Depression Brother     Blindness Neg Hx     Macular degeneration Neg Hx     Retinal detachment Neg Hx     Breast cancer Neg Hx     Colon cancer Neg Hx     Ovarian cancer Neg Hx      Social History     Tobacco Use    Smoking status: Former     Packs/day: 2.00     Years: 28.00     Pack years: 56.00     Types: Cigarettes     Quit date: 2011      Years since quittin.8    Smokeless tobacco: Never   Substance Use Topics    Alcohol use: Yes     Comment: beer weekends    Drug use: No     Review of patient's allergies indicates:   Allergen Reactions    Coconut Itching    Losartan Hallucinations    Voltaren [diclofenac sodium] Rash     Rash with voltaren GEL, not to NSAIDs in general       Prior to Admission medications    Medication Sig Start Date End Date Taking? Authorizing Provider   amLODIPine (NORVASC) 10 MG tablet TAKE 1 TABLET(10 MG) BY MOUTH EVERY EVENING FOR BLOOD PRESSURE 23 Yes Kenzie Caputo MD   aspirin (ECOTRIN) 81 MG EC tablet Take 1 tablet (81 mg total) by mouth once daily. 10/18/21 6/20/23 Yes Otto Antonio MD   atorvastatin (LIPITOR) 80 MG tablet TAKE 1 TABLET BY MOUTH ONCE DAILY FOR CHOLESTEROL AND TO PROTECT YOUR HEART 10/18/21  Yes Otto Antonio MD   ergocalciferol (ERGOCALCIFEROL) 50,000 unit Cap TAKE ONE CAPSULE BY MOUTH EVERY 7 DAYS FOR VITAMIN D DEFICIENCY 20  Yes Karan James MD   ezetimibe (ZETIA) 10 mg tablet Take 1 tablet (10 mg total) by mouth every evening. 10/18/21  Yes Otto Antonio MD   fluticasone propionate (FLONASE) 50 mcg/actuation nasal spray 1 spray (50 mcg total) by Each Nostril route once daily. 23  Yes Anselom Trammell MD   gabapentin (NEURONTIN) 400 MG capsule Take 1 capsule (400 mg total) by mouth every evening. If no relief, may increase dose to twice per day. 10/18/21  Yes Otto Antonio MD   traZODone (DESYREL) 150 MG tablet TAKE 1 TABLET(150 MG) BY MOUTH EVERY EVENING AT BEDTIME AS NEEDED FOR INSOMNIA 3/9/21  Yes Kraan James MD   EScitalopram oxalate (LEXAPRO) 20 MG tablet Take 1 tablet (20 mg total) by mouth once daily. 10/18/21 10/18/22  Otto Antonio MD   lisinopriL (PRINIVIL,ZESTRIL) 20 MG tablet Take 1 tablet (20 mg total) by mouth once daily. 23  Maria G Sierra MD       Physical Examination  BP (!) 205/95   Pulse 66    "Temp 97.4 °F (36.3 °C)   Resp 15   Ht 5' 5" (1.651 m)   Wt 97.5 kg (215 lb)   SpO2 98%   Breastfeeding No   BMI 35.78 kg/m²      Constitutional: well developed, no cough, no dyspnea, alert, and no acute distress    Head: Normocephalic, no lesions, without obvious abnormality  Eye: Normal external eye, conjunctiva, and lids, PERRL  Cardiovascular: regular rate and regular rhythm  Respiratory: normal air entry  Gastrointestinal: soft, non-tender, without masses or organomegaly  Neurologic: alert, oriented, normal speech, no focal findings or movement disorder noted  Psychiatric: appropriate, normal mood    Plan of Care    It was a pleasure meeting Ms. Rivera today - we will plan to perform a colonoscopy with monitored anesthesia care. The details of the procedure, the possible need for biopsy or polypectomy and the potential risks including bleeding, perforation, missed polyps were discussed in detail and they consented to undergo the procedure.      Lon George MD, FACS - Staff Surgeon  Department of Colon & Rectal Surgery  [facility]    "

## 2023-06-20 NOTE — ANESTHESIA POSTPROCEDURE EVALUATION
Anesthesia Post Evaluation    Patient: Batool Rivera    Procedure(s) Performed: Procedure(s) (LRB):  COLONOSCOPY (N/A)    Final Anesthesia Type: general      Patient location during evaluation: PACU  Patient participation: Yes- Able to Participate  Level of consciousness: awake and alert  Post-procedure vital signs: reviewed and stable  Pain management: adequate  Airway patency: patent    PONV status at discharge: No PONV  Anesthetic complications: no      Cardiovascular status: blood pressure returned to baseline  Respiratory status: unassisted  Hydration status: euvolemic  Follow-up not needed.          Vitals Value Taken Time   /73 06/20/23 0953   Temp 36.6 °C (97.9 °F) 06/20/23 0923   Pulse 79 06/20/23 0953   Resp 16 06/20/23 0953   SpO2 97 % 06/20/23 0953         Event Time   Out of Recovery 10:04:48         Pain/Ariadne Score: Ariadne Score: 9 (6/20/2023  9:53 AM)

## 2023-06-20 NOTE — TRANSFER OF CARE
"Anesthesia Transfer of Care Note    Patient: Batool Rivera    Procedure(s) Performed: Procedure(s) (LRB):  COLONOSCOPY (N/A)    Patient location: GI    Anesthesia Type: general    Transport from OR: Transported from OR on room air with adequate spontaneous ventilation    Post pain: adequate analgesia    Post assessment: no apparent anesthetic complications and tolerated procedure well    Post vital signs: stable    Level of consciousness: awake, alert and oriented    Nausea/Vomiting: no nausea/vomiting    Complications: none    Transfer of care protocol was followed      Last vitals:   Visit Vitals  /70   Pulse 68   Temp 36.3 °C (97.4 °F)   Resp 16   Ht 5' 5" (1.651 m)   Wt 97.5 kg (215 lb)   SpO2 98%   Breastfeeding No   BMI 35.78 kg/m²     "

## 2023-06-20 NOTE — PROVATION PATIENT INSTRUCTIONS
Discharge Summary/Instructions after an Endoscopic Procedure  Patient Name: Batool Rivera  Patient MRN: [id number]  Patient YOB: 1965  Tuesday, June 20, 2023  Lon George MD  Dear patient,  As a result of recent federal legislation (The Federal Cures Act), you may   receive lab or pathology results from your procedure in your [facility]   account before your physician is able to contact you. Your physician or   their representative will relay the results to you with their   recommendations at their soonest availability.  Thank you,  RESTRICTIONS:  During your procedure today, you received medications for sedation.  These   medications may affect your judgment, balance and coordination.  Therefore,   for 24 hours, you have the following restrictions:   - DO NOT drive a car, operate machinery, make legal/financial decisions,   sign important papers or drink alcohol.    ACTIVITY:  Today: no heavy lifting, straining or running due to procedural   sedation/anesthesia.  The following day: return to full activity including work.  DIET:  Eat and drink normally unless instructed otherwise.     TREATMENT FOR COMMON SIDE EFFECTS:  - Mild abdominal pain, nausea, belching, bloating or excessive gas:  rest,   eat lightly and use a heating pad.  - Sore Throat: treat with throat lozenges and/or gargle with warm salt   water.  - Because air was used during the procedure, expelling large amounts of air   from your rectum or belching is normal.  - If a bowel prep was taken, you may not have a bowel movement for 1-3 days.    This is normal.  SYMPTOMS TO WATCH FOR AND REPORT TO YOUR PHYSICIAN:  1. Abdominal pain or bloating, other than gas cramps.  2. Chest pain.  3. Back pain.  4. Signs of infection such as: chills or fever occurring within 24 hours   after the procedure.  5. Rectal bleeding, which would show as bright red, maroon, or black stools.   (A tablespoon of blood from the rectum is not serious, especially if    hemorrhoids are present.)  6. Vomiting.  7. Weakness or dizziness.  GO DIRECTLY TO THE NEAREST EMERGENCY ROOM IF YOU HAVE ANY OF THE FOLLOWING:      Difficulty breathing              Chills and/or fever over 101 F   Persistent vomiting and/or vomiting blood   Severe abdominal pain   Severe chest pain   Black, tarry stools   Bleeding- more than one tablespoon   Any other symptom or condition that you feel may need urgent attention  Your doctor recommends these additional instructions:  If any biopsies were taken, your doctors clinic will contact you in 1 to 2   weeks with any results.  - Discharge patient to home.   - Resume previous diet.   - Continue present medications.   - Await pathology results.   - Repeat colonoscopy in 3 years because the bowel preparation was   suboptimal.   - Return to referring physician as previously scheduled.  For questions, problems or results please call your physician - Lon George MD at Work:  [phone].  [facility] EMERGENCY ROOM PHONE NUMBER: [phone]  IF A COMPLICATION OR EMERGENCY SITUATION ARISES AND YOU ARE UNABLE TO REACH   YOUR PHYSICIAN - GO DIRECTLY TO THE EMERGENCY ROOM.  MD Lon Myers MD  6/20/2023 9:20:32 AM  This report has been verified and signed electronically.  Dear patient,  As a result of recent federal legislation (The Federal Cures Act), you may   receive lab or pathology results from your procedure in your [facility]   account before your physician is able to contact you. Your physician or   their representative will relay the results to you with their   recommendations at their soonest availability.  Thank you,  PROVATION

## 2023-06-20 NOTE — ANESTHESIA PREPROCEDURE EVALUATION
2023  Batool Rivera is a 58 y.o., female.    Active Problem List with Overview Notes    Diagnosis Date Noted    Abnormal ankle brachial index (VIDAL) 2022    Numbness of left foot 2022    Left leg pain 2022    Prediabetes 2021    Blurred vision, bilateral 2021    Benavidez's neuroma of left foot 2020    Left foot pain 2020    History of smoking greater than 50 pack years 2019    Pulmonary nodule 2019     Smoking history 2 ppd x >25 years, quit >5 years ago. Screening lung CT done - no clearly abnormal findings, one micronodule, repeat in 12 months      Recurrent major depressive disorder, in partial remission 2019    Chronic bilateral low back pain with bilateral sciatica 10/02/2018    Mixed stress and urge urinary incontinence 2018     discussed options; recommended drinking less water at night. will trial oxybutynin; if still having problems will refer to Urology      Elevated hemoglobin A1c 10/19/2017    Anxiety 2017    BELLO on CPAP      Following with Sleep [facility]. Titration study done 2017.      Vitamin D deficiency 2016    Episodic paroxysmal hemicrania, not intractable 2016    Hypertension 2014    Severe obesity (BMI 35.0-35.9 with comorbidity) 2012    Hyperlipidemia 10/25/2012    Gastroesophageal reflux 2012     Past Surgical History:   Procedure Laterality Date     SECTION      HYSTERECTOMY   or 2010    OOPHORECTOMY      one ovary was removed    TUBAL LIGATION       Results for orders placed or performed during the hospital encounter of 16   EKG 12-lead (Reason:chest pain)    Collection Time: 16  6:42 AM    Narrative    Test Reason :   Blood Pressure :  mmHG  Vent. Rate : 059 BPM     Atrial Rate : 059 BPM     P-R Int : 172 ms          QRS Dur : 080  ms      QT Int : 434 ms       P-R-T Axes : 035 034 036 degrees     QTc Int : 429 ms    Sinus bradycardia  Otherwise normal ECG    Confirmed by Mauricio Mackenzie MD ([id number]) on 8/22/2016 1:55:38 PM    Referred By: SELF REFERRAL           Confirmed By:Mauricio Mackenzie MD     Transthoracic echo (TTE) complete  Order# [id number]  Reading physician: Abhi Barba MD Ordering physician: Lon Roblero MD PhD Study date: 7/22/22     Reason for Exam  Priority: Routine  Dx: Foot pain, left [M79.672 (ICD-10-CM)]; Pain of left lower extremity [M79.605 (ICD-10-CM)]; Peripheral vascular disease, unspecified [I73.9 (ICD-10-CM)]     Summary     The left ventricle is normal in size with normal systolic function.   The estimated ejection fraction is 65%.   Normal left ventricular diastolic function.   Normal right ventricular size with normal right ventricular systolic function.   Mild right atrial enlargement.   Mild mitral regurgitation.   Normal central venous pressure (3 mmHg).   The estimated PA systolic pressure is 24 mmHg.   Mild pulmonic regurgitation.       Pre-op Assessment    I have reviewed the Patient Summary Reports.    I have reviewed the NPO Status.   I have reviewed the Medications.     Review of Systems  Anesthesia Hx:  No problems with previous Anesthesia    Social:  Former Smoker    Hematology/Oncology:  Hematology Normal   Oncology Normal     EENT/Dental:EENT/Dental Normal   Cardiovascular:   Hypertension    Pulmonary:   Sleep Apnea    Renal/:  Renal/ Normal     Hepatic/GI:   GERD    Musculoskeletal:   Arthritis     Neurological:   Neuromuscular Disease, Headaches    Endocrine:  Endocrine Normal  Obesity / BMI > 30  Dermatological:  Skin Normal    Psych:   Psychiatric History anxiety depression          Physical Exam  General: Well nourished, Cooperative, Alert and Oriented    Airway:  Mallampati: II   Mouth Opening: Normal  TM Distance: Normal  Tongue: Normal  Neck ROM: Normal  ROM    Dental:  Intact    Chest/Lungs:  Normal Respiratory Rate        Anesthesia Plan  Type of Anesthesia, risks & benefits discussed:    Anesthesia Type: Gen Natural Airway  Intra-op Monitoring Plan: Standard ASA Monitors  Post Op Pain Control Plan: multimodal analgesia  Induction:  IV  Informed Consent: Informed consent signed with the Patient and all parties understand the risks and agree with anesthesia plan.  All questions answered.   ASA Score: 2  Day of Surgery Review of History & Physical: H&P Update referred to the surgeon/provider.    Ready For Surgery From Anesthesia Perspective.     .

## 2023-06-22 LAB
COMMENT: NORMAL
FINAL PATHOLOGIC DIAGNOSIS: NORMAL
GROSS: NORMAL
Lab: NORMAL

## 2023-07-14 ENCOUNTER — TELEPHONE (OUTPATIENT)
Dept: INTERNAL MEDICINE | Facility: CLINIC | Age: 58
End: 2023-07-14
Payer: MEDICAID

## 2023-07-28 ENCOUNTER — LAB VISIT (OUTPATIENT)
Dept: LAB | Facility: HOSPITAL | Age: 58
End: 2023-07-28
Payer: MEDICAID

## 2023-07-28 ENCOUNTER — OFFICE VISIT (OUTPATIENT)
Dept: INTERNAL MEDICINE | Facility: CLINIC | Age: 58
End: 2023-07-28
Payer: MEDICAID

## 2023-07-28 VITALS — DIASTOLIC BLOOD PRESSURE: 80 MMHG | SYSTOLIC BLOOD PRESSURE: 154 MMHG

## 2023-07-28 DIAGNOSIS — R51.9 UNILATERAL HEADACHE: Primary | ICD-10-CM

## 2023-07-28 DIAGNOSIS — F39 MOOD DISORDER: ICD-10-CM

## 2023-07-28 DIAGNOSIS — L72.3 SEBACEOUS CYST: ICD-10-CM

## 2023-07-28 DIAGNOSIS — I10 ESSENTIAL HYPERTENSION: ICD-10-CM

## 2023-07-28 LAB
ANION GAP SERPL CALC-SCNC: 7 MMOL/L (ref 8–16)
BUN SERPL-MCNC: 16 MG/DL (ref 6–20)
CALCIUM SERPL-MCNC: 9.5 MG/DL (ref 8.7–10.5)
CHLORIDE SERPL-SCNC: 106 MMOL/L (ref 95–110)
CO2 SERPL-SCNC: 26 MMOL/L (ref 23–29)
CREAT SERPL-MCNC: 0.8 MG/DL (ref 0.5–1.4)
EST. GFR  (NO RACE VARIABLE): >60 ML/MIN/1.73 M^2
GLUCOSE SERPL-MCNC: 93 MG/DL (ref 70–110)
POTASSIUM SERPL-SCNC: 4.1 MMOL/L (ref 3.5–5.1)
SODIUM SERPL-SCNC: 139 MMOL/L (ref 136–145)

## 2023-07-28 PROCEDURE — 36415 COLL VENOUS BLD VENIPUNCTURE: CPT | Performed by: STUDENT IN AN ORGANIZED HEALTH CARE EDUCATION/TRAINING PROGRAM

## 2023-07-28 PROCEDURE — 4010F ACE/ARB THERAPY RXD/TAKEN: CPT | Mod: CPTII,,,

## 2023-07-28 PROCEDURE — 99213 OFFICE O/P EST LOW 20 MIN: CPT | Mod: S$PBB,,,

## 2023-07-28 PROCEDURE — 3044F PR MOST RECENT HEMOGLOBIN A1C LEVEL <7.0%: ICD-10-PCS | Mod: CPTII,,,

## 2023-07-28 PROCEDURE — 80048 BASIC METABOLIC PNL TOTAL CA: CPT | Performed by: STUDENT IN AN ORGANIZED HEALTH CARE EDUCATION/TRAINING PROGRAM

## 2023-07-28 PROCEDURE — 3079F PR MOST RECENT DIASTOLIC BLOOD PRESSURE 80-89 MM HG: ICD-10-PCS | Mod: CPTII,,,

## 2023-07-28 PROCEDURE — 99213 OFFICE O/P EST LOW 20 MIN: CPT | Mod: PBBFAC

## 2023-07-28 PROCEDURE — 99213 PR OFFICE/OUTPT VISIT, EST, LEVL III, 20-29 MIN: ICD-10-PCS | Mod: S$PBB,,,

## 2023-07-28 PROCEDURE — 99999 PR PBB SHADOW E&M-EST. PATIENT-LVL III: ICD-10-PCS | Mod: PBBFAC,,,

## 2023-07-28 PROCEDURE — 3079F DIAST BP 80-89 MM HG: CPT | Mod: CPTII,,,

## 2023-07-28 PROCEDURE — 1159F PR MEDICATION LIST DOCUMENTED IN MEDICAL RECORD: ICD-10-PCS | Mod: CPTII,,,

## 2023-07-28 PROCEDURE — 3044F HG A1C LEVEL LT 7.0%: CPT | Mod: CPTII,,,

## 2023-07-28 PROCEDURE — 1159F MED LIST DOCD IN RCRD: CPT | Mod: CPTII,,,

## 2023-07-28 PROCEDURE — 3077F PR MOST RECENT SYSTOLIC BLOOD PRESSURE >= 140 MM HG: ICD-10-PCS | Mod: CPTII,,,

## 2023-07-28 PROCEDURE — 99999 PR PBB SHADOW E&M-EST. PATIENT-LVL III: CPT | Mod: PBBFAC,,,

## 2023-07-28 PROCEDURE — 1160F PR REVIEW ALL MEDS BY PRESCRIBER/CLIN PHARMACIST DOCUMENTED: ICD-10-PCS | Mod: CPTII,,,

## 2023-07-28 PROCEDURE — 3077F SYST BP >= 140 MM HG: CPT | Mod: CPTII,,,

## 2023-07-28 PROCEDURE — 1160F RVW MEDS BY RX/DR IN RCRD: CPT | Mod: CPTII,,,

## 2023-07-28 PROCEDURE — 4010F PR ACE/ARB THEARPY RXD/TAKEN: ICD-10-PCS | Mod: CPTII,,,

## 2023-07-28 RX ORDER — LISINOPRIL AND HYDROCHLOROTHIAZIDE 12.5; 2 MG/1; MG/1
1 TABLET ORAL DAILY
Qty: 30 TABLET | Refills: 11 | Status: SHIPPED | OUTPATIENT
Start: 2023-07-28 | End: 2023-09-08

## 2023-07-28 RX ORDER — ESCITALOPRAM OXALATE 20 MG/1
20 TABLET ORAL DAILY
Qty: 30 TABLET | Refills: 11 | Status: SHIPPED | OUTPATIENT
Start: 2023-07-28 | End: 2024-07-27

## 2023-07-28 NOTE — PATIENT INSTRUCTIONS
MsAriadna Batool Rivera     Thank you for coming to the clinic today. Lab work for this visit includes: BMP. [facility] 1 [facility]. Please continue taking your home medications as indicated.     The following new medications will be started after this visit:  Excedrin Migraine from local pharmacy  Escitalopram for anxiety/mood 20mg daily  Lisinopril-Hydrochlorothiazide 20-12.5mg daily for high blood pressure    Lastly, please notify a health care provider or present to the ED if you experience any of the following: temperature >100.4, persistent nausea/vomiting or diarrhea, severe chest pain/tightness, difficulty breathing or unrelenting cough, severe persistent headache, worsening rash, persistent dizziness/light-headedness or visual disturbances, increased confusion or weakness/debility.    Please message me if you have any outstanding questions and thanks for choosing [facility] as your health care provider.    Sincerely,    Avery Rosales MD  Internal Medicine PGY-3  [facility]

## 2023-07-28 NOTE — ASSESSMENT & PLAN NOTE
HA likely migraine, associated with anxiety and uncontrolled HTN. Started new HTN regimen (see HTN). Restarted Lexapro. Recommended Excedrin Migraine for control of HA symptoms.  --RTC 1 months

## 2023-07-28 NOTE — ASSESSMENT & PLAN NOTE
Baseline SBP in 150mmHg. Poorly controlled on lisinopril and amlodipine. Ordered Lisinopril-HCTZ 20-12.5mg daily  --Start new antihypertensive regimen   --Cardiac diet, sodium <2g daily  --Weekly aerobic exercise goal of 150min  --Continue statin therapy  --Daily BP check/log and follow up with PCP in one month  --F/U with Digital HTN Program ([facility]). Phone number provided.

## 2023-07-28 NOTE — ASSESSMENT & PLAN NOTE
--Regular hot showers and warm compress  --Recommended application of pressure to express gland  --RTC 1 month

## 2023-07-28 NOTE — PROGRESS NOTES
Clinic Note          Subjective     Chief Complaint:   Chief Complaint   Patient presents with    Headache      History of Present Illness:  Ms. Batool Rivera is a 58 y.o. female with HTN, HLD, Glaucoma, Chronic Back Pain, and Depression who presents to the clinic for headache and bump on her back.     Ms. Batool Rivera  is a pleasant lady seen by Dr Trammell (PCP) on 7/08/22 for HTN follow up. She was continued on her lisinopril and amlodipine. Started in the Digital [facility]N Program but having difficulty with uploading pressures. She reported that she has all the components but is having difficulty with the setup.      HA: worsens with anxiety/agitation and when laying on left side. Associated with congestion, not improved with antihistamine or tylenol use. No aura. Last 2-3 hrs. Occurs every other day.     ANXIETY/MOOD: Anxiety reported by pt on regular basis. Provoked by confrontational interactions with other individuals. Pt previously on Lexapro for mood disorder but discontinued in 2022. Pt reported that her HA are often associated with uncontrolled anxiety.     LOWER BACK MASS: cystic lesion on right lower back. 1cm diameter. No associated redness, tenderness or trauma. Pt noticed the area in  the past month and it has not decreased in size, just bothersome.    HTN SCREEN: /73mmHg (baseline SBP reported at 150mmHg). Current medications include amlodipine (Norvase) and lisinopril (Prinivil). Current symptoms include: headache and neck aches. Cardiovascular risk factors: dyslipidemia and hypertension. Part of the Digital HTN Program but not fully set up.    HLD SCREEN: Last Lipids on 7/22/22 with elevated TC (212). Current medications include atorvastatin (Lipitor).    DM SCREEN: Last HbA1C on wnl (5.6). No current medications .     Review of Systems   Constitutional:  Negative for chills and fever.   HENT:  Negative for congestion, hearing loss, sinus pain and sore throat.    Eyes:  Negative for blurred  vision, photophobia and redness.   Respiratory:  Negative for cough, shortness of breath and wheezing.    Cardiovascular:  Negative for chest pain, palpitations and leg swelling.   Gastrointestinal:  Negative for blood in stool, constipation, diarrhea, nausea and vomiting.   Genitourinary:  Negative for dysuria and flank pain.   Musculoskeletal:  Negative for falls, joint pain and myalgias.   Skin:  Negative for itching and rash.   Neurological:  Negative for dizziness, tingling, weakness and headaches.   Psychiatric/Behavioral:  Negative for depression. The patient is not nervous/anxious.       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     Heart disease, unspecified     Hyperlipidemia     Hypertension        Past Surgical History:   Procedure Laterality Date     SECTION      COLONOSCOPY N/A 2023    Procedure: COLONOSCOPY;  Surgeon: Lon George MD;  Location: [address]);  Service: Colon and Rectal;  Laterality: N/A;  referred by pcp arash Cordova instructions sent to pt [email or web address]  pre call, pt confirmed - mf    HYSTERECTOMY   or     OOPHORECTOMY      one ovary was removed    TUBAL LIGATION         MEDICATIONS & ALLERGIES:     Current Outpatient Medications on File Prior to Visit   Medication Sig    amLODIPine (NORVASC) 10 MG tablet TAKE 1 TABLET(10 MG) BY MOUTH EVERY EVENING FOR BLOOD PRESSURE    aspirin (ECOTRIN) 81 MG EC tablet Take 1 tablet (81 mg total) by mouth once daily.    atorvastatin (LIPITOR) 80 MG tablet TAKE 1 TABLET BY MOUTH ONCE DAILY FOR CHOLESTEROL AND TO PROTECT YOUR HEART    ergocalciferol (ERGOCALCIFEROL) 50,000 unit Cap TAKE ONE CAPSULE BY MOUTH EVERY 7 DAYS FOR VITAMIN D DEFICIENCY    ezetimibe (ZETIA) 10 mg tablet Take 1 tablet (10 mg total) by mouth every evening.    fluticasone propionate (FLONASE) 50 mcg/actuation  nasal spray 1 spray (50 mcg total) by Each Nostril route once daily.    gabapentin (NEURONTIN) 400 MG capsule Take 1 capsule (400 mg total) by mouth every evening. If no relief, may increase dose to twice per day.    traZODone (DESYREL) 150 MG tablet TAKE 1 TABLET(150 MG) BY MOUTH EVERY EVENING AT BEDTIME AS NEEDED FOR INSOMNIA    [DISCONTINUED] EScitalopram oxalate (LEXAPRO) 20 MG tablet Take 1 tablet (20 mg total) by mouth once daily.    [DISCONTINUED] lisinopriL (PRINIVIL,ZESTRIL) 20 MG tablet Take 1 tablet (20 mg total) by mouth once daily.     No current facility-administered medications on file prior to visit.       Review of patient's allergies indicates:   Allergen Reactions    Coconut Itching    Losartan Hallucinations    Voltaren [diclofenac sodium] Rash     Rash with voltaren GEL, not to NSAIDs in general       OBJECTIVE:     Vitals:    07/28/23 1024   BP: (Abnormal) 154/80   BP Location: Left arm   Patient Position: Sitting       There is no height or weight on file to calculate BMI.     Physical Exam  Vitals reviewed.   Constitutional:       General: She is not in acute distress.     Appearance: Normal appearance. She is not ill-appearing.   HENT:      Head: Normocephalic and atraumatic.      Right Ear: External ear normal.      Left Ear: External ear normal.      Nose: Nose normal. No congestion.      Mouth/Throat:      Mouth: Mucous membranes are moist.      Pharynx: Oropharynx is clear.   Eyes:      General: No scleral icterus.        Right eye: No discharge.         Left eye: No discharge.      Extraocular Movements: Extraocular movements intact.      Conjunctiva/sclera: Conjunctivae normal.   Cardiovascular:      Rate and Rhythm: Normal rate and regular rhythm.      Pulses: Normal pulses.      Heart sounds: Normal heart sounds.   Pulmonary:      Effort: Pulmonary effort is normal. No respiratory distress.      Breath sounds: Normal breath sounds. No wheezing or rales.   Abdominal:      General:  Abdomen is flat. Bowel sounds are normal. There is no distension.      Palpations: Abdomen is soft.      Tenderness: There is no abdominal tenderness. There is no guarding.   Musculoskeletal:         General: No swelling or tenderness. Normal range of motion.      Cervical back: Neck supple. No rigidity or tenderness.   Skin:     General: Skin is warm.      Capillary Refill: Capillary refill takes less than 2 seconds.      Coloration: Skin is not jaundiced or pale.      Findings: No bruising.      Comments: Papular mass (1cm diameter) on right lateral lower back. Black head observed, likely sebaceous cyst   Neurological:      General: No focal deficit present.      Mental Status: She is alert and oriented to person, place, and time.      Cranial Nerves: No cranial nerve deficit.   Psychiatric:         Mood and Affect: Mood normal.         Behavior: Behavior normal.        ASSESSMENT & PLAN:   Ms. Batool Rivera is a 58 y.o. female who was seen today in clinic for worsening HA and mass on lower right back. HA likely migraine, associated with uncontrolled HTN and untreated anxiety.     Sebaceous cyst  --Regular hot showers and warm compress  --Recommended application of pressure to express gland  --RTC 1 month    Essential hypertension  Baseline SBP in 150mmHg. Poorly controlled on lisinopril and amlodipine. Ordered Lisinopril-HCTZ 20-12.5mg daily  --Start new antihypertensive regimen   --Cardiac diet, sodium <2g daily  --Weekly aerobic exercise goal of 150min  --Continue statin therapy  --Daily BP check/log and follow up with PCP in one month  --F/U with Digital HTN Program ([facility]). Phone number provided.    Unilateral headache  HA likely migraine, associated with anxiety and uncontrolled HTN. Started new HTN regimen (see HTN). Restarted Lexapro. Recommended Excedrin Migraine for control of HA symptoms.  --RTC 1 months    Mood disorder  --Restarted on Lexapro 20mg daily  --RTC in one month     No lab work for  this visit     F/U with the clinic scheduled for 1 month.    Discussed with Dr Rhoades - staff attestation to follow        Avery Rosales MD  Internal Medicine, PGY-3  [facility]  [address] [place] [id number] [phone]

## 2023-08-30 ENCOUNTER — TELEPHONE (OUTPATIENT)
Dept: CARDIOLOGY | Facility: CLINIC | Age: 58
End: 2023-08-30
Payer: MEDICAID

## 2023-08-30 NOTE — TELEPHONE ENCOUNTER
Patient stating she is still having trouble with foot pain. Reviewed records. Dr. Roblero evaluated her for similar in 2022. Recommended to follow up with him and Neurology. Placed patient on Wait list for Dr. Roblero and provided patient with Neurology phone number of scheduling.    Note 7/1/22: Left Foot Numbness- Likely neuropathic in origin.  Refer to Neurology for evaluation.

## 2023-09-08 ENCOUNTER — OFFICE VISIT (OUTPATIENT)
Dept: INTERNAL MEDICINE | Facility: CLINIC | Age: 58
End: 2023-09-08
Payer: MEDICAID

## 2023-09-08 VITALS
DIASTOLIC BLOOD PRESSURE: 90 MMHG | WEIGHT: 216.5 LBS | SYSTOLIC BLOOD PRESSURE: 152 MMHG | OXYGEN SATURATION: 97 % | BODY MASS INDEX: 36.07 KG/M2 | HEART RATE: 59 BPM | HEIGHT: 65 IN

## 2023-09-08 DIAGNOSIS — L72.3 SEBACEOUS CYST: ICD-10-CM

## 2023-09-08 DIAGNOSIS — I10 PRIMARY HYPERTENSION: Primary | ICD-10-CM

## 2023-09-08 PROCEDURE — 99214 OFFICE O/P EST MOD 30 MIN: CPT | Mod: PBBFAC

## 2023-09-08 PROCEDURE — 1159F MED LIST DOCD IN RCRD: CPT | Mod: CPTII,,,

## 2023-09-08 PROCEDURE — 99999 PR PBB SHADOW E&M-EST. PATIENT-LVL IV: ICD-10-PCS | Mod: PBBFAC,,,

## 2023-09-08 PROCEDURE — 99213 PR OFFICE/OUTPT VISIT, EST, LEVL III, 20-29 MIN: ICD-10-PCS | Mod: S$PBB,,,

## 2023-09-08 PROCEDURE — 4010F PR ACE/ARB THEARPY RXD/TAKEN: ICD-10-PCS | Mod: CPTII,,,

## 2023-09-08 PROCEDURE — 3008F PR BODY MASS INDEX (BMI) DOCUMENTED: ICD-10-PCS | Mod: CPTII,,,

## 2023-09-08 PROCEDURE — 1160F RVW MEDS BY RX/DR IN RCRD: CPT | Mod: CPTII,,,

## 2023-09-08 PROCEDURE — 3077F SYST BP >= 140 MM HG: CPT | Mod: CPTII,,,

## 2023-09-08 PROCEDURE — 99213 OFFICE O/P EST LOW 20 MIN: CPT | Mod: S$PBB,,,

## 2023-09-08 PROCEDURE — 3008F BODY MASS INDEX DOCD: CPT | Mod: CPTII,,,

## 2023-09-08 PROCEDURE — 1160F PR REVIEW ALL MEDS BY PRESCRIBER/CLIN PHARMACIST DOCUMENTED: ICD-10-PCS | Mod: CPTII,,,

## 2023-09-08 PROCEDURE — 3044F PR MOST RECENT HEMOGLOBIN A1C LEVEL <7.0%: ICD-10-PCS | Mod: CPTII,,,

## 2023-09-08 PROCEDURE — 3080F DIAST BP >= 90 MM HG: CPT | Mod: CPTII,,,

## 2023-09-08 PROCEDURE — 99999 PR PBB SHADOW E&M-EST. PATIENT-LVL IV: CPT | Mod: PBBFAC,,,

## 2023-09-08 PROCEDURE — 4010F ACE/ARB THERAPY RXD/TAKEN: CPT | Mod: CPTII,,,

## 2023-09-08 PROCEDURE — 3080F PR MOST RECENT DIASTOLIC BLOOD PRESSURE >= 90 MM HG: ICD-10-PCS | Mod: CPTII,,,

## 2023-09-08 PROCEDURE — 1159F PR MEDICATION LIST DOCUMENTED IN MEDICAL RECORD: ICD-10-PCS | Mod: CPTII,,,

## 2023-09-08 PROCEDURE — 3077F PR MOST RECENT SYSTOLIC BLOOD PRESSURE >= 140 MM HG: ICD-10-PCS | Mod: CPTII,,,

## 2023-09-08 PROCEDURE — 3044F HG A1C LEVEL LT 7.0%: CPT | Mod: CPTII,,,

## 2023-09-08 RX ORDER — LISINOPRIL AND HYDROCHLOROTHIAZIDE 12.5; 2 MG/1; MG/1
2 TABLET ORAL DAILY
Qty: 180 TABLET | Refills: 3 | Status: SHIPPED | OUTPATIENT
Start: 2023-09-08 | End: 2024-09-07

## 2023-09-08 NOTE — PROGRESS NOTES
"  Subjective     Chief Complaint: "blood pressure check and boil on back"    History of Present Illness:  Ms. Batool Rivera is a 58 y.o. female with hx of HTN, HLD, glaucoma, chronic back pain, depression, migraines who presents for a follow-up visit and BP check. Patient was last seen in clinic 7/28 with Dr. Rosales. At that time, patient was started on lisinopril-HCTZ 20-12.5mg qd in addition to her home amlodipine 10mg qd for BP control. Discussion was held regarding exercise and diet as well.     In addition, patient was also started back on Lexapro for anxiety and advised to use Excedrin migraine prn for her headaches. With regards to her sebaceous cyst on her back, she was advised to use warm compresses to help express any purulent drainage.    Since that time, patient reports her BP has stayed elevated at home in the 140s systolic. Her BP cuff broke so she brought it in today to get it exchanged at the pharmacy. She has been taking her BP meds as prescribed. She has tried to improve her diet though she notes she is still eating some foods with likely high salt content.    Her headaches have completely resolved since her last visit. She reports continued pain and intermittent drainage from her cyst.     Review of Systems   Constitutional:  Negative for chills and fever.   HENT:  Negative for congestion and sore throat.    Respiratory:  Negative for cough and shortness of breath.    Cardiovascular:  Negative for chest pain and leg swelling.   Gastrointestinal:  Negative for abdominal pain, constipation, diarrhea, nausea and vomiting.   Musculoskeletal:  Negative for joint pain and myalgias.        Painful cyst on R mid back   Neurological:  Negative for dizziness, loss of consciousness and headaches.   Psychiatric/Behavioral:  Negative for depression.        PAST HISTORY:     Past Medical History:   Diagnosis Date    Anemia     Cataract     Chronic back pain     Degenerative disc disease     Depression     " 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     Heart disease, unspecified     Hyperlipidemia     Hypertension        Past Surgical History:   Procedure Laterality Date     SECTION      COLONOSCOPY N/A 2023    Procedure: COLONOSCOPY;  Surgeon: Lon George MD;  Location: [facility] ([address] [place] [address]);  Service: Colon and Rectal;  Laterality: N/A;  referred by pcp arash Cordova instructions sent to pt portal.cf  pre call, pt confirmed - mf    HYSTERECTOMY   or     OOPHORECTOMY      one ovary was removed    TUBAL LIGATION         Family History   Problem Relation Age of Onset    Hypertension Mother     Heart disease Mother     Asthma Mother     Cataracts Mother     Glaucoma Mother     Brain cancer Father     Stroke Brother     Kidney disease Brother     Glaucoma Sister     Diabetes Sister     Heart disease Brother         MI x 5    Depression Brother     Blindness Neg Hx     Macular degeneration Neg Hx     Retinal detachment Neg Hx     Breast cancer Neg Hx     Colon cancer Neg Hx     Ovarian cancer Neg Hx        Social History     Socioeconomic History    Marital status: Single   Tobacco Use    Smoking status: Former     Current packs/day: 0.00     Average packs/day: 2.0 packs/day for 28.0 years (56.0 ttl pk-yrs)     Types: Cigarettes     Start date: 1983     Quit date: 2011     Years since quittin.0    Smokeless tobacco: Never   Substance and Sexual Activity    Alcohol use: Yes     Comment: beer weekends    Drug use: No    Sexual activity: Yes     Partners: Male     Birth control/protection: See Surgical Hx     Comment: BTL   Social History Narrative    Living with her felicitas, who is 7 y/o.        MEDICATIONS & ALLERGIES:     Current Outpatient Medications on File Prior to Visit   Medication Sig    amLODIPine (NORVASC) 10 MG tablet TAKE 1 TABLET(10 MG) BY MOUTH EVERY EVENING FOR BLOOD PRESSURE    aspirin (ECOTRIN) 81 MG EC tablet Take 1 tablet (81 mg total) by mouth once daily.     "atorvastatin (LIPITOR) 80 MG tablet TAKE 1 TABLET BY MOUTH ONCE DAILY FOR CHOLESTEROL AND TO PROTECT YOUR HEART    ergocalciferol (ERGOCALCIFEROL) 50,000 unit Cap TAKE ONE CAPSULE BY MOUTH EVERY 7 DAYS FOR VITAMIN D DEFICIENCY    EScitalopram oxalate (LEXAPRO) 20 MG tablet Take 1 tablet (20 mg total) by mouth once daily.    ezetimibe (ZETIA) 10 mg tablet Take 1 tablet (10 mg total) by mouth every evening.    fluticasone propionate (FLONASE) 50 mcg/actuation nasal spray 1 spray (50 mcg total) by Each Nostril route once daily.    gabapentin (NEURONTIN) 400 MG capsule Take 1 capsule (400 mg total) by mouth every evening. If no relief, may increase dose to twice per day.    traZODone (DESYREL) 150 MG tablet TAKE 1 TABLET(150 MG) BY MOUTH EVERY EVENING AT BEDTIME AS NEEDED FOR INSOMNIA    [DISCONTINUED] lisinopriL (PRINIVIL,ZESTRIL) 20 MG tablet Take 1 tablet (20 mg total) by mouth once daily.    [DISCONTINUED] lisinopriL-hydrochlorothiazide (PRINZIDE,ZESTORETIC) 20-12.5 mg per tablet Take 1 tablet by mouth once daily.     No current facility-administered medications on file prior to visit.       Review of patient's allergies indicates:   Allergen Reactions    Coconut Itching    Losartan Hallucinations    Voltaren [diclofenac sodium] Rash     Rash with voltaren GEL, not to NSAIDs in general       OBJECTIVE:     Vital Signs:  Vitals:    09/08/23 0845   BP: (!) 152/90   BP Location: Right arm   Patient Position: Sitting   BP Method: Large (Manual)   Pulse: (!) 59   SpO2: 97%   Weight: 98.2 kg (216 lb 7.9 oz)   Height: 5' 5" (1.651 m)       Body mass index is 36.03 kg/m².     Physical Exam  Vitals and nursing note reviewed.   Constitutional:       General: She is not in acute distress.     Appearance: Normal appearance. She is obese. She is not ill-appearing, toxic-appearing or diaphoretic.   HENT:      Head: Normocephalic and atraumatic.      Nose: Nose normal. No congestion.      Mouth/Throat:      Mouth: Mucous membranes " are moist.      Pharynx: Oropharynx is clear.   Eyes:      Extraocular Movements: Extraocular movements intact.      Conjunctiva/sclera: Conjunctivae normal.      Pupils: Pupils are equal, round, and reactive to light.   Cardiovascular:      Rate and Rhythm: Normal rate and regular rhythm.      Pulses: Normal pulses.      Heart sounds: Normal heart sounds. No murmur heard.     No friction rub. No gallop.   Pulmonary:      Effort: Pulmonary effort is normal. No respiratory distress.      Breath sounds: Normal breath sounds.   Abdominal:      General: Abdomen is flat. Bowel sounds are normal. There is no distension.      Palpations: Abdomen is soft.      Tenderness: There is no abdominal tenderness.   Musculoskeletal:         General: No swelling. Normal range of motion.      Cervical back: Normal range of motion and neck supple.   Skin:     General: Skin is warm and dry.          Neurological:      General: No focal deficit present.      Mental Status: She is alert and oriented to person, place, and time.   Psychiatric:         Mood and Affect: Mood normal.         Behavior: Behavior normal.         Laboratory  No results found for this or any previous visit (from the past 24 hour(s)).    Diagnostic Results:      Health Maintenance Due   Topic Date Due    Pneumococcal Vaccines (Age 0-64) (1 - PCV) Never done    COVID-19 Vaccine (4 - Pfizer series) 03/05/2022    LDCT Lung Screen  11/05/2022    Influenza Vaccine (1) 09/01/2023         ASSESSMENT & PLAN:     Ms. Batool Rivera is a 58 y.o. female who presents for a follow-up visit for BP check.     1. Primary hypertension  - BP still elevated in clinic today. Will double patient's Lisinopril-HCTZ dose today. Encouraged continued improvement in diet and avoiding excess salt. Will see back in clinic in another month for repeat BP check.   -     lisinopriL-hydrochlorothiazide (PRINZIDE,ZESTORETIC) 20-12.5 mg per tablet; Take 2 tablets by mouth once daily.  Dispense:  180 tablet; Refill: 3    2. Sebaceous cyst  - Expressed some purulent drainage with mild relief in patient's sxs. Patient likely needs complete excision of cyst for definitive treatment.   -     Ambulatory referral/consult to General Surgery; Future; Expected date: 09/15/2023        RTC in 1 month    Discussed with Dr. Rhoades  - staff attestation to follow      Umesh Walton MD  Internal Medicine PGY-3  [facility]  [address] [place] [id number]  [phone]

## 2023-09-08 NOTE — PATIENT INSTRUCTIONS
It was great seeing you in clinic this morning.    I have sent a referral to general surgery to have your cyst/boil removed.    Please start taking two (2) tablets of your lisinopril-HCTZ combo pill each morning along with your amlodipine pill.    I will see you back in clinic in 1 month or sooner if needed.    Please message with any questions or concerns.

## 2023-09-27 ENCOUNTER — OFFICE VISIT (OUTPATIENT)
Dept: SURGERY | Facility: CLINIC | Age: 58
End: 2023-09-27
Payer: MEDICAID

## 2023-09-27 ENCOUNTER — TELEPHONE (OUTPATIENT)
Dept: SURGERY | Facility: CLINIC | Age: 58
End: 2023-09-27
Payer: MEDICAID

## 2023-09-27 VITALS
HEART RATE: 66 BPM | DIASTOLIC BLOOD PRESSURE: 81 MMHG | WEIGHT: 214.19 LBS | OXYGEN SATURATION: 99 % | HEIGHT: 65 IN | SYSTOLIC BLOOD PRESSURE: 176 MMHG | BODY MASS INDEX: 35.68 KG/M2

## 2023-09-27 DIAGNOSIS — L72.3 SEBACEOUS CYST: ICD-10-CM

## 2023-09-27 PROCEDURE — 3079F PR MOST RECENT DIASTOLIC BLOOD PRESSURE 80-89 MM HG: ICD-10-PCS | Mod: CPTII,,, | Performed by: STUDENT IN AN ORGANIZED HEALTH CARE EDUCATION/TRAINING PROGRAM

## 2023-09-27 PROCEDURE — 99203 PR OFFICE/OUTPT VISIT, NEW, LEVL III, 30-44 MIN: ICD-10-PCS | Mod: S$PBB,,, | Performed by: STUDENT IN AN ORGANIZED HEALTH CARE EDUCATION/TRAINING PROGRAM

## 2023-09-27 PROCEDURE — 3044F PR MOST RECENT HEMOGLOBIN A1C LEVEL <7.0%: ICD-10-PCS | Mod: CPTII,,, | Performed by: STUDENT IN AN ORGANIZED HEALTH CARE EDUCATION/TRAINING PROGRAM

## 2023-09-27 PROCEDURE — 99214 OFFICE O/P EST MOD 30 MIN: CPT | Mod: PBBFAC | Performed by: STUDENT IN AN ORGANIZED HEALTH CARE EDUCATION/TRAINING PROGRAM

## 2023-09-27 PROCEDURE — 3077F PR MOST RECENT SYSTOLIC BLOOD PRESSURE >= 140 MM HG: ICD-10-PCS | Mod: CPTII,,, | Performed by: STUDENT IN AN ORGANIZED HEALTH CARE EDUCATION/TRAINING PROGRAM

## 2023-09-27 PROCEDURE — 4010F ACE/ARB THERAPY RXD/TAKEN: CPT | Mod: CPTII,,, | Performed by: STUDENT IN AN ORGANIZED HEALTH CARE EDUCATION/TRAINING PROGRAM

## 2023-09-27 PROCEDURE — 99203 OFFICE O/P NEW LOW 30 MIN: CPT | Mod: S$PBB,,, | Performed by: STUDENT IN AN ORGANIZED HEALTH CARE EDUCATION/TRAINING PROGRAM

## 2023-09-27 PROCEDURE — 1159F PR MEDICATION LIST DOCUMENTED IN MEDICAL RECORD: ICD-10-PCS | Mod: CPTII,,, | Performed by: STUDENT IN AN ORGANIZED HEALTH CARE EDUCATION/TRAINING PROGRAM

## 2023-09-27 PROCEDURE — 99999 PR PBB SHADOW E&M-EST. PATIENT-LVL IV: CPT | Mod: PBBFAC,,, | Performed by: STUDENT IN AN ORGANIZED HEALTH CARE EDUCATION/TRAINING PROGRAM

## 2023-09-27 PROCEDURE — 3044F HG A1C LEVEL LT 7.0%: CPT | Mod: CPTII,,, | Performed by: STUDENT IN AN ORGANIZED HEALTH CARE EDUCATION/TRAINING PROGRAM

## 2023-09-27 PROCEDURE — 4010F PR ACE/ARB THEARPY RXD/TAKEN: ICD-10-PCS | Mod: CPTII,,, | Performed by: STUDENT IN AN ORGANIZED HEALTH CARE EDUCATION/TRAINING PROGRAM

## 2023-09-27 PROCEDURE — 1159F MED LIST DOCD IN RCRD: CPT | Mod: CPTII,,, | Performed by: STUDENT IN AN ORGANIZED HEALTH CARE EDUCATION/TRAINING PROGRAM

## 2023-09-27 PROCEDURE — 1160F RVW MEDS BY RX/DR IN RCRD: CPT | Mod: CPTII,,, | Performed by: STUDENT IN AN ORGANIZED HEALTH CARE EDUCATION/TRAINING PROGRAM

## 2023-09-27 PROCEDURE — 3077F SYST BP >= 140 MM HG: CPT | Mod: CPTII,,, | Performed by: STUDENT IN AN ORGANIZED HEALTH CARE EDUCATION/TRAINING PROGRAM

## 2023-09-27 PROCEDURE — 3008F PR BODY MASS INDEX (BMI) DOCUMENTED: ICD-10-PCS | Mod: CPTII,,, | Performed by: STUDENT IN AN ORGANIZED HEALTH CARE EDUCATION/TRAINING PROGRAM

## 2023-09-27 PROCEDURE — 99999 PR PBB SHADOW E&M-EST. PATIENT-LVL IV: ICD-10-PCS | Mod: PBBFAC,,, | Performed by: STUDENT IN AN ORGANIZED HEALTH CARE EDUCATION/TRAINING PROGRAM

## 2023-09-27 PROCEDURE — 1160F PR REVIEW ALL MEDS BY PRESCRIBER/CLIN PHARMACIST DOCUMENTED: ICD-10-PCS | Mod: CPTII,,, | Performed by: STUDENT IN AN ORGANIZED HEALTH CARE EDUCATION/TRAINING PROGRAM

## 2023-09-27 PROCEDURE — 3008F BODY MASS INDEX DOCD: CPT | Mod: CPTII,,, | Performed by: STUDENT IN AN ORGANIZED HEALTH CARE EDUCATION/TRAINING PROGRAM

## 2023-09-27 PROCEDURE — 3079F DIAST BP 80-89 MM HG: CPT | Mod: CPTII,,, | Performed by: STUDENT IN AN ORGANIZED HEALTH CARE EDUCATION/TRAINING PROGRAM

## 2023-09-27 RX ORDER — SULFAMETHOXAZOLE AND TRIMETHOPRIM 800; 160 MG/1; MG/1
1 TABLET ORAL 2 TIMES DAILY
Qty: 10 TABLET | Refills: 0 | Status: SHIPPED | OUTPATIENT
Start: 2023-09-27 | End: 2023-10-02

## 2023-09-27 NOTE — TELEPHONE ENCOUNTER
----- Message from lGoria Phillips sent at 9/27/2023 12:39 PM CDT -----  Contact: [phone]  1MEDICALADVICE     Patient is calling for Medical Advice regarding: pt states she didn't receive anything after her appt and was supposed to receive paperwork and a prescription         Pharmacy name and phone#:  [id number] - [place], [place] - [address] [facility] [address]  [address] [facility] [address]  Phone: [phone] Fax: [phone]    Would like response via [employer]:  call back     Comments:

## 2023-09-27 NOTE — PROGRESS NOTES
[facility]  General Surgery  History & Physical  Date: 09/27/2023  Referring Provider: Umesh Walton    SUBJECTIVE:     Chief complaint:   Chief Complaint   Patient presents with    Consult       History of Present Illness:  Patient is a 58 y.o. female with PMHx HTN presents with right flank mass. She reports she has had this for years and was not bothered by it, but 2 weeks ago began draining and became exquisitely tender after she was examined by her PCP and an attempt was made to express material from the mass. Since then, the area has become tender to palpation, erythematous and edematous. Patient denies fever, chills, N/V, chest pain, SOB.     She is not on any anticoagulation or antiplatelet therapy.    Review of patient's allergies indicates:   Allergen Reactions    Coconut Itching    Losartan Hallucinations    Voltaren [diclofenac sodium] Rash     Rash with voltaren GEL, not to NSAIDs in general       Current Outpatient Medications   Medication Sig Dispense Refill    amLODIPine (NORVASC) 10 MG tablet TAKE 1 TABLET(10 MG) BY MOUTH EVERY EVENING FOR BLOOD PRESSURE 90 tablet 3    aspirin (ECOTRIN) 81 MG EC tablet Take 1 tablet (81 mg total) by mouth once daily. 30 tablet 11    atorvastatin (LIPITOR) 80 MG tablet TAKE 1 TABLET BY MOUTH ONCE DAILY FOR CHOLESTEROL AND TO PROTECT YOUR HEART 90 tablet 4    ergocalciferol (ERGOCALCIFEROL) 50,000 unit Cap TAKE ONE CAPSULE BY MOUTH EVERY 7 DAYS FOR VITAMIN D DEFICIENCY 12 capsule 1    EScitalopram oxalate (LEXAPRO) 20 MG tablet Take 1 tablet (20 mg total) by mouth once daily. 30 tablet 11    ezetimibe (ZETIA) 10 mg tablet Take 1 tablet (10 mg total) by mouth every evening. 90 tablet 3    fluticasone propionate (FLONASE) 50 mcg/actuation nasal spray 1 spray (50 mcg total) by Each Nostril route once daily. 18.2 mL 2    gabapentin (NEURONTIN) 400 MG capsule Take 1 capsule (400 mg total) by mouth every evening. If no relief, may increase dose to twice  per day. 60 capsule 3    lisinopriL-hydrochlorothiazide (PRINZIDE,ZESTORETIC) 20-12.5 mg per tablet Take 2 tablets by mouth once daily. 180 tablet 3    traZODone (DESYREL) 150 MG tablet TAKE 1 TABLET(150 MG) BY MOUTH EVERY EVENING AT BEDTIME AS NEEDED FOR INSOMNIA 90 tablet 2     No current facility-administered medications for this visit.       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     Heart disease, unspecified     Hyperlipidemia     Hypertension      Past Surgical History:   Procedure Laterality Date     SECTION      COLONOSCOPY N/A 2023    Procedure: COLONOSCOPY;  Surgeon: Lon George MD;  Location: [facility] ([address] [place] [address]);  Service: Colon and Rectal;  Laterality: N/A;  referred by pcp arash Cordova instructions sent to pt portal.cf  pre call, pt confirmed - mf    HYSTERECTOMY   or     OOPHORECTOMY      one ovary was removed    TUBAL LIGATION       Family History   Problem Relation Age of Onset    Hypertension Mother     Heart disease Mother     Asthma Mother     Cataracts Mother     Glaucoma Mother     Brain cancer Father     Stroke Brother     Kidney disease Brother     Glaucoma Sister     Diabetes Sister     Heart disease Brother         MI x 5    Depression Brother     Blindness Neg Hx     Macular degeneration Neg Hx     Retinal detachment Neg Hx     Breast cancer Neg Hx     Colon cancer Neg Hx     Ovarian cancer Neg Hx      Social History     Tobacco Use    Smoking status: Former     Current packs/day: 0.00     Average packs/day: 2.0 packs/day for 28.0 years (56.0 ttl pk-yrs)     Types: Cigarettes     Start date: 1983     Quit date: 2011     Years since quittin.1    Smokeless tobacco: Never   Substance Use Topics    Alcohol use: Yes     Comment: beer weekends    Drug use: No        Review of Systems:  A detailed review of systems has been  "reviewed with the patient, pertinent positives and negatives are presented in the note and is otherwise negative.  Review of Systems   Constitutional:  Negative for activity change, chills, diaphoresis, fatigue and fever.   HENT:  Negative for congestion, rhinorrhea and trouble swallowing.    Eyes:  Negative for discharge.   Respiratory:  Negative for cough, chest tightness and shortness of breath.    Cardiovascular:  Negative for chest pain and palpitations.   Gastrointestinal:  Negative for abdominal distention, abdominal pain, anal bleeding, constipation, diarrhea, nausea and vomiting.   Endocrine: Negative for cold intolerance and heat intolerance.   Genitourinary:  Negative for decreased urine volume, difficulty urinating, dysuria and urgency.   Musculoskeletal:  Negative for back pain, joint swelling and neck pain.   Skin:  Positive for wound. Negative for color change.        Draining mass on right back/flank   Neurological:  Negative for dizziness, syncope, weakness and light-headedness.   Hematological:  Negative for adenopathy. Does not bruise/bleed easily.   Psychiatric/Behavioral:  Negative for agitation.        OBJECTIVE:     Vital Signs (Most Recent)  Pulse: 66 (09/27/23 0952)  BP: (!) 176/81 (09/27/23 0952)  SpO2: 99 % (09/27/23 0952)  5' 5" (1.651 m)  97.1 kg (214 lb 2.8 oz)     Physical Exam:  Physical Exam  Constitutional:       General: She is not in acute distress.     Appearance: Normal appearance. She is not ill-appearing or toxic-appearing.   HENT:      Head: Normocephalic and atraumatic.   Eyes:      General: Vision grossly intact.      Extraocular Movements: Extraocular movements intact.   Cardiovascular:      Rate and Rhythm: Normal rate and regular rhythm.      Heart sounds: Normal heart sounds. No murmur heard.  Pulmonary:      Effort: Pulmonary effort is normal. No respiratory distress.      Breath sounds: Wheezing present.      Comments: Mild expiratory wheezes in the post mid lung " fields.  Abdominal:      General: Abdomen is flat. Bowel sounds are normal. There is no distension.      Palpations: Abdomen is soft. There is no fluid wave.   Musculoskeletal:         General: No swelling, tenderness or deformity. Normal range of motion.      Cervical back: Normal range of motion and neck supple. No muscular tenderness.   Lymphadenopathy:      Upper Body:      Right upper body: No supraclavicular adenopathy.      Left upper body: No supraclavicular adenopathy.   Skin:     General: Skin is warm and dry.      Coloration: Skin is not cyanotic or jaundiced.      Findings: Lesion and wound present.             Comments: Right back skin lesion, draining purulent fluid and exquisitely tender to touch   Neurological:      General: No focal deficit present.      Mental Status: She is alert and oriented to person, place, and time. Mental status is at baseline.      Sensory: Sensation is intact.   Psychiatric:         Behavior: Behavior is cooperative.       Laboratory:  I personally and independently reviewed relevant lab test results, including the following:  BMP 7/28/2023: Reviewed - wnl  Hgb A1c 1/3/2023: wnl    Diagnostic Results:  I personally and independently reviewed, visualized and interpreted the images of the below listed radiology studies and my findings are notable for:  None relevant    ASSESSMENT/PLAN:   Batool Rivera is a 58 year old female with PMHx of HTN here for right back sebaceous cyst. Currently infected, will remove cyst once infection cleared.    PLAN:  - PO abx prescribed  - schedule for sebaceous cyst removal in minor rooms    Joseph Nye M.D.  General Surgery PGY-II  [facility]    ATTESTATION: I have reviewed and concur with the resident's history, physical, assessment, and plan.  I have personally interviewed and examined the patient at bedside.  See below addendum for my evaluation and additional findings.    58 year-old woman who has had a skin lesion for  many years on her right flank presenting with now an infected skin lesion that is probably a cyst.  I have given her a short course of antibiotics (Bactrim) and for her to place warm compresses over the area to help with continued drainage.  She will send me the picture in about a week or so to see how it looks.  Once the area and the infection has completely cleared, we will sign her up for excision of the cyst in the minor procedure room.  Consent will be obtained the day of the procedure.  All of her questions and concerns were addressed.      Obinna Dc MD  General Surgery and Surgical Critical Care  [facility] 2nd Fl

## 2023-11-21 ENCOUNTER — OFFICE VISIT (OUTPATIENT)
Dept: INTERNAL MEDICINE | Facility: CLINIC | Age: 58
End: 2023-11-21
Payer: MEDICAID

## 2023-11-21 VITALS
HEART RATE: 55 BPM | OXYGEN SATURATION: 95 % | WEIGHT: 219.56 LBS | HEIGHT: 65 IN | BODY MASS INDEX: 36.58 KG/M2 | DIASTOLIC BLOOD PRESSURE: 96 MMHG | SYSTOLIC BLOOD PRESSURE: 142 MMHG

## 2023-11-21 DIAGNOSIS — F33.41 RECURRENT MAJOR DEPRESSIVE DISORDER, IN PARTIAL REMISSION: Chronic | ICD-10-CM

## 2023-11-21 DIAGNOSIS — I10 PRIMARY HYPERTENSION: Primary | ICD-10-CM

## 2023-11-21 DIAGNOSIS — Z00.00 HEALTHCARE MAINTENANCE: ICD-10-CM

## 2023-11-21 DIAGNOSIS — E66.01 SEVERE OBESITY (BMI 35.0-35.9 WITH COMORBIDITY): ICD-10-CM

## 2023-11-21 DIAGNOSIS — E78.2 MIXED HYPERLIPIDEMIA: ICD-10-CM

## 2023-11-21 PROCEDURE — 99213 OFFICE O/P EST LOW 20 MIN: CPT | Mod: S$PBB,,,

## 2023-11-21 PROCEDURE — 3044F PR MOST RECENT HEMOGLOBIN A1C LEVEL <7.0%: ICD-10-PCS | Mod: CPTII,,,

## 2023-11-21 PROCEDURE — 3077F SYST BP >= 140 MM HG: CPT | Mod: CPTII,,,

## 2023-11-21 PROCEDURE — 99999 PR PBB SHADOW E&M-EST. PATIENT-LVL IV: ICD-10-PCS | Mod: PBBFAC,,,

## 2023-11-21 PROCEDURE — 99214 OFFICE O/P EST MOD 30 MIN: CPT | Mod: PBBFAC

## 2023-11-21 PROCEDURE — 99213 PR OFFICE/OUTPT VISIT, EST, LEVL III, 20-29 MIN: ICD-10-PCS | Mod: S$PBB,,,

## 2023-11-21 PROCEDURE — 3008F BODY MASS INDEX DOCD: CPT | Mod: CPTII,,,

## 2023-11-21 PROCEDURE — 3044F HG A1C LEVEL LT 7.0%: CPT | Mod: CPTII,,,

## 2023-11-21 PROCEDURE — 3008F PR BODY MASS INDEX (BMI) DOCUMENTED: ICD-10-PCS | Mod: CPTII,,,

## 2023-11-21 PROCEDURE — 99999 PR PBB SHADOW E&M-EST. PATIENT-LVL IV: CPT | Mod: PBBFAC,,,

## 2023-11-21 PROCEDURE — 3077F PR MOST RECENT SYSTOLIC BLOOD PRESSURE >= 140 MM HG: ICD-10-PCS | Mod: CPTII,,,

## 2023-11-21 PROCEDURE — 4010F ACE/ARB THERAPY RXD/TAKEN: CPT | Mod: CPTII,,,

## 2023-11-21 PROCEDURE — 3080F DIAST BP >= 90 MM HG: CPT | Mod: CPTII,,,

## 2023-11-21 PROCEDURE — 4010F PR ACE/ARB THEARPY RXD/TAKEN: ICD-10-PCS | Mod: CPTII,,,

## 2023-11-21 PROCEDURE — 3080F PR MOST RECENT DIASTOLIC BLOOD PRESSURE >= 90 MM HG: ICD-10-PCS | Mod: CPTII,,,

## 2023-11-22 ENCOUNTER — PATIENT MESSAGE (OUTPATIENT)
Dept: SURGERY | Facility: CLINIC | Age: 58
End: 2023-11-22
Payer: MEDICAID

## 2023-11-27 ENCOUNTER — OFFICE VISIT (OUTPATIENT)
Dept: OPTOMETRY | Facility: CLINIC | Age: 58
End: 2023-11-27
Payer: MEDICAID

## 2023-11-27 DIAGNOSIS — H52.223 HYPEROPIA OF BOTH EYES WITH REGULAR ASTIGMATISM AND PRESBYOPIA: ICD-10-CM

## 2023-11-27 DIAGNOSIS — H40.013 OAG (OPEN ANGLE GLAUCOMA) SUSPECT, LOW RISK, BILATERAL: Primary | ICD-10-CM

## 2023-11-27 DIAGNOSIS — Q12.0 CONGENITAL CATARACT OF RIGHT EYE: ICD-10-CM

## 2023-11-27 DIAGNOSIS — H52.4 HYPEROPIA OF BOTH EYES WITH REGULAR ASTIGMATISM AND PRESBYOPIA: ICD-10-CM

## 2023-11-27 DIAGNOSIS — H52.03 HYPEROPIA OF BOTH EYES WITH REGULAR ASTIGMATISM AND PRESBYOPIA: ICD-10-CM

## 2023-11-27 PROCEDURE — 3044F HG A1C LEVEL LT 7.0%: CPT | Mod: CPTII,,, | Performed by: OPTOMETRIST

## 2023-11-27 PROCEDURE — 92015 PR REFRACTION: ICD-10-PCS | Mod: ,,, | Performed by: OPTOMETRIST

## 2023-11-27 PROCEDURE — 92004 PR EYE EXAM, NEW PATIENT,COMPREHESV: ICD-10-PCS | Mod: S$PBB,,, | Performed by: OPTOMETRIST

## 2023-11-27 PROCEDURE — 99999 PR PBB SHADOW E&M-EST. PATIENT-LVL III: CPT | Mod: PBBFAC,,, | Performed by: OPTOMETRIST

## 2023-11-27 PROCEDURE — 4010F ACE/ARB THERAPY RXD/TAKEN: CPT | Mod: CPTII,,, | Performed by: OPTOMETRIST

## 2023-11-27 PROCEDURE — 99999 PR PBB SHADOW E&M-EST. PATIENT-LVL III: ICD-10-PCS | Mod: PBBFAC,,, | Performed by: OPTOMETRIST

## 2023-11-27 PROCEDURE — 99213 OFFICE O/P EST LOW 20 MIN: CPT | Mod: PBBFAC | Performed by: OPTOMETRIST

## 2023-11-27 PROCEDURE — 1159F MED LIST DOCD IN RCRD: CPT | Mod: CPTII,,, | Performed by: OPTOMETRIST

## 2023-11-27 PROCEDURE — 3044F PR MOST RECENT HEMOGLOBIN A1C LEVEL <7.0%: ICD-10-PCS | Mod: CPTII,,, | Performed by: OPTOMETRIST

## 2023-11-27 PROCEDURE — 92015 DETERMINE REFRACTIVE STATE: CPT | Mod: ,,, | Performed by: OPTOMETRIST

## 2023-11-27 PROCEDURE — 92133 CPTRZD OPH DX IMG PST SGM ON: CPT | Mod: PBBFAC | Performed by: OPTOMETRIST

## 2023-11-27 PROCEDURE — 92133 OCT, OPTIC NERVE - OU - BOTH EYES: ICD-10-PCS | Mod: 26,S$PBB,, | Performed by: OPTOMETRIST

## 2023-11-27 PROCEDURE — 4010F PR ACE/ARB THEARPY RXD/TAKEN: ICD-10-PCS | Mod: CPTII,,, | Performed by: OPTOMETRIST

## 2023-11-27 PROCEDURE — 1159F PR MEDICATION LIST DOCUMENTED IN MEDICAL RECORD: ICD-10-PCS | Mod: CPTII,,, | Performed by: OPTOMETRIST

## 2023-11-27 PROCEDURE — 92004 COMPRE OPH EXAM NEW PT 1/>: CPT | Mod: S$PBB,,, | Performed by: OPTOMETRIST

## 2023-11-27 NOTE — PROGRESS NOTES
HPI    Annual Glaucoma Suspect eye exam   Pt states vision is blurry, requesting sRx   OTC +2.75    Pt denies F/F     Pt reports Dry/ Itchy/ Burning  Gtt: No    OAG OU Low Risk   Pt denies vision loss/ HA's   OCT Ordered and taken today    Last edited by Frantz Hand, OD on 11/27/2023  9:31 AM.            Assessment /Plan     For exam results, see Encounter Report.    OAG (open angle glaucoma) suspect, low risk, bilateral  -     OCT, Optic Nerve - OU - Both Eyes  -Physio Enlarged, unchanged CDs  -Monitor low to no rish    Congenital cataract of right eye  -BVA 20/30 stable with prior    Hyperopia of both eyes with regular astigmatism and presbyopia  Eyeglass Final Rx       Eyeglass Final Rx         Sphere Cylinder Axis Dist VA Add    Right +0.75 Sphere  20/30 +2.25    Left +0.75 +0.50 170 20/20 +2.25      Type: PAL    Expiration Date: 11/27/2024                      RTC 1 yr

## 2023-12-01 ENCOUNTER — HOSPITAL ENCOUNTER (OUTPATIENT)
Dept: RADIOLOGY | Facility: HOSPITAL | Age: 58
Discharge: HOME | End: 2023-12-01
Payer: MEDICAID

## 2023-12-01 DIAGNOSIS — Z00.00 HEALTHCARE MAINTENANCE: ICD-10-CM

## 2023-12-01 PROCEDURE — 71271 CT THORAX LUNG CANCER SCR C-: CPT | Mod: TC

## 2023-12-01 PROCEDURE — 71271 CT CHEST LUNG SCREENING LOW DOSE: ICD-10-PCS | Mod: 26,,, | Performed by: RADIOLOGY

## 2023-12-01 PROCEDURE — 71271 CT THORAX LUNG CANCER SCR C-: CPT | Mod: 26,,, | Performed by: RADIOLOGY

## 2023-12-20 ENCOUNTER — PROCEDURE VISIT (OUTPATIENT)
Dept: SURGERY | Facility: CLINIC | Age: 58
End: 2023-12-20
Payer: MEDICAID

## 2023-12-20 VITALS
SYSTOLIC BLOOD PRESSURE: 144 MMHG | OXYGEN SATURATION: 98 % | HEART RATE: 63 BPM | BODY MASS INDEX: 36.22 KG/M2 | DIASTOLIC BLOOD PRESSURE: 98 MMHG | HEIGHT: 65 IN | WEIGHT: 217.38 LBS

## 2023-12-20 DIAGNOSIS — R19.00 ABDOMINAL WALL MASS OF RIGHT FLANK: Primary | ICD-10-CM

## 2023-12-20 PROCEDURE — 12031 PR LAYR CLOS WND TRUNK,ARM,LEG <2.5 CM: ICD-10-PCS | Mod: S$PBB,51,, | Performed by: STUDENT IN AN ORGANIZED HEALTH CARE EDUCATION/TRAINING PROGRAM

## 2023-12-20 PROCEDURE — 11403 PR EXC SKIN BENIG 2.1-3 CM TRUNK,ARM,LEG: ICD-10-PCS | Mod: S$PBB,,, | Performed by: STUDENT IN AN ORGANIZED HEALTH CARE EDUCATION/TRAINING PROGRAM

## 2023-12-20 PROCEDURE — 88304 TISSUE EXAM BY PATHOLOGIST: CPT | Performed by: PATHOLOGY

## 2023-12-20 PROCEDURE — 12031 INTMD RPR S/A/T/EXT 2.5 CM/<: CPT | Mod: S$PBB,51,, | Performed by: STUDENT IN AN ORGANIZED HEALTH CARE EDUCATION/TRAINING PROGRAM

## 2023-12-20 PROCEDURE — 88304 TISSUE EXAM BY PATHOLOGIST: CPT | Mod: 26,,, | Performed by: PATHOLOGY

## 2023-12-20 PROCEDURE — 11403 EXC TR-EXT B9+MARG 2.1-3CM: CPT | Mod: S$PBB,,, | Performed by: STUDENT IN AN ORGANIZED HEALTH CARE EDUCATION/TRAINING PROGRAM

## 2023-12-20 PROCEDURE — 11403 EXC TR-EXT B9+MARG 2.1-3CM: CPT | Mod: PBBFAC

## 2023-12-20 PROCEDURE — 12042 INTMD RPR N-HF/GENIT2.6-7.5: CPT | Mod: PBBFAC | Performed by: STUDENT IN AN ORGANIZED HEALTH CARE EDUCATION/TRAINING PROGRAM

## 2023-12-20 PROCEDURE — 88304 PR  SURG PATH,LEVEL III: ICD-10-PCS | Mod: 26,,, | Performed by: PATHOLOGY

## 2023-12-20 PROCEDURE — 88305 TISSUE EXAM BY PATHOLOGIST: CPT | Performed by: PATHOLOGY

## 2023-12-20 NOTE — PROCEDURES
"Batool Rivera is a 58 y.o. female patient.    Pulse: 63 (12/20/23 1007)  BP: (!) 144/98 (12/20/23 1007)  SpO2: 98 % (12/20/23 1007)  Weight: 98.6 kg (217 lb 6 oz) (12/20/23 1007)  Height: 5' 5" (165.1 cm) (12/20/23 1007)       Exc, Cyst    Date/Time: 12/20/2023 10:00 AM    Performed by: Cande Min MD  Authorized by: Cande Min MD    Consent Done?:  Yes (Written)  Timeout: prior to procedure the correct patient, procedure, and site was verified    Prep: patient was prepped and draped in usual sterile fashion    Local anesthesia used?: Yes    Anesthesia:  Local infiltration  Local anesthetic:  Lidocaine 1% with epinephrine  Assistants?: Yes    List of assistants:  Evie Pino MD  Indications:  Cyst  Body area:  Back / flank  Laterality:  Right  Position:  Lateral   Patient was prepped and draped in the normal sterile fashion.  Anesthesia:  Local infiltration  Local anesthetic:  Lidocaine 1% with epinephrine  Excision type:  Skin  Malignancy:  Benign  Excision size (cm):  2.5  Scalpel size:  15  Incision type:  Elliptical  Specimens?: Yes     Specimens submitted to pathology.   Hemostasis was obtained.  Wound closure:  Intermediate layered  Wound repair size (cm):  2.5  Sutures:  4-0 Monocryl and 3-0 Vicryl  Post-op diagnosis:  Other (see comment)   Patient tolerated the procedure well with no immediate complications.   Post-operative instructions were provided for the patient.  Comments:      Dermabond applied over incision      12/20/2023    "

## 2023-12-26 LAB
FINAL PATHOLOGIC DIAGNOSIS: NORMAL
GROSS: NORMAL
Lab: NORMAL
SUPPLEMENTAL DIAGNOSIS: NORMAL

## 2024-01-26 ENCOUNTER — OFFICE VISIT (OUTPATIENT)
Dept: INTERNAL MEDICINE | Facility: CLINIC | Age: 59
End: 2024-01-26
Payer: MEDICAID

## 2024-01-26 VITALS
HEART RATE: 82 BPM | WEIGHT: 218.5 LBS | OXYGEN SATURATION: 99 % | SYSTOLIC BLOOD PRESSURE: 140 MMHG | BODY MASS INDEX: 36.4 KG/M2 | DIASTOLIC BLOOD PRESSURE: 84 MMHG | HEIGHT: 65 IN

## 2024-01-26 DIAGNOSIS — F33.41 RECURRENT MAJOR DEPRESSIVE DISORDER, IN PARTIAL REMISSION: Chronic | ICD-10-CM

## 2024-01-26 DIAGNOSIS — I10 PRIMARY HYPERTENSION: ICD-10-CM

## 2024-01-26 DIAGNOSIS — E66.01 SEVERE OBESITY (BMI 35.0-35.9 WITH COMORBIDITY): ICD-10-CM

## 2024-01-26 DIAGNOSIS — Z12.31 ENCOUNTER FOR SCREENING MAMMOGRAM FOR MALIGNANT NEOPLASM OF BREAST: ICD-10-CM

## 2024-01-26 DIAGNOSIS — R05.2 SUBACUTE COUGH: ICD-10-CM

## 2024-01-26 DIAGNOSIS — Z00.00 HEALTHCARE MAINTENANCE: Primary | ICD-10-CM

## 2024-01-26 PROCEDURE — 1160F RVW MEDS BY RX/DR IN RCRD: CPT | Mod: CPTII,,,

## 2024-01-26 PROCEDURE — 99215 OFFICE O/P EST HI 40 MIN: CPT | Mod: PBBFAC

## 2024-01-26 PROCEDURE — 99213 OFFICE O/P EST LOW 20 MIN: CPT | Mod: S$PBB,,,

## 2024-01-26 PROCEDURE — 99999 PR PBB SHADOW E&M-EST. PATIENT-LVL V: CPT | Mod: PBBFAC,,,

## 2024-01-26 PROCEDURE — 3008F BODY MASS INDEX DOCD: CPT | Mod: CPTII,,,

## 2024-01-26 PROCEDURE — 3079F DIAST BP 80-89 MM HG: CPT | Mod: CPTII,,,

## 2024-01-26 PROCEDURE — 3077F SYST BP >= 140 MM HG: CPT | Mod: CPTII,,,

## 2024-01-26 PROCEDURE — 1159F MED LIST DOCD IN RCRD: CPT | Mod: CPTII,,,

## 2024-01-26 RX ORDER — BENZONATATE 100 MG/1
100 CAPSULE ORAL 3 TIMES DAILY PRN
Qty: 30 CAPSULE | Refills: 0 | Status: SHIPPED | OUTPATIENT
Start: 2024-01-26 | End: 2024-02-05

## 2024-01-26 NOTE — PATIENT INSTRUCTIONS
Call to set up an appointment at  [facility] weight loss surgery:  [address] [place] [address]  [facility]  [phone]

## 2024-01-26 NOTE — PROGRESS NOTES
Subjective     Chief Complaint: annual physical    History of Present Illness:  Ms. Batool Rivera is a 58 y.o. female  with hx of HTN, HLD, glaucoma, chronic back pain, depression, migraines, who presents for her annual physical. I last saw Ms. Rivera on 11/21/23 for her annual exam. At that time, patient reported some depressive sxs though she was not taking her lexapro every day. I enrolled patient in the digital HTN program and advised her to take her lexapro daily as prescribed.     Today, patient reports overall she is doing ok. She had a cyst excision with general surgery last month. She is having some difficulties remembering to take all her medications. She has a pill counter to help her take her meds each day but still forgets. She has not checked her BP in quite some time as she recently lost her BP cuff at home. Her mood is ok though she still has some depressive episodes. She is not taking her lexapro daily. Patient also reports having the flu a couple of weeks ago, and ever since has had a dry cough that is keeping her up at night.     Review of Systems   Constitutional:  Negative for chills and fever.   HENT:  Negative for congestion and sore throat.    Respiratory:  Positive for cough. Negative for hemoptysis, sputum production and shortness of breath.    Cardiovascular:  Negative for chest pain and leg swelling.   Gastrointestinal:  Negative for abdominal pain, constipation, diarrhea, nausea and vomiting.   Musculoskeletal:  Negative for joint pain and myalgias.   Neurological:  Negative for dizziness and loss of consciousness.   Psychiatric/Behavioral:  Positive for depression.        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     Heart disease, unspecified     Hyperlipidemia     Hypertension        Past Surgical History:   Procedure Laterality Date      SECTION      COLONOSCOPY N/A 2023    Procedure: COLONOSCOPY;  Surgeon: Lon George MD;  Location: [facility] ([address] [place] [address]);  Service: Colon and Rectal;  Laterality: N/A;  referred by pcp arash Cordova instructions sent to pt portal.cf  pre call, pt confirmed - mf    HYSTERECTOMY   or     OOPHORECTOMY      one ovary was removed    TUBAL LIGATION         Family History   Problem Relation Age of Onset    Hypertension Mother     Heart disease Mother     Asthma Mother     Cataracts Mother     Glaucoma Mother     Brain cancer Father     Stroke Brother     Kidney disease Brother     Glaucoma Sister     Diabetes Sister     Heart disease Brother         MI x 5    Depression Brother     Blindness Neg Hx     Macular degeneration Neg Hx     Retinal detachment Neg Hx     Breast cancer Neg Hx     Colon cancer Neg Hx     Ovarian cancer Neg Hx        Social History     Socioeconomic History    Marital status: Single   Tobacco Use    Smoking status: Former     Current packs/day: 0.00     Average packs/day: 2.0 packs/day for 28.0 years (56.0 ttl pk-yrs)     Types: Cigarettes     Start date: 1983     Quit date: 2011     Years since quittin.4    Smokeless tobacco: Never   Substance and Sexual Activity    Alcohol use: Yes     Comment: beer weekends    Drug use: No    Sexual activity: Yes     Partners: Male     Birth control/protection: See Surgical Hx     Comment: BTL   Social History Narrative    Living with her grandbran, who is 9 y/o.        MEDICATIONS & ALLERGIES:     Current Outpatient Medications on File Prior to Visit   Medication Sig    amLODIPine (NORVASC) 10 MG tablet TAKE 1 TABLET(10 MG) BY MOUTH EVERY EVENING FOR BLOOD PRESSURE    aspirin (ECOTRIN) 81 MG EC tablet Take 1 tablet (81 mg total) by mouth once daily.    atorvastatin (LIPITOR) 80 MG tablet TAKE 1 TABLET BY MOUTH ONCE DAILY FOR CHOLESTEROL AND TO PROTECT YOUR HEART    ergocalciferol (ERGOCALCIFEROL) 50,000 unit Cap TAKE  "ONE CAPSULE BY MOUTH EVERY 7 DAYS FOR VITAMIN D DEFICIENCY (Patient not taking: Reported on 11/27/2023)    EScitalopram oxalate (LEXAPRO) 20 MG tablet Take 1 tablet (20 mg total) by mouth once daily.    ezetimibe (ZETIA) 10 mg tablet Take 1 tablet (10 mg total) by mouth every evening.    fluticasone propionate (FLONASE) 50 mcg/actuation nasal spray 1 spray (50 mcg total) by Each Nostril route once daily.    gabapentin (NEURONTIN) 400 MG capsule Take 1 capsule (400 mg total) by mouth every evening. If no relief, may increase dose to twice per day.    lisinopriL-hydrochlorothiazide (PRINZIDE,ZESTORETIC) 20-12.5 mg per tablet Take 2 tablets by mouth once daily.    traZODone (DESYREL) 150 MG tablet TAKE 1 TABLET(150 MG) BY MOUTH EVERY EVENING AT BEDTIME AS NEEDED FOR INSOMNIA    [DISCONTINUED] lisinopriL (PRINIVIL,ZESTRIL) 20 MG tablet Take 1 tablet (20 mg total) by mouth once daily.     No current facility-administered medications on file prior to visit.       Review of patient's allergies indicates:   Allergen Reactions    Coconut Itching    Losartan Hallucinations    Voltaren [diclofenac sodium] Rash     Rash with voltaren GEL, not to NSAIDs in general       OBJECTIVE:     Vital Signs:  Vitals:    01/26/24 1000   BP: (!) 140/84   Pulse: 82   SpO2: 99%   Weight: 99.1 kg (218 lb 7.6 oz)   Height: 5' 5" (1.651 m)       Body mass index is 36.36 kg/m².     Physical Exam  Vitals and nursing note reviewed.   Constitutional:       General: She is not in acute distress.     Appearance: She is obese. She is not ill-appearing, toxic-appearing or diaphoretic.   HENT:      Head: Normocephalic and atraumatic.      Mouth/Throat:      Mouth: Mucous membranes are dry.      Pharynx: Oropharynx is clear.   Eyes:      Extraocular Movements: Extraocular movements intact.      Conjunctiva/sclera: Conjunctivae normal.      Pupils: Pupils are equal, round, and reactive to light.   Cardiovascular:      Rate and Rhythm: Normal rate and " regular rhythm.      Pulses: Normal pulses.      Heart sounds: Normal heart sounds. No murmur heard.     No friction rub. No gallop.   Pulmonary:      Effort: Pulmonary effort is normal. No respiratory distress.      Breath sounds: Normal breath sounds. No wheezing, rhonchi or rales.   Abdominal:      General: Bowel sounds are normal.      Palpations: Abdomen is soft.   Musculoskeletal:         General: No swelling. Normal range of motion.      Cervical back: Normal range of motion and neck supple.   Skin:     General: Skin is warm and dry.   Neurological:      General: No focal deficit present.      Mental Status: She is alert and oriented to person, place, and time.   Psychiatric:         Mood and Affect: Mood normal.         Behavior: Behavior normal.         Laboratory  No results found for this or any previous visit (from the past 24 hour(s)).    Diagnostic Results:      Health Maintenance Due   Topic Date Due    Pneumococcal Vaccines (Age 0-64) (1 of 2 - PCV) Never done    RSV Vaccine (Age 60+ and Pregnant patients) (1 - 1-dose 60+ series) Never done    Influenza Vaccine (1) 09/01/2023    COVID-19 Vaccine (4 - 2023-24 season) 09/01/2023    Hemoglobin A1c (Prediabetes)  01/03/2024    Mammogram  02/16/2024         ASSESSMENT & PLAN:     Ms. Batool Rivera is a 58 y.o. female who presents for her annual physical exam.     1. Healthcare maintenance  -     Mammo Digital Screening Bilat w/ Terry; Future; Expected date: 01/26/2024  - Given patient on medicaid, will obtain pneumococcal and flu vaccines at her pharmacy today after her clinic visit  - Will check fasting lipid panel at next visit    2. Primary hypertension  Held extensive discussion with patient regarding medication adherence and ways to insure she is taking her pills each day. Encouraged patient to find her BP cuff and continue checking her pressure at home. BP elevated in clinic today 140/84. Continue amlodipine and Lisinopril-HCTZ.     3. Recurrent  major depressive disorder, in partial remission         As above, held discussion with patient regarding the necessity of taking lexapro daily without missing doses for adequate effects, as lexapro is not a prn medication.     4. Severe obesity (BMI 35.0-35.9 with comorbidity)  -     referred patient to [facility] loss clinic given her insurance is not accepted here at [facility]  -     Ambulatory referral/consult to Weight Management Program; Future; Expected date: 02/02/2024    5. Subacute cough  -     benzonatate (TESSALON) 100 MG capsule; Take 1 capsule (100 mg total) by mouth 3 (three) times daily as needed for Cough.  Dispense: 30 capsule; Refill: 0    6. Encounter for screening mammogram for malignant neoplasm of breast  -     Mammo Digital Screening Bilat w/ Terry; Future; Expected date: 01/26/2024        RTC in 3 months or sooner if needed    Discussed with Dr. Houston  - staff attestation to follow      Umesh Walton MD  Internal Medicine PGY-3  [facility]  [address] [place] [id number]  [phone]

## 2024-02-15 ENCOUNTER — PATIENT MESSAGE (OUTPATIENT)
Dept: ADMINISTRATIVE | Facility: OTHER | Age: 59
End: 2024-02-15
Payer: MEDICAID

## 2024-02-23 ENCOUNTER — HOSPITAL ENCOUNTER (OUTPATIENT)
Dept: RADIOLOGY | Facility: HOSPITAL | Age: 59
Discharge: HOME | End: 2024-02-23
Payer: MEDICAID

## 2024-02-23 VITALS — BODY MASS INDEX: 35.94 KG/M2 | WEIGHT: 216 LBS

## 2024-02-23 DIAGNOSIS — Z00.00 HEALTHCARE MAINTENANCE: ICD-10-CM

## 2024-02-23 DIAGNOSIS — Z12.31 ENCOUNTER FOR SCREENING MAMMOGRAM FOR MALIGNANT NEOPLASM OF BREAST: ICD-10-CM

## 2024-02-23 PROCEDURE — 77067 SCR MAMMO BI INCL CAD: CPT | Mod: 26,,, | Performed by: RADIOLOGY

## 2024-02-23 PROCEDURE — 77063 BREAST TOMOSYNTHESIS BI: CPT | Mod: 26,,, | Performed by: RADIOLOGY

## 2024-02-23 PROCEDURE — 77067 SCR MAMMO BI INCL CAD: CPT | Mod: TC

## 2024-02-26 ENCOUNTER — TELEPHONE (OUTPATIENT)
Dept: PODIATRY | Facility: CLINIC | Age: 59
End: 2024-02-26
Payer: MEDICAID

## 2024-02-26 NOTE — TELEPHONE ENCOUNTER
Spoke with patient in regards to cancelling her appointment on 02/26/2024 with Dr. Green(Podiatry), informed her to check her portal for the rescheduled appointment with provider

## 2024-03-22 ENCOUNTER — OFFICE VISIT (OUTPATIENT)
Dept: PODIATRY | Facility: CLINIC | Age: 59
End: 2024-03-22
Payer: MEDICAID

## 2024-03-22 VITALS
BODY MASS INDEX: 36 KG/M2 | HEART RATE: 60 BPM | WEIGHT: 216.06 LBS | HEIGHT: 65 IN | DIASTOLIC BLOOD PRESSURE: 74 MMHG | SYSTOLIC BLOOD PRESSURE: 181 MMHG

## 2024-03-22 DIAGNOSIS — G57.82 NEUROMA OF THIRD INTERSPACE OF LEFT FOOT: ICD-10-CM

## 2024-03-22 DIAGNOSIS — M79.672 FOOT PAIN, LEFT: Primary | ICD-10-CM

## 2024-03-22 DIAGNOSIS — G57.62 NEUROMA OF SECOND INTERSPACE OF LEFT FOOT: ICD-10-CM

## 2024-03-22 PROCEDURE — 99213 OFFICE O/P EST LOW 20 MIN: CPT | Mod: PBBFAC | Performed by: PODIATRIST

## 2024-03-22 PROCEDURE — 1160F RVW MEDS BY RX/DR IN RCRD: CPT | Mod: CPTII,,, | Performed by: PODIATRIST

## 2024-03-22 PROCEDURE — 1159F MED LIST DOCD IN RCRD: CPT | Mod: CPTII,,, | Performed by: PODIATRIST

## 2024-03-22 PROCEDURE — 3077F SYST BP >= 140 MM HG: CPT | Mod: CPTII,,, | Performed by: PODIATRIST

## 2024-03-22 PROCEDURE — 3078F DIAST BP <80 MM HG: CPT | Mod: CPTII,,, | Performed by: PODIATRIST

## 2024-03-22 PROCEDURE — 99999 PR PBB SHADOW E&M-EST. PATIENT-LVL III: CPT | Mod: PBBFAC,,, | Performed by: PODIATRIST

## 2024-03-22 PROCEDURE — 4010F ACE/ARB THERAPY RXD/TAKEN: CPT | Mod: CPTII,,, | Performed by: PODIATRIST

## 2024-03-22 PROCEDURE — 3008F BODY MASS INDEX DOCD: CPT | Mod: CPTII,,, | Performed by: PODIATRIST

## 2024-03-22 PROCEDURE — 99213 OFFICE O/P EST LOW 20 MIN: CPT | Mod: S$PBB,,, | Performed by: PODIATRIST

## 2024-03-22 NOTE — PROGRESS NOTES
Subjective:      Patient ID: Batool Rivera is a 58 y.o. female.    Chief Complaint:   Foot Pain (Left foot pain, numbness around toes)    Batool is a 58 y.o. female who presents to the podiatry clinic  with complaint of  left foot and leg pain. Onset of the symptoms was several years ago.   Patient relates she is able to walk without any pain however the left foot is and leg have a numbness painful feel primarily at night.  Patient does take gabapentin.  She relates that she did have some lower back evaluation and treatments many years ago.    She is really concerned that it may be bad blood flow as she was told after her hysterectomy that it was possible that she had bad blood flow in her legs.    Patient relates that her twin brother lost his leg to amputation.  Patient relates she does not smoke or drink.     Patient relates she did see orthopedic Dr. Phillip and after an injection her foot turn white.  She relates that the injection did not help the pain.  Patient denies diabetes        Per PCP note10/21    Foot numbness : Reports she had injection done 2/3/20 by Dr Phillip for Benavidez neuroma, reports had area of foot/toe that turned white after this and was concerned. Has photo of this - linear area of hyperpigmentation from between 2nd &3rd MTP up dorsum of foot to midfoot, not extending to ankle or across dorsum of foot. Says this has now resolved and now is darker than her usual skin color in that area. She is concerned about her feet because her twin sister had similar issues and had to had her foot amputated.      HTN - was previously on losartan + amlodipine, stopped both, then restarted amlodipine 10mg. BP at home have been in the 160-190s per patient. She ran out of amlodipine.      BELLO on CPAP - following with Sleep Medicine clinic here, had titration study done 8/3/17.  Hasn't been using this recently, needs to re-set-up machine     Dyslipidemia - prescribed atorvastatin 80mg, +zetia 10mg   Not taking  these every day     MDD, Anxiety, difficulty sleeping - On lexapro 20mg, not taking this consistently. Has been feeling more depressed recently  Taking 150mg nightly which does help her to sleep.     H/o elevated A1c, not on medications, not recently following consistent diet     Vitamin D deficiency - taking supplement. Last checked in  and it was >30     Chronic back pain - No severe/acute back pain issues at present. Follows with Dr Moraes. ON gabapentin and mobic-    2024: Patient presents to clinic new to me for chronic L foot pain and discomfort. States it has been going on for 7 years. Has had xrays in the past as well as multiple medications none of which have helped. Also had neuroma injection a while ago which did not help. States she would like to avoid medication but would like to find out what is going on with her foot. Hurts with any shoe and with weightbearing. Feels funny sometimes and hurts other times.      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     Heart disease, unspecified     Hyperlipidemia     Hypertension      Past Surgical History:   Procedure Laterality Date     SECTION      COLONOSCOPY N/A 2023    Procedure: COLONOSCOPY;  Surgeon: Lon George MD;  Location: [facility] ([address] [place] [address]);  Service: Colon and Rectal;  Laterality: N/A;  referred by pcp arash Cordova instructions sent to pt portal.cf  pre call, pt confirmed - mf    HYSTERECTOMY   or     OOPHORECTOMY      one ovary was removed    TUBAL LIGATION       Current Outpatient Medications on File Prior to Visit   Medication Sig Dispense Refill    amLODIPine (NORVASC) 10 MG tablet TAKE 1 TABLET(10 MG) BY MOUTH EVERY EVENING FOR BLOOD PRESSURE 90 tablet 3    atorvastatin (LIPITOR) 80 MG tablet TAKE 1 TABLET BY MOUTH ONCE DAILY FOR CHOLESTEROL AND TO PROTECT YOUR HEART 90 tablet 4     ergocalciferol (ERGOCALCIFEROL) 50,000 unit Cap TAKE ONE CAPSULE BY MOUTH EVERY 7 DAYS FOR VITAMIN D DEFICIENCY 12 capsule 1    EScitalopram oxalate (LEXAPRO) 20 MG tablet Take 1 tablet (20 mg total) by mouth once daily. 30 tablet 11    ezetimibe (ZETIA) 10 mg tablet Take 1 tablet (10 mg total) by mouth every evening. 90 tablet 3    fluticasone propionate (FLONASE) 50 mcg/actuation nasal spray 1 spray (50 mcg total) by Each Nostril route once daily. 18.2 mL 2    gabapentin (NEURONTIN) 400 MG capsule Take 1 capsule (400 mg total) by mouth every evening. If no relief, may increase dose to twice per day. 60 capsule 3    lisinopriL-hydrochlorothiazide (PRINZIDE,ZESTORETIC) 20-12.5 mg per tablet Take 2 tablets by mouth once daily. 180 tablet 3    traZODone (DESYREL) 150 MG tablet TAKE 1 TABLET(150 MG) BY MOUTH EVERY EVENING AT BEDTIME AS NEEDED FOR INSOMNIA 90 tablet 2    aspirin (ECOTRIN) 81 MG EC tablet Take 1 tablet (81 mg total) by mouth once daily. 30 tablet 11    [DISCONTINUED] lisinopriL (PRINIVIL,ZESTRIL) 20 MG tablet Take 1 tablet (20 mg total) by mouth once daily. 90 tablet 3     No current facility-administered medications on file prior to visit.     Review of patient's allergies indicates:   Allergen Reactions    Coconut Itching    Losartan Hallucinations    Voltaren [diclofenac sodium] Rash     Rash with voltaren GEL, not to NSAIDs in general       Review of Systems   Constitutional: Negative for chills, decreased appetite, fever, malaise/fatigue, night sweats, weight gain and weight loss.   Cardiovascular:  Negative for chest pain, claudication, dyspnea on exertion, leg swelling, palpitations and syncope.   Respiratory:  Negative for cough and shortness of breath.    Endocrine: Negative for cold intolerance and heat intolerance.   Hematologic/Lymphatic: Negative for bleeding problem. Does not bruise/bleed easily.   Skin:  Negative for color change, dry skin, flushing, itching, nail changes, poor wound  "healing, rash, skin cancer, suspicious lesions and unusual hair distribution.   Musculoskeletal:  Positive for back pain, myalgias and stiffness. Negative for arthritis, falls, gout, joint pain, joint swelling, muscle cramps, muscle weakness and neck pain.   Gastrointestinal:  Negative for diarrhea, nausea and vomiting.   Neurological:  Positive for numbness and paresthesias. Negative for dizziness, focal weakness, light-headedness, tremors, vertigo and weakness.   Psychiatric/Behavioral:  Negative for altered mental status and depression. The patient does not have insomnia.    Allergic/Immunologic: Negative.            Objective:       Vitals:    03/22/24 1110   BP: (!) 181/74   Pulse: 60   Weight: 98 kg (216 lb 0.8 oz)   Height: 5' 5" (1.651 m)   PainSc: 0-No pain   PainLoc: Foot   98 kg (216 lb 0.8 oz)     Physical Exam  Vitals reviewed.   Constitutional:       General: She is not in acute distress.     Appearance: She is not ill-appearing.      Comments: ** physical exam deferred as patient had granddaughter who is 1 years old who refused to sit in the exam chair with her.    Patient was seen sitting and regular chair holding granddaughter   Cardiovascular:      Pulses:           Dorsalis pedis pulses are 2+ on the right side and 2+ on the left side.        Posterior tibial pulses are 2+ on the right side and 2+ on the left side.   Musculoskeletal:      Right lower leg: No edema.      Left lower leg: No edema.      Comments: + pain with palpation of L 2nd and 3rd intermetatarsal space. + moulder's click with + pain on lateral squeeze of metatarsals 1-5 affected foot. No pain with palpation of adjacent MTPJs or plantar capsules affected foot.    Otherwise rectus foot and toe position L foot with no major deformities noted. Mild equinus noted L ankle with < 10 deg DF noted. MMT 5/5 in DF/PF/Inv/Ev resistance with no reproduction of pain in any direction L foot. Passive range of motion of ankle and pedal joints is " painless L foot/ankle/toes.     Feet:      Comments: No open lesions noted  Skin:     Comments: No open lesions, lacerations or wounds noted. Nails are normotrophic to L 1-5. Interdigital spaces clean, dry and intact L. No erythema noted to L foot. Skin texture normal L. Pedal hair normal L     Neurological:      Mental Status: She is alert.      Sensory: No sensory deficit.   Psychiatric:         Attention and Perception: Attention normal.         Mood and Affect: Mood normal.         Speech: Speech normal.         Behavior: Behavior is cooperative.               Assessment:       Encounter Diagnoses   Name Primary?    Foot pain, left Yes    Neuroma of second interspace of left foot     Neuroma of third interspace of left foot            Plan:       Batool was seen today for foot pain.    Diagnoses and all orders for this visit:    Foot pain, left  -     Cancel: MRI Foot (Forefoot) Left Without Contrast; Future  -     MRI Foot (Forefoot) Left Without Contrast; Future    Neuroma of second interspace of left foot  -     Cancel: MRI Foot (Forefoot) Left Without Contrast; Future  -     MRI Foot (Forefoot) Left Without Contrast; Future    Neuroma of third interspace of left foot  -     Cancel: MRI Foot (Forefoot) Left Without Contrast; Future  -     MRI Foot (Forefoot) Left Without Contrast; Future        I counseled the patient on her conditions, their implications and medical management.     Previous xrays reviewed: negative.     Likely soft tissue related: neuroma, bursitis, tendinitis, capsulitis etc.     MRI ordered of L forefoot to assess soft tissues for injury and/or damage given equivocal xray results. Rule out soft tissue abnormality.     Patient would like to hold off on any medication for now.     Long discussion with patient regarding appropriate, supportive and comfortable shoes. Recommended supportive style shoe brands with adequate arch supports to alleviate abnormal pressure and improve stability of foot  while walking. Avoid flat shoes and barefoot walking as these will exacerbate or worsen symptoms. Well padded/cushioned shoes recommended.     RTC within 1 week after MRI, sooner pRN

## 2024-04-19 ENCOUNTER — HOSPITAL ENCOUNTER (OUTPATIENT)
Dept: RADIOLOGY | Facility: HOSPITAL | Age: 59
Discharge: HOME | End: 2024-04-19
Attending: PODIATRIST
Payer: MEDICAID

## 2024-04-19 DIAGNOSIS — G57.82 NEUROMA OF THIRD INTERSPACE OF LEFT FOOT: ICD-10-CM

## 2024-04-19 DIAGNOSIS — M79.672 FOOT PAIN, LEFT: ICD-10-CM

## 2024-04-19 DIAGNOSIS — G57.62 NEUROMA OF SECOND INTERSPACE OF LEFT FOOT: ICD-10-CM

## 2024-04-19 PROCEDURE — 73718 MRI LOWER EXTREMITY W/O DYE: CPT | Mod: 26,LT,, | Performed by: RADIOLOGY

## 2024-04-19 PROCEDURE — 73718 MRI LOWER EXTREMITY W/O DYE: CPT | Mod: TC,LT

## 2024-04-24 ENCOUNTER — TELEPHONE (OUTPATIENT)
Dept: PODIATRY | Facility: CLINIC | Age: 59
End: 2024-04-24
Payer: MEDICAID

## 2024-04-24 NOTE — TELEPHONE ENCOUNTER
Spoke with patient in regards to her question about what type of shoes can she wear to help with the pain and swelling in her feet , per Dr. Green(Podiatry) he suggested she can wear any shoe that has more cushion support also a shoe with a wider front and also an injection, patient stated she's not getting a shot in her foot but did verbally understand what was discussed with telephone encounter.

## 2024-04-24 NOTE — TELEPHONE ENCOUNTER
Spoke with patient in regards to her MRI results per: Dr. Green, patient asked if there is a certain type shoe that she can wear to help with her foot. I informed her that I will ask Dr. Green if there are any recommendations for her          ----- Message from Vanita Green DPM sent at 4/24/2024  9:42 AM CDT -----  Please call her and let her know that her MRI showed bursitis in her foot which is usually due to wear and tear. No neuroma or other major issues were noted on the MRI. She should follow up as scheduled if it continues to bother her. Thanks.

## 2024-06-05 ENCOUNTER — OFFICE VISIT (OUTPATIENT)
Dept: PODIATRY | Facility: CLINIC | Age: 59
End: 2024-06-05
Payer: MEDICAID

## 2024-06-05 VITALS
HEART RATE: 71 BPM | BODY MASS INDEX: 36.99 KG/M2 | DIASTOLIC BLOOD PRESSURE: 84 MMHG | HEIGHT: 65 IN | WEIGHT: 222 LBS | SYSTOLIC BLOOD PRESSURE: 156 MMHG

## 2024-06-05 DIAGNOSIS — M77.52 BURSITIS OF INTERMETATARSAL BURSA OF LEFT FOOT: Primary | ICD-10-CM

## 2024-06-05 DIAGNOSIS — M79.672 FOOT PAIN, LEFT: ICD-10-CM

## 2024-06-05 PROCEDURE — 4010F ACE/ARB THERAPY RXD/TAKEN: CPT | Mod: CPTII,,, | Performed by: PODIATRIST

## 2024-06-05 PROCEDURE — 1159F MED LIST DOCD IN RCRD: CPT | Mod: CPTII,,, | Performed by: PODIATRIST

## 2024-06-05 PROCEDURE — 99213 OFFICE O/P EST LOW 20 MIN: CPT | Mod: S$PBB,,, | Performed by: PODIATRIST

## 2024-06-05 PROCEDURE — 3079F DIAST BP 80-89 MM HG: CPT | Mod: CPTII,,, | Performed by: PODIATRIST

## 2024-06-05 PROCEDURE — 99213 OFFICE O/P EST LOW 20 MIN: CPT | Mod: PBBFAC | Performed by: PODIATRIST

## 2024-06-05 PROCEDURE — 99999 PR PBB SHADOW E&M-EST. PATIENT-LVL III: CPT | Mod: PBBFAC,,, | Performed by: PODIATRIST

## 2024-06-05 PROCEDURE — 3077F SYST BP >= 140 MM HG: CPT | Mod: CPTII,,, | Performed by: PODIATRIST

## 2024-06-05 PROCEDURE — 1160F RVW MEDS BY RX/DR IN RCRD: CPT | Mod: CPTII,,, | Performed by: PODIATRIST

## 2024-06-05 PROCEDURE — 3008F BODY MASS INDEX DOCD: CPT | Mod: CPTII,,, | Performed by: PODIATRIST

## 2024-06-05 RX ORDER — METHYLPREDNISOLONE 4 MG/1
TABLET ORAL
Qty: 1 EACH | Refills: 0 | Status: SHIPPED | OUTPATIENT
Start: 2024-06-05

## 2024-06-05 NOTE — PROGRESS NOTES
"Subjective:      Patient ID: Batool Rivera is a 58 y.o. female.    Chief Complaint:   Foot Pain (Left foot (Swelling)/PCP- /Umesh Walton MD)    Batool is a 58 y.o. female who returns to clinic for follow up of chronic L foot pain and discomfort. States it has been going on for 7+ years. Has had xrays in the past as well as multiple medications none of which have helped. Also had neuroma injection a while ago which did not help. Had MRI done which showed multiple bursitis to L forefoot likely contributing to her pain. Wearing worn out tennis shoes today. Here for further recommendations.     Vitals:    24 1434   BP: (!) 156/84   Pulse: 71   Weight: 100.7 kg (222 lb 0.1 oz)   Height: 5' 5" (1.651 m)   PainSc:   5   PainLoc: Foot          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     Heart disease, unspecified     Hyperlipidemia     Hypertension      Past Surgical History:   Procedure Laterality Date     SECTION      COLONOSCOPY N/A 2023    Procedure: COLONOSCOPY;  Surgeon: Lon George MD;  Location: [facility] ([address] [place] [address]);  Service: Colon and Rectal;  Laterality: N/A;  referred by pcp arash Cordova instructions sent to pt portal.cf  pre call, pt confirmed - mf    HYSTERECTOMY   or     OOPHORECTOMY      one ovary was removed    TUBAL LIGATION       Current Outpatient Medications on File Prior to Visit   Medication Sig Dispense Refill    amLODIPine (NORVASC) 10 MG tablet TAKE 1 TABLET(10 MG) BY MOUTH EVERY EVENING FOR BLOOD PRESSURE 90 tablet 3    atorvastatin (LIPITOR) 80 MG tablet TAKE 1 TABLET BY MOUTH ONCE DAILY FOR CHOLESTEROL AND TO PROTECT YOUR HEART 90 tablet 4    ergocalciferol (ERGOCALCIFEROL) 50,000 unit Cap TAKE ONE CAPSULE BY MOUTH EVERY 7 DAYS FOR VITAMIN D DEFICIENCY 12 capsule 1    EScitalopram oxalate (LEXAPRO) 20 MG tablet Take 1 tablet " (20 mg total) by mouth once daily. 30 tablet 11    ezetimibe (ZETIA) 10 mg tablet Take 1 tablet (10 mg total) by mouth every evening. 90 tablet 3    fluticasone propionate (FLONASE) 50 mcg/actuation nasal spray 1 spray (50 mcg total) by Each Nostril route once daily. 18.2 mL 2    gabapentin (NEURONTIN) 400 MG capsule Take 1 capsule (400 mg total) by mouth every evening. If no relief, may increase dose to twice per day. 60 capsule 3    lisinopriL-hydrochlorothiazide (PRINZIDE,ZESTORETIC) 20-12.5 mg per tablet Take 2 tablets by mouth once daily. 180 tablet 3    traZODone (DESYREL) 150 MG tablet TAKE 1 TABLET(150 MG) BY MOUTH EVERY EVENING AT BEDTIME AS NEEDED FOR INSOMNIA 90 tablet 2    aspirin (ECOTRIN) 81 MG EC tablet Take 1 tablet (81 mg total) by mouth once daily. 30 tablet 11    [DISCONTINUED] lisinopriL (PRINIVIL,ZESTRIL) 20 MG tablet Take 1 tablet (20 mg total) by mouth once daily. 90 tablet 3     No current facility-administered medications on file prior to visit.     Review of patient's allergies indicates:   Allergen Reactions    Coconut Itching    Losartan Hallucinations    Voltaren [diclofenac sodium] Rash     Rash with voltaren GEL, not to NSAIDs in general       Review of Systems   Constitutional: Negative for chills, decreased appetite, fever, malaise/fatigue, night sweats, weight gain and weight loss.   Cardiovascular:  Negative for chest pain, claudication, dyspnea on exertion, leg swelling, palpitations and syncope.   Respiratory:  Negative for cough and shortness of breath.    Endocrine: Negative for cold intolerance and heat intolerance.   Hematologic/Lymphatic: Negative for bleeding problem. Does not bruise/bleed easily.   Skin:  Negative for color change, dry skin, flushing, itching, nail changes, poor wound healing, rash, skin cancer, suspicious lesions and unusual hair distribution.   Musculoskeletal:  Positive for back pain, myalgias and stiffness. Negative for arthritis, falls, gout, joint  "pain, joint swelling, muscle cramps, muscle weakness and neck pain.   Gastrointestinal:  Negative for diarrhea, nausea and vomiting.   Neurological:  Positive for numbness and paresthesias. Negative for dizziness, focal weakness, light-headedness, tremors, vertigo and weakness.   Psychiatric/Behavioral:  Negative for altered mental status and depression. The patient does not have insomnia.    Allergic/Immunologic: Negative.            Objective:       Vitals:    06/05/24 1434   BP: (!) 156/84   Pulse: 71   Weight: 100.7 kg (222 lb 0.1 oz)   Height: 5' 5" (1.651 m)   PainSc:   5   PainLoc: Foot   100.7 kg (222 lb 0.1 oz)     Physical Exam  Vitals reviewed.   Constitutional:       General: She is not in acute distress.     Appearance: She is not ill-appearing.      Comments: ** physical exam deferred as patient had granddaughter who is 1 years old who refused to sit in the exam chair with her.    Patient was seen sitting and regular chair holding granddaughter   Cardiovascular:      Pulses:           Dorsalis pedis pulses are 2+ on the right side and 2+ on the left side.        Posterior tibial pulses are 2+ on the right side and 2+ on the left side.   Musculoskeletal:      Right lower leg: No edema.      Left lower leg: No edema.      Comments: + pain with palpation of L 2nd and 3rd intermetatarsal space. + moulder's click with + pain on lateral squeeze of metatarsals 1-5 affected foot. No pain with palpation of adjacent MTPJs or plantar capsules affected foot.    Otherwise rectus foot and toe position L foot with no major deformities noted. Mild equinus noted L ankle with < 10 deg DF noted. MMT 5/5 in DF/PF/Inv/Ev resistance with no reproduction of pain in any direction L foot. Passive range of motion of ankle and pedal joints is painless L foot/ankle/toes.     Feet:      Comments: No open lesions noted  Skin:     Comments: No open lesions, lacerations or wounds noted. Nails are normotrophic to L 1-5. Interdigital " spaces clean, dry and intact L. No erythema noted to L foot. Skin texture normal L. Pedal hair normal L     Neurological:      Mental Status: She is alert.      Sensory: No sensory deficit.   Psychiatric:         Attention and Perception: Attention normal.         Mood and Affect: Mood normal.         Speech: Speech normal.         Behavior: Behavior is cooperative.               Assessment:       Encounter Diagnoses   Name Primary?    Bursitis of intermetatarsal bursa of left foot Yes    Foot pain, left              Plan:       Batool was seen today for foot pain.    Diagnoses and all orders for this visit:    Bursitis of intermetatarsal bursa of left foot    Foot pain, left    Other orders  -     methylPREDNISolone (MEDROL DOSEPACK) 4 mg tablet; Use as directed          I counseled the patient on her conditions, their implications and medical management.     Previous xrays reviewed: negative.     MRI reviewed noting bursitis of multiple intermetatarsal spaces L foot.     Rx Medrol dose pack to be taken as directed for pain. Follow prescription instructions while taking medication.    Metatarsal gel cushion pad dispensed and applied to affected foot for additional cushioning of met heads.    Long discussion with patient regarding appropriate, supportive and comfortable shoes. Recommended supportive style shoe brands with adequate arch supports to alleviate abnormal pressure and improve stability of foot while walking. Avoid flat shoes and barefoot walking as these will exacerbate or worsen symptoms. Well padded/cushioned shoes recommended.     RTC 1 month, sooner pRN

## 2024-07-29 ENCOUNTER — PATIENT MESSAGE (OUTPATIENT)
Dept: PODIATRY | Facility: CLINIC | Age: 59
End: 2024-07-29
Payer: MEDICAID

## 2024-07-30 ENCOUNTER — OFFICE VISIT (OUTPATIENT)
Dept: PODIATRY | Facility: CLINIC | Age: 59
End: 2024-07-30
Payer: MEDICAID

## 2024-07-30 VITALS
HEIGHT: 65 IN | SYSTOLIC BLOOD PRESSURE: 195 MMHG | DIASTOLIC BLOOD PRESSURE: 98 MMHG | HEART RATE: 55 BPM | BODY MASS INDEX: 36.72 KG/M2 | WEIGHT: 220.38 LBS

## 2024-07-30 DIAGNOSIS — M79.672 FOOT PAIN, LEFT: ICD-10-CM

## 2024-07-30 DIAGNOSIS — M77.52 BURSITIS OF INTERMETATARSAL BURSA OF LEFT FOOT: Primary | ICD-10-CM

## 2024-07-30 DIAGNOSIS — G57.62 NEUROMA OF SECOND INTERSPACE OF LEFT FOOT: ICD-10-CM

## 2024-07-30 PROCEDURE — 1159F MED LIST DOCD IN RCRD: CPT | Mod: CPTII,,, | Performed by: PODIATRIST

## 2024-07-30 PROCEDURE — 4010F ACE/ARB THERAPY RXD/TAKEN: CPT | Mod: CPTII,,, | Performed by: PODIATRIST

## 2024-07-30 PROCEDURE — 3080F DIAST BP >= 90 MM HG: CPT | Mod: CPTII,,, | Performed by: PODIATRIST

## 2024-07-30 PROCEDURE — 3077F SYST BP >= 140 MM HG: CPT | Mod: CPTII,,, | Performed by: PODIATRIST

## 2024-07-30 PROCEDURE — 99999 PR PBB SHADOW E&M-EST. PATIENT-LVL III: CPT | Mod: PBBFAC,,, | Performed by: PODIATRIST

## 2024-07-30 PROCEDURE — 99213 OFFICE O/P EST LOW 20 MIN: CPT | Mod: PBBFAC | Performed by: PODIATRIST

## 2024-07-30 PROCEDURE — 99214 OFFICE O/P EST MOD 30 MIN: CPT | Mod: S$PBB,,, | Performed by: PODIATRIST

## 2024-07-30 PROCEDURE — 3008F BODY MASS INDEX DOCD: CPT | Mod: CPTII,,, | Performed by: PODIATRIST

## 2024-07-30 RX ORDER — MELOXICAM 15 MG/1
15 TABLET ORAL DAILY
Qty: 30 TABLET | Refills: 0 | Status: SHIPPED | OUTPATIENT
Start: 2024-07-30

## 2024-09-10 ENCOUNTER — TELEPHONE (OUTPATIENT)
Dept: CARDIOLOGY | Facility: CLINIC | Age: 59
End: 2024-09-10
Payer: MEDICAID

## 2024-09-10 NOTE — TELEPHONE ENCOUNTER
----- Message from Darcy Mary sent at 9/10/2024  7:48 AM CDT -----  Contact: [phone]  Patient called, would like a call back in regards her upcoming appointment. Unable to find an open appointment, patient want to reschedule due to weather reason. Thank you.

## 2024-10-02 ENCOUNTER — PATIENT MESSAGE (OUTPATIENT)
Dept: ADMINISTRATIVE | Facility: OTHER | Age: 59
End: 2024-10-02
Payer: MEDICAID

## 2024-10-29 ENCOUNTER — OFFICE VISIT (OUTPATIENT)
Dept: INTERNAL MEDICINE | Facility: CLINIC | Age: 59
End: 2024-10-29
Payer: MEDICAID

## 2024-10-29 VITALS
BODY MASS INDEX: 36.8 KG/M2 | HEART RATE: 68 BPM | HEIGHT: 65 IN | WEIGHT: 220.88 LBS | OXYGEN SATURATION: 99 % | DIASTOLIC BLOOD PRESSURE: 82 MMHG | SYSTOLIC BLOOD PRESSURE: 168 MMHG

## 2024-10-29 DIAGNOSIS — M25.512 ACUTE PAIN OF LEFT SHOULDER: Primary | ICD-10-CM

## 2024-10-29 PROCEDURE — 99999 PR PBB SHADOW E&M-EST. PATIENT-LVL IV: CPT | Mod: PBBFAC,,,

## 2024-10-29 PROCEDURE — 99214 OFFICE O/P EST MOD 30 MIN: CPT | Mod: PBBFAC

## 2024-10-29 RX ORDER — CYCLOBENZAPRINE HCL 5 MG
5 TABLET ORAL 3 TIMES DAILY PRN
Qty: 90 TABLET | Refills: 0 | Status: SHIPPED | OUTPATIENT
Start: 2024-10-29 | End: 2024-11-29

## 2024-11-01 ENCOUNTER — HOSPITAL ENCOUNTER (OUTPATIENT)
Dept: RADIOLOGY | Facility: HOSPITAL | Age: 59
Discharge: HOME | End: 2024-11-01
Payer: MEDICAID

## 2024-11-01 DIAGNOSIS — M25.512 ACUTE PAIN OF LEFT SHOULDER: ICD-10-CM

## 2024-11-01 PROCEDURE — 73030 X-RAY EXAM OF SHOULDER: CPT | Mod: TC,LT

## 2024-11-01 PROCEDURE — 73030 X-RAY EXAM OF SHOULDER: CPT | Mod: 26,LT,, | Performed by: RADIOLOGY

## 2024-11-19 ENCOUNTER — OFFICE VISIT (OUTPATIENT)
Dept: CARDIOLOGY | Facility: CLINIC | Age: 59
End: 2024-11-19
Payer: MEDICAID

## 2024-11-19 VITALS
HEIGHT: 65 IN | BODY MASS INDEX: 36.26 KG/M2 | SYSTOLIC BLOOD PRESSURE: 177 MMHG | WEIGHT: 217.63 LBS | HEART RATE: 75 BPM | DIASTOLIC BLOOD PRESSURE: 101 MMHG

## 2024-11-19 DIAGNOSIS — E78.5 HYPERLIPIDEMIA, UNSPECIFIED HYPERLIPIDEMIA TYPE: ICD-10-CM

## 2024-11-19 DIAGNOSIS — I10 ESSENTIAL HYPERTENSION: ICD-10-CM

## 2024-11-19 DIAGNOSIS — I10 PRIMARY HYPERTENSION: ICD-10-CM

## 2024-11-19 DIAGNOSIS — R07.89 OTHER CHEST PAIN: Primary | ICD-10-CM

## 2024-11-19 DIAGNOSIS — Z87.891 HISTORY OF SMOKING GREATER THAN 50 PACK YEARS: ICD-10-CM

## 2024-11-19 DIAGNOSIS — G47.33 OSA ON CPAP: ICD-10-CM

## 2024-11-19 DIAGNOSIS — R68.89 ABNORMAL ANKLE BRACHIAL INDEX (ABI): ICD-10-CM

## 2024-11-19 LAB
OHS QRS DURATION: 80 MS
OHS QTC CALCULATION: 440 MS

## 2024-11-19 PROCEDURE — 99214 OFFICE O/P EST MOD 30 MIN: CPT | Mod: S$PBB,25,, | Performed by: INTERNAL MEDICINE

## 2024-11-19 PROCEDURE — 93005 ELECTROCARDIOGRAM TRACING: CPT | Mod: PBBFAC | Performed by: INTERNAL MEDICINE

## 2024-11-19 PROCEDURE — 93010 ELECTROCARDIOGRAM REPORT: CPT | Mod: S$PBB,,, | Performed by: INTERNAL MEDICINE

## 2024-11-19 PROCEDURE — 1159F MED LIST DOCD IN RCRD: CPT | Mod: CPTII,,, | Performed by: INTERNAL MEDICINE

## 2024-11-19 PROCEDURE — 99213 OFFICE O/P EST LOW 20 MIN: CPT | Mod: PBBFAC,25 | Performed by: INTERNAL MEDICINE

## 2024-11-19 PROCEDURE — 4010F ACE/ARB THERAPY RXD/TAKEN: CPT | Mod: CPTII,,, | Performed by: INTERNAL MEDICINE

## 2024-11-19 PROCEDURE — 3008F BODY MASS INDEX DOCD: CPT | Mod: CPTII,,, | Performed by: INTERNAL MEDICINE

## 2024-11-19 PROCEDURE — 3077F SYST BP >= 140 MM HG: CPT | Mod: CPTII,,, | Performed by: INTERNAL MEDICINE

## 2024-11-19 PROCEDURE — 3080F DIAST BP >= 90 MM HG: CPT | Mod: CPTII,,, | Performed by: INTERNAL MEDICINE

## 2024-11-19 PROCEDURE — 99999 PR PBB SHADOW E&M-EST. PATIENT-LVL III: CPT | Mod: PBBFAC,,, | Performed by: INTERNAL MEDICINE

## 2024-11-19 RX ORDER — ATORVASTATIN CALCIUM 80 MG/1
TABLET, FILM COATED ORAL
Qty: 90 TABLET | Refills: 4 | Status: SHIPPED | OUTPATIENT
Start: 2024-11-19

## 2024-11-19 RX ORDER — CHLORHEXIDINE GLUCONATE ORAL RINSE 1.2 MG/ML
SOLUTION DENTAL
COMMUNITY
Start: 2024-10-21

## 2024-11-19 RX ORDER — EZETIMIBE 10 MG/1
10 TABLET ORAL NIGHTLY
Qty: 90 TABLET | Refills: 3 | Status: SHIPPED | OUTPATIENT
Start: 2024-11-19

## 2024-11-19 NOTE — PROGRESS NOTES
"[facility]    CC: Chest Pain      Patient ID: Batool Rivera is a 59 y.o. female with HTN, HLD, BELLO  (not on CPAP), severe obesity, former smoking, who presents for a scheduled appointment.  Pertinent history/events are as follows:     -Pt kindly referred by Dr. Stevens for evaluation of left foot pain/left lower extremity pain.    -At our initial clinic visit on 7/1/2022, Ms. Rivera reports numbness in left foot at plantar surface at toes.  She reports pain in left leg "if I stay on it too long".  She reports no claudication symptoms and no tissue loss.  Formerly smoked 2 packs a day for 22 years.  Quit at age 47.  Works in security.  VIDAL Study on 6/24/2022 revealed normal rest and exercise VIDAL bilaterally.  Mildly dampened PVR waveforms bilaterally.  Note made in drop in exercise VIDAL on left suggesting possible occult PAD (1.14->1.09).   Plan:  Left Foot/Leg Pain- Etiology likely multifactorial with contribution from lumbar degenerative disc disease and PAD.  Check CTA abd/pelvis with iliofemoral runoff, CT lumbar spine and echo.    Left Foot Numbness- Likely neuropathic in origin.  Refer to Neurology for evaluation.  HLD- Pt states she is not taking statin or zetia.  Pt encouraged to take atorvasatatin 80 mg daily, Zetia 10 mg daily, and ASA 81 mg daily as prescribed.    HTN- Continue current medications.  Severe Obesity- Encourage diet, exercise and weight loss.   BELLO- Encourage CPAP usage.     HPI:  Ms. Rivera reports chest pain which started which started last night while lying down.  Chest pain described as "hard pain in my chest", located beneath left breast, non-radiating, 10/10 in intensity, lasting 2-3 hours. No associated n/v/d. Formerly smoked a pack a day for 22 years. Quit at age 47.  No family history of MI or sudden death. EKG today shows normal sinus rhythm with sinus arrhythmia and nonspecific ST/T wave changes.    Past Medical History:   Diagnosis Date    Anemia     Cataract     " Chronic back pain     Degenerative disc disease     Depression     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     Heart disease, unspecified     Hyperlipidemia     Hypertension      Past Surgical History:   Procedure Laterality Date     SECTION      COLONOSCOPY N/A 2023    Procedure: COLONOSCOPY;  Surgeon: Lon George MD;  Location: [facility] ([address] [place] [address]);  Service: Colon and Rectal;  Laterality: N/A;  referred by pcp arash Cordova instructions sent to pt portal.cf  pre call, pt confirmed - mf    HYSTERECTOMY   or     OOPHORECTOMY      one ovary was removed    TUBAL LIGATION       Social History     Socioeconomic History    Marital status: Single   Tobacco Use    Smoking status: Former     Current packs/day: 0.00     Average packs/day: 2.0 packs/day for 28.0 years (56.0 ttl pk-yrs)     Types: Cigarettes     Start date: 1983     Quit date: 2011     Years since quittin.2    Smokeless tobacco: Never   Substance and Sexual Activity    Alcohol use: Yes     Comment: beer weekends    Drug use: No    Sexual activity: Yes     Partners: Male     Birth control/protection: See Surgical Hx     Comment: BTL   Social History Narrative    Living with her felicitas, who is 9 y/o.      Social Drivers of Health     Financial Resource Strain: Medium Risk (2024)    Overall Financial Resource Strain (CARDIA)     Difficulty of Paying Living Expenses: Somewhat hard   Food Insecurity: Food Insecurity Present (2024)    Hunger Vital Sign     Worried About Running Out of Food in the Last Year: Sometimes true     Ran Out of Food in the Last Year: Never true   Transportation Needs: Unmet Transportation Needs (2024)    PRAPARE - Transportation     Lack of Transportation (Medical): No     Lack of Transportation (Non-Medical): Yes   Physical Activity: Sufficiently Active (2024)    Exercise Vital Sign     Days of Exercise per Week: 7 days     Minutes of Exercise per Session: 30 min   Stress:  Stress Concern Present (2024)    [employer] [place] [employer] - Occupational Stress Questionnaire     Feeling of Stress : Very much   Housing Stability: Low Risk  (2024)    Housing Stability Vital Sign     Unable to Pay for Housing in the Last Year: No     Number of Places Lived in the Last Year: 1     Unstable Housing in the Last Year: No     Family History   Problem Relation Name Age of Onset    Hypertension Mother      Heart disease Mother      Asthma Mother      Cataracts Mother      Glaucoma Mother      Brain cancer Father      Stroke Brother      Kidney disease Brother      Glaucoma Sister      Diabetes Sister      Heart disease Brother          MI x 5    Depression Brother      Blindness Neg Hx      Macular degeneration Neg Hx      Retinal detachment Neg Hx      Breast cancer Neg Hx      Colon cancer Neg Hx      Ovarian cancer Neg Hx         Review of patient's allergies indicates:   Allergen Reactions    Coconut Itching    Losartan Hallucinations    Pineapple     Voltaren [diclofenac sodium] Rash     Rash with voltaren GEL, not to NSAIDs in general       Medication List with Changes/Refills   Current Medications    AMLODIPINE (NORVASC) 10 MG TABLET    TAKE 1 TABLET(10 MG) BY MOUTH EVERY EVENING FOR BLOOD PRESSURE    ASPIRIN (ECOTRIN) 81 MG EC TABLET    Take 1 tablet (81 mg total) by mouth once daily.    ATORVASTATIN (LIPITOR) 80 MG TABLET    TAKE 1 TABLET BY MOUTH ONCE DAILY FOR CHOLESTEROL AND TO PROTECT YOUR HEART    CHLORHEXIDINE (PERIDEX) 0.12 % SOLUTION    SMARTSI-10 Milliliter(s) By Mouth Twice Daily    CYCLOBENZAPRINE (FLEXERIL) 5 MG TABLET    Take 1 tablet (5 mg total) by mouth 3 (three) times daily as needed for Muscle spasms. Take the first dose at night, avoid operating heavy machinery when on this medication.    ERGOCALCIFEROL (ERGOCALCIFEROL) 50,000 UNIT CAP    TAKE ONE CAPSULE BY MOUTH EVERY 7 DAYS FOR VITAMIN D DEFICIENCY    ESCITALOPRAM OXALATE (LEXAPRO) 20 MG TABLET   "  Take 1 tablet (20 mg total) by mouth once daily.    EZETIMIBE (ZETIA) 10 MG TABLET    Take 1 tablet (10 mg total) by mouth every evening.    FLUTICASONE PROPIONATE (FLONASE) 50 MCG/ACTUATION NASAL SPRAY    1 spray (50 mcg total) by Each Nostril route once daily.    GABAPENTIN (NEURONTIN) 400 MG CAPSULE    Take 1 capsule (400 mg total) by mouth every evening. If no relief, may increase dose to twice per day.    LISINOPRIL-HYDROCHLOROTHIAZIDE (PRINZIDE,ZESTORETIC) 20-12.5 MG PER TABLET    Take 2 tablets by mouth once daily.    MELOXICAM (MOBIC) 15 MG TABLET    Take 1 tablet (15 mg total) by mouth once daily.    METHYLPREDNISOLONE (MEDROL DOSEPACK) 4 MG TABLET    Use as directed    TRAZODONE (DESYREL) 150 MG TABLET    TAKE 1 TABLET(150 MG) BY MOUTH EVERY EVENING AT BEDTIME AS NEEDED FOR INSOMNIA       Review of Systems  Constitution: Denies chills, fever, and sweats.  HENT: Denies headaches or blurry vision.  Cardiovascular: Denies chest pain or irregular heart beat.  Respiratory: Denies cough or shortness of breath.  Gastrointestinal: Denies abdominal pain, nausea, or vomiting.  Musculoskeletal: Denies muscle cramps.  Neurological: Denies dizziness or focal weakness.  Psychiatric/Behavioral: Normal mental status.  Hematologic/Lymphatic: Denies bleeding problem or easy bruising/bleeding.  Skin: Denies rash or suspicious lesions    Physical Examination  BP (!) 177/101   Pulse 75   Ht 5' 5" (1.651 m)   Wt 98.7 kg (217 lb 9.5 oz)   BMI 36.21 kg/m²     Constitutional: No acute distress, conversant  HEENT: Sclera anicteric, Pupils equal, round and reactive to light, extraocular motions intact, Oropharynx clear  Neck: No JVD, no carotid bruits  Cardiovascular: regular rate and rhythm, no murmur, rubs or gallops, normal S1/S2  Pulmonary: Clear to auscultation bilaterally  Abdominal: Abdomen soft, nontender, nondistended, positive bowel sounds  Extremities: No lower extremity edema,   Pulses:  Carotid pulses are 2+ on " the right side, and 2+ on the left side.  Radial pulses are 2+ on the right side, and 2+ on the left side.   Femoral pulses are 2+ on the right side, and 2+ on the left side.  Popliteal pulses are 2+ on the right side, and 2+ on the left side.   Dorsalis pedis pulses are 2+ on the right side, and 2+ on the left side.   Posterior tibial pulses are 2+ on the right side, and 2+ on the left side.    Skin: No ecchymosis, erythema, or ulcers  Psych: Alert and oriented x 3, appropriate affect  Neuro: CNII-XII intact, no focal deficits    Labs:  Most Recent Data  CBC:   Lab Results   Component Value Date    WBC 10.67 11/05/2021    HGB 14.6 11/05/2021    HCT 43.5 11/05/2021     11/05/2021    MCV 75 (L) 11/05/2021    RDW 16.5 (H) 11/05/2021     BMP:   Lab Results   Component Value Date     07/28/2023    K 4.1 07/28/2023     07/28/2023    CO2 26 07/28/2023    BUN 16 07/28/2023    CREATININE 0.8 07/28/2023    GLU 93 07/28/2023    CALCIUM 9.5 07/28/2023     LFTS;   Lab Results   Component Value Date    PROT 7.7 11/05/2021    ALBUMIN 4.1 11/05/2021    BILITOT 0.5 11/05/2021    AST 16 11/05/2021    ALKPHOS 118 11/05/2021    ALT 15 11/05/2021     COAGS:   Lab Results   Component Value Date    INR 1.1 02/17/2012     FLP:   Lab Results   Component Value Date    CHOL 219 (H) 07/22/2022    HDL 37 (L) 07/22/2022    LDLCALC 158.0 07/22/2022    TRIG 120 07/22/2022    CHOLHDL 16.9 (L) 07/22/2022     CARDIAC:   Lab Results   Component Value Date    TROPONINI <0.006 04/04/2015    BNP 26 04/04/2015       Imaging:    EKG 8/9/2016:  Sinus bradycardia  Otherwise normal ECG    VIDAL Study 6/24/2022:  Normal rest and exercise VIDAL bilaterally.  Mildly dampened PVR waveforms bilaterally.  Note made in drop in exercise VIDAL on left suggesting possible occult PAD (1.14->1.09).     Assessment/Plan:  Batool Rivera is a 57 y.o. female with HTN, HLD, BELLO  (not on CPAP), severe obesity, former smoking, who presents for an initial  appointment.    1. Chest Pain- Pt with risk factors for CAD. Check exercise stress echo to evaluate for myocardial ischemia. Pt states she is not taking statin or zetia.  Pt encouraged to take atorvasatatin 80 mg daily, Zetia 10 mg daily, and ASA 81 mg daily as prescribed.     2. HLD- Pt states she is not taking statin or zetia.  Pt encouraged to take atorvasatatin 80 mg daily, Zetia 10 mg daily, and ASA 81 mg daily as prescribed.      4. HTN- Continue current medications.    5. Severe Obesity- Encourage diet, exercise and weight loss.     6. BELLO- Encourage CPAP usage.     Will call pt with results of stress test  Otherwise, follow up in 3 months with lipids prior    Total duration of face to face visit time 30 minutes.  Total time spent counseling greater than fifty percent of total visit time.  Counseling included discussion regarding imaging findings, diagnosis, possibilities, treatment options, risks and benefits.  The patient had many questions regarding the options and long-term effects.    Lon Roblero MD, PhD  Interventional Cardiology

## 2024-11-20 NOTE — PATIENT INSTRUCTIONS
Assessment/Plan:  Batool Rivera is a 57 y.o. female with HTN, HLD, BELLO  (not on CPAP), severe obesity, former smoking, who presents for an initial appointment.    1. Chest Pain- Pt with risk factors for CAD. Check exercise stress echo to evaluate for myocardial ischemia. Pt states she is not taking statin or zetia.  Pt encouraged to take atorvasatatin 80 mg daily, Zetia 10 mg daily, and ASA 81 mg daily as prescribed.     2. HLD- Pt states she is not taking statin or zetia.  Pt encouraged to take atorvasatatin 80 mg daily, Zetia 10 mg daily, and ASA 81 mg daily as prescribed.      4. HTN- Continue current medications.    5. Severe Obesity- Encourage diet, exercise and weight loss.     6. BELLO- Encourage CPAP usage.     Will call pt with results of stress test  Otherwise, follow up in 3 months with lipids prior

## 2024-12-12 ENCOUNTER — HOSPITAL ENCOUNTER (OUTPATIENT)
Dept: CARDIOLOGY | Facility: HOSPITAL | Age: 59
Discharge: HOME | End: 2024-12-12
Attending: INTERNAL MEDICINE
Payer: MEDICAID

## 2024-12-12 VITALS — WEIGHT: 217 LBS | HEIGHT: 65 IN | BODY MASS INDEX: 36.15 KG/M2

## 2024-12-12 DIAGNOSIS — R07.89 OTHER CHEST PAIN: ICD-10-CM

## 2024-12-12 LAB
ASCENDING AORTA: 3.13 CM
AV AREA BY CONTINUOUS VTI: 1.5 CM2
AV INDEX (PROSTH): 0.64
AV LVOT MEAN GRADIENT: 3 MMHG
AV LVOT PEAK GRADIENT: 4 MMHG
AV MEAN GRADIENT: 6.6 MMHG
AV PEAK GRADIENT: 11.6 MMHG
AV VALVE AREA BY VELOCITY RATIO: 1.3 CM²
AV VALVE AREA: 1.5 CM2
AV VELOCITY RATIO: 0.59
BSA FOR ECHO PROCEDURE: 2.12 M2
CV ECHO LV RWT: 0.42 CM
CV STRESS BASE HR: 71 BPM
DIASTOLIC BLOOD PRESSURE: 100 MMHG
DOP CALC AO PEAK VEL: 1.7 M/S
DOP CALC AO VTI: 33.3 CM
DOP CALC LVOT AREA: 2.3 CM2
DOP CALC LVOT DIAMETER: 1.7 CM
DOP CALC LVOT PEAK VEL: 1 M/S
DOP CALC LVOT STROKE VOLUME: 48.5 CM3
DOP CALCLVOT PEAK VEL VTI: 21.4 CM
E WAVE DECELERATION TIME: 195.02 MS
E/A RATIO: 1.01
E/E' RATIO: 7.37 M/S
ECHO EF ESTIMATED: 72 %
ECHO LV POSTERIOR WALL: 1 CM (ref 0.6–1.1)
FRACTIONAL SHORTENING: 41.7 % (ref 28–44)
INTERVENTRICULAR SEPTUM: 0.9 CM (ref 0.6–1.1)
LA MAJOR: 5.41 CM
LA MINOR: 5.17 CM
LA WIDTH: 3.61 CM
LEFT ATRIUM SIZE: 3.74 CM
LEFT ATRIUM VOLUME INDEX MOD: 25.3 ML/M2
LEFT ATRIUM VOLUME INDEX: 29.6 ML/M2
LEFT ATRIUM VOLUME MOD: 51.96 ML
LEFT ATRIUM VOLUME: 60.68 CM3
LEFT INTERNAL DIMENSION IN SYSTOLE: 2.8 CM (ref 2.1–4)
LEFT VENTRICLE DIASTOLIC VOLUME INDEX: 52.61 ML/M2
LEFT VENTRICLE DIASTOLIC VOLUME: 107.86 ML
LEFT VENTRICLE MASS INDEX: 77.5 G/M2
LEFT VENTRICLE SYSTOLIC VOLUME INDEX: 14.5 ML/M2
LEFT VENTRICLE SYSTOLIC VOLUME: 29.79 ML
LEFT VENTRICULAR INTERNAL DIMENSION IN DIASTOLE: 4.8 CM (ref 3.5–6)
LEFT VENTRICULAR MASS: 158.8 G
LV LATERAL E/E' RATIO: 6.36
LV SEPTAL E/E' RATIO: 8.75
MV PEAK A VEL: 0.69 M/S
MV PEAK E VEL: 0.7 M/S
OHS CV CPX 1 MINUTE RECOVERY HEART RATE: 87 BPM
OHS CV CPX 85 PERCENT MAX PREDICTED HEART RATE MALE: 135
OHS CV CPX ESTIMATED METS: 8
OHS CV CPX MAX PREDICTED HEART RATE: 159
OHS CV CPX PATIENT IS FEMALE: 1
OHS CV CPX PATIENT IS MALE: 0
OHS CV CPX PEAK DIASTOLIC BLOOD PRESSURE: 80 MMHG
OHS CV CPX PEAK HEAR RATE: 122 BPM
OHS CV CPX PEAK RATE PRESSURE PRODUCT: NORMAL
OHS CV CPX PEAK SYSTOLIC BLOOD PRESSURE: 182 MMHG
OHS CV CPX PERCENT MAX PREDICTED HEART RATE ACHIEVED: 80
OHS CV CPX RATE PRESSURE PRODUCT PRESENTING: NORMAL
OHS CV RV/LV RATIO: 0.75 CM
POST STRESS EJECTION FRACTION: 70 %
RA MAJOR: 4.65 CM
RA PRESSURE ESTIMATED: 3 MMHG
RA WIDTH: 3.29 CM
RIGHT ATRIAL AREA: 13.5 CM2
RIGHT VENTRICLE DIASTOLIC BASEL DIMENSION: 3.6 CM
RV TISSUE DOPPLER FREE WALL SYSTOLIC VELOCITY 1 (APICAL 4 CHAMBER VIEW): 12.5 CM/S
SINUS: 2.58 CM
STJ: 2.45 CM
STRESS ECHO POST EXERCISE DUR MIN: 5 MINUTES
STRESS ECHO POST EXERCISE DUR SEC: 30 SECONDS
SYSTOLIC BLOOD PRESSURE: 176 MMHG
TDI LATERAL: 0.11 M/S
TDI SEPTAL: 0.08 M/S
TDI: 0.1 M/S
TRICUSPID ANNULAR PLANE SYSTOLIC EXCURSION: 2.93 CM
Z-SCORE OF LEFT VENTRICULAR DIMENSION IN END DIASTOLE: -2.48
Z-SCORE OF LEFT VENTRICULAR DIMENSION IN END SYSTOLE: -2.34

## 2024-12-12 PROCEDURE — 93320 DOPPLER ECHO COMPLETE: CPT | Mod: 26,,, | Performed by: INTERNAL MEDICINE

## 2024-12-12 PROCEDURE — 93320 DOPPLER ECHO COMPLETE: CPT

## 2024-12-12 PROCEDURE — 93325 DOPPLER ECHO COLOR FLOW MAPG: CPT | Mod: 26,,, | Performed by: INTERNAL MEDICINE

## 2024-12-12 PROCEDURE — 93351 STRESS TTE COMPLETE: CPT | Mod: 26,,, | Performed by: INTERNAL MEDICINE

## 2024-12-16 ENCOUNTER — PATIENT MESSAGE (OUTPATIENT)
Dept: ORTHOPEDICS | Facility: CLINIC | Age: 59
End: 2024-12-16
Payer: MEDICAID

## 2024-12-26 ENCOUNTER — TELEPHONE (OUTPATIENT)
Dept: INTERNAL MEDICINE | Facility: CLINIC | Age: 59
End: 2024-12-26

## 2025-02-18 ENCOUNTER — OFFICE VISIT (OUTPATIENT)
Dept: CARDIOLOGY | Facility: CLINIC | Age: 60
End: 2025-02-18
Payer: MEDICAID

## 2025-02-18 ENCOUNTER — LAB VISIT (OUTPATIENT)
Dept: LAB | Facility: HOSPITAL | Age: 60
End: 2025-02-18
Attending: INTERNAL MEDICINE
Payer: MEDICAID

## 2025-02-18 VITALS
DIASTOLIC BLOOD PRESSURE: 115 MMHG | WEIGHT: 220.44 LBS | HEIGHT: 65 IN | BODY MASS INDEX: 36.73 KG/M2 | SYSTOLIC BLOOD PRESSURE: 235 MMHG | HEART RATE: 67 BPM

## 2025-02-18 DIAGNOSIS — G47.33 OSA ON CPAP: ICD-10-CM

## 2025-02-18 DIAGNOSIS — Z87.891 HISTORY OF SMOKING GREATER THAN 50 PACK YEARS: ICD-10-CM

## 2025-02-18 DIAGNOSIS — E78.2 MIXED HYPERLIPIDEMIA: ICD-10-CM

## 2025-02-18 DIAGNOSIS — R07.89 OTHER CHEST PAIN: Primary | ICD-10-CM

## 2025-02-18 DIAGNOSIS — I10 ESSENTIAL HYPERTENSION: ICD-10-CM

## 2025-02-18 DIAGNOSIS — I10 PRIMARY HYPERTENSION: ICD-10-CM

## 2025-02-18 DIAGNOSIS — E66.01 SEVERE OBESITY (BMI 35.0-35.9 WITH COMORBIDITY): ICD-10-CM

## 2025-02-18 LAB
ALBUMIN SERPL BCP-MCNC: 3.7 G/DL (ref 3.5–5.2)
ALP SERPL-CCNC: 111 U/L (ref 40–150)
ALT SERPL W/O P-5'-P-CCNC: 13 U/L (ref 10–44)
ANION GAP SERPL CALC-SCNC: 7 MMOL/L (ref 8–16)
AST SERPL-CCNC: 23 U/L (ref 10–40)
BILIRUB SERPL-MCNC: 0.5 MG/DL (ref 0.1–1)
BUN SERPL-MCNC: 15 MG/DL (ref 8–23)
CALCIUM SERPL-MCNC: 9.6 MG/DL (ref 8.7–10.5)
CHLORIDE SERPL-SCNC: 106 MMOL/L (ref 95–110)
CHOLEST SERPL-MCNC: 311 MG/DL (ref 120–199)
CHOLEST/HDLC SERPL: 7.6 {RATIO} (ref 2–5)
CO2 SERPL-SCNC: 27 MMOL/L (ref 23–29)
CREAT SERPL-MCNC: 0.9 MG/DL (ref 0.5–1.4)
EST. GFR  (NO RACE VARIABLE): >60 ML/MIN/1.73 M^2
GLUCOSE SERPL-MCNC: 105 MG/DL (ref 70–110)
HDLC SERPL-MCNC: 41 MG/DL (ref 40–75)
HDLC SERPL: 13.2 % (ref 20–50)
LDLC SERPL CALC-MCNC: 239.4 MG/DL (ref 63–159)
NONHDLC SERPL-MCNC: 270 MG/DL
POTASSIUM SERPL-SCNC: 4.2 MMOL/L (ref 3.5–5.1)
PROT SERPL-MCNC: 7.6 G/DL (ref 6–8.4)
SODIUM SERPL-SCNC: 140 MMOL/L (ref 136–145)
TRIGL SERPL-MCNC: 153 MG/DL (ref 30–150)

## 2025-02-18 PROCEDURE — 36415 COLL VENOUS BLD VENIPUNCTURE: CPT | Performed by: INTERNAL MEDICINE

## 2025-02-18 PROCEDURE — 80061 LIPID PANEL: CPT | Performed by: INTERNAL MEDICINE

## 2025-02-18 PROCEDURE — 80053 COMPREHEN METABOLIC PANEL: CPT | Performed by: INTERNAL MEDICINE

## 2025-02-18 PROCEDURE — 99214 OFFICE O/P EST MOD 30 MIN: CPT | Mod: PBBFAC | Performed by: INTERNAL MEDICINE

## 2025-02-18 NOTE — PROGRESS NOTES
"[facility]    CC: Chest Pain      Patient ID: Batool Rivera is a 59 y.o. female with HTN, HLD, BELLO  (not on CPAP), severe obesity, former smoking, who presents for a follow up appointment.  Pertinent history/events are as follows:     -Pt kindly referred by Dr. Stevens for evaluation of left foot pain/left lower extremity pain.    -At our initial clinic visit on 7/1/2022, Ms. Rivera reports numbness in left foot at plantar surface at toes.  She reports pain in left leg "if I stay on it too long".  She reports no claudication symptoms and no tissue loss.  Formerly smoked 2 packs a day for 22 years.  Quit at age 47.  Works in security.  VIDAL Study on 6/24/2022 revealed normal rest and exercise VIDAL bilaterally.  Mildly dampened PVR waveforms bilaterally.  Note made in drop in exercise VIDAL on left suggesting possible occult PAD (1.14->1.09).   Plan:  Left Foot/Leg Pain- Etiology likely multifactorial with contribution from lumbar degenerative disc disease and PAD.  Check CTA abd/pelvis with iliofemoral runoff, CT lumbar spine and echo.    Left Foot Numbness- Likely neuropathic in origin.  Refer to Neurology for evaluation.  HLD- Pt states she is not taking statin or zetia.  Pt encouraged to take atorvasatatin 80 mg daily, Zetia 10 mg daily, and ASA 81 mg daily as prescribed.    HTN- Continue current medications.  Severe Obesity- Encourage diet, exercise and weight loss.   BELLO- Encourage CPAP usage.     -At follow-up clinic visit on 11/19/2024, Ms. Rivera reported chest pain which started which started last night while lying down.  Chest pain described as "hard pain in my chest", located beneath left breast, non-radiating, 10/10 in intensity, lasting 2-3 hours. No associated n/v/d. Formerly smoked a pack a day for 22 years. Quit at age 47.  No family history of MI or sudden death. EKG today shows normal sinus rhythm with sinus arrhythmia and nonspecific ST/T wave changes.  Plan:  Chest Pain- Pt with risk " factors for CAD. Check exercise stress echo to evaluate for myocardial ischemia. Pt states she is not taking statin or zetia.  Pt encouraged to take atorvasatatin 80 mg daily, Zetia 10 mg daily, and ASA 81 mg daily as prescribed.   HLD- Pt states she is not taking statin or zetia.  Pt encouraged to take atorvasatatin 80 mg daily, Zetia 10 mg daily, and ASA 81 mg daily as prescribed.    HTN- Continue current medications.  Severe Obesity- Encourage diet, exercise and weight loss.   BELLO- Encourage CPAP usage.     HPI:  Ms. Rivera reports no further chest pain. Exercise stress echo with Doppler on 2024 is negative with no echocardiographic evidence of stress induced ischemia. The exercise capacity was normal. Echo portion with EF 60-65%; normal LV diastolic function; normal venous pressure at 3 mmHg.    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     Heart disease, unspecified     Hyperlipidemia     Hypertension      Past Surgical History:   Procedure Laterality Date     SECTION      COLONOSCOPY N/A 2023    Procedure: COLONOSCOPY;  Surgeon: Lon George MD;  Location: [facility] ([address] [place] [address]);  Service: Colon and Rectal;  Laterality: N/A;  referred by pcp , arash instructions sent to pt [email or web address]  pre call, pt confirmed - mf    HYSTERECTOMY   or     OOPHORECTOMY      one ovary was removed    TUBAL LIGATION       Social History     Socioeconomic History    Marital status: Single   Tobacco Use    Smoking status: Former     Current packs/day: 0.00     Average packs/day: 2.0 packs/day for 28.0 years (56.0 ttl pk-yrs)     Types: Cigarettes     Start date: 1983     Quit date: 2011     Years since quittin.4    Smokeless tobacco: Never   Substance and Sexual Activity    Alcohol use: Yes     Comment: beer weekends    Drug use: No    Sexual activity: Yes      Partners: Male     Birth control/protection: See Surgical Hx     Comment: BTL   Social History Narrative    Living with her felicitas, who is 9 y/o.      Social Drivers of Health     Financial Resource Strain: Medium Risk (1/29/2024)    Overall Financial Resource Strain (CARDIA)     Difficulty of Paying Living Expenses: Somewhat hard   Food Insecurity: Food Insecurity Present (1/29/2024)    Hunger Vital Sign     Worried About Running Out of Food in the Last Year: Sometimes true     Ran Out of Food in the Last Year: Never true   Transportation Needs: Unmet Transportation Needs (1/29/2024)    PRAPARE - Transportation     Lack of Transportation (Medical): No     Lack of Transportation (Non-Medical): Yes   Physical Activity: Sufficiently Active (1/29/2024)    Exercise Vital Sign     Days of Exercise per Week: 7 days     Minutes of Exercise per Session: 30 min   Stress: Stress Concern Present (1/29/2024)    [employer] [place] [employer] - Occupational Stress Questionnaire     Feeling of Stress : Very much   Housing Stability: Low Risk  (1/29/2024)    Housing Stability Vital Sign     Unable to Pay for Housing in the Last Year: No     Number of Places Lived in the Last Year: 1     Unstable Housing in the Last Year: No     Family History   Problem Relation Name Age of Onset    Hypertension Mother      Heart disease Mother      Asthma Mother      Cataracts Mother      Glaucoma Mother      Brain cancer Father      Stroke Brother      Kidney disease Brother      Glaucoma Sister      Diabetes Sister      Heart disease Brother          MI x 5    Depression Brother      Blindness Neg Hx      Macular degeneration Neg Hx      Retinal detachment Neg Hx      Breast cancer Neg Hx      Colon cancer Neg Hx      Ovarian cancer Neg Hx         Review of patient's allergies indicates:   Allergen Reactions    Coconut Itching    Losartan Hallucinations    Pineapple     Voltaren [diclofenac sodium] Rash     Rash with voltaren  GEL, not to NSAIDs in general       Medication List with Changes/Refills   Current Medications    AMLODIPINE (NORVASC) 10 MG TABLET    TAKE 1 TABLET(10 MG) BY MOUTH EVERY EVENING FOR BLOOD PRESSURE    ASPIRIN (ECOTRIN) 81 MG EC TABLET    Take 1 tablet (81 mg total) by mouth once daily.    ATORVASTATIN (LIPITOR) 80 MG TABLET    TAKE 1 TABLET BY MOUTH ONCE DAILY FOR CHOLESTEROL AND TO PROTECT YOUR HEART    CHLORHEXIDINE (PERIDEX) 0.12 % SOLUTION    SMARTSI-10 Milliliter(s) By Mouth Twice Daily    ERGOCALCIFEROL (ERGOCALCIFEROL) 50,000 UNIT CAP    TAKE ONE CAPSULE BY MOUTH EVERY 7 DAYS FOR VITAMIN D DEFICIENCY    ESCITALOPRAM OXALATE (LEXAPRO) 20 MG TABLET    Take 1 tablet (20 mg total) by mouth once daily.    EZETIMIBE (ZETIA) 10 MG TABLET    Take 1 tablet (10 mg total) by mouth every evening.    FLUTICASONE PROPIONATE (FLONASE) 50 MCG/ACTUATION NASAL SPRAY    1 spray (50 mcg total) by Each Nostril route once daily.    GABAPENTIN (NEURONTIN) 400 MG CAPSULE    Take 1 capsule (400 mg total) by mouth every evening. If no relief, may increase dose to twice per day.    LISINOPRIL-HYDROCHLOROTHIAZIDE (PRINZIDE,ZESTORETIC) 20-12.5 MG PER TABLET    Take 2 tablets by mouth once daily.    MELOXICAM (MOBIC) 15 MG TABLET    Take 1 tablet (15 mg total) by mouth once daily.    METHYLPREDNISOLONE (MEDROL DOSEPACK) 4 MG TABLET    Use as directed    TRAZODONE (DESYREL) 150 MG TABLET    TAKE 1 TABLET(150 MG) BY MOUTH EVERY EVENING AT BEDTIME AS NEEDED FOR INSOMNIA       Review of Systems  Constitution: Denies chills, fever, and sweats.  HENT: Denies headaches or blurry vision.  Cardiovascular: Denies chest pain or irregular heart beat.  Respiratory: Denies cough or shortness of breath.  Gastrointestinal: Denies abdominal pain, nausea, or vomiting.  Musculoskeletal: Denies muscle cramps.  Neurological: Denies dizziness or focal weakness.  Psychiatric/Behavioral: Normal mental status.  Hematologic/Lymphatic: Denies bleeding problem or  "easy bruising/bleeding.  Skin: Denies rash or suspicious lesions    Physical Examination  Ht 5' 5" (1.651 m)   Wt 100 kg (220 lb 7.4 oz)   BMI 36.69 kg/m²     Constitutional: No acute distress, conversant  HEENT: Sclera anicteric, Pupils equal, round and reactive to light, extraocular motions intact, Oropharynx clear  Neck: No JVD, no carotid bruits  Cardiovascular: regular rate and rhythm, no murmur, rubs or gallops, normal S1/S2  Pulmonary: Clear to auscultation bilaterally  Abdominal: Abdomen soft, nontender, nondistended, positive bowel sounds  Extremities: No lower extremity edema,   Pulses:  Carotid pulses are 2+ on the right side, and 2+ on the left side.  Radial pulses are 2+ on the right side, and 2+ on the left side.   Femoral pulses are 2+ on the right side, and 2+ on the left side.  Popliteal pulses are 2+ on the right side, and 2+ on the left side.   Dorsalis pedis pulses are 2+ on the right side, and 2+ on the left side.   Posterior tibial pulses are 2+ on the right side, and 2+ on the left side.    Skin: No ecchymosis, erythema, or ulcers  Psych: Alert and oriented x 3, appropriate affect  Neuro: CNII-XII intact, no focal deficits    Labs:  Most Recent Data  CBC:   Lab Results   Component Value Date    WBC 10.67 11/05/2021    HGB 14.6 11/05/2021    HCT 43.5 11/05/2021     11/05/2021    MCV 75 (L) 11/05/2021    RDW 16.5 (H) 11/05/2021     BMP:   Lab Results   Component Value Date     07/28/2023    K 4.1 07/28/2023     07/28/2023    CO2 26 07/28/2023    BUN 16 07/28/2023    CREATININE 0.8 07/28/2023    GLU 93 07/28/2023    CALCIUM 9.5 07/28/2023     LFTS;   Lab Results   Component Value Date    PROT 7.7 11/05/2021    ALBUMIN 4.1 11/05/2021    BILITOT 0.5 11/05/2021    AST 16 11/05/2021    ALKPHOS 118 11/05/2021    ALT 15 11/05/2021     COAGS:   Lab Results   Component Value Date    INR 1.1 02/17/2012     FLP:   Lab Results   Component Value Date    CHOL 219 (H) 07/22/2022    HDL 37 " "(L) 07/22/2022    LDLCALC 158.0 07/22/2022    TRIG 120 07/22/2022    CHOLHDL 16.9 (L) 07/22/2022     CARDIAC:   Lab Results   Component Value Date    TROPONINI <0.006 04/04/2015    BNP 26 04/04/2015       Imaging:    EKG 8/9/2016:  Sinus bradycardia  Otherwise normal ECG    Exercise stress echo with Doppler 12/12/2024:    Left Ventricle: The left ventricle is normal in size. Normal wall thickness. There is normal systolic function with a visually estimated ejection fraction of 60 - 65%. There is normal diastolic function.    Right Ventricle: Normal right ventricular cavity size. Wall thickness is normal. Systolic function is normal.    IVC/SVC: Normal venous pressure at 3 mmHg.    Stress Protocol: The patient exercised for 5 minutes 30 seconds on a high ramp protocol, corresponding to a functional capacity of 8 estimated METS, achieving a peak heart rate of 122 bpm, which is 80% of the age predicted maximum heart rate. Their exercise capacity was normal. The patient reported shortness of breath during the stress test. The test was stopped because the patient experienced shortness of breath. Patient also said that she " feels like she is going to fall."    Baseline ECG: The Baseline ECG reveals sinus rhythm. The axis is normal. The ST segments are normal.    Stress ECG: There is no ST segment deviation identified during the protocol. During stress, occasional PACs are noted. During stress, occasional PVCs are noted. There is normal blood pressure response with stress.    ECG Conclusion: The ECG portion of the study is negative for ischemia. Sensitivity is reduced due to failure to reach target heart rate.    Post-stress Echo: The left ventricle systolic function is normal with an EF of 70%. Right ventricle cavity size is small. Right ventricle systolic function is hyperdynamic.    Post-stress Conclusion: The study is negative with no echocardiographic evidence of stress induced ischemia.    VIDAL Study " 6/24/2022:  Normal rest and exercise VIDAL bilaterally.  Mildly dampened PVR waveforms bilaterally.  Note made in drop in exercise VIDAL on left suggesting possible occult PAD (1.14->1.09).     Assessment/Plan:  Batool Rivera is a 59 y.o. female with HTN, HLD, BELLO  (not on CPAP), severe obesity, former smoking, who presents for a follow up appointment.    1. Chest Pain- Ms. Rivera reports no further chest pain. Exercise stress echo with Doppler on 12/12/2024 is negative with no echocardiographic evidence of stress induced ischemia. The exercise capacity was normal. Echo portion with EF 60-65%; normal LV diastolic function; normal venous pressure at 3 mmHg.  Continue aggressive risk factor modification, including weight loss.      2. HLD- Pt states she is not taking statin or zetia.  Pt encouraged to take atorvasatatin 80 mg daily, Zetia 10 mg daily, and ASA 81 mg daily as prescribed.      4. HTN- Continue current medications.    5. Severe Obesity- Encourage diet, exercise and weight loss. Refer to Bariatric Medicine for evaluation.     6. BELLO- Encourage CPAP usage.     Follow up in 6 months     Total duration of face to face visit time 15 minutes.  Total time spent counseling greater than fifty percent of total visit time.  Counseling included discussion regarding imaging findings, diagnosis, possibilities, treatment options, risks and benefits.  The patient had many questions regarding the options and long-term effects.    Lon Roblero MD, PhD  Interventional Cardiology

## 2025-02-18 NOTE — PATIENT INSTRUCTIONS
Assessment/Plan:  Batool Rivera is a 59 y.o. female with HTN, HLD, BELLO  (not on CPAP), severe obesity, former smoking, who presents for a follow up appointment.    1. Chest Pain- Ms. Rivera reports no further chest pain. Exercise stress echo with Doppler on 12/12/2024 is negative with no echocardiographic evidence of stress induced ischemia. The exercise capacity was normal. Echo portion with EF 60-65%; normal LV diastolic function; normal venous pressure at 3 mmHg.  Continue aggressive risk factor modification, including weight loss.      2. HLD- Pt states she is not taking statin or zetia.  Pt encouraged to take atorvasatatin 80 mg daily, Zetia 10 mg daily, and ASA 81 mg daily as prescribed.      4. HTN- Continue current medications.    5. Severe Obesity- Encourage diet, exercise and weight loss. Refer to Bariatric Medicine for evaluation.     6. BELLO- Encourage CPAP usage.     Follow up in 6 months

## 2025-02-24 ENCOUNTER — TELEPHONE (OUTPATIENT)
Dept: BARIATRICS | Facility: CLINIC | Age: 60
End: 2025-02-24
Payer: MEDICAID

## 2025-03-31 ENCOUNTER — PATIENT OUTREACH (OUTPATIENT)
Dept: ADMINISTRATIVE | Facility: HOSPITAL | Age: 60
End: 2025-03-31
Payer: MEDICAID

## 2025-03-31 DIAGNOSIS — R91.1 PULMONARY NODULE: Primary | ICD-10-CM

## 2025-03-31 NOTE — PROGRESS NOTES
Attempted to contact Pt. No answer. Left voicemail.   Attempting to schedule Pt for low dose CT for lung CA screening.  Sarah Lundy LPN  Care Coordinator  [facility]

## 2025-04-10 ENCOUNTER — TELEPHONE (OUTPATIENT)
Dept: INTERNAL MEDICINE | Facility: CLINIC | Age: 60
End: 2025-04-10

## 2025-04-10 ENCOUNTER — OFFICE VISIT (OUTPATIENT)
Dept: INTERNAL MEDICINE | Facility: CLINIC | Age: 60
End: 2025-04-10
Payer: MEDICAID

## 2025-04-10 VITALS
WEIGHT: 214.5 LBS | DIASTOLIC BLOOD PRESSURE: 103 MMHG | HEIGHT: 65 IN | SYSTOLIC BLOOD PRESSURE: 205 MMHG | HEART RATE: 57 BPM | BODY MASS INDEX: 35.74 KG/M2

## 2025-04-10 DIAGNOSIS — I16.0 HYPERTENSIVE URGENCY: ICD-10-CM

## 2025-04-10 DIAGNOSIS — E78.2 MIXED HYPERLIPIDEMIA: Primary | ICD-10-CM

## 2025-04-10 DIAGNOSIS — I10 ESSENTIAL HYPERTENSION: ICD-10-CM

## 2025-04-10 DIAGNOSIS — R29.6 FALLS: ICD-10-CM

## 2025-04-10 DIAGNOSIS — F39 MOOD DISORDER: ICD-10-CM

## 2025-04-10 DIAGNOSIS — Z12.2 ENCOUNTER FOR SCREENING FOR LUNG CANCER: ICD-10-CM

## 2025-04-10 DIAGNOSIS — M25.512 ACUTE PAIN OF LEFT SHOULDER: ICD-10-CM

## 2025-04-10 DIAGNOSIS — Z12.31 ENCOUNTER FOR SCREENING MAMMOGRAM FOR MALIGNANT NEOPLASM OF BREAST: ICD-10-CM

## 2025-04-10 DIAGNOSIS — I10 PRIMARY HYPERTENSION: ICD-10-CM

## 2025-04-10 DIAGNOSIS — E55.9 VITAMIN D DEFICIENCY: ICD-10-CM

## 2025-04-10 DIAGNOSIS — E78.5 HYPERLIPIDEMIA, UNSPECIFIED HYPERLIPIDEMIA TYPE: ICD-10-CM

## 2025-04-10 DIAGNOSIS — F33.41 RECURRENT MAJOR DEPRESSIVE DISORDER, IN PARTIAL REMISSION: ICD-10-CM

## 2025-04-10 DIAGNOSIS — Z00.00 HEALTHCARE MAINTENANCE: ICD-10-CM

## 2025-04-10 PROCEDURE — 99999 PR PBB SHADOW E&M-EST. PATIENT-LVL V: CPT | Mod: PBBFAC,,,

## 2025-04-10 PROCEDURE — 99215 OFFICE O/P EST HI 40 MIN: CPT | Mod: PBBFAC

## 2025-04-10 RX ORDER — ESCITALOPRAM OXALATE 20 MG/1
20 TABLET ORAL DAILY
Qty: 30 TABLET | Refills: 11 | Status: SHIPPED | OUTPATIENT
Start: 2025-04-10 | End: 2026-04-10

## 2025-04-10 RX ORDER — GABAPENTIN 400 MG/1
400 CAPSULE ORAL NIGHTLY
Qty: 90 CAPSULE | Refills: 3 | Status: SHIPPED | OUTPATIENT
Start: 2025-04-10

## 2025-04-10 RX ORDER — EZETIMIBE 10 MG/1
10 TABLET ORAL NIGHTLY
Qty: 90 TABLET | Refills: 3 | Status: SHIPPED | OUTPATIENT
Start: 2025-04-10

## 2025-04-10 RX ORDER — FLUTICASONE PROPIONATE 50 MCG
1 SPRAY, SUSPENSION (ML) NASAL DAILY
Qty: 18.2 ML | Refills: 2 | Status: SHIPPED | OUTPATIENT
Start: 2025-04-10

## 2025-04-10 RX ORDER — TRAZODONE HYDROCHLORIDE 150 MG/1
TABLET ORAL
Qty: 90 TABLET | Refills: 2 | Status: SHIPPED | OUTPATIENT
Start: 2025-04-10

## 2025-04-10 RX ORDER — LISINOPRIL AND HYDROCHLOROTHIAZIDE 12.5; 2 MG/1; MG/1
3 TABLET ORAL DAILY
Qty: 270 TABLET | Refills: 3 | Status: SHIPPED | OUTPATIENT
Start: 2025-04-10 | End: 2026-04-10

## 2025-04-10 RX ORDER — ATORVASTATIN CALCIUM 80 MG/1
TABLET, FILM COATED ORAL
Qty: 90 TABLET | Refills: 4 | Status: SHIPPED | OUTPATIENT
Start: 2025-04-10

## 2025-04-10 RX ORDER — OLMESARTAN MEDOXOMIL AND HYDROCHLOROTHIAZIDE 40/25 40; 25 MG/1; MG/1
1 TABLET ORAL DAILY
Qty: 90 TABLET | Refills: 3 | Status: CANCELLED | OUTPATIENT
Start: 2025-04-10 | End: 2026-04-10

## 2025-04-10 RX ORDER — AMLODIPINE BESYLATE 10 MG/1
TABLET ORAL
Qty: 90 TABLET | Refills: 3 | Status: SHIPPED | OUTPATIENT
Start: 2025-04-10

## 2025-04-10 NOTE — PROGRESS NOTES
[facility]  Internal Medicine Resident Clinic  Clinic Note    Subjective     Chief Complaint:  Urgent care    History of Present Illness:  Ms. Batool Rivera is a 59 y.o. female w/ HTN, HLD, prediabetes, GERD, depression and BELLO on CPAP who presents today for an urgent care visit.  Reports that she had a fall about 5 days ago.  States that she was going to work when she began to feel dizzy as if she were going to pass out and fell.  Denies any syncope.  Denies that the dizziness felt like the room was spinning.  Denies any chest pain, any auras, palpitations, abdominal pain, fever, chills, shortness of breath prior to the fall.  States that she had not taken any meds before the fall.  States that she fell on her left side and is having increased shoulder pain.  States that she is unable to move her left shoulder.  Also states that she has been out of all of her medication for the past 2 weeks.  States that her systolic blood pressure runs in the 180s when she is on the medications.  Currently denies any headache, vision changes, urinary symptoms, chest pain, shortness of breath, abdominal pain, nausea, vomiting, or diarrhea.    Review of Systems   Constitutional:  Negative for chills and fever.   HENT:  Negative for congestion.    Eyes:  Negative for blurred vision and double vision.   Respiratory:  Negative for cough and shortness of breath.    Cardiovascular:  Negative for chest pain and palpitations.   Gastrointestinal:  Negative for abdominal pain, blood in stool, constipation, diarrhea, melena, nausea and vomiting.   Genitourinary:  Negative for dysuria.   Musculoskeletal:  Positive for falls.   Neurological:  Positive for dizziness. Negative for weakness and headaches.   All other systems reviewed and are negative.      PAST HISTORY:     Past Medical History:   Diagnosis Date    Anemia     Cataract     Chronic back pain     Degenerative disc disease     Depression     Glaucoma  suspect with open angle     Heart disease, unspecified     Hyperlipidemia     Hypertension        Past Surgical History:   Procedure Laterality Date     SECTION      COLONOSCOPY N/A 2023    Procedure: COLONOSCOPY;  Surgeon: Lon George MD;  Location: [facility] ([address] [place] [address]);  Service: Colon and Rectal;  Laterality: N/A;  referred by pcp arash Cordova instructions sent to pt [email or web address]  pre call, pt confirmed - mf    HYSTERECTOMY   or     OOPHORECTOMY      one ovary was removed    TUBAL LIGATION         Family History   Problem Relation Name Age of Onset    Hypertension Mother      Heart disease Mother      Asthma Mother      Cataracts Mother      Glaucoma Mother      Brain cancer Father      Stroke Brother      Kidney disease Brother      Glaucoma Sister      Diabetes Sister      Heart disease Brother          MI x 5    Depression Brother      Blindness Neg Hx      Macular degeneration Neg Hx      Retinal detachment Neg Hx      Breast cancer Neg Hx      Colon cancer Neg Hx      Ovarian cancer Neg Hx         Social History     Socioeconomic History    Marital status: Single   Tobacco Use    Smoking status: Former     Current packs/day: 0.00     Average packs/day: 2.0 packs/day for 28.0 years (56.0 ttl pk-yrs)     Types: Cigarettes     Start date: 1983     Quit date: 2011     Years since quittin.6    Smokeless tobacco: Never   Substance and Sexual Activity    Alcohol use: Yes     Comment: beer weekends    Drug use: No    Sexual activity: Yes     Partners: Male     Birth control/protection: See Surgical Hx     Comment: BTL   Social History Narrative    Living with her grandbran, who is 9 y/o.      Social Drivers of Health     Financial Resource Strain: Medium Risk (2024)    Overall Financial Resource Strain (CARDIA)     Difficulty of Paying Living Expenses: Somewhat hard   Food Insecurity: Food Insecurity Present (2024)    Hunger Vital Sign     Worried About Running  Out of Food in the Last Year: Sometimes true     Ran Out of Food in the Last Year: Never true   Transportation Needs: Unmet Transportation Needs (2024)    PRAPARE - Transportation     Lack of Transportation (Medical): No     Lack of Transportation (Non-Medical): Yes   Physical Activity: Sufficiently Active (2024)    Exercise Vital Sign     Days of Exercise per Week: 7 days     Minutes of Exercise per Session: 30 min   Stress: Stress Concern Present (2024)    [employer] - Occupational Stress Questionnaire     Feeling of Stress : Very much   Housing Stability: Low Risk  (2024)    Housing Stability Vital Sign     Unable to Pay for Housing in the Last Year: No     Number of Places Lived in the Last Year: 1     Unstable Housing in the Last Year: No       MEDICATIONS & ALLERGIES:     Current Outpatient Medications on File Prior to Visit   Medication Sig    aspirin (ECOTRIN) 81 MG EC tablet Take 1 tablet (81 mg total) by mouth once daily.    chlorhexidine (PERIDEX) 0.12 % solution SMARTSI-10 Milliliter(s) By Mouth Twice Daily    ergocalciferol (ERGOCALCIFEROL) 50,000 unit Cap TAKE ONE CAPSULE BY MOUTH EVERY 7 DAYS FOR VITAMIN D DEFICIENCY (Patient not taking: Reported on 2025)    meloxicam (MOBIC) 15 MG tablet Take 1 tablet (15 mg total) by mouth once daily.    [DISCONTINUED] amLODIPine (NORVASC) 10 MG tablet TAKE 1 TABLET(10 MG) BY MOUTH EVERY EVENING FOR BLOOD PRESSURE    [DISCONTINUED] atorvastatin (LIPITOR) 80 MG tablet TAKE 1 TABLET BY MOUTH ONCE DAILY FOR CHOLESTEROL AND TO PROTECT YOUR HEART    [DISCONTINUED] EScitalopram oxalate (LEXAPRO) 20 MG tablet Take 1 tablet (20 mg total) by mouth once daily.    [DISCONTINUED] ezetimibe (ZETIA) 10 mg tablet Take 1 tablet (10 mg total) by mouth every evening.    [DISCONTINUED] fluticasone propionate (FLONASE) 50 mcg/actuation nasal spray 1 spray (50 mcg total) by Each Nostril route once daily.    [DISCONTINUED]  "gabapentin (NEURONTIN) 400 MG capsule Take 1 capsule (400 mg total) by mouth every evening. If no relief, may increase dose to twice per day.    [DISCONTINUED] lisinopriL (PRINIVIL,ZESTRIL) 20 MG tablet Take 1 tablet (20 mg total) by mouth once daily.    [DISCONTINUED] lisinopriL-hydrochlorothiazide (PRINZIDE,ZESTORETIC) 20-12.5 mg per tablet Take 2 tablets by mouth once daily.    [DISCONTINUED] traZODone (DESYREL) 150 MG tablet TAKE 1 TABLET(150 MG) BY MOUTH EVERY EVENING AT BEDTIME AS NEEDED FOR INSOMNIA     No current facility-administered medications on file prior to visit.       Review of patient's allergies indicates:   Allergen Reactions    Coconut Itching    Losartan Hallucinations    Pineapple     Voltaren [diclofenac sodium] Rash     Rash with voltaren GEL, not to NSAIDs in general       OBJECTIVE:     Vital Signs:  Vitals:    04/10/25 0950   BP: (!) 205/103   BP Location: Right arm   Patient Position: Sitting   Pulse: (!) 57   Weight: 97.3 kg (214 lb 8.1 oz)   Height: 5' 5" (1.651 m)       Body mass index is 35.7 kg/m².     Physical Exam  Vitals and nursing note reviewed.   Constitutional:       General: She is not in acute distress.     Appearance: Normal appearance. She is not ill-appearing.   HENT:      Head: Atraumatic.      Right Ear: External ear normal.      Left Ear: External ear normal.   Eyes:      Extraocular Movements: Extraocular movements intact.   Cardiovascular:      Rate and Rhythm: Normal rate and regular rhythm.      Pulses: Normal pulses.      Heart sounds: Normal heart sounds. No murmur heard.  Pulmonary:      Effort: Pulmonary effort is normal. No respiratory distress.      Breath sounds: Normal breath sounds.   Abdominal:      General: Abdomen is flat. Bowel sounds are normal.      Palpations: Abdomen is soft. There is no mass.      Tenderness: There is no abdominal tenderness. There is no guarding.   Musculoskeletal:      Left shoulder: Tenderness present. No deformity. Decreased " range of motion.      Right lower leg: No edema.      Left lower leg: No edema.   Skin:     General: Skin is warm and dry.   Neurological:      General: No focal deficit present.      Mental Status: She is alert and oriented to person, place, and time.   Psychiatric:         Mood and Affect: Mood normal.         Behavior: Behavior normal.         Laboratory  No results found for this or any previous visit (from the past 24 hours).    Diagnostic Results:      Health Maintenance Due   Topic Date Due    Pneumococcal Vaccines (Age 50+) (1 of 2 - PCV) Never done    RSV Vaccine (Age 60+ and Pregnant patients) (1 - Risk 60-74 years 1-dose series) Never done    Hemoglobin A1c (Prediabetes)  01/03/2024    COVID-19 Vaccine (4 - 2024-25 season) 09/01/2024    LDCT Lung Screen  12/01/2024    Mammogram  02/23/2025         ASSESSMENT & PLAN:   59-year-old F with a history of HTN, HLD, BELLO on CPAP presents for an urgent care visit.  Patient notably hypertensive with a repeat blood pressure of 198/98.  Instructed patient to go to the ED but patient declined at this time.  She is currently asymptomatic.  ED precautions given as well as return to clinic precautions.  Given her elevated blood pressure, we will increase her lisinopril-hydrochlorothiazide.  Refill sent for all her medications.  Mammogram referral, low-dose CT scan ordered today.  Given her left shoulder pain, we will get left shoulder x-ray and refer to Orthopedics.      1. Mixed hyperlipidemia    2. Essential hypertension  Overview:  - increase lisinopril   - Referral to digital hypertension   - CMP in 1 week.     Orders:  -     amLODIPine (NORVASC) 10 MG tablet; TAKE 1 TABLET(10 MG) BY MOUTH EVERY EVENING FOR BLOOD PRESSURE  Dispense: 90 tablet; Refill: 3    3. Hyperlipidemia, unspecified hyperlipidemia type  -     atorvastatin (LIPITOR) 80 MG tablet; TAKE 1 TABLET BY MOUTH ONCE DAILY FOR CHOLESTEROL AND TO PROTECT YOUR HEART  Dispense: 90 tablet; Refill: 4  -      ezetimibe (ZETIA) 10 mg tablet; Take 1 tablet (10 mg total) by mouth every evening.  Dispense: 90 tablet; Refill: 3    4. Mood disorder  -     EScitalopram oxalate (LEXAPRO) 20 MG tablet; Take 1 tablet (20 mg total) by mouth once daily.  Dispense: 30 tablet; Refill: 11    5. Primary hypertension  -     lisinopriL-hydrochlorothiazide (PRINZIDE,ZESTORETIC) 20-12.5 mg per tablet; Take 3 tablets by mouth once daily.  Dispense: 270 tablet; Refill: 3    6. Recurrent major depressive disorder, in partial remission  -     traZODone (DESYREL) 150 MG tablet; TAKE 1 TABLET(150 MG) BY MOUTH EVERY EVENING AT BEDTIME AS NEEDED FOR INSOMNIA  Dispense: 90 tablet; Refill: 2    7. Vitamin D deficiency  -     Vitamin D; Future; Expected date: 04/10/2025    8. Encounter for screening mammogram for malignant neoplasm of breast  -     Mammo Digital Screening Bilat; Future; Expected date: 04/10/2025    9. Acute pain of left shoulder  -     Ambulatory referral/consult to Orthopedics; Future; Expected date: 04/17/2025    10. Falls  -     X-Ray Shoulder Trauma 3 view Left; Future; Expected date: 04/10/2025    11. Healthcare maintenance  -     Hemoglobin A1C; Future; Expected date: 04/10/2025    12. Encounter for screening for lung cancer  -     CT Chest Lung Screening Low Dose; Future; Expected date: 04/10/2025    13. Hypertensive urgency    Other orders  -     gabapentin (NEURONTIN) 400 MG capsule; Take 1 capsule (400 mg total) by mouth every evening. If no relief, may increase dose to twice per day.  Dispense: 90 capsule; Refill: 3  -     fluticasone propionate (FLONASE) 50 mcg/actuation nasal spray; 1 spray (50 mcg total) by Each Nostril route once daily.  Dispense: 18.2 mL; Refill: 2        See above for further detail.    RTC in 4 weeks with PCP    Discussed with Dr Walton -- staff attestation to follow      Lewis Ireland DO  Internal Medicine PGY-2  [facility]

## 2025-04-10 NOTE — TELEPHONE ENCOUNTER
----- Message from Darcy sent at 4/10/2025 11:01 AM CDT -----  Contact: Ignacio  Cc: Lewis Ireland DOPharmacy calling, in regards dosage on rx lisinopriL-hydrochlorothiazide (PRINZIDE,ZESTORETIC) 20-12.5 mg per tablet, would like to get a call back. [id number] [place] [address] [address] [facility] AT [address] Phone: [phone]: [phone] you

## 2025-04-10 NOTE — PROGRESS NOTES
I have reviewed the notes, assessments, and/or procedures performed by Dr. Ireland, I concur with his documentation of Batool Rivera.  Date of Service: 4/10/2025    Pt with severe uncontrolled BP during visit today. Has been out of her BP medications for several weeks. Asymptomatic at this time and declines an ED visit. Provided strict return precautions, will refill and uptitrate BP medications and RTC for BP check in a couple of weeks.

## 2025-04-11 ENCOUNTER — HOSPITAL ENCOUNTER (OUTPATIENT)
Dept: RADIOLOGY | Facility: HOSPITAL | Age: 60
Discharge: HOME | End: 2025-04-11
Payer: MEDICAID

## 2025-04-11 ENCOUNTER — RESULTS FOLLOW-UP (OUTPATIENT)
Dept: INTERNAL MEDICINE | Facility: CLINIC | Age: 60
End: 2025-04-11

## 2025-04-11 DIAGNOSIS — R29.6 FALLS: ICD-10-CM

## 2025-04-11 DIAGNOSIS — Z12.31 ENCOUNTER FOR SCREENING MAMMOGRAM FOR MALIGNANT NEOPLASM OF BREAST: ICD-10-CM

## 2025-04-11 PROCEDURE — 77067 SCR MAMMO BI INCL CAD: CPT | Mod: 26,,, | Performed by: RADIOLOGY

## 2025-04-11 PROCEDURE — 73030 X-RAY EXAM OF SHOULDER: CPT | Mod: TC,LT

## 2025-04-11 PROCEDURE — 73030 X-RAY EXAM OF SHOULDER: CPT | Mod: 26,LT,, | Performed by: RADIOLOGY

## 2025-04-11 PROCEDURE — 77063 BREAST TOMOSYNTHESIS BI: CPT | Mod: 26,,, | Performed by: RADIOLOGY

## 2025-04-11 PROCEDURE — 77063 BREAST TOMOSYNTHESIS BI: CPT | Mod: TC

## 2025-04-17 ENCOUNTER — HOSPITAL ENCOUNTER (OUTPATIENT)
Dept: RADIOLOGY | Facility: HOSPITAL | Age: 60
Discharge: HOME | End: 2025-04-17
Payer: MEDICAID

## 2025-04-17 DIAGNOSIS — Z12.2 ENCOUNTER FOR SCREENING FOR LUNG CANCER: ICD-10-CM

## 2025-04-17 PROCEDURE — 71271 CT THORAX LUNG CANCER SCR C-: CPT | Mod: 26,,, | Performed by: STUDENT IN AN ORGANIZED HEALTH CARE EDUCATION/TRAINING PROGRAM

## 2025-04-17 PROCEDURE — 71271 CT THORAX LUNG CANCER SCR C-: CPT | Mod: TC

## 2025-05-13 NOTE — ED PROVIDER NOTES
Discharge Diagnosis  Sepsis with acute organ dysfunction and septic shock, due to unspecified organism, unspecified organ dysfunction type (Multi)           Issues Requiring Follow-Up  Patient fully evaluated on the 13th and cleared for discharge respiratory status improved and patient on 3 L nasal cannula    Discharge Meds     Medication List      START taking these medications     ertapenem 1 g in sodium chloride 0.9% 50 mL IV; Infuse 1 g at 100 mL/hr   over 30 minutes into a venous catheter once every 24 hours for 7 days.     CHANGE how you take these medications     acetaminophen 325 mg tablet; Commonly known as: Tylenol; What changed:   Another medication with the same name was removed. Continue taking this   medication, and follow the directions you see here.     CONTINUE taking these medications     atorvastatin 10 mg tablet; Commonly known as: Lipitor   bisacodyl 10 mg suppository; Commonly known as: Dulcolax   calcium carbonate 500 mg (200 mg elemental) chewable tablet; Commonly   known as: Tums   cholecalciferol 50 mcg (2,000 units) capsule; Commonly known as: Vitamin   D-3   cyclobenzaprine 10 mg tablet; Commonly known as: Flexeril   docusate sodium 100 mg capsule; Commonly known as: Colace   EPINEPHrine 1 mg/mL injection; Commonly known as: Adrenalin   Fleet Enema 19-7 gram/118 mL enema enema; Generic drug: sodium   phosphates   fluticasone 50 mcg/actuation nasal spray; Commonly known as: Flonase   gabapentin 300 mg capsule; Commonly known as: Neurontin   glipiZIDE 5 mg tablet; Commonly known as: Glucotrol   guaiFENesin 100 mg/5 mL syrup; Commonly known as: Robitussin   ipratropium-albuteroL 0.5-2.5 mg/3 mL nebulizer solution; Commonly known   as: Duo-Neb   lidocaine 4 % patch   lisinopril 10 mg tablet   loperamide 2 mg capsule; Commonly known as: Imodium   loratadine 10 mg tablet; Commonly known as: Claritin   magnesium hydroxide 400 mg/5 mL suspension; Commonly known as: Milk of   Magnesia   melatonin  Encounter Date: 3/20/2017    SCRIBE #1 NOTE: I, Ana Marcelino, am scribing for, and in the presence of,  Dr. Navarrete. I have scribed the entire note.       History     Chief Complaint   Patient presents with    Arm Pain     pt c/o right arm pain for 3 months, states she was suppose to f/u with ortho but the dr wasnt taking new pts.      Review of patient's allergies indicates:  No Known Allergies  HPI Comments: Time seen by provider: 8:46 AM    This is a 51 y.o. female who presents with complaint of right shoulder pain. She reports onset of symptoms was about 1 month ago. The patient describes the pain as aching and intermittent. She reports associated swelling to the shoulder but denies any numbness, tingling or weakness in the shoulder. The patient states the pain worsens with movement and mildly improves with rest. She notes was evaluated by her primary care doctor for the pain. The patient notes she was referred to an Orthopedics. She notes she was unable to see the doctor due to the doctor not seeing new patients and not specializing in shoulder injuries. According to the patient's record previous imaging showed no fractures or dislocation, or signs of ligamentous instability. Suspected the patient may have calcific tendonitis or bursitis    The history is provided by the patient.     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     Heart disease, unspecified     Hyperlipidemia     Hypertension      Past Surgical History:   Procedure Laterality Date     SECTION      HYSTERECTOMY   or     OOPHORECTOMY      one ovary was removed     Family History   Problem Relation Age of Onset    Hypertension Mother     Heart disease Mother     Asthma Mother     Cataracts Mother     Glaucoma Mother     Brain cancer Father     Stroke Brother     Kidney disease Brother      Glaucoma Sister     Diabetes Sister     Heart disease Brother      MI x 5    Depression Brother     Blindness Neg Hx     Macular degeneration Neg Hx     Retinal detachment Neg Hx      Social History   Substance Use Topics    Smoking status: Former Smoker     Packs/day: 2.00     Years: 28.00     Quit date: 8/24/2011    Smokeless tobacco: Never Used    Alcohol use No     Review of Systems   Constitutional: Negative for chills and fever.   HENT: Negative for congestion and sore throat.    Eyes: Negative for redness and visual disturbance.   Respiratory: Negative for cough and shortness of breath.    Cardiovascular: Negative for chest pain and palpitations.   Gastrointestinal: Negative for abdominal pain, diarrhea, nausea and vomiting.   Genitourinary: Negative for dysuria.   Musculoskeletal: Negative for back pain.        Right shoulder pain   Skin: Negative for rash.   Neurological: Negative for weakness and headaches.   Psychiatric/Behavioral: Negative for confusion.       Physical Exam   Initial Vitals   BP Pulse Resp Temp SpO2   03/20/17 0754 03/20/17 0754 03/20/17 0754 03/20/17 0754 03/20/17 0754   148/81 62 17 98.4 °F (36.9 °C) 100 %     Physical Exam    Nursing note and vitals reviewed.  Constitutional: She appears well-developed and well-nourished. She is not diaphoretic. No distress.   HENT:   Head: Normocephalic and atraumatic.   Right Ear: External ear normal.   Left Ear: External ear normal.   Oropharynx is clear and intact. Moist mucus membranes   Eyes: EOM are normal.   Conjunctivae clear, pink, and intact.   Neck: Normal range of motion. Neck supple.   Cardiovascular: Normal rate and regular rhythm. Exam reveals no gallop and no friction rub.    No murmur heard.  Normal S1/S2.   Pulmonary/Chest: She has no wheezes. She has no rhonchi. She has no rales.   Clear to auscultation bilaterally.    Musculoskeletal: Normal range of motion. She exhibits tenderness. She exhibits no edema.   RUE:  3 mg tablet   MetamuciL (inulin-corn fiber) 1.7 gram tablet,chewable; Generic drug:   soluble corn fiber-inulin   omeprazole 20 mg DR capsule; Commonly known as: PriLOSEC   psyllium 3.4 gram packet; Commonly known as: Metamucil   sennosides 8.6 mg tablet; Commonly known as: Senokot   tamsulosin 0.4 mg 24 hr capsule; Commonly known as: Flomax   torsemide 20 mg tablet; Commonly known as: Demadex       Test Results Pending At Discharge  Pending Labs       No current pending labs.            Hospital Course         Mohit Frausto MD  Physician  Internal Medicine     Progress Notes      Signed     Date of Service: 5/13/2025  6:23 AM     Signed         Patient fully evaluated  05/12  for    Problem List Items Addressed This Visit         * (Principal) Sepsis with acute organ dysfunction and septic shock, due to unspecified organism, unspecified organ dysfunction type (Multi) - Primary     Relevant Orders     Vascular US lower extremity venous duplex bilateral (Completed)      Other Visit Diagnoses           Edema of both lower legs         Relevant Orders     Vascular US lower extremity venous duplex bilateral (Completed)       Labored respiration         Relevant Orders     Transthoracic Echo Complete (Completed)       SOB (shortness of breath)         Relevant Orders     Transthoracic Echo Complete (Completed)             Patient seen resting in bed with head of bed elevated, no s/s or c/o acute difficulties at this time.  Vital signs for last 24 hours Temp:  [36.1 °C (97 °F)-36.6 °C (97.9 °F)] 36.3 °C (97.3 °F)  Heart Rate:  [68-74] 73  Resp:  [18-22] 18  BP: (116-171)/(60-79) 119/60    I/O this shift:  In: 150 [I.V.:150]  Out: -   Patient still requiring frequent cardiac and SPO2 monitoring. Continue aggressive pulmonary hygiene and oral hygiene. Off loading as tolerated for skin integrity. Medications and labs reviewed-         Results for orders placed or performed during the hospital encounter of 05/09/25 (from  Radial pulse 2+. Capillary refill < 2 sec. Sensation intact to light touch. 5/5  strength. Tenderness over the bursa. Pain with abduction. Normal adduction and flexion of shoulder, elbow and wrist. No clinical signs of infection, cellulitis, septic joint or cellulitis.    Lymphadenopathy:     She has no cervical adenopathy.   Neurological: She is alert and oriented to person, place, and time. She has normal strength.   Skin: Skin is warm and dry. No rash noted.         ED Course   Procedures  Labs Reviewed - No data to display                     Scribe Attestation:   Scribe #1: I performed the above scribed service and the documentation accurately describes the services I performed. I attest to the accuracy of the note.    Attending Attestation:           Physician Attestation for Scribe:  Physician Attestation Statement for Scribe #1: I, Dr. Travis, reviewed documentation, as scribed by Ana Marcelino in my presence, and it is both accurate and complete.         Attending ED Notes:   Urgent evaluation a 51-year-old female with nontraumatic right shoulder pain.  Patient is neurovascularly intact without focal neurologic deficits.  No clinical evidence of infection, cellulitis or abscess formation.  I did review patient's most recent x-ray which reveals bursitis versus calcific tendinitis.  The patient is extensive the counseled on her diagnosis and treatment, discharged in good condition and directed to follow-up with orthopedics in the next 24-48 hours.          ED Course     Clinical Impression:     1. Bursitis, shoulder, right    2. Calcific tendonitis of right shoulder                Danny Travis MD  03/20/17 3413     the past 24 hours)   POCT GLUCOSE   Result Value Ref Range     POCT Glucose 115 (H) 74 - 99 mg/dL   POCT GLUCOSE   Result Value Ref Range     POCT Glucose 183 (H) 74 - 99 mg/dL   POCT GLUCOSE   Result Value Ref Range     POCT Glucose 190 (H) 74 - 99 mg/dL   POCT GLUCOSE   Result Value Ref Range     POCT Glucose 145 (H) 74 - 99 mg/dL   POCT GLUCOSE   Result Value Ref Range     POCT Glucose 130 (H) 74 - 99 mg/dL      Patient recently received an antibiotic (last 12 hours)         Date/Time Action Medication Dose Rate     05/12/25 1413 New Bag    vancomycin (Vancocin) 1,000 mg in dextrose 5%  mL 1,000 mg 200 mL/hr             Plan discussed with interdisciplinary team, continue current and repeat labs in the AM.      Discharge planning discussed with patient and care team. Therapy evaluations ordered. [facility]  , anticipate C/SNF at discharge. Nephorlogy approved CT angio, hydrate with LR for renal insuffiencey. Patient aware and agreeable to current plan, continue plan as above.      I spent a total of 60 minutes on the date of the service which included preparing to see the patient, face-to-face patient care, completing clinical documentation, obtaining and/or reviewing separately obtained history, performing a medically appropriate examination, counseling and educating the patient/family/caregiver, ordering medications, tests, or procedures, communicating with other HCPs (not separately reported), independently interpreting results (not separately reported), communicating results to the patient/family/caregiver, and care coordination (not separately reported).      Patient fully evaluated  05/13  for    Problem List Items Addressed This Visit         * (Principal) Sepsis with acute organ dysfunction and septic shock, due to unspecified organism, unspecified organ dysfunction type (Multi) - Primary     Relevant Orders     Vascular US lower extremity venous duplex bilateral (Completed)      Other Visit  Diagnoses           Edema of both lower legs         Relevant Orders     Vascular US lower extremity venous duplex bilateral (Completed)       Labored respiration         Relevant Orders     Transthoracic Echo Complete (Completed)       SOB (shortness of breath)         Relevant Orders     Transthoracic Echo Complete (Completed)             Patient seen resting in bed with head of bed elevated, no s/s or c/o acute difficulties at this time.  Vital signs for last 24 hours Temp:  [36.1 °C (97 °F)-36.6 °C (97.9 °F)] 36.3 °C (97.3 °F)  Heart Rate:  [68-74] 73  Resp:  [18-22] 18  BP: (116-171)/(60-79) 119/60    I/O this shift:  In: 150 [I.V.:150]  Out: -   Patient still requiring frequent cardiac and SPO2 monitoring. Continue aggressive pulmonary hygiene and oral hygiene. Off loading as tolerated for skin integrity. Medications and labs reviewed-         Results for orders placed or performed during the hospital encounter of 05/09/25 (from the past 24 hours)   POCT GLUCOSE   Result Value Ref Range     POCT Glucose 115 (H) 74 - 99 mg/dL   POCT GLUCOSE   Result Value Ref Range     POCT Glucose 183 (H) 74 - 99 mg/dL   POCT GLUCOSE   Result Value Ref Range     POCT Glucose 190 (H) 74 - 99 mg/dL   POCT GLUCOSE   Result Value Ref Range     POCT Glucose 145 (H) 74 - 99 mg/dL   POCT GLUCOSE   Result Value Ref Range     POCT Glucose 130 (H) 74 - 99 mg/dL      Patient recently received an antibiotic (last 12 hours)         None             Plan discussed with interdisciplinary team, Patient fully evaluated.  Clinically improved.  Ambulate patient with physical therapy.  Await final cultures continue present IV antibiotics and recheck labs in a.m.     Discharge planning discussed with patient and care team. Therapy evaluations ordered. Anticipate HHC/SNF at discharge. Patient aware and agreeable to current plan, continue plan as above.      I spent a total of 60 minutes on the date of the service which included preparing to see the  patient, face-to-face patient care, completing clinical documentation, obtaining and/or reviewing separately obtained history, performing a medically appropriate examination, counseling and educating the patient/family/caregiver, ordering medications, tests, or procedures, communicating with other HCPs (not separately reported), independently interpreting results (not separately reported), communicating results to the patient/family/caregiver, and care coordination (not separately reported).               Revision History         Pertinent Physical Exam At Time of Discharge  Physical Exam    Outpatient Follow-Up  No future appointments.      Mohit Frausto MD

## 2025-05-15 NOTE — PROGRESS NOTES
Internal Medicine Clinic Note  2025    Subjective   Patient Name: Batool Rivera  : 1965    Chief Complaint:   Chief Complaint   Patient presents with    Hypertension       History of Present Illness:  Ms. Batool Rivera is a 59 y.o. female with a PMHx of HTN, HLD, prediabetes, GERD, depression and BELLO on CPAP presenting to clinic for 4 week blood pressure follow up. She is established with Dr. Trammell, new to me today.     # Hypertension  She was last seen in clinic by Dr. Ireland in 2025 following a fall. She was notably hypertensive (200s/100s) though asymptomatic during that visit and was advised to present to ED at that time, however she declined. Her medications were refilled and screening care gaps were addressed during at that time.  Today, she explains that she has been taking her Amlodipine 10 mg and Lisinopril-HCTZ 20-12.5 as prescribed and with good tolerance. She mentions that she is generally very good about taking her medications, not missing any doses within any given week. Though she does mention that she missed a dose of her blood pressure medications yesterday amidst her granddaughter's graduation chaos. She has a blood pressure cuff at home that she uses [employer]. She checks every morning and occasionally in the evenings. She explains that her typical readings at home are systolics 180s-200s. She is aware these readings are suboptimal and is concerned about getting blood pressure under control. She is trying to cut back on added salt in her diet. Her physical activity consists of dancing to [employer] music at home, performing gym exercises at home, and playing Wii Fit. She notes that her mother has a history of hypertension requiring many medications to control. Today, she denies headaches, vision changes, dizziness, lightheadedness, chest pain, shortness of breath, or peripheral edema.      HCM and screenings:  - A1c  - Covid, Pneumococcal, RSV vaccines        Review of Systems    Constitutional:  Negative for fever.   Respiratory:  Negative for shortness of breath.    Cardiovascular:  Negative for chest pain and leg swelling.   Musculoskeletal:  Positive for back pain and joint pain (left shoulder). Negative for falls and myalgias.   Neurological:  Negative for dizziness and headaches.       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     Heart disease, unspecified     Hyperlipidemia     Hypertension        Past Surgical History:   Procedure Laterality Date     SECTION      COLONOSCOPY N/A 2023    Procedure: COLONOSCOPY;  Surgeon: Lon George MD;  Location: [facility] ([address] [place] [address]);  Service: Colon and Rectal;  Laterality: N/A;  referred by pcp , arash instructions sent to pt portal.cf  pre call, pt confirmed - mf    HYSTERECTOMY   or     OOPHORECTOMY      one ovary was removed    TUBAL LIGATION         Family History   Problem Relation Name Age of Onset    Hypertension Mother      Heart disease Mother      Asthma Mother      Cataracts Mother      Glaucoma Mother      Brain cancer Father      Stroke Brother      Kidney disease Brother      Glaucoma Sister      Diabetes Sister      Heart disease Brother          MI x 5    Depression Brother      Blindness Neg Hx      Macular degeneration Neg Hx      Retinal detachment Neg Hx      Breast cancer Neg Hx      Colon cancer Neg Hx      Ovarian cancer Neg Hx           MEDICATIONS & ALLERGIES:     Current Medications[1]    Review of patient's allergies indicates:   Allergen Reactions    Coconut Itching    Losartan Hallucinations    Pineapple     Voltaren [diclofenac sodium] Rash     Rash with voltaren GEL, not to NSAIDs in general       OBJECTIVE:     Vital Signs:  Vitals:    25 0943 25 1000   BP: (!) 168/80 (!) 140/84   BP Location: Right arm Right arm   Patient  "Position: Sitting Sitting   Pulse: 65    SpO2: 99%    Weight: 94.4 kg (208 lb 1.8 oz)    Height: 5' 5" (1.651 m)        Body mass index is 34.63 kg/m².     Physical Exam  Vitals and nursing note reviewed.   Constitutional:       General: She is awake. She is not in acute distress.     Appearance: Normal appearance. She is normal weight. She is not ill-appearing or toxic-appearing.   HENT:      Head: Normocephalic and atraumatic.   Eyes:      General: No scleral icterus.        Right eye: No discharge.         Left eye: No discharge.      Conjunctiva/sclera: Conjunctivae normal.   Cardiovascular:      Rate and Rhythm: Normal rate and regular rhythm.      Heart sounds: No murmur heard.  Pulmonary:      Effort: Pulmonary effort is normal. No respiratory distress.   Musculoskeletal:      Cervical back: No rigidity.      Right lower leg: Edema (trace) present.      Left lower leg: Edema (trace) present.   Skin:     General: Skin is warm and dry.   Neurological:      General: No focal deficit present.      Mental Status: She is alert and oriented to person, place, and time. Mental status is at baseline.      Sensory: No sensory deficit.   Psychiatric:         Mood and Affect: Mood normal.         Behavior: Behavior normal. Behavior is cooperative.         Laboratory  No results found for this or any previous visit (from the past 2 weeks).     Health Maintenance Due   Topic Date Due    Pneumococcal Vaccines (Age 50+) (1 of 2 - PCV) Never done    RSV Vaccine (Age 60+ and Pregnant patients) (1 - Risk 60-74 years 1-dose series) Never done    Hemoglobin A1c (Prediabetes)  01/03/2024    COVID-19 Vaccine (4 - 2024-25 season) 09/01/2024       ASSESSMENT & PLAN:       Primary hypertension  Uncontrolled  Blood pressure 168/80, repeat reading 140/84  Renal function stable on most recent BMP  Asymptomatic today  - Continue Amlodipine 10 mg  - Will increase Lisinopril-HCTZ to 40-25 mg, taken as 2 half-strength tablets  -     " lisinopriL-hydrochlorothiazide (PRINZIDE,ZESTORETIC) 20-12.5 mg per tablet; Take 2 tablets by mouth once daily.  Dispense: 180 tablet; Refill: 3  - Advised to return to clinic in 2 weeks to 1 month for blood pressure follow up  - May need to consider Cardiology referral for resistant hypertension if control remains suboptimal      Preventative Care:  - To be discussed with PCP    RTC in 2-4 weeks    Discussed with Dr. Verona Wright, DO  Internal Medicine PGY-3  [facility]       [1]   Current Outpatient Medications:     amLODIPine (NORVASC) 10 MG tablet, TAKE 1 TABLET(10 MG) BY MOUTH EVERY EVENING FOR BLOOD PRESSURE, Disp: 90 tablet, Rfl: 3    aspirin (ECOTRIN) 81 MG EC tablet, Take 1 tablet (81 mg total) by mouth once daily., Disp: 30 tablet, Rfl: 11    atorvastatin (LIPITOR) 80 MG tablet, TAKE 1 TABLET BY MOUTH ONCE DAILY FOR CHOLESTEROL AND TO PROTECT YOUR HEART, Disp: 90 tablet, Rfl: 4    chlorhexidine (PERIDEX) 0.12 % solution, SMARTSI-10 Milliliter(s) By Mouth Twice Daily, Disp: , Rfl:     ergocalciferol (ERGOCALCIFEROL) 50,000 unit Cap, TAKE ONE CAPSULE BY MOUTH EVERY 7 DAYS FOR VITAMIN D DEFICIENCY (Patient not taking: Reported on 2025), Disp: 12 capsule, Rfl: 1    EScitalopram oxalate (LEXAPRO) 20 MG tablet, Take 1 tablet (20 mg total) by mouth once daily., Disp: 30 tablet, Rfl: 11    ezetimibe (ZETIA) 10 mg tablet, Take 1 tablet (10 mg total) by mouth every evening., Disp: 90 tablet, Rfl: 3    fluticasone propionate (FLONASE) 50 mcg/actuation nasal spray, 1 spray (50 mcg total) by Each Nostril route once daily., Disp: 18.2 mL, Rfl: 2    gabapentin (NEURONTIN) 400 MG capsule, Take 1 capsule (400 mg total) by mouth every evening. If no relief, may increase dose to twice per day., Disp: 90 capsule, Rfl: 3    lisinopriL-hydrochlorothiazide (PRINZIDE,ZESTORETIC) 20-12.5 mg per tablet, Take 2 tablets by mouth once daily., Disp: 180 tablet, Rfl: 3    meloxicam (MOBIC) 15 MG  tablet, Take 1 tablet (15 mg total) by mouth once daily., Disp: 30 tablet, Rfl: 0    traZODone (DESYREL) 150 MG tablet, TAKE 1 TABLET(150 MG) BY MOUTH EVERY EVENING AT BEDTIME AS NEEDED FOR INSOMNIA, Disp: 90 tablet, Rfl: 2

## 2025-05-16 ENCOUNTER — OFFICE VISIT (OUTPATIENT)
Dept: INTERNAL MEDICINE | Facility: CLINIC | Age: 60
End: 2025-05-16
Payer: MEDICAID

## 2025-05-16 VITALS
HEIGHT: 65 IN | OXYGEN SATURATION: 99 % | HEART RATE: 65 BPM | DIASTOLIC BLOOD PRESSURE: 84 MMHG | BODY MASS INDEX: 34.68 KG/M2 | SYSTOLIC BLOOD PRESSURE: 140 MMHG | WEIGHT: 208.13 LBS

## 2025-05-16 DIAGNOSIS — I10 PRIMARY HYPERTENSION: ICD-10-CM

## 2025-05-16 PROCEDURE — 99999 PR PBB SHADOW E&M-EST. PATIENT-LVL IV: CPT | Mod: PBBFAC,,,

## 2025-05-16 PROCEDURE — 99214 OFFICE O/P EST MOD 30 MIN: CPT | Mod: PBBFAC

## 2025-05-16 RX ORDER — LISINOPRIL AND HYDROCHLOROTHIAZIDE 12.5; 2 MG/1; MG/1
2 TABLET ORAL DAILY
Qty: 180 TABLET | Refills: 3 | Status: SHIPPED | OUTPATIENT
Start: 2025-05-16 | End: 2026-05-16

## 2025-07-08 ENCOUNTER — TELEPHONE (OUTPATIENT)
Dept: PODIATRY | Facility: CLINIC | Age: 60
End: 2025-07-08
Payer: MEDICAID

## 2025-07-08 NOTE — TELEPHONE ENCOUNTER
Copied from CRM #[id number]. Topic: General Inquiry - Patient Advice  >> Jul 8, 2025  2:50 PM Sixto wrote:  Patient wants to be seen today by their PCP only. Caller will not accept being placed on the wait list and is requesting a message be sent to the provider.      When is the next available appointment:  None shown     Symptoms:  Ongoing pain in her left foot    Would you prefer an answer via [employer]?: No    Best call back number: [phone]     Comments:  Please call pt to advise      Left voice message for patient to give our office a call back at [phone] Dr. Stevens office for scheduling pain in her left foot

## 2025-07-09 ENCOUNTER — TELEPHONE (OUTPATIENT)
Dept: PODIATRY | Facility: CLINIC | Age: 60
End: 2025-07-09
Payer: MEDICAID

## 2025-07-09 NOTE — TELEPHONE ENCOUNTER
Copied from CRM #[id number]. Topic: Appointments - Appointment Access  >> Jul 9, 2025  3:19 PM Mariela wrote:  Type:  Needs Medical Advice    Who Called: Batool  Symptoms (please be specific): left foot pain   Would the patient rather a call back or a response via [facility]? call  Best Call Back Number: [phone]  Additional Information: Calling in regards to missed call from office. Please call and discuss.

## 2025-07-10 ENCOUNTER — OFFICE VISIT (OUTPATIENT)
Dept: INTERNAL MEDICINE | Facility: CLINIC | Age: 60
End: 2025-07-10
Payer: MEDICAID

## 2025-07-10 ENCOUNTER — HOSPITAL ENCOUNTER (OUTPATIENT)
Dept: RADIOLOGY | Facility: HOSPITAL | Age: 60
Discharge: HOME | End: 2025-07-10
Payer: MEDICAID

## 2025-07-10 VITALS
BODY MASS INDEX: 36.22 KG/M2 | OXYGEN SATURATION: 98 % | WEIGHT: 217.38 LBS | SYSTOLIC BLOOD PRESSURE: 152 MMHG | HEART RATE: 75 BPM | HEIGHT: 65 IN | DIASTOLIC BLOOD PRESSURE: 76 MMHG

## 2025-07-10 DIAGNOSIS — M25.511 RIGHT SHOULDER PAIN, UNSPECIFIED CHRONICITY: ICD-10-CM

## 2025-07-10 DIAGNOSIS — M25.511 RIGHT SHOULDER PAIN, UNSPECIFIED CHRONICITY: Primary | ICD-10-CM

## 2025-07-10 PROCEDURE — 4010F ACE/ARB THERAPY RXD/TAKEN: CPT | Mod: CPTII,,, | Performed by: FAMILY MEDICINE

## 2025-07-10 PROCEDURE — 73030 X-RAY EXAM OF SHOULDER: CPT | Mod: TC,RT

## 2025-07-10 PROCEDURE — 3078F DIAST BP <80 MM HG: CPT | Mod: CPTII,,, | Performed by: FAMILY MEDICINE

## 2025-07-10 PROCEDURE — 99213 OFFICE O/P EST LOW 20 MIN: CPT | Mod: PBBFAC,25

## 2025-07-10 PROCEDURE — 3008F BODY MASS INDEX DOCD: CPT | Mod: CPTII,,, | Performed by: FAMILY MEDICINE

## 2025-07-10 PROCEDURE — 99999 PR PBB SHADOW E&M-EST. PATIENT-LVL III: CPT | Mod: PBBFAC,,,

## 2025-07-10 PROCEDURE — 73030 X-RAY EXAM OF SHOULDER: CPT | Mod: 26,RT,, | Performed by: RADIOLOGY

## 2025-07-10 PROCEDURE — 3077F SYST BP >= 140 MM HG: CPT | Mod: CPTII,,, | Performed by: FAMILY MEDICINE

## 2025-07-10 PROCEDURE — 99213 OFFICE O/P EST LOW 20 MIN: CPT | Mod: GE,S$PBB,, | Performed by: FAMILY MEDICINE

## 2025-07-10 RX ORDER — METHOCARBAMOL 500 MG/1
500 TABLET, FILM COATED ORAL 3 TIMES DAILY PRN
Qty: 30 TABLET | Refills: 0 | Status: SHIPPED | OUTPATIENT
Start: 2025-07-10 | End: 2025-07-20

## 2025-07-10 NOTE — PROGRESS NOTES
"    [facility]  RESIDENT CONTINUITY CLINIC NOTE    Name: Batool Rivera  : 1965  MRN: [id number]  Date of Service: 2025   PCP: Anselmo Trammell MD          HPI:           Batool Rivera is a 60 y.o. female with HTN, HLD, prediabetes, GERD, depression and BELLO on CPAP who presents to clinic for urgent care visit for shoulder pain.    Right shoulder for the past month. Mechanical fall recently and caught herself with her right arm. Does not radiate. Worse with movement or laying on it. Used a topical treatment "tiger" which helped. Had trouble taking off shirt after Druze the other day because of inability to life arm, but she is now able to move it but it hurts. No weakness, no numbness/tingling. Also has L shoulder pain for several months and was told she had arthritis.      Review of systems as above; all unmentioned systems are negative.     PEX:     Vitals:    07/10/25 1024   BP: (!) 152/76   BP Location: Left arm   Patient Position: Sitting   Pulse: 75   SpO2: 98%   Weight: 98.6 kg (217 lb 6 oz)   Height: 5' 5" (1.651 m)     Body mass index is 36.17 kg/m².    Physical Exam  Vitals reviewed.   Constitutional:       General: She is not in acute distress.     Appearance: She is not ill-appearing or toxic-appearing.   HENT:      Head: Normocephalic and atraumatic.   Eyes:      General: No scleral icterus.  Cardiovascular:      Rate and Rhythm: Normal rate and regular rhythm.      Heart sounds: Normal heart sounds.   Pulmonary:      Effort: Pulmonary effort is normal. No respiratory distress.   Abdominal:      General: Abdomen is flat. There is no distension.   Musculoskeletal:      Right shoulder: Tenderness present. No deformity. Normal range of motion. Normal strength.      Left shoulder: Tenderness present. No deformity. Normal range of motion. Normal strength.      Right lower leg: No edema.      Left lower leg: No edema.      Comments: Tenderness to " anterior aspect of both shoulders  Pain with abduction but with full ROM   Skin:     General: Skin is warm.   Neurological:      Mental Status: She is alert and oriented to person, place, and time. Mental status is at baseline.      Motor: No weakness.              Other pertinent data from chart that formed part of my MDM:           Current Outpatient Medications   Medication Instructions    amLODIPine (NORVASC) 10 MG tablet TAKE 1 TABLET(10 MG) BY MOUTH EVERY EVENING FOR BLOOD PRESSURE    aspirin (ECOTRIN) 81 mg, Oral, Daily    atorvastatin (LIPITOR) 80 MG tablet TAKE 1 TABLET BY MOUTH ONCE DAILY FOR CHOLESTEROL AND TO PROTECT YOUR HEART    chlorhexidine (PERIDEX) 0.12 % solution SMARTSI-10 Milliliter(s) By Mouth Twice Daily    ergocalciferol (ERGOCALCIFEROL) 50,000 unit Cap TAKE ONE CAPSULE BY MOUTH EVERY 7 DAYS FOR VITAMIN D DEFICIENCY    EScitalopram oxalate (LEXAPRO) 20 mg, Oral, Daily    ezetimibe (ZETIA) 10 mg, Oral, Nightly    fluticasone propionate (FLONASE) 50 mcg, Each Nostril, Daily    gabapentin (NEURONTIN) 400 mg, Oral, Nightly, If no relief, may increase dose to twice per day.    lisinopriL-hydrochlorothiazide (PRINZIDE,ZESTORETIC) 20-12.5 mg per tablet 2 tablets, Oral, Daily    meloxicam (MOBIC) 15 mg, Oral, Daily    methocarbamoL (ROBAXIN) 500 mg, Oral, 3 times daily PRN    traZODone (DESYREL) 150 MG tablet TAKE 1 TABLET(150 MG) BY MOUTH EVERY EVENING AT BEDTIME AS NEEDED FOR INSOMNIA       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     Heart disease, unspecified     Hyperlipidemia     Hypertension      Past Surgical History:   Procedure Laterality Date     SECTION      COLONOSCOPY N/A 2023    Procedure: COLONOSCOPY;  Surgeon: Lon George MD;  Location: [facility] ([address] [place] [address]);  Service: Colon and Rectal;  Laterality: N/A;  referred by pcp Dr.Yin peg  instructions sent to pt portal.cf  pre call, pt confirmed - mf    HYSTERECTOMY   or     OOPHORECTOMY      one ovary was removed    TUBAL LIGATION       Family History   Problem Relation Name Age of Onset    Hypertension Mother      Heart disease Mother      Asthma Mother      Cataracts Mother      Glaucoma Mother      Brain cancer Father      Stroke Brother      Kidney disease Brother      Glaucoma Sister      Diabetes Sister      Heart disease Brother          MI x 5    Depression Brother      Blindness Neg Hx      Macular degeneration Neg Hx      Retinal detachment Neg Hx      Breast cancer Neg Hx      Colon cancer Neg Hx      Ovarian cancer Neg Hx       Social History     Socioeconomic History    Marital status: Single   Tobacco Use    Smoking status: Former     Current packs/day: 0.00     Average packs/day: 2.0 packs/day for 28.0 years (56.0 ttl pk-yrs)     Types: Cigarettes     Start date: 1983     Quit date: 2011     Years since quittin.8    Smokeless tobacco: Never   Substance and Sexual Activity    Alcohol use: Yes     Comment: beer weekends    Drug use: No    Sexual activity: Yes     Partners: Male     Birth control/protection: See Surgical Hx     Comment: BTL   Social History Narrative    Living with her grandbran, who is 9 y/o.      Social Drivers of Health     Financial Resource Strain: Medium Risk (2025)    Overall Financial Resource Strain (CARDIA)     Difficulty of Paying Living Expenses: Somewhat hard   Food Insecurity: Food Insecurity Present (2025)    Hunger Vital Sign     Worried About Running Out of Food in the Last Year: Sometimes true     Ran Out of Food in the Last Year: Sometimes true   Transportation Needs: No Transportation Needs (2025)    PRAPARE - Transportation     Lack of Transportation (Medical): No     Lack of Transportation (Non-Medical): No   Physical Activity: Inactive (2025)    Exercise Vital Sign     Days of Exercise per Week: 0  days     Minutes of Exercise per Session: 20 min   Stress: Stress Concern Present (6/12/2025)    [employer] - Occupational Stress Questionnaire     Feeling of Stress : Very much   Housing Stability: High Risk (6/12/2025)    Housing Stability Vital Sign     Unable to Pay for Housing in the Last Year: No     Number of Times Moved in the Last Year: 2017     Homeless in the Last Year: No       Labs: Previous labs reviewed.  Lab Results   Component Value Date    WBC 10.67 11/05/2021    HGB 14.6 11/05/2021    HCT 43.5 11/05/2021    MCV 75 (L) 11/05/2021     11/05/2021         Lab Results   Component Value Date     02/18/2025    K 4.2 02/18/2025     02/18/2025    CO2 27 02/18/2025     02/18/2025    BUN 15 02/18/2025    CREATININE 0.9 02/18/2025    CALCIUM 9.6 02/18/2025    PROT 7.6 02/18/2025    ALBUMIN 3.7 02/18/2025    BILITOT 0.5 02/18/2025    ALKPHOS 111 02/18/2025    AST 23 02/18/2025    ALT 13 02/18/2025    ANIONGAP 7 (L) 02/18/2025    EGFRNORACEVR >60.0 02/18/2025       Imaging: Previous imaging reviewed.       Assessment and Plan:       Right shoulder pain, unspecified chronicity  Subacute shoulder pain following fall. Normal strength. Will get XR to r/o fracture although lower suspicion. Highest concern for muscle spasm +/- arthritis. Recommended PT, but patient opts for home exercises. Robaxin for muscle spasm and continue topical treatment that has been helping.  -     X-ray Shoulder 2 or More Views Right; Future; Expected date: 07/10/2025  -     methocarbamoL (ROBAXIN) 500 MG Tab; Take 1 tablet (500 mg total) by mouth 3 (three) times daily as needed (muscle pain).  Dispense: 30 tablet; Refill: 0    RTC PRN      Discussed with Dr. Bustamante, further recommendations as per attending addendum. Please feel free to contact me with any questions or concerns.    Evelyn Medina MD  Resident [facility], PGY-III  [facility]  [address]  [place] [address]  [phone]

## 2025-08-08 ENCOUNTER — OFFICE VISIT (OUTPATIENT)
Dept: PODIATRY | Facility: CLINIC | Age: 60
End: 2025-08-08
Payer: MEDICAID

## 2025-08-08 VITALS
BODY MASS INDEX: 36.58 KG/M2 | DIASTOLIC BLOOD PRESSURE: 84 MMHG | WEIGHT: 219.56 LBS | SYSTOLIC BLOOD PRESSURE: 173 MMHG | HEIGHT: 65 IN | HEART RATE: 66 BPM

## 2025-08-08 DIAGNOSIS — M79.672 FOOT PAIN, LEFT: Primary | ICD-10-CM

## 2025-08-08 DIAGNOSIS — G60.9 IDIOPATHIC PERIPHERAL NEUROPATHY: ICD-10-CM

## 2025-08-08 DIAGNOSIS — M54.50 CHRONIC BILATERAL LOW BACK PAIN WITHOUT SCIATICA: ICD-10-CM

## 2025-08-08 DIAGNOSIS — G89.29 CHRONIC BILATERAL LOW BACK PAIN WITHOUT SCIATICA: ICD-10-CM

## 2025-08-08 PROCEDURE — 99214 OFFICE O/P EST MOD 30 MIN: CPT | Mod: PBBFAC | Performed by: PODIATRIST

## 2025-08-08 PROCEDURE — 99999 PR PBB SHADOW E&M-EST. PATIENT-LVL IV: CPT | Mod: PBBFAC,,, | Performed by: PODIATRIST

## 2025-08-08 NOTE — PROGRESS NOTES
Subjective:      Patient ID: Batool Rivera is a 60 y.o. female.    Chief Complaint:   Foot Pain (Left, shooting pains on outside of foot that go up leg) and Numbness (Left)    Batool is a 60 y.o. female who returns  to the podiatry clinic  with complaint of  left foot and leg pain.   Pt relates that now the numbness is in the toes and forefoot.  No pain or numbness right.  Pt relates she has not seen neurology and was not aware it was recommended.   She relates that she was followed by     Pt was seen by me in  for same issue.   Has been followed  in  by Dr. Carlin and Dr. Bauer for same problem.  Pt also saw ortho.     Pt had MRI that demonstrated:   bursitis of multiple intermetatarsal spaces L foot.     Xrays neg  Oral mediation and injections provided no relief.     Pt was also eval by vascular Dr. Roblero:   1. Left Foot/Leg Pain- Etiology likely multifactorial with contribution from lumbar degenerative disc disease and PAD.  Check CTA abd/pelvis with iliofemoral runoff, CT lumbar spine and echo.       2. Left Foot Numbness- Likely neuropathic in origin.  Refer to Neurology for evaluation.     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     Heart disease, unspecified     Hyperlipidemia     Hypertension      Past Surgical History:   Procedure Laterality Date     SECTION      COLONOSCOPY N/A 2023    Procedure: COLONOSCOPY;  Surgeon: Lon George MD;  Location: [facility] ([address] [place] [address]);  Service: Colon and Rectal;  Laterality: N/A;  referred by pcp arsah Cordova instructions sent to pt portal.cf  pre call, pt confirmed - mf    HYSTERECTOMY   or     OOPHORECTOMY      one ovary was removed    TUBAL LIGATION       Current Outpatient Medications on File Prior to Visit   Medication Sig Dispense Refill    amLODIPine (NORVASC) 10 MG tablet TAKE 1 TABLET(10 MG) BY  MOUTH EVERY EVENING FOR BLOOD PRESSURE 90 tablet 3    atorvastatin (LIPITOR) 80 MG tablet TAKE 1 TABLET BY MOUTH ONCE DAILY FOR CHOLESTEROL AND TO PROTECT YOUR HEART 90 tablet 4    chlorhexidine (PERIDEX) 0.12 % solution SMARTSI-10 Milliliter(s) By Mouth Twice Daily      ergocalciferol (ERGOCALCIFEROL) 50,000 unit Cap TAKE ONE CAPSULE BY MOUTH EVERY 7 DAYS FOR VITAMIN D DEFICIENCY 12 capsule 1    EScitalopram oxalate (LEXAPRO) 20 MG tablet Take 1 tablet (20 mg total) by mouth once daily. 30 tablet 11    ezetimibe (ZETIA) 10 mg tablet Take 1 tablet (10 mg total) by mouth every evening. 90 tablet 3    fluticasone propionate (FLONASE) 50 mcg/actuation nasal spray 1 spray (50 mcg total) by Each Nostril route once daily. 18.2 mL 2    lisinopriL-hydrochlorothiazide (PRINZIDE,ZESTORETIC) 20-12.5 mg per tablet Take 2 tablets by mouth once daily. 180 tablet 3    meloxicam (MOBIC) 15 MG tablet Take 1 tablet (15 mg total) by mouth once daily. 30 tablet 0    traZODone (DESYREL) 150 MG tablet TAKE 1 TABLET(150 MG) BY MOUTH EVERY EVENING AT BEDTIME AS NEEDED FOR INSOMNIA 90 tablet 2    aspirin (ECOTRIN) 81 MG EC tablet Take 1 tablet (81 mg total) by mouth once daily. 30 tablet 11    gabapentin (NEURONTIN) 400 MG capsule Take 1 capsule (400 mg total) by mouth every evening. If no relief, may increase dose to twice per day. (Patient not taking: Reported on 2025) 90 capsule 3    [DISCONTINUED] lisinopriL (PRINIVIL,ZESTRIL) 20 MG tablet Take 1 tablet (20 mg total) by mouth once daily. 90 tablet 3     No current facility-administered medications on file prior to visit.     Review of patient's allergies indicates:   Allergen Reactions    Coconut Itching    Losartan Hallucinations    Pineapple     Voltaren [diclofenac sodium] Rash     Rash with voltaren GEL, not to NSAIDs in general       Review of Systems   Constitutional: Negative for chills, decreased appetite, fever, malaise/fatigue, night sweats, weight gain and weight loss.  "  Cardiovascular:  Negative for chest pain, claudication, dyspnea on exertion, leg swelling, palpitations and syncope.   Respiratory:  Negative for cough and shortness of breath.    Endocrine: Negative for cold intolerance and heat intolerance.   Hematologic/Lymphatic: Negative for bleeding problem. Does not bruise/bleed easily.   Skin:  Negative for color change, dry skin, flushing, itching, nail changes, poor wound healing, rash, skin cancer, suspicious lesions and unusual hair distribution.   Musculoskeletal:  Positive for back pain, myalgias and stiffness. Negative for arthritis, falls, gout, joint pain, joint swelling, muscle cramps, muscle weakness and neck pain.   Gastrointestinal:  Negative for diarrhea, nausea and vomiting.   Neurological:  Positive for numbness and paresthesias. Negative for dizziness, focal weakness, light-headedness, tremors, vertigo and weakness.   Psychiatric/Behavioral:  Negative for altered mental status and depression. The patient does not have insomnia.    Allergic/Immunologic: Negative.            Objective:       Vitals:    08/08/25 0821   BP: (!) 173/84   Pulse: 66   Weight: 99.6 kg (219 lb 9.3 oz)   Height: 5' 5" (1.651 m)   PainSc: 0-No pain   PainLoc: Foot   99.6 kg (219 lb 9.3 oz)     Physical Exam  Vitals reviewed.   Constitutional:       General: She is not in acute distress.     Appearance: She is not ill-appearing.      Comments:      Cardiovascular:      Pulses:           Dorsalis pedis pulses are 1+ on the right side and 1+ on the left side.        Posterior tibial pulses are 1+ on the right side and 1+ on the left side.   Musculoskeletal:      Right lower leg: No edema.      Left lower leg: No edema.      Right foot: Prominent metatarsal heads present. No tenderness or bony tenderness.      Left foot: Prominent metatarsal heads present. No tenderness or bony tenderness.   Feet:      Right foot:      Protective Sensation: 10 sites tested.  10 sites sensed.      Skin " integrity: No callus or dry skin.      Toenail Condition: Right toenails are normal.      Left foot:      Protective Sensation: 10 sites tested.  5 sites sensed.      Skin integrity: No callus or dry skin.      Toenail Condition: Left toenails are normal.      Comments: No open lesions noted    Decreased swm left forefoot  Neurological:      Mental Status: She is alert.   Psychiatric:         Attention and Perception: Attention normal.         Mood and Affect: Mood normal.         Speech: Speech normal.             Assessment:       Encounter Diagnoses   Name Primary?    Foot pain, left Yes    Idiopathic peripheral neuropathy     Chronic bilateral low back pain without sciatica            Plan:       Batool was seen today for foot pain and numbness.    Diagnoses and all orders for this visit:    Foot pain, left  -     Ambulatory referral/consult to Neurology; Future  -     Ambulatory referral/consult to Spine Care; Future    Idiopathic peripheral neuropathy  -     Ambulatory referral/consult to Neurology; Future  -     Ambulatory referral/consult to Spine Care; Future    Chronic bilateral low back pain without sciatica  -     Ambulatory referral/consult to Spine Care; Future        I counseled the patient on her conditions, their implications and medical management.      Extensive chart review    Physical exam and image review.    Dispensed pt a new MT pad.    No further podiatry intervention warranted.   D/w pt re-recommend neurology consult and/or back and spine clinic.

## 2025-08-31 ENCOUNTER — HOSPITAL ENCOUNTER (EMERGENCY)
Facility: OTHER | Age: 60
Discharge: HOME | End: 2025-08-31
Attending: EMERGENCY MEDICINE
Payer: MEDICAID

## 2025-08-31 VITALS
TEMPERATURE: 98 F | BODY MASS INDEX: 36.15 KG/M2 | SYSTOLIC BLOOD PRESSURE: 168 MMHG | DIASTOLIC BLOOD PRESSURE: 70 MMHG | HEIGHT: 65 IN | HEART RATE: 68 BPM | RESPIRATION RATE: 20 BRPM | WEIGHT: 217 LBS | OXYGEN SATURATION: 99 %

## 2025-08-31 DIAGNOSIS — S63.502A SPRAIN OF LEFT WRIST, INITIAL ENCOUNTER: ICD-10-CM

## 2025-08-31 DIAGNOSIS — M25.532 LEFT WRIST PAIN: ICD-10-CM

## 2025-08-31 DIAGNOSIS — S90.512A ABRASION OF LEFT ANKLE, INITIAL ENCOUNTER: Primary | ICD-10-CM

## 2025-08-31 DIAGNOSIS — M25.572 LEFT ANKLE PAIN: ICD-10-CM
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
## 2025-08-31 PROCEDURE — 25000003 PHARM REV CODE 250: Performed by: NURSE PRACTITIONER

## 2025-08-31 PROCEDURE — 99284 EMERGENCY DEPT VISIT MOD MDM: CPT | Mod: 25

## 2025-08-31 RX ORDER — ACETAMINOPHEN 500 MG
1000 TABLET ORAL
Status: COMPLETED | OUTPATIENT
Start: 2025-08-31 | End: 2025-08-31

## 2025-08-31 RX ADMIN — ACETAMINOPHEN 1000 MG: 500 TABLET, FILM COATED ORAL at 03:08

## 2025-09-04 ENCOUNTER — TELEPHONE (OUTPATIENT)
Dept: INTERNAL MEDICINE | Facility: CLINIC | Age: 60
End: 2025-09-04
Payer: MEDICAID

## 2025-09-05 ENCOUNTER — TELEPHONE (OUTPATIENT)
Dept: INTERNAL MEDICINE | Facility: CLINIC | Age: 60
End: 2025-09-05
Payer: MEDICAID